# Patient Record
Sex: FEMALE | Race: WHITE | Employment: UNEMPLOYED | URBAN - METROPOLITAN AREA
[De-identification: names, ages, dates, MRNs, and addresses within clinical notes are randomized per-mention and may not be internally consistent; named-entity substitution may affect disease eponyms.]

---

## 2017-02-22 LAB
HBSAG, EXTERNAL: NORMAL
HIV, EXTERNAL: NORMAL
RUBELLA, EXTERNAL: NORMAL

## 2017-03-08 ENCOUNTER — HOSPITAL ENCOUNTER (EMERGENCY)
Age: 32
Discharge: HOME | End: 2017-03-08
Attending: EMERGENCY MEDICINE
Payer: MEDICAID

## 2017-03-08 ENCOUNTER — APPOINTMENT (OUTPATIENT)
Dept: ULTRASOUND IMAGING | Age: 32
End: 2017-03-08
Payer: MEDICAID

## 2017-03-08 VITALS
TEMPERATURE: 98.6 F | HEIGHT: 60 IN | HEART RATE: 89 BPM | OXYGEN SATURATION: 100 % | SYSTOLIC BLOOD PRESSURE: 103 MMHG | BODY MASS INDEX: 19.09 KG/M2 | DIASTOLIC BLOOD PRESSURE: 57 MMHG | RESPIRATION RATE: 18 BRPM | WEIGHT: 97.22 LBS

## 2017-03-08 DIAGNOSIS — F90.9 ATTENTION DEFICIT HYPERACTIVITY DISORDER (ADHD), UNSPECIFIED ADHD TYPE: ICD-10-CM

## 2017-03-08 DIAGNOSIS — O20.0 THREATENED MISCARRIAGE IN EARLY PREGNANCY: Primary | ICD-10-CM

## 2017-03-08 DIAGNOSIS — F41.9 ANXIETY: ICD-10-CM

## 2017-03-08 DIAGNOSIS — Z87.59 HISTORY OF MISCARRIAGE: ICD-10-CM

## 2017-03-08 PROCEDURE — 74011250637 HC RX REV CODE- 250/637: Performed by: PHYSICIAN ASSISTANT

## 2017-03-08 PROCEDURE — 76801 OB US < 14 WKS SINGLE FETUS: CPT

## 2017-03-08 PROCEDURE — 99283 EMERGENCY DEPT VISIT LOW MDM: CPT

## 2017-03-08 RX ORDER — ACETAMINOPHEN 500 MG
1000 TABLET ORAL ONCE
Status: COMPLETED | OUTPATIENT
Start: 2017-03-08 | End: 2017-03-08

## 2017-03-08 RX ORDER — DOXYLAMINE SUCCINATE AND PYRIDOXINE HYDROCHLORIDE, DELAYED RELEASE TABLETS 10 MG/10 MG 10; 10 MG/1; MG/1
TABLET, DELAYED RELEASE ORAL
COMMUNITY
End: 2017-10-09

## 2017-03-08 RX ADMIN — ACETAMINOPHEN 1000 MG: 500 TABLET, FILM COATED ORAL at 11:27

## 2017-03-08 NOTE — ED PROVIDER NOTES
HPI Comments: Elva Quiroz is a A4 28 y.o. female with pertinent PMHx of miscarriage x 2 and endometriosis presenting ambulatory to the ED c/o vaginal bleeding x last night. Pt states that she began having the vaginal bleeding shortly after sexual intercourse last night and states that she became concerned after beginning to pass clots this morning. Pt notes an associated symptom of headache. Pt states that her prior miscarriages were during her first trimester and were likely related to her hx of endometriosis. Pt states that her current symptoms are similar to her past miscarriages. Pt states that she has had a full OB/GYN work up and is being provided medications which control her nausea during her pregnancy. Pt specifically denies any vomiting or diarrhea. Pt also c/o a nonproductive cough x ~1 week. Pt denies any fevers/chills, chest pain or SOB. PCP: JASON Florez  OB/GYN: Sharon Cueto M.D. Social Hx: + tobacco use, - alcohol use, - illicit drug use    There are no other complaints, changes, or physical findings at this time. The history is provided by the patient. No  was used.         Past Medical History:   Diagnosis Date    Calculus of kidney     Dyspepsia and other specified disorders of function of stomach     GERD (gastroesophageal reflux disease)     Kidney disease     hx of kidney stone    Other ill-defined conditions(799.89)     kidney stone in pass,passed    Psychiatric disorder     ADHD       Past Surgical History:   Procedure Laterality Date    ABDOMEN SURGERY PROC UNLISTED  10/30/12    Laparoscopic cholecystectomy    HX APPENDECTOMY      HX CHOLECYSTECTOMY      HX DILATION AND CURETTAGE      HX GYN      miscarriage x2    HX GYN      C section    HX HEENT      wisdom teeth extraction    HX ORTHOPAEDIC      torn catiledge repair left knee    HX OTHER SURGICAL      HX WISDOM TEETH EXTRACTION      ND ANESTH,KNEE AREA SURGERY      UPPER GI ENDOSCOPY,BIOPSY  12/17/2015              Family History:   Problem Relation Age of Onset    Heart Disease Father        Social History     Social History    Marital status:      Spouse name: N/A    Number of children: N/A    Years of education: N/A     Occupational History    Not on file. Social History Main Topics    Smoking status: Current Every Day Smoker     Packs/day: 0.25     Years: 0.60    Smokeless tobacco: Never Used      Comment: about 6 cigarettes per day at present    Alcohol use No      Comment: rarely and not during pregnancy    Drug use: No    Sexual activity: Yes     Partners: Male     Birth control/ protection: None     Other Topics Concern    Not on file     Social History Narrative         ALLERGIES: Ambien [zolpidem]; Morphine; and Vicodin [hydrocodone-acetaminophen]    Review of Systems   Constitutional: Negative. Negative for chills and fever. Eyes: Negative. Respiratory: Positive for cough. Negative for shortness of breath and wheezing. Cardiovascular: Negative. Negative for chest pain. Gastrointestinal: Positive for nausea. Negative for abdominal pain, diarrhea and vomiting. Genitourinary: Positive for vaginal bleeding. Negative for difficulty urinating, dysuria and vaginal pain. Skin: Negative. Neurological: Positive for headaches. Psychiatric/Behavioral: Negative. All other systems reviewed and are negative. Vitals:    03/08/17 1029 03/08/17 1226   BP: 118/73 103/57   Pulse: 100 89   Resp: 18 18   Temp: 98.6 °F (37 °C)    SpO2: 100% 100%   Weight: 44.1 kg (97 lb 3.6 oz)    Height: 5' (1.524 m)             Physical Exam   Constitutional: She is oriented to person, place, and time. She appears well-developed and well-nourished. No distress. Tearful throughout exam   HENT:   Head: Normocephalic and atraumatic. Nose: Nose normal.   Eyes: Conjunctivae and EOM are normal. Right eye exhibits no discharge.  Left eye exhibits no discharge. No scleral icterus. Neck: Normal range of motion. Neck supple. No JVD present. No tracheal deviation present. No thyromegaly present. Cardiovascular: Normal rate, regular rhythm and normal heart sounds. Pulmonary/Chest: Effort normal and breath sounds normal. No respiratory distress. She has no wheezes. Abdominal: Soft. There is no tenderness. Musculoskeletal: Normal range of motion. She exhibits no edema. Lymphadenopathy:     She has no cervical adenopathy. Neurological: She is alert and oriented to person, place, and time. She exhibits normal muscle tone. Coordination normal.   Skin: Skin is warm and dry. She is not diaphoretic. Psychiatric: Her behavior is normal. Judgment normal. Her mood appears anxious. Nursing note and vitals reviewed. MDM  Number of Diagnoses or Management Options  History of miscarriage:   Threatened miscarriage in early pregnancy:   Diagnosis management comments: DDx: threatened , subchorionic hemorrhage       Amount and/or Complexity of Data Reviewed  Tests in the radiology section of CPT®: ordered and reviewed  Review and summarize past medical records: yes  Independent visualization of images, tracings, or specimens: yes    Patient Progress  Patient progress: stable    ED Course       Pelvic Exam  Date/Time: 3/8/2017 11:19 AM  Performed by: PA  Procedure duration:  15 minutes. Documented by:  JASON Garcia. Exam assisted by:  Wilson Elizalde RN. Type of exam performed: speculum. External genitalia appearance: normal.    Vaginal exam:  bleeding. Bleeding: mild  Cervical exam:  os closed. Patient tolerance: Patient tolerated the procedure well with no immediate complications        Procedure Note - Pelvic Exam:    11:07 AM  Performed by: Rodolfo Ahumada  Pelvic exam was performed using bimanual and speculum. Further findings noted under procedures tab. The procedure took 1-15 minutes, and pt tolerated well.   Written by Betti Leyden Cheryl Peralta ED Scribe, as dictated by [employer]. Progress Note:  12:59 PM  Pt and/or family have been updated on their results. Pt and/or pt's family are aware of the plan of care and are in agreement. Written by FAITH Mouraibdalila, as dictated by [employer]. IMAGING RESULTS:  US PREG UTS < 14 WKS SNGL   Final Result     INDICATION: threatened miscarriage first trimester , postcoital bleeding.     COMPARISON: Ultrasound of 7/28/2015     EXAM: Realtime transabdominal ultrasound of the female pelvis. Transvaginal  ultrasound was refused by the patient.     FINDINGS: The uterus measures 10.3 x 6.8 x 7.4 cm. A fundic intrauterine  pregnancy is demonstrated with demonstration of a single fetus showing positive  cardiac activity measuring 181 beats per minute. Crown-rump length measurement  of 3.05 cm yields an estimated gestational age of 10 weeks, 0 days +/- 6 days. There is a normal-appearing anterior placenta and normal appearing gestational  sac and amniotic sac fluid volume for dates. The cervix shows a normal length of  3.9 cm. No uterine or cervical mass is shown. The right ovary measures 3.3 x 2.2  x 2.1 cm and left ovary 2.9 x 1.1 x 2.9 cm without demonstration of adnexal  mass. No free pelvic fluid is demonstrated.     IMPRESSION  IMPRESSION: Viable 10 week single intrauterine pregnancy. MEDICATIONS GIVEN:  Medications   acetaminophen (TYLENOL) tablet 1,000 mg (1,000 mg Oral Given 3/8/17 1127)       IMPRESSION:  1. Threatened miscarriage in early pregnancy    2. History of miscarriage    3. Attention deficit hyperactivity disorder (ADHD), unspecified ADHD type    4. Anxiety        PLAN:  1. Current Discharge Medication List      CONTINUE these medications which have NOT CHANGED    Details   doxylamine-pyridoxine (DICLEGIS) 10-10 mg TbEC Take  by mouth. MME69/PFAO FUM/FOLIC ACID (PRENATAL PO) Take  by mouth.            2.   Follow-up Information     Follow up With Details Comments Contact Info    Ronald Godwin MD   [address] [facility]  [phone]      [facility] EMERGENCY DEPT  If symptoms worsen [address] [facility]  [address]  [facility]  [phone]        Return to ED if worse   DISCHARGE NOTE:  1:04 PM  The patient is ready for discharge. The patient's signs, symptoms, diagnosis, and discharge instructions have been discussed and the patient and/or family has conveyed their understanding. The patient and/or family is to follow up as recommended or return to the ER should their symptoms worsen. Plan has been discussed and the patient and/or family is in agreement. Written by Merari Knight, ED Scribe, as dictated by [employer]. Attestation: This note is prepared by Keyon العراقي. Susie Knight, acting as Scribe for Shaji Buitrago. RADHA Domínguez: The scribe's documentation has been prepared under my direction and personally reviewed by me in its entirety. I confirm that the note above accurately reflects all work, treatment, procedures, and medical decision making performed by me.

## 2017-03-08 NOTE — ED NOTES
Discharge instructions reviewed with pt by GRAHAM Nixon. Pt able to return/verbalize discharge instructions. Copy of discharge instructions given. Patient condition stable, respiratory status within normal limits, neuro status intact.  Ambulatory out of er, accompanied by significant other

## 2017-03-08 NOTE — ED NOTES
Assumed care of patient. Pt resting in position of comfort. Call bell within reach. Pt presents to ED with reports of being 10 weeks pregnant. Reports last night after having sexual intercourse she started with vaginal bleeding. Bleeding subsided and then this morning she coughed and \"I feel like I am passing something and it is still attached. Reports 4 pregnancies including this one with 1 living child. Having pressure in her pelvis area. Denies any fevers, vomiting.

## 2017-03-08 NOTE — ED NOTES
Pt back from [address]. Resting in position of comfort.  at bedside brought patient lunch.  Pt states headache subsided

## 2017-03-08 NOTE — ED NOTES
Assisted PA with pelvic exam. One small grape sized blood clot noted. Cervix is closed. Pt given ginger ale, saltines and medicated with tylenol.  Aware of plan of care

## 2017-03-08 NOTE — DISCHARGE INSTRUCTIONS
Threatened Miscarriage: Care Instructions  Your Care Instructions    Some women have light spotting or bleeding during the first 12 weeks of pregnancy. In some cases this is normal. Light spotting or bleeding can also be a sign of a possible loss of the pregnancy. This is called a threatened miscarriage. At this point, the doctor may not be able to tell if your vaginal bleeding is normal or is a sign of a miscarriage. In early pregnancy, things such as stress, exercise, and sex do not cause miscarriage. You may be worried or upset about the possibility of losing your pregnancy. But do not blame yourself. There is no treatment to stop a threatened miscarriage. If you do have a miscarriage, there was nothing you could have done to prevent it. A miscarriage usually means that the pregnancy is not developing normally. The doctor has checked you carefully, but problems can develop later. If you notice any problems or new symptoms, get medical treatment right away. Follow-up care is a key part of your treatment and safety. Be sure to make and go to all appointments, and call your doctor if you are having problems. It's also a good idea to know your test results and keep a list of the medicines you take. How can you care for yourself at home? · If you do have a miscarriage, you will probably have some vaginal bleeding for 1 to 2 weeks. Use pads instead of tampons. · Take acetaminophen (Tylenol) for cramps. Read and follow all instructions on the label. · Do not take two or more pain medicines at the same time unless the doctor told you to. Many pain medicines have acetaminophen, which is Tylenol. Too much acetaminophen (Tylenol) can be harmful. · Do not have sex until your doctor says it is okay. · Get lots of rest over the next several days. · You may do your normal activities if you feel well enough to do them. But do not do any heavy exercise until your doctor says it is okay.   · Eat a balanced diet that is high in iron and vitamin C. Foods rich in iron include red meat, shellfish, eggs, beans, and leafy green vegetables. Foods high in vitamin C include citrus fruits, tomatoes, and broccoli. Talk to your doctor about whether you need to take iron pills or a multivitamin. · Do not drink alcohol or use tobacco or illegal drugs. · Do not smoke. If you need help quitting, talk to your doctor about stop-smoking programs and medicines. These can increase your chances of quitting for good. When should you call for help? Call 911 anytime you think you may need emergency care. For example, call if:  · You have sudden, severe pain in your belly or pelvis. · You passed out (lost consciousness). · You have severe vaginal bleeding. Call your doctor now or seek immediate medical care if:  · You are dizzy or lightheaded, or you feel like you may faint. · You have new or increased pain in your belly or pelvis. · Your vaginal bleeding is getting worse. · You have increased pain in the vaginal area. · You have a fever. · You think you may have passed tissue. Save any tissue that you pass. Take it to your doctor's office as soon as you can. Watch closely for changes in your health, and be sure to contact your doctor if:  · You have new or worse vaginal discharge. · You do not get better as expected. Where can you learn more? Go to http://john-rekha.info/. Enter S755 in the search box to learn more about \"Threatened Miscarriage: Care Instructions. \"  Current as of: May 30, 2016  Content Version: 11.1  © 4591-8776 [employer]. Care instructions adapted under license by [employer] (which disclaims liability or warranty for this information). If you have questions about a medical condition or this instruction, always ask your healthcare professional. [address] any warranty or liability for your use of this information.

## 2017-07-13 LAB
ANTIBODY SCREEN, EXTERNAL: NORMAL
T. PALLIDUM, EXTERNAL: NORMAL

## 2017-09-05 LAB — GRBS, EXTERNAL: NORMAL

## 2017-10-06 ENCOUNTER — ANESTHESIA EVENT (OUTPATIENT)
Dept: LABOR AND DELIVERY | Age: 32
DRG: 542 | End: 2017-10-06
Payer: MEDICAID

## 2017-10-06 ENCOUNTER — ANESTHESIA (OUTPATIENT)
Dept: LABOR AND DELIVERY | Age: 32
DRG: 542 | End: 2017-10-06
Payer: MEDICAID

## 2017-10-06 ENCOUNTER — HOSPITAL ENCOUNTER (INPATIENT)
Age: 32
LOS: 3 days | Discharge: HOME | DRG: 542 | End: 2017-10-09
Attending: OBSTETRICS & GYNECOLOGY | Admitting: OBSTETRICS & GYNECOLOGY
Payer: MEDICAID

## 2017-10-06 PROBLEM — O42.90 ROM (RUPTURE OF MEMBRANES), PREMATURE: Status: ACTIVE | Noted: 2017-10-06

## 2017-10-06 LAB
A1 MICROGLOB PLACENTAL VAG QL: POSITIVE
BASOPHILS # BLD: 0 K/UL (ref 0–0.1)
BASOPHILS NFR BLD: 0 % (ref 0–1)
CONTROL LINE PRESENT?: YES
DAILY QC (YES/NO)?: YES
EOSINOPHIL # BLD: 0.2 K/UL (ref 0–0.4)
EOSINOPHIL NFR BLD: 2 % (ref 0–7)
ERYTHROCYTE [DISTWIDTH] IN BLOOD BY AUTOMATED COUNT: 12.9 % (ref 11.5–14.5)
EXPIRATION DATE: NORMAL
HCT VFR BLD AUTO: 35.1 % (ref 35–47)
HGB BLD-MCNC: 12 G/DL (ref 11.5–16)
INTERNAL NEGATIVE CONTROL: NEGATIVE
KIT LOT NO.: NORMAL
LYMPHOCYTES # BLD: 2.3 K/UL (ref 0.8–3.5)
LYMPHOCYTES NFR BLD: 25 % (ref 12–49)
MCH RBC QN AUTO: 32.6 PG (ref 26–34)
MCHC RBC AUTO-ENTMCNC: 34.2 G/DL (ref 30–36.5)
MCV RBC AUTO: 95.4 FL (ref 80–99)
MONOCYTES # BLD: 0.8 K/UL (ref 0–1)
MONOCYTES NFR BLD: 9 % (ref 5–13)
NEUTS SEG # BLD: 6 K/UL (ref 1.8–8)
NEUTS SEG NFR BLD: 64 % (ref 32–75)
PH, VAGINAL FLUID: 5 (ref 5–6.1)
PLATELET # BLD AUTO: 218 K/UL (ref 150–400)
RBC # BLD AUTO: 3.68 M/UL (ref 3.8–5.2)
WBC # BLD AUTO: 9.3 K/UL (ref 3.6–11)

## 2017-10-06 PROCEDURE — 99283 EMERGENCY DEPT VISIT LOW MDM: CPT

## 2017-10-06 PROCEDURE — 74011250637 HC RX REV CODE- 250/637: Performed by: OBSTETRICS & GYNECOLOGY

## 2017-10-06 PROCEDURE — 74011250636 HC RX REV CODE- 250/636: Performed by: OBSTETRICS & GYNECOLOGY

## 2017-10-06 PROCEDURE — 85025 COMPLETE CBC W/AUTO DIFF WBC: CPT | Performed by: OBSTETRICS & GYNECOLOGY

## 2017-10-06 PROCEDURE — 0KQM0ZZ REPAIR PERINEUM MUSCLE, OPEN APPROACH: ICD-10-PCS | Performed by: SPECIALIST

## 2017-10-06 PROCEDURE — 00HU33Z INSERTION OF INFUSION DEVICE INTO SPINAL CANAL, PERCUTANEOUS APPROACH: ICD-10-PCS | Performed by: ANESTHESIOLOGY

## 2017-10-06 PROCEDURE — 74011250636 HC RX REV CODE- 250/636

## 2017-10-06 PROCEDURE — 30233N1 TRANSFUSION OF NONAUTOLOGOUS RED BLOOD CELLS INTO PERIPHERAL VEIN, PERCUTANEOUS APPROACH: ICD-10-PCS | Performed by: SPECIALIST

## 2017-10-06 PROCEDURE — 77030011943

## 2017-10-06 PROCEDURE — 83986 ASSAY PH BODY FLUID NOS: CPT | Performed by: OBSTETRICS & GYNECOLOGY

## 2017-10-06 PROCEDURE — 3E0R3BZ INTRODUCTION OF ANESTHETIC AGENT INTO SPINAL CANAL, PERCUTANEOUS APPROACH: ICD-10-PCS | Performed by: ANESTHESIOLOGY

## 2017-10-06 PROCEDURE — 86900 BLOOD TYPING SEROLOGIC ABO: CPT | Performed by: OBSTETRICS & GYNECOLOGY

## 2017-10-06 PROCEDURE — 10907ZC DRAINAGE OF AMNIOTIC FLUID, THERAPEUTIC FROM PRODUCTS OF CONCEPTION, VIA NATURAL OR ARTIFICIAL OPENING: ICD-10-PCS | Performed by: OBSTETRICS & GYNECOLOGY

## 2017-10-06 PROCEDURE — 74011250636 HC RX REV CODE- 250/636: Performed by: ANESTHESIOLOGY

## 2017-10-06 PROCEDURE — 86923 COMPATIBILITY TEST ELECTRIC: CPT | Performed by: OBSTETRICS & GYNECOLOGY

## 2017-10-06 PROCEDURE — 84112 EVAL AMNIOTIC FLUID PROTEIN: CPT | Performed by: OBSTETRICS & GYNECOLOGY

## 2017-10-06 PROCEDURE — 77030007880 HC KT SPN EPDRL BBMI -B

## 2017-10-06 PROCEDURE — 75410000002 HC LABOR FEE PER 1 HR

## 2017-10-06 PROCEDURE — 0UQC7ZZ REPAIR CERVIX, VIA NATURAL OR ARTIFICIAL OPENING: ICD-10-PCS | Performed by: SPECIALIST

## 2017-10-06 PROCEDURE — 36415 COLL VENOUS BLD VENIPUNCTURE: CPT | Performed by: OBSTETRICS & GYNECOLOGY

## 2017-10-06 PROCEDURE — 65270000029 HC RM PRIVATE

## 2017-10-06 PROCEDURE — 0W3R7ZZ CONTROL BLEEDING IN GENITOURINARY TRACT, VIA NATURAL OR ARTIFICIAL OPENING: ICD-10-PCS | Performed by: SPECIALIST

## 2017-10-06 PROCEDURE — A4300 CATH IMPL VASC ACCESS PORTAL: HCPCS

## 2017-10-06 RX ORDER — FENTANYL CITRATE 50 UG/ML
100 INJECTION, SOLUTION INTRAMUSCULAR; INTRAVENOUS ONCE
Status: DISPENSED | OUTPATIENT
Start: 2017-10-06 | End: 2017-10-07

## 2017-10-06 RX ORDER — SODIUM CHLORIDE, SODIUM LACTATE, POTASSIUM CHLORIDE, CALCIUM CHLORIDE 600; 310; 30; 20 MG/100ML; MG/100ML; MG/100ML; MG/100ML
125 INJECTION, SOLUTION INTRAVENOUS CONTINUOUS
Status: DISCONTINUED | OUTPATIENT
Start: 2017-10-06 | End: 2017-10-07

## 2017-10-06 RX ORDER — TERBUTALINE SULFATE 1 MG/ML
0.25 INJECTION SUBCUTANEOUS AS NEEDED
Status: DISCONTINUED | OUTPATIENT
Start: 2017-10-06 | End: 2017-10-07 | Stop reason: HOSPADM

## 2017-10-06 RX ORDER — FENTANYL CITRATE 50 UG/ML
INJECTION, SOLUTION INTRAMUSCULAR; INTRAVENOUS
Status: DISPENSED
Start: 2017-10-06 | End: 2017-10-07

## 2017-10-06 RX ORDER — BUPIVACAINE HYDROCHLORIDE 2.5 MG/ML
10 INJECTION, SOLUTION EPIDURAL; INFILTRATION; INTRACAUDAL ONCE
Status: DISPENSED | OUTPATIENT
Start: 2017-10-06 | End: 2017-10-07

## 2017-10-06 RX ORDER — ACETAMINOPHEN 500 MG
1000 TABLET ORAL
COMMUNITY
End: 2017-10-09

## 2017-10-06 RX ORDER — FENTANYL/BUPIVACAINE/NS/PF 2-1250MCG
1-16 PREFILLED PUMP RESERVOIR EPIDURAL CONTINUOUS
Status: DISCONTINUED | OUTPATIENT
Start: 2017-10-06 | End: 2017-10-07

## 2017-10-06 RX ORDER — FENTANYL CITRATE 50 UG/ML
50 INJECTION, SOLUTION INTRAMUSCULAR; INTRAVENOUS
Status: DISCONTINUED | OUTPATIENT
Start: 2017-10-06 | End: 2017-10-07 | Stop reason: HOSPADM

## 2017-10-06 RX ORDER — BUPIVACAINE HYDROCHLORIDE 5 MG/ML
INJECTION, SOLUTION EPIDURAL; INTRACAUDAL
Status: DISPENSED
Start: 2017-10-06 | End: 2017-10-07

## 2017-10-06 RX ORDER — BUPIVACAINE HYDROCHLORIDE 5 MG/ML
INJECTION, SOLUTION EPIDURAL; INTRACAUDAL AS NEEDED
Status: DISCONTINUED | OUTPATIENT
Start: 2017-10-06 | End: 2017-10-07 | Stop reason: HOSPADM

## 2017-10-06 RX ORDER — FENTANYL CITRATE 50 UG/ML
INJECTION, SOLUTION INTRAMUSCULAR; INTRAVENOUS AS NEEDED
Status: DISCONTINUED | OUTPATIENT
Start: 2017-10-06 | End: 2017-10-07 | Stop reason: HOSPADM

## 2017-10-06 RX ORDER — ACETAMINOPHEN 325 MG/1
650 TABLET ORAL
Status: DISCONTINUED | OUTPATIENT
Start: 2017-10-06 | End: 2017-10-09 | Stop reason: HOSPADM

## 2017-10-06 RX ORDER — NALOXONE HYDROCHLORIDE 0.4 MG/ML
0.4 INJECTION, SOLUTION INTRAMUSCULAR; INTRAVENOUS; SUBCUTANEOUS AS NEEDED
Status: DISCONTINUED | OUTPATIENT
Start: 2017-10-06 | End: 2017-10-07 | Stop reason: HOSPADM

## 2017-10-06 RX ORDER — BUPIVACAINE HYDROCHLORIDE 2.5 MG/ML
INJECTION, SOLUTION EPIDURAL; INFILTRATION; INTRACAUDAL AS NEEDED
Status: DISCONTINUED | OUTPATIENT
Start: 2017-10-06 | End: 2017-10-07 | Stop reason: HOSPADM

## 2017-10-06 RX ORDER — OXYTOCIN IN 5 % DEXTROSE 30/500 ML
2-25 PLASTIC BAG, INJECTION (ML) INTRAVENOUS
Status: DISCONTINUED | OUTPATIENT
Start: 2017-10-06 | End: 2017-10-07 | Stop reason: HOSPADM

## 2017-10-06 RX ORDER — LIDOCAINE HYDROCHLORIDE AND EPINEPHRINE 15; 5 MG/ML; UG/ML
INJECTION, SOLUTION EPIDURAL AS NEEDED
Status: DISCONTINUED | OUTPATIENT
Start: 2017-10-06 | End: 2017-10-07 | Stop reason: HOSPADM

## 2017-10-06 RX ADMIN — BUPIVACAINE HYDROCHLORIDE 8 ML: 5 INJECTION, SOLUTION EPIDURAL; INTRACAUDAL at 21:10

## 2017-10-06 RX ADMIN — BUPIVACAINE HYDROCHLORIDE 5 ML: 2.5 INJECTION, SOLUTION EPIDURAL; INFILTRATION; INTRACAUDAL at 13:55

## 2017-10-06 RX ADMIN — FENTANYL 0.2 MG/100ML-BUPIV 0.125%-NACL 0.9% EPIDURAL INJ 10 ML/HR: 2/0.125 SOLUTION at 14:00

## 2017-10-06 RX ADMIN — SODIUM CHLORIDE, SODIUM LACTATE, POTASSIUM CHLORIDE, AND CALCIUM CHLORIDE 125 ML/HR: 600; 310; 30; 20 INJECTION, SOLUTION INTRAVENOUS at 12:55

## 2017-10-06 RX ADMIN — FENTANYL CITRATE 100 MCG: 50 INJECTION, SOLUTION INTRAMUSCULAR; INTRAVENOUS at 13:52

## 2017-10-06 RX ADMIN — FENTANYL CITRATE 100 MCG: 50 INJECTION, SOLUTION INTRAMUSCULAR; INTRAVENOUS at 21:10

## 2017-10-06 RX ADMIN — SODIUM CHLORIDE, SODIUM LACTATE, POTASSIUM CHLORIDE, AND CALCIUM CHLORIDE 125 ML/HR: 600; 310; 30; 20 INJECTION, SOLUTION INTRAVENOUS at 08:00

## 2017-10-06 RX ADMIN — FENTANYL CITRATE 50 MCG: 50 INJECTION, SOLUTION INTRAMUSCULAR; INTRAVENOUS at 12:40

## 2017-10-06 RX ADMIN — LIDOCAINE HYDROCHLORIDE AND EPINEPHRINE 5 ML: 15; 5 INJECTION, SOLUTION EPIDURAL at 13:50

## 2017-10-06 RX ADMIN — Medication 2 MILLI-UNITS/MIN: at 11:12

## 2017-10-06 RX ADMIN — FENTANYL 0.2 MG/100ML-BUPIV 0.125%-NACL 0.9% EPIDURAL INJ 10 ML/HR: 2/0.125 SOLUTION at 21:16

## 2017-10-06 RX ADMIN — ACETAMINOPHEN 650 MG: 325 TABLET, FILM COATED ORAL at 17:41

## 2017-10-06 NOTE — H&P
EDC:10/04/2017  EGA: 40 weeks, 2 days      Primary Provider:  Jyoti Arreguin MD    CC:  ROM. History of Present Illness:  patient presents with leaking pink fluid since 5am  some irregular contractions      Patient's Prenatal Care with Doctor of Record Vanessa Cabezas MD Notable For -    Post dates  Normal pregnancy multigravida, ant. plac. , xy  Smoker encouraged to quit pregnant  Prev LTCS, breech, 8-6, xy,  desires  consent done  Previous depression postpartum          Impression & Recommendations:    Problem # 1:  ROM at term (GJF-534.15) (MOY45-K91.02)  +ROM by amnisure (0500)  GBS negative  EFW 36.5, 32%ile  patient desires no epidural  will wait until 6hours s/p ROM to start pitocin    *patient has been counseld for [facility] and signed consent in office          Past Medical History:     Reviewed history from 2015 and no changes required:        ADHD        Anxiety        chronic pelvic pain yrs        c-scope  neg        back pain    Past Surgical History:     Reviewed history from 2016 and no changes required:        left knee-othroscopic        [place] Teeth        Appendectomy         Cholecystectomy 10/'12        SAB        MAB-D&C         Laparoscopy 3/13- pelvic pain, Endometriosis, laser lysis (Dr. Pb Esposito)        Low Transverse  Section male Cecille Grimes [id number]        diagnostic l'scopy 10/'14 Johanna, no path    Family History Summary:      Reviewed history Last on 2017 and no changes required:10/06/2017      General Comments - FH:  Family history transferred to [address] [place] [address] And [address] [place] [address]      Social History:     Reviewed history from 2017 and no changes required:          \" [employer] \"        retail        Review of Systems        See HPI    Except as noted in the HPI, the review of systems is negative for General, Breast, , Resp, GI, Endo, MS, Psych and Heme.     Allergies    MORPHINE (Moderate)  * AMBIEN (Moderate)      Medications Removed from Medication List        Flowsheet View for Follow-up Visit     Estimated weeks of        gestation:  36 2/7     Preceptor:  son     Comment:  L&D ROM          Physical Exam     General           General appearance:  no acute distress    Head           Inspection:   normal    Chest           Lungs:  clear to auscultation          Heart:  regular rate and rhythm    Psych           Orientation:  oriented to time, place, and person          Mood:  no appearance of anxiety, depression, or agitation    Abdomen           Abdomen:  gravid            Impression & Recommendations:    Problem # 1:  ROM at term (YYX-808.81) (DEC17-I49.02)  +ROM by amnisure (0500)  GBS negative  EFW 36.5, 32%ile  patient desires no epidural  will wait until 6hours s/p ROM to start pitocin    *patient has been counseld for TOLAC and signed consent in office      Medications (at conclusion of this visit)    02/22/2017 PRENATAL VITAMIN TABS (PRENATAL VIT-FE FUMARATE-FA TABS)           LABORATORY DATA   TEST DATE RESULT   Group B Strep culture 09/05/2017 Negative                                   (Group B Strep Culture Result Field)   Blood Type 02/22/2017 A                                             (Blood Type Result Field)   Rh 02/22/2017 Positive                                   (Rh Result Field)   Rhogam Inj Given 01/17/2014 *   Tdap Vaccine Given 07/13/2017 Vacc.  [address]   Antibody Screen 07/13/2017 Negative   Rubella  [facility] Reference Ranges On or After 3/10/14                  <0.90              Non-immune      0.90 - 0.99     Equivocal      >0.99              Immune    [facility] Reference Ranges  Before 3/10/14           <5                 Non-immune             5 - 9               Equivocal            >9                 Immune  [facility] Reference Ranges       < Or = 0.90       Negative             0.91-1.09          Equivocal            > Or = 1.10       Positive   02/22/2017     3.47     TPA (T Pallidum Antibodies) 07/13/2017 Negative   Serology (RPR) 01/17/2014 *   HBsAg 02/22/2017 Negative   HIV 02/22/2017 Non Reactive   Hemoglobin 07/13/2017 11.5   Hematocrit 07/13/2017 35.0   Platelets 90/22/2475 193 X10E3/UL   TSH 05/24/2016 1.810   Urine Culture 02/22/2017 Negative   GC DNA Probe 02/22/2017 Negative   Chlamydia DNA 02/22/2017 Negative   PAP 07/23/2015 NIL   Flu Vaccine Given 09/25/2017 Vacc. [address]   HGBA1C 01/17/2014 *   HGB Electro     T4, Free 07/23/2015 1.12   BG Fasting 01/17/2014 *   GTT 1H 50G 07/13/2017 94   GTT 1H 100G 01/17/2014 *   GTT 2H 100G 01/17/2014 *   GTT 3H 100G 01/17/2014 *   Glucose Plasma 01/17/2014 *   CF Accept or Decline 02/22/2017 declined   CF Screen Result 06/13/2013 Negative   Nuchal Trans 05/17/2017 3.16^3. 16 mm&millimeters   AFP Only     Tetra     AFP Serum 02/22/2017 declined   CVS 02/22/2017 declined   AFP Amniotic 01/17/2014 *   Amnio Karyo 02/22/2017 declined   FISH 01/17/2014 *   GC Culture 01/17/2014 *   Chlamydia Cult 01/17/2014 *   Ureaplasma     Mycoplasma     WBC 02/22/2017 10.0 X10E3/UL   RBC 02/22/2017 4.17 X10E6/UL   MCV 02/22/2017 94   MCH 02/22/2017 31.2   MCHC RBC 02/22/2017 33.3     ULTRASOUND DATA   TEST DATE RESULT   Estimated Fetal Weight 09/11/2017 0756.69996600^7414 g&grams                                     Weight % 09/11/2017 31^31% %&percent                                                CADENCE 09/11/2017 10.5^10.5 cm&centimeters                    BPP 09/11/2017 8^8 [n/a]&Not applicable   Cervical Length (mm) 05/18/2016 31.000           Electronically signed by Jesse Hugo MD on 10/06/2017 at 8:18 AM    ________________________________________________________________________

## 2017-10-06 NOTE — PROGRESS NOTES
7324 Bedside report received from Aron Gomez, RN. Pt breathing with UC's. Plan for admission due to positive Amnisure. 7627 Dr Ada Baeza at bedside, discussing plan of care with pt, SVE not done. Plan to continue monitoring. 1001 Pt transferred to labor room 4.  1225 SVE per pt request 2/80/-2, posterior to the patient's left.    46 Dr Idalia Rangel at bedside to place epidural.

## 2017-10-06 NOTE — PROGRESS NOTES
1600: Bedside shift change report given to Kathryn Yi RN (oncoming nurse) by Isaac Cordova RN (offgoing nurse). Report included the following information SBAR, MAR and Recent Results. 2048: Dr. Daniel Worrell at the bedside assessing patient and [address]. 2130: Bedside shift change report given to UVALDO acharya RN (oncoming nurse) by Kathyrn Yi RN (offgoing nurse). Report included the following information SBAR, MAR and Recent Results.

## 2017-10-06 NOTE — IP AVS SNAPSHOT
[address] 
[phone] Patient: Emily Nelson MRN: [id number] MTX:[id number] You are allergic to the following Allergen Reactions Ambien (Zolpidem) Other (comments) \"Hallucinations and black outs\" Morphine Rash Recent Documentation Height Weight Breastfeeding? BMI OB Status Smoking Status 1.524 m 56.2 kg Unknown 24.22 kg/m2 Recent pregnancy Current Every Day Smoker Unresulted Labs Order Current Status PH BY NITRAZINE, POC Preliminary result Emergency Contacts Name Discharge Info Relation Home Work Mobile JoseSreekanth DISCHARGE CAREGIVER [3] Boyfriend [17] [phone] About your hospitalization You were admitted on:  October 6, 2017 You last received care in the:  [facility]  You were discharged on:  October 9, 2017 Unit phone number:  [phone] Why you were hospitalized Your primary diagnosis was:  Not on File Your diagnoses also included:  Rom (Rupture Of Membranes), Premature, Atonic Postpartum Hemorrhage, History Of Postpartum Depression Providers Seen During Your Hospitalizations Provider Role Specialty Primary office phone Jose Kelsey MD Attending Provider Obstetrics & Gynecology [phone] Your Primary Care Physician (PCP) Primary Care Physician Office Phone Office Fax NONE ** None ** ** None ** Follow-up Information Follow up With Details Comments Contact Info Jose Kelsey MD   [address] [id number] [phone] Current Discharge Medication List  
  
START taking these medications Dose & Instructions Dispensing Information Comments Morning Noon Evening Bedtime  
 docusate sodium 100 mg capsule Commonly known as:  Annika Schultz Your last dose was: Your next dose is: Dose:  100 mg Take 1 Cap by mouth two (2) times daily as needed for Constipation. Quantity:  60 Cap Refills:  0 HYDROmorphone 2 mg tablet Commonly known as:  DILAUDID Your last dose was: Your next dose is:    
   
   
 Dose:  2 mg Take 1 Tab by mouth every four (4) hours as needed. Max Daily Amount: 12 mg. Quantity:  12 Tab Refills:  0  
     
   
   
   
  
 ibuprofen 600 mg tablet Commonly known as:  MOTRIN Your last dose was: Your next dose is:    
   
   
 Dose:  600 mg Take 1 Tab by mouth every six (6) hours as needed for Pain. Quantity:  40 Tab Refills:  0  
     
   
   
   
  
 ondansetron 4 mg disintegrating tablet Commonly known as:  ZOFRAN ODT Your last dose was: Your next dose is:    
   
   
 Dose:  4 mg Take 1 Tab by mouth every six (6) hours as needed. Quantity:  30 Tab Refills:  0  
     
   
   
   
  
 sertraline 25 mg tablet Commonly known as:  ZOLOFT Your last dose was: Your next dose is:    
   
   
 Dose:  25 mg Take 1 Tab by mouth daily. Quantity:  90 Tab Refills:  12 CONTINUE these medications which have NOT CHANGED Dose & Instructions Dispensing Information Comments Morning Noon Evening Bedtime PRENATAL PO Your last dose was: Your next dose is: Take  by mouth. Refills:  0 STOP taking these medications DICLEGIS 10-10 mg Tbec DR tablet Generic drug:  doxylamine-pyridoxine (vit B6) TYLENOL EXTRA STRENGTH 500 mg tablet Generic drug:  acetaminophen Where to Get Your Medications Information on where to get these meds will be given to you by the nurse or doctor. ! Ask your nurse or doctor about these medications  
  docusate sodium 100 mg capsule HYDROmorphone 2 mg tablet  
 ibuprofen 600 mg tablet ondansetron 4 mg disintegrating tablet  
 sertraline 25 mg tablet Discharge Instructions After Your Delivery (the Postpartum Period): Care Instructions Your Care Instructions Congratulations on the birth of your baby. Like pregnancy, the  period can be a time of excitement, monica, and exhaustion. You may look at your wondrous little baby and feel happy. You may also be overwhelmed by your new sleep hours and new responsibilities. At first, babies often sleep during the days and are awake at night. They do not have a pattern or routine. They may make sudden gasps, jerk themselves awake, or look like they have crossed eyes. These are all normal, and they may even make you smile. In these first weeks after delivery, try to take good care of yourself. It may take 4 to 6 weeks to feel like yourself again, and possibly longer if you had a  birth. You will likely feel very tired for several weeks. Your days will be full of ups and downs, but lots of monica as well. Follow-up care is a key part of your treatment and safety. Be sure to make and go to all appointments, and call your doctor if you are having problems. It's also a good idea to know your test results and keep a list of the medicines you take. How can you care for yourself at home? Take care of your body after delivery · Use pads instead of tampons for the bloody flow that may last as long as 2 weeks. · Ease cramps with ibuprofen (Advil, Motrin). · Ease soreness of hemorrhoids and the area between your vagina and rectum with ice compresses or witch hazel pads. · Ease constipation by drinking lots of fluid and eating high-fiber foods. Ask your doctor about over-the-counter stool softeners. · Cleanse yourself with a gentle squeeze of warm water from a bottle instead of wiping with toilet paper. · Take a sitz bath in warm water several times a day. · Wear a good nursing bra. Ease sore and swollen breasts with warm, wet washcloths. · If you are not breastfeeding, use ice rather than heat for breast soreness. · Your period may not start for several months if you are breastfeeding. You may bleed more, and longer at first, than you did before you got pregnant. · Wait until you are healed (about 4 to 6 weeks) before you have sexual intercourse. Your doctor will tell you when it is okay to have sex. · Try not to travel with your baby for 5 or 6 weeks. If you take a long car trip, make frequent stops to walk around and stretch. Avoid exhaustion · Rest every day. Try to nap when your baby naps. · Ask another adult to be with you for a few days after delivery. · Plan for  if you have other children. · Stay flexible so you can eat at odd hours and sleep when you need to. Both you and your baby are making new schedules. · Plan small trips to get out of the house. Change can make you feel less tired. · Ask for help with housework, cooking, and shopping. Remind yourself that your job is to care for your baby. Know about help for postpartum depression · \"Baby blues\" are common for the first 1 to 2 weeks after birth. You may cry or feel sad or irritable for no reason. · Rest whenever you can. Being tired makes it harder to handle your emotions. · Go for walks with your baby. · Talk to your partner, friends, and family about your feelings. · If your symptoms last for more than a few weeks, or if you feel very depressed, ask your doctor for help. · Postpartum depression can be treated. Support groups and counseling can help. Sometimes medicine can also help. Stay healthy · Eat healthy foods so you have more energy, make good breast milk, and lose extra baby pounds. · If you breastfeed, avoid alcohol and drugs. Stay smoke-free. If you quit during pregnancy, congratulations. · Start daily exercise after 4 to 6 weeks, but rest when you feel tired. · Learn exercises to tone your belly. Do Kegel exercises to regain strength in your pelvic muscles. You can do these exercises while you stand or sit. ¨ Squeeze the same muscles you would use to stop your urine. Your belly and thighs should not move. ¨ Hold the squeeze for 3 seconds, and then relax for 3 seconds. ¨ Start with 3 seconds. Then add 1 second each week until you are able to squeeze for 10 seconds. ¨ Repeat the exercise 10 to 15 times for each session. Do three or more sessions each day. · Find a class for new mothers and new babies that has an exercise time. · If you had a  birth, give yourself a bit more time before you exercise, and be careful. When should you call for help? Call 911 anytime you think you may need emergency care. For example, call if: 
· You passed out (lost consciousness). Call your doctor now or seek immediate medical care if: 
· You have severe vaginal bleeding. This means you are passing blood clots and soaking through a pad each hour for 2 or more hours. · You are dizzy or lightheaded, or you feel like you may faint. · You have a fever. · You have new belly pain, or your pain gets worse. Watch closely for changes in your health, and be sure to contact your doctor if: 
· Your vaginal bleeding seems to be getting heavier. · You have new or worse vaginal discharge. · You feel sad, anxious, or hopeless for more than a few days. · You do not get better as expected. Where can you learn more? Go to http://john-rekha.info/. Enter A461 in the search box to learn more about \"After Your Delivery (the Postpartum Period): Care Instructions. \" Current as of: 2017 Content Version: 11.3 © 6630-1040 [employer]. Care instructions adapted under license by [employer] (which disclaims liability or warranty for this information).  If you have questions about a medical condition or this instruction, always ask your healthcare professional. [place] [address] any warranty or liability for your use of this information. Discharge Orders None Endoart Announcement We are excited to announce that we are making your provider's discharge notes available to you in Endoart. You will see these notes when they are completed and signed by the physician that discharged you from your recent hospital stay. If you have any questions or concerns about any information you see in Endoart, please call the Health Information Department where you were seen or reach out to your Primary Care Provider for more information about your plan of care. Introducing [facility]! Britton Chan introduces Endoart patient portal. Now you can access parts of your medical record, email your doctor's office, and request medication refills online. 1. In your internet browser, go to [email or web address] 2. Click on the First Time User? Click Here link in the Sign In box. You will see the New Member Sign Up page. 3. Enter your Endoart Access Code exactly as it appears below. You will not need to use this code after youve completed the sign-up process. If you do not sign up before the expiration date, you must request a new code. · Endoart Access Code: [email or web address] Expires: 1/7/2018 11:05 AM 
 
4. Enter the last four digits of your Social Security Number (xxxx) and Date of Birth (mm/dd/yyyy) as indicated and click Submit. You will be taken to the next sign-up page. 5. Create a Endoart ID. This will be your Endoart login ID and cannot be changed, so think of one that is secure and easy to remember. 6. Create a Endoart password. You can change your password at any time. 7. Enter your Password Reset Question and Answer. This can be used at a later time if you forget your password. 8. Enter your e-mail address.  You will receive e-mail notification when new information is available in apomiot. 9. Click Sign Up. You can now view and download portions of your medical record. 10. Click the Download Summary menu link to download a portable copy of your medical information. If you have questions, please visit the Frequently Asked Questions section of the [employer] website. Remember, [employer] is NOT to be used for urgent needs. For medical emergencies, dial 911. Now available from your iPhone and Android! General Information Please provide this summary of care documentation to your next provider. Patient Signature:  ____________________________________________________________ Date:  ____________________________________________________________  
  
[facility] Provider Signature:  ____________________________________________________________ Date:  ____________________________________________________________

## 2017-10-06 NOTE — ANESTHESIA PROCEDURE NOTES
Epidural Block    Performed by: Traci Kramer  Authorized by: Jillian GANT     Pre-Procedure  Indication: labor epidural    Preanesthetic Checklist: patient identified, risks and benefits discussed, patient being monitored and timeout performed      Epidural:   Patient position:  Left lateral decubitus  Prep region:  Lumbar  Prep: Betadine    Location:  L2-3    Needle and Epidural Catheter:   Needle Type:  Tuohy  Needle Gauge:  18 G  Injection Technique:  Loss of resistance using air and loss of resistance using saline  Attempts:  1  Catheter Size:  20 G  Catheter at Skin Depth (cm):  10  Depth in Epidural Space (cm):  5  Events: no blood with aspiration, no cerebrospinal fluid with aspiration, no paresthesia and negative aspiration test    Test Dose:  Negative and lidocaine 1.5% w/ epi    Assessment:   Catheter Secured:  Tegaderm and tape  Insertion:  Uncomplicated  Patient tolerance:  Patient tolerated the procedure well with no immediate complications

## 2017-10-06 NOTE — PROGRESS NOTES
6802 Pt presented with complaint of ruptured membranes service of Dr Wally Leung no fluid noted.    0703 nitrazene negative    0727 amnisure done positive    0735 Bedside shift change report given to SHELLY Hall (oncoming nurse) by Javier Arreola Rn (offgoing nurse). Report included the following information SBAR.

## 2017-10-06 NOTE — PROGRESS NOTES
Labor Progress Note  Patient seen, fetal heart rate and contraction pattern evaluated. Comfortable w epidural    VSS, pitocin @ 4  Fetal Heart Rate: moderate variability  Accelerations: yes  Decelerations: none  Uterine Activity: q3min  Cervical Exam: 3/90/0    Assessment/Plan:  Reassuring fetal status.    Increase pitocin per protocol

## 2017-10-06 NOTE — ANESTHESIA PREPROCEDURE EVALUATION
Anesthetic History   No history of anesthetic complications            Review of Systems / Medical History  Patient summary reviewed, nursing notes reviewed and pertinent labs reviewed    Pulmonary  Within defined limits                 Neuro/Psych         Psychiatric history     Cardiovascular  Within defined limits                Exercise tolerance: >4 METS     GI/Hepatic/Renal     GERD           Endo/Other  Within defined limits           Other Findings              Physical Exam    Airway  Mallampati: II  TM Distance: 4 - 6 cm  Neck ROM: normal range of motion   Mouth opening: Normal     Cardiovascular  Regular rate and rhythm,  S1 and S2 normal,  no murmur, click, rub, or gallop             Dental  No notable dental hx       Pulmonary  Breath sounds clear to auscultation               Abdominal  GI exam deferred       Other Findings            Anesthetic Plan    ASA: 2  Anesthesia type: epidural            Anesthetic plan and risks discussed with: Patient

## 2017-10-06 NOTE — IP AVS SNAPSHOT
[address] 
[phone] Patient: Loretta Xie MRN: [id number] KIS:0/98/7581 Current Discharge Medication List  
  
START taking these medications Dose & Instructions Dispensing Information Comments Morning Noon Evening Bedtime  
 docusate sodium 100 mg capsule Commonly known as:  Ltanya Julio Your last dose was: Your next dose is:    
   
   
 Dose:  100 mg Take 1 Cap by mouth two (2) times daily as needed for Constipation. Quantity:  60 Cap Refills:  0 HYDROmorphone 2 mg tablet Commonly known as:  DILAUDID Your last dose was: Your next dose is:    
   
   
 Dose:  2 mg Take 1 Tab by mouth every four (4) hours as needed. Max Daily Amount: 12 mg. Quantity:  12 Tab Refills:  0  
     
   
   
   
  
 ibuprofen 600 mg tablet Commonly known as:  MOTRIN Your last dose was: Your next dose is:    
   
   
 Dose:  600 mg Take 1 Tab by mouth every six (6) hours as needed for Pain. Quantity:  40 Tab Refills:  0  
     
   
   
   
  
 ondansetron 4 mg disintegrating tablet Commonly known as:  ZOFRAN ODT Your last dose was: Your next dose is:    
   
   
 Dose:  4 mg Take 1 Tab by mouth every six (6) hours as needed. Quantity:  30 Tab Refills:  0  
     
   
   
   
  
 sertraline 25 mg tablet Commonly known as:  ZOLOFT Your last dose was: Your next dose is:    
   
   
 Dose:  25 mg Take 1 Tab by mouth daily. Quantity:  90 Tab Refills:  12 CONTINUE these medications which have NOT CHANGED Dose & Instructions Dispensing Information Comments Morning Noon Evening Bedtime PRENATAL PO Your last dose was: Your next dose is: Take  by mouth. Refills:  0 STOP taking these medications DICLEGIS 10-10 mg Tbec DR tablet Generic drug:  doxylamine-pyridoxine (vit B6) TYLENOL EXTRA STRENGTH 500 mg tablet Generic drug:  acetaminophen Where to Get Your Medications Information on where to get these meds will be given to you by the nurse or doctor. ! Ask your nurse or doctor about these medications  
  docusate sodium 100 mg capsule HYDROmorphone 2 mg tablet  
 ibuprofen 600 mg tablet  
 ondansetron 4 mg disintegrating tablet  
 sertraline 25 mg tablet

## 2017-10-07 ENCOUNTER — ANESTHESIA (OUTPATIENT)
Dept: LABOR AND DELIVERY | Age: 32
DRG: 542 | End: 2017-10-07
Payer: MEDICAID

## 2017-10-07 ENCOUNTER — ANESTHESIA EVENT (OUTPATIENT)
Dept: LABOR AND DELIVERY | Age: 32
DRG: 542 | End: 2017-10-07
Payer: MEDICAID

## 2017-10-07 PROBLEM — Z86.59 HISTORY OF POSTPARTUM DEPRESSION: Status: ACTIVE | Noted: 2017-10-07

## 2017-10-07 PROBLEM — Z87.59 HISTORY OF POSTPARTUM DEPRESSION: Status: ACTIVE | Noted: 2017-10-07

## 2017-10-07 LAB
ERYTHROCYTE [DISTWIDTH] IN BLOOD BY AUTOMATED COUNT: 13.1 % (ref 11.5–14.5)
HCT VFR BLD AUTO: 30.9 % (ref 35–47)
HCT VFR BLD AUTO: 35 % (ref 35–47)
HGB BLD-MCNC: 10.6 G/DL (ref 11.5–16)
HGB BLD-MCNC: 11.9 G/DL (ref 11.5–16)
MCH RBC QN AUTO: 32.3 PG (ref 26–34)
MCHC RBC AUTO-ENTMCNC: 34 G/DL (ref 30–36.5)
MCV RBC AUTO: 95.1 FL (ref 80–99)
PLATELET # BLD AUTO: 253 K/UL (ref 150–400)
RBC # BLD AUTO: 3.68 M/UL (ref 3.8–5.2)
WBC # BLD AUTO: 22 K/UL (ref 3.6–11)

## 2017-10-07 PROCEDURE — 76010000388 HC LDRP PROCEDURE 1 TO 1.5 HR: Performed by: OBSTETRICS & GYNECOLOGY

## 2017-10-07 PROCEDURE — 76060000078 HC EPIDURAL ANESTHESIA: Performed by: OBSTETRICS & GYNECOLOGY

## 2017-10-07 PROCEDURE — 74011250636 HC RX REV CODE- 250/636

## 2017-10-07 PROCEDURE — 75410000003 HC RECOV DEL/VAG/CSECN EA 0.5 HR

## 2017-10-07 PROCEDURE — P9016 RBC LEUKOCYTES REDUCED: HCPCS | Performed by: OBSTETRICS & GYNECOLOGY

## 2017-10-07 PROCEDURE — 74011250636 HC RX REV CODE- 250/636: Performed by: SPECIALIST

## 2017-10-07 PROCEDURE — 74011250636 HC RX REV CODE- 250/636: Performed by: OBSTETRICS & GYNECOLOGY

## 2017-10-07 PROCEDURE — 85018 HEMOGLOBIN: CPT

## 2017-10-07 PROCEDURE — 65410000002 HC RM PRIVATE OB

## 2017-10-07 PROCEDURE — 75410000000 HC DELIVERY VAGINAL/SINGLE

## 2017-10-07 PROCEDURE — 74011000250 HC RX REV CODE- 250

## 2017-10-07 PROCEDURE — 85027 COMPLETE CBC AUTOMATED: CPT | Performed by: OBSTETRICS & GYNECOLOGY

## 2017-10-07 PROCEDURE — 85018 HEMOGLOBIN: CPT | Performed by: SPECIALIST

## 2017-10-07 PROCEDURE — 36415 COLL VENOUS BLD VENIPUNCTURE: CPT | Performed by: OBSTETRICS & GYNECOLOGY

## 2017-10-07 PROCEDURE — 74011250637 HC RX REV CODE- 250/637: Performed by: OBSTETRICS & GYNECOLOGY

## 2017-10-07 PROCEDURE — 74011250636 HC RX REV CODE- 250/636: Performed by: ANESTHESIOLOGY

## 2017-10-07 PROCEDURE — 74011250637 HC RX REV CODE- 250/637: Performed by: SPECIALIST

## 2017-10-07 PROCEDURE — 74011000250 HC RX REV CODE- 250: Performed by: OBSTETRICS & GYNECOLOGY

## 2017-10-07 PROCEDURE — 75410000003 HC RECOV DEL/VAG/CSECN EA 0.5 HR: Performed by: OBSTETRICS & GYNECOLOGY

## 2017-10-07 PROCEDURE — 36430 TRANSFUSION BLD/BLD COMPNT: CPT

## 2017-10-07 PROCEDURE — 76060000078 HC EPIDURAL ANESTHESIA

## 2017-10-07 PROCEDURE — 77030020061 HC IV BLD WRMR ADMIN SET 3M -B: Performed by: ANESTHESIOLOGY

## 2017-10-07 PROCEDURE — 75410000002 HC LABOR FEE PER 1 HR

## 2017-10-07 PROCEDURE — 74011000258 HC RX REV CODE- 258: Performed by: OBSTETRICS & GYNECOLOGY

## 2017-10-07 PROCEDURE — 77030011943

## 2017-10-07 RX ORDER — ACETAMINOPHEN 10 MG/ML
INJECTION, SOLUTION INTRAVENOUS AS NEEDED
Status: DISCONTINUED | OUTPATIENT
Start: 2017-10-07 | End: 2017-10-07 | Stop reason: HOSPADM

## 2017-10-07 RX ORDER — METHYLERGONOVINE MALEATE 0.2 MG/ML
0.2 INJECTION INTRAVENOUS ONCE
Status: COMPLETED | OUTPATIENT
Start: 2017-10-07 | End: 2017-10-07

## 2017-10-07 RX ORDER — ACETAMINOPHEN 10 MG/ML
1000 INJECTION, SOLUTION INTRAVENOUS EVERY 6 HOURS
Status: DISCONTINUED | OUTPATIENT
Start: 2017-10-07 | End: 2017-10-07 | Stop reason: DRUGHIGH

## 2017-10-07 RX ORDER — KETOROLAC TROMETHAMINE 30 MG/ML
INJECTION, SOLUTION INTRAMUSCULAR; INTRAVENOUS
Status: COMPLETED
Start: 2017-10-07 | End: 2017-10-07

## 2017-10-07 RX ORDER — HYDROMORPHONE HYDROCHLORIDE 1 MG/ML
2 INJECTION, SOLUTION INTRAMUSCULAR; INTRAVENOUS; SUBCUTANEOUS ONCE
Status: DISCONTINUED | OUTPATIENT
Start: 2017-10-07 | End: 2017-10-07

## 2017-10-07 RX ORDER — SODIUM CHLORIDE 0.9 % (FLUSH) 0.9 %
5-10 SYRINGE (ML) INJECTION EVERY 8 HOURS
Status: DISCONTINUED | OUTPATIENT
Start: 2017-10-07 | End: 2017-10-09 | Stop reason: HOSPADM

## 2017-10-07 RX ORDER — CEFAZOLIN SODIUM IN 0.9 % NACL 2 G/50 ML
INTRAVENOUS SOLUTION, PIGGYBACK (ML) INTRAVENOUS
Status: DISCONTINUED
Start: 2017-10-07 | End: 2017-10-07

## 2017-10-07 RX ORDER — MISOPROSTOL 200 UG/1
800 TABLET ORAL ONCE
Status: COMPLETED | OUTPATIENT
Start: 2017-10-07 | End: 2017-10-07

## 2017-10-07 RX ORDER — ONDANSETRON 2 MG/ML
4 INJECTION INTRAMUSCULAR; INTRAVENOUS ONCE
Status: COMPLETED | OUTPATIENT
Start: 2017-10-07 | End: 2017-10-07

## 2017-10-07 RX ORDER — MINERAL OIL
OIL (ML) ORAL
Status: DISCONTINUED
Start: 2017-10-07 | End: 2017-10-07

## 2017-10-07 RX ORDER — PROPOFOL 10 MG/ML
INJECTION, EMULSION INTRAVENOUS AS NEEDED
Status: DISCONTINUED | OUTPATIENT
Start: 2017-10-07 | End: 2017-10-07 | Stop reason: HOSPADM

## 2017-10-07 RX ORDER — HYDROMORPHONE HYDROCHLORIDE 2 MG/ML
INJECTION, SOLUTION INTRAMUSCULAR; INTRAVENOUS; SUBCUTANEOUS
Status: COMPLETED
Start: 2017-10-07 | End: 2017-10-07

## 2017-10-07 RX ORDER — ONDANSETRON 2 MG/ML
4 INJECTION INTRAMUSCULAR; INTRAVENOUS
Status: DISCONTINUED | OUTPATIENT
Start: 2017-10-07 | End: 2017-10-09 | Stop reason: HOSPADM

## 2017-10-07 RX ORDER — OXYTOCIN 10 [USP'U]/ML
10 INJECTION, SOLUTION INTRAMUSCULAR; INTRAVENOUS AS NEEDED
Status: DISCONTINUED | OUTPATIENT
Start: 2017-10-07 | End: 2017-10-09 | Stop reason: HOSPADM

## 2017-10-07 RX ORDER — HYDROMORPHONE HYDROCHLORIDE 10 MG/ML
3 INJECTION INTRAMUSCULAR; INTRAVENOUS; SUBCUTANEOUS ONCE
Status: DISCONTINUED | OUTPATIENT
Start: 2017-10-07 | End: 2017-10-07 | Stop reason: DRUGHIGH

## 2017-10-07 RX ORDER — SODIUM CHLORIDE, SODIUM LACTATE, POTASSIUM CHLORIDE, CALCIUM CHLORIDE 600; 310; 30; 20 MG/100ML; MG/100ML; MG/100ML; MG/100ML
INJECTION, SOLUTION INTRAVENOUS
Status: DISCONTINUED | OUTPATIENT
Start: 2017-10-07 | End: 2017-10-07 | Stop reason: HOSPADM

## 2017-10-07 RX ORDER — HYDROMORPHONE HYDROCHLORIDE 2 MG/1
2 TABLET ORAL
Status: DISCONTINUED | OUTPATIENT
Start: 2017-10-07 | End: 2017-10-09 | Stop reason: HOSPADM

## 2017-10-07 RX ORDER — CEFAZOLIN SODIUM 1 G/3ML
INJECTION, POWDER, FOR SOLUTION INTRAMUSCULAR; INTRAVENOUS AS NEEDED
Status: DISCONTINUED | OUTPATIENT
Start: 2017-10-07 | End: 2017-10-07 | Stop reason: HOSPADM

## 2017-10-07 RX ORDER — OXYTOCIN/RINGER'S LACTATE 20/1000 ML
125 PLASTIC BAG, INJECTION (ML) INTRAVENOUS ONCE
Status: COMPLETED | OUTPATIENT
Start: 2017-10-07 | End: 2017-10-07

## 2017-10-07 RX ORDER — SODIUM CHLORIDE 0.9 % (FLUSH) 0.9 %
5-10 SYRINGE (ML) INJECTION AS NEEDED
Status: DISCONTINUED | OUTPATIENT
Start: 2017-10-07 | End: 2017-10-09 | Stop reason: HOSPADM

## 2017-10-07 RX ORDER — MIDAZOLAM HYDROCHLORIDE 1 MG/ML
INJECTION, SOLUTION INTRAMUSCULAR; INTRAVENOUS AS NEEDED
Status: DISCONTINUED | OUTPATIENT
Start: 2017-10-07 | End: 2017-10-07 | Stop reason: HOSPADM

## 2017-10-07 RX ORDER — LIDOCAINE HYDROCHLORIDE 10 MG/ML
INJECTION INFILTRATION; PERINEURAL
Status: DISCONTINUED
Start: 2017-10-07 | End: 2017-10-07

## 2017-10-07 RX ORDER — SODIUM CHLORIDE 9 MG/ML
250 INJECTION, SOLUTION INTRAVENOUS AS NEEDED
Status: DISCONTINUED | OUTPATIENT
Start: 2017-10-07 | End: 2017-10-09 | Stop reason: HOSPADM

## 2017-10-07 RX ORDER — SODIUM CHLORIDE 9 MG/ML
INJECTION, SOLUTION INTRAVENOUS
Status: DISCONTINUED | OUTPATIENT
Start: 2017-10-07 | End: 2017-10-07 | Stop reason: HOSPADM

## 2017-10-07 RX ORDER — SERTRALINE HYDROCHLORIDE 50 MG/1
25 TABLET, FILM COATED ORAL DAILY
Status: DISCONTINUED | OUTPATIENT
Start: 2017-10-07 | End: 2017-10-09 | Stop reason: HOSPADM

## 2017-10-07 RX ORDER — ACETAMINOPHEN 500 MG
1000 TABLET ORAL 4 TIMES DAILY
Status: DISCONTINUED | OUTPATIENT
Start: 2017-10-07 | End: 2017-10-07 | Stop reason: ALTCHOICE

## 2017-10-07 RX ORDER — KETOROLAC TROMETHAMINE 30 MG/ML
30 INJECTION, SOLUTION INTRAMUSCULAR; INTRAVENOUS
Status: ACTIVE | OUTPATIENT
Start: 2017-10-07 | End: 2017-10-08

## 2017-10-07 RX ORDER — IBUPROFEN 400 MG/1
800 TABLET ORAL EVERY 8 HOURS
Status: DISCONTINUED | OUTPATIENT
Start: 2017-10-07 | End: 2017-10-09 | Stop reason: HOSPADM

## 2017-10-07 RX ORDER — ONDANSETRON 2 MG/ML
INJECTION INTRAMUSCULAR; INTRAVENOUS
Status: COMPLETED
Start: 2017-10-07 | End: 2017-10-07

## 2017-10-07 RX ORDER — ACETAMINOPHEN 10 MG/ML
1000 INJECTION, SOLUTION INTRAVENOUS EVERY 6 HOURS
Status: DISCONTINUED | OUTPATIENT
Start: 2017-10-07 | End: 2017-10-07

## 2017-10-07 RX ADMIN — Medication 10 ML: at 20:50

## 2017-10-07 RX ADMIN — METHYLERGONOVINE MALEATE 0.2 MG: 0.2 INJECTION, SOLUTION INTRAMUSCULAR; INTRAVENOUS at 08:06

## 2017-10-07 RX ADMIN — OXYTOCIN 10 UNITS: 10 INJECTION INTRAVENOUS at 08:10

## 2017-10-07 RX ADMIN — FENTANYL CITRATE 100 MCG: 50 INJECTION, SOLUTION INTRAMUSCULAR; INTRAVENOUS at 05:02

## 2017-10-07 RX ADMIN — IBUPROFEN 800 MG: 400 TABLET, FILM COATED ORAL at 20:43

## 2017-10-07 RX ADMIN — SODIUM CHLORIDE, SODIUM LACTATE, POTASSIUM CHLORIDE, AND CALCIUM CHLORIDE 125 ML/HR: 600; 310; 30; 20 INJECTION, SOLUTION INTRAVENOUS at 01:10

## 2017-10-07 RX ADMIN — HYDROMORPHONE HYDROCHLORIDE 2 MG: 2 TABLET ORAL at 23:22

## 2017-10-07 RX ADMIN — PROPOFOL 50 MG: 10 INJECTION, EMULSION INTRAVENOUS at 08:53

## 2017-10-07 RX ADMIN — SERTRALINE HYDROCHLORIDE 25 MG: 50 TABLET ORAL at 20:48

## 2017-10-07 RX ADMIN — TRANEXAMIC ACID: 100 INJECTION, SOLUTION INTRAVENOUS at 08:41

## 2017-10-07 RX ADMIN — ACETAMINOPHEN 1000 MG: 10 INJECTION, SOLUTION INTRAVENOUS at 16:02

## 2017-10-07 RX ADMIN — ONDANSETRON 4 MG: 2 INJECTION INTRAMUSCULAR; INTRAVENOUS at 13:29

## 2017-10-07 RX ADMIN — OXYTOCIN 10 UNITS: 10 INJECTION INTRAVENOUS at 08:06

## 2017-10-07 RX ADMIN — Medication 125 ML/HR: at 09:50

## 2017-10-07 RX ADMIN — MIDAZOLAM HYDROCHLORIDE 2 MG: 1 INJECTION, SOLUTION INTRAMUSCULAR; INTRAVENOUS at 08:20

## 2017-10-07 RX ADMIN — TRANEXAMIC ACID 1 G: 100 INJECTION, SOLUTION INTRAVENOUS at 08:27

## 2017-10-07 RX ADMIN — ACETAMINOPHEN 1000 MG: 10 INJECTION, SOLUTION INTRAVENOUS at 09:06

## 2017-10-07 RX ADMIN — BUPIVACAINE HYDROCHLORIDE 8 ML: 5 INJECTION, SOLUTION EPIDURAL; INTRACAUDAL at 05:02

## 2017-10-07 RX ADMIN — SODIUM CHLORIDE: 9 INJECTION, SOLUTION INTRAVENOUS at 08:20

## 2017-10-07 RX ADMIN — CEFAZOLIN SODIUM 2 G: 1 INJECTION, POWDER, FOR SOLUTION INTRAMUSCULAR; INTRAVENOUS at 08:18

## 2017-10-07 RX ADMIN — KETOROLAC TROMETHAMINE 30 MG: 30 INJECTION, SOLUTION INTRAMUSCULAR; INTRAVENOUS at 13:02

## 2017-10-07 RX ADMIN — HYDROMORPHONE HYDROCHLORIDE 2 MG: 2 INJECTION, SOLUTION INTRAMUSCULAR; INTRAVENOUS; SUBCUTANEOUS at 13:32

## 2017-10-07 RX ADMIN — HYDROMORPHONE HYDROCHLORIDE 2 MG: 2 TABLET ORAL at 18:19

## 2017-10-07 RX ADMIN — PROPOFOL 50 MG: 10 INJECTION, EMULSION INTRAVENOUS at 08:20

## 2017-10-07 RX ADMIN — MISOPROSTOL 800 MCG: 200 TABLET ORAL at 08:06

## 2017-10-07 RX ADMIN — KETOROLAC TROMETHAMINE 30 MG: 30 INJECTION, SOLUTION INTRAMUSCULAR at 13:02

## 2017-10-07 RX ADMIN — ONDANSETRON 4 MG: 2 INJECTION INTRAMUSCULAR; INTRAVENOUS at 23:24

## 2017-10-07 RX ADMIN — FENTANYL 0.2 MG/100ML-BUPIV 0.125%-NACL 0.9% EPIDURAL INJ 10 ML/HR: 2/0.125 SOLUTION at 03:35

## 2017-10-07 RX ADMIN — ONDANSETRON 4 MG: 2 INJECTION INTRAMUSCULAR; INTRAVENOUS at 18:21

## 2017-10-07 RX ADMIN — SODIUM CHLORIDE, SODIUM LACTATE, POTASSIUM CHLORIDE, CALCIUM CHLORIDE: 600; 310; 30; 20 INJECTION, SOLUTION INTRAVENOUS at 08:16

## 2017-10-07 NOTE — PROGRESS NOTES
This patient was 30 or more weeks gestation at the time of [facility] go-live. For complete information pertaining to this patient's pregnancy, please refer to the paper chart and ACOG form. Delivery Note    Obstetrician:  Susan Mora MD    Assistant: none    Pre-Delivery Diagnosis: Term pregnancy, Induced labor : SROM: Desires TOLAC    Post-Delivery Diagnosis: Living  infant(s) or Male    Intrapartum Event: None    Procedure: Spontaneous vaginal delivery    Epidural: YES    Monitor:  Fetal Heart Tones - External and Uterine Contractions - External    Indications for instrumental delivery: none    Estimated Blood Loss: 900cc    Episiotomy: none    Laceration(s):  Cervical- left angle and left  labial    Laceration(s) repair: YES    Presentation: Cephalic    Fetal Description: culp    Fetal Position: Right Occiput Anterior    Birth Weight:     Birth Length:     Apgar - One Minute: 9    Apgar - Five Minutes: 9    Umbilical Cord: 3 vessels present    Specimens:            Complications:  uterine inertia           Cord Blood Results:   Information for the patient's :  Diamante Nuris [[id number]]   No results found for: PCTABR, PCTDIG, BILI, ABORH    Prenatal Labs:     Lab Results   Component Value Date/Time    ABO/Rh(D) A POSITIVE 10/06/2017 08:17 AM    ABO,Rh A positive  2013    HBsAg, External NEG 2017    HIV, External Non-reactive 2017    Rubella, External Immune 2017    RPR, External Non-Reactive 2013    Gonorrhea, External Negative 2013    Chlamydia, External Negative 2013    GrBStrep, External NEG 2017        Attending Attestation: I performed the procedure    Signed By:  Susan Mora MD     2017        After spontaneous expulsion of placenta, brisk bleeding was noted with lower segment uterine atony encountered. Bladder was drained with rubber catheter.  Clots were removed from the lower uterine segment and bimanual uterine massage was done. At that point the Nursing staff, anesthesia and Dr. Anyi Ayala was notified and immediate  help was available. Left labial laceration noted which was hemostatic. 10 units of IM pitocin was given along with 30 units of pitocin in the 500ml RL. 0.2 mg IM methergine was given. 800mcg cytotec was placed in the rectum. No perineal /vaginal  laceration noted at that time. Due to incomplete visualization of the cervix and continued bleeding the decision was made to go the OR. Ms. Gregg Elizalde was consented for [facility]  and possible laparotomy and any indicated procedure. Risks bleeding, infection, DVT, PE was discussed in detail. Informed consent obtained. To OR for EUA and D&C.   Ancef 2 gram IV single dose given  EBL at this time 800cc  Type and crossmatch 2 units PRBC  H&H 11.9/35    Aaron Burroughs MD

## 2017-10-07 NOTE — PROGRESS NOTES
2130- SBAR by VOLODYMYR Liang RN     2150- SVE 5-6 cm/60/-1 by Dr. Holly Perez. Some brighter red small amount of blood noted, then forebag noted and AROM's forebag. Once broke then moderate amount of brighter blood. Cleaned pt up and then no added bleeding at this time. 2300- Pt called out and felt some rudy- area drainage. Once in room, found light pink drainage on pad that was a silver dollar in size and small amount of absorption into pad.     2325-Small amount of bleeding noted, a little brighter, about a quarter in size. Will have Tye Underwood RN monitor in size as giving report now. 2325- SBAR to STAN Owens RN

## 2017-10-07 NOTE — ANESTHESIA PROCEDURE NOTES
Emergent Intubation  Performed by: Kelli Escalante by: Laura BROOKS       Airway Documentation:   Emergent intubation was not done.

## 2017-10-07 NOTE — ANESTHESIA POSTPROCEDURE EVALUATION
Post-Anesthesia Evaluation and Assessment    Patient: Mery Bolanos MRN: [id number]  SSN: [id number]    YOB: 1985  Age: 33 y.o. Sex: female       Cardiovascular Function/Vital Signs  Visit Vitals    /66    Pulse 80    Temp 36 °C (96.8 °F)    Resp 12    Ht 5' (1.524 m)    Wt 56.2 kg (124 lb)    SpO2 97%    Breastfeeding Unknown    BMI 24.22 kg/m2       Patient is status post epidural, MAC anesthesia for Procedure(s):  VAGINAL EXAMINATION UNDER ANESTHESIA. Nausea/Vomiting: None    Postoperative hydration reviewed and adequate. Pain:  Pain Scale 1: Numeric (0 - 10) (10/07/17 0927)  Pain Intensity 1: 4 (10/07/17 0927)   Managed    Neurological Status:   Neuro (WDL): Within Defined Limits (10/07/17 0930)   At baseline    Mental Status and Level of Consciousness: Arousable    Pulmonary Status:   O2 Device: Room air (10/07/17 0930)   Adequate oxygenation and airway patent    Complications related to anesthesia: None    Post-anesthesia assessment completed.  No concerns    Signed By: Aldair Najera MD     October 7, 2017

## 2017-10-07 NOTE — PROGRESS NOTES
Labor Progress Note  Patient seen, fetal heart rate and contraction pattern evaluated. Comfortable with Epidural.    Physical Exam:  Cervical Exam: 5-6 cm/70/-1  Membranes: Forebag felt- AROM - Bloody fluid noted  Uterine Activity: Frequency: regular  Fetal Heart Rate: Reactive, 130,moderate variability  Accelerations: yes  Decelerations: none  Uterine contractions:every 2-3 mins    Assessment/Plan:  Reassuring fetal status.    Will continue to titrate pitocin  Will continue to monitor closely    Viri Alvarenga MD

## 2017-10-07 NOTE — PROGRESS NOTES
Cortes4 CHINO Giraldo from the lab called and notified us that patient blue top tube did not have enough blood in it to run the PT/PTT and Fibrinogen. Requesting for new order and new blue top tube to be sent.  Asuncion Campbell RN notified

## 2017-10-07 NOTE — PROGRESS NOTES
0740 Bedside shift change report given to Nam Galan RN (oncoming nurse) by Bridget Castillo RN (offgoing nurse). Report included the following information SBAR.     0803 placenta delivered; increased vaginal bleeding noted; MD performing uterine massage and exploration of vagina/ cervix; lower uterine segment atony noted; second IV and labs requested by MD; Code KURTIS called; methergine, cytotec, oxytocin ordered  0806 methergine, pitocin, and cytotec admin;  Dr Francine Owens in room to assist Dr Fox Police  6546 pitocin administered IM; vaginal packing in; continuing to attempt 2nd IV placement    0817 Pt in OR  0920 Pt out OR; PRBCs #2 unit currently infusing. Pt still groggy;  1300 consulting with Dr Francine Owens re: pain relief options; will give toradol  1315 demerol difficult to get from pharmacy; pt has no negative SE with historical use of dilaudid; MD will order dilaudid  1910 TRANSFER - OUT REPORT:    Verbal report given to Murali Bailey RN(name) on Dk Yu  being transferred to MIU(unit) for routine progression of care       Report consisted of patients Situation, Background, Assessment and   Recommendations(SBAR). Information from the following report(s) SBAR, OR Summary, Procedure Summary, MAR and Recent Results was reviewed with the receiving nurse. Lines:   Peripheral IV 10/06/17 Left Forearm (Active)   Site Assessment Clean, dry, & intact 10/6/2017  3:32 PM   Phlebitis Assessment 0 10/6/2017  3:32 PM   Infiltration Assessment 0 10/6/2017  3:32 PM   Dressing Status Clean, dry, & intact 10/6/2017  3:32 PM   Dressing Type Tape;Transparent 10/6/2017  3:32 PM   Hub Color/Line Status Pink; Infusing 10/6/2017  8:19 AM       Peripheral IV 10/07/17 Right Forearm (Active)   Site Assessment Clean, dry, & intact 10/7/2017 10:00 AM   Phlebitis Assessment 0 10/7/2017 10:00 AM   Infiltration Assessment 0 10/7/2017 10:00 AM   Dressing Status Clean, dry, & intact 10/7/2017 10:00 AM   Dressing Type Tape;Transparent 10/7/2017 10:00 AM   Hub Color/Line Status Pink; Infusing 10/7/2017 10:00 AM   Action Taken Blood drawn 10/7/2017 10:00 AM        Opportunity for questions and clarification was provided.       Patient transported with:   Registered Nurse

## 2017-10-07 NOTE — ANESTHESIA PREPROCEDURE EVALUATION
Anesthetic History   No history of anesthetic complications            Review of Systems / Medical History  Patient summary reviewed, nursing notes reviewed and pertinent labs reviewed    Pulmonary  Within defined limits                 Neuro/Psych   Within defined limits           Cardiovascular  Within defined limits                Exercise tolerance: >4 METS     GI/Hepatic/Renal  Within defined limits              Endo/Other  Within defined limits           Other Findings              Physical Exam    Airway  Mallampati: II  TM Distance: 4 - 6 cm  Neck ROM: normal range of motion   Mouth opening: Normal     Cardiovascular  Regular rate and rhythm,  S1 and S2 normal,  no murmur, click, rub, or gallop             Dental  No notable dental hx       Pulmonary  Breath sounds clear to auscultation               Abdominal  GI exam deferred       Other Findings            Anesthetic Plan    ASA: 2, emergent  Anesthesia type: MAC          Induction: Intravenous  Anesthetic plan and risks discussed with: Patient

## 2017-10-07 NOTE — PROGRESS NOTES
Vaginal packing pulled  Mild-moderately stained with lochia - appropriate for 5 hours   Fundus firm with minimal lochia expressed during uterine massage  Repeat H/H pending  Excellent UOP

## 2017-10-07 NOTE — PROGRESS NOTES
OB Hospitalist      Assumed care from Dr. Ruddy Miller. 34 y/o [id number]@ 40 weeks 2 days with SROM @ 5 am today Previous  C -Section due to Breech in  desires TOLAC. Patient comfortable with Epidural. Has been started on Low dose Pitocin at 12 pm. Currently on 6 milliunits. VSS AF  Alert and Oriented x 3  CTA B/L BS  RRR  ,moderate variability, +accels  [place] ctx every 2-3 mins  Cervix -deferred  Plan  [id number]@ 40 weeks 2 days with SROM in early labor: Desires TOLAC  Will continue to Titrate Pitocin. Fetal tracing Reactive. Risks of  reiterated and indications for  section discussed. Patient verbalizes understanding and all questions answered.

## 2017-10-07 NOTE — LACTATION NOTE
Discussed with mother her plan for feeding. Questions and concerns addressed at this time. Patient confirms breast milk feed exclusively as intended feeding method at this time. Instructed on how to recognize hunger cues and offered assistance as needed. Will support her choice and offer additional information as needed.

## 2017-10-07 NOTE — PROGRESS NOTES
Labor Progress Note  Patient seen, fetal heart rate and contraction pattern evaluated. Physical Exam:  Cervical Exam: C/C/+2 ,OP  Membranes:  SROM  Uterine Activity: Frequency: regular  Fetal Heart Rate: Reactive  Accelerations: yes  Decelerations: none  Uterine contractions: every 2-3 mins    Assessment/Plan:  Reassuring fetal status.    Feels the urge to push     Martínez Lockett MD

## 2017-10-07 NOTE — PROGRESS NOTES
TRANSFER - IN REPORT:    Verbal report received from Reymundo Garcia RN(name) on Hector Russo  being received from L&D(unit) for routine progression of care      Report consisted of patients Situation, Background, Assessment and   Recommendations(SBAR). Information from the following report(s) SBAR was reviewed with the receiving nurse. Opportunity for questions and clarification was provided. Assessment completed upon patients arrival to unit and care assumed.

## 2017-10-07 NOTE — OP NOTES
OB/GYN Operative Report    Patient ID:     Name: Karen Singh   Medical Record Number: [id number]   YOB: 1985    Indications: This is a 33 yrs female  Immediate s/p vaginal birth after prior  delivery with immediate postpartum hemorrage     Date of Surgery: 10/7/2017    Preoperative Diagnosis:    with PPH unresponsive to cytotec, methergine, IM and IV pitocin, to OR for vaginal exam under anesthesia    Postoperative Diagnosis: same + repair of left cervical laceration, failed Bakri balloon, placement of vaginal packing; administration of transexamic acid, 2U PRBCs    Surgeon: James Ramirez MD     Assistant: Surgeon(s):  MD Santiago Morley MD Cosimo Moos, MD    Anesthesia: MAC    Procedure: 1. Exam under anesthesia 2. Repair of left cervical laceration 3. Attempted placement of bakri balloon 4. Vaginal packing    Findings: lower uterine segment atony; in tact lower uterine segment; 4-5cm left cervical laceration    Estimated Blood Loss: pre-op 2145g (weighed), intra-op 500cc    Procedure:  After reviewing the indications for delivery and obtaining informed consen,t the patient was taken to the operating theatre where she was anesthetized then prepped and draped in the usual sterile fashion for vaginal surgery. A miguel catheter was then placed using sterile technique. Once the patient was comfortable a bimanual exam was undertaken which confirmed the lower uterine segment to be in tact, but without normal tone. The uterine corpus remained firm and contracted at approximately 1cm below the umbilicus. A weighted speculum was inserted into the vagina and the cervix fully exposed with retractors. The entirety of the cervix was walked using atraumatic instruments and a cervical laceration noted at approximately 4 o'clock extending 4-5 cm posteriorly. This was repaired with 2-0 vicryl in anatomic fashion, until hemostasis was noted.  At this point  Maximo scrubbed out and Dr Eddie Gaines scrubbed in. The remaining second degree right vaginal laceration was repaired; the left sided labia minora laceration was hemostatic and no longer required suturing. Improvement in hemorrhage was noted after the repair of the cervical laceration; however, the lower uterine segment remained atonic. Bakri balloon placement was attempted by Dr Eddie Gaines; however, it could not be easily supported, even with insufflation, by the poor lower segment uterine tone. It was removed and the bleeding reassessed. As the bleeding was now consistent with normal lochia, the decision was made to proceed with placement of vaginal packing for support. Once the procedures were completed the patient had her anesthesia reversed. Counts were correct x 2; the patient was taken to the recovery room in stable condition.       Specimens: * No specimens in log * NONE    Signed By: James Ramirez MD

## 2017-10-07 NOTE — PROGRESS NOTES
OB Hospitalist    At the bedside to reassess. Patient for the last 30 mins was trying positional changes- hands on knees . Cervical exam - OP with mild caput noted. Fetal monitoring 120 with accelerations and moderate variability. Patient expresses the desire  to push and continue with the trial of labor. Ms. Delano Pickering was given the option of  section at this time but deferred at this time. Risks of  section were elaborated at this time -risk of injury to the bladder. Bowel, extension of incision, bleeding, infection, blood transfusion, PE. Jonathan Lagos Patient verbalizes understanding of all the risks at this time. Patient has decided  to resume pushing at this time. The effective pushing time has been approx. 45 mins and patient wants to continue with the pushing efforts for the next hour.       Dr. James Peres

## 2017-10-07 NOTE — PROGRESS NOTES
Labor Progress Note  Patient seen, fetal heart rate and contraction pattern evaluated. Physical Exam:  Cervical Exam: 7-8/80/0 mildly swollen cervix mostly on the right side, feels OP  Membranes: SROM  Uterine Activity: Frequency: regular  Fetal Heart Rate: Reactive  Accelerations: yes  Decelerations: none  Uterine contractions:every 2-3 mins    Assessment/Plan:  Reassuring fetal status. Updated on the findings of the fetal position and the options to continue with the trial of labor versus repeat  section revisited. Patient wants to continue with the trial of labor.   Will continue to monitor progress of labor    Marielena Magallon MD

## 2017-10-07 NOTE — PROGRESS NOTES
2330 - Bedside and Verbal shift change report given to STAN Ortiz RNC (oncoming nurse) by BASSEM Macias RNC (offgoing nurse). Report included the following information SBAR, Kardex, Intake/Output, MAR, Accordion, Recent Results and Med Rec Status. 80 - Dr Shayla Brandt at bedside to assess pt and perform SVE. 7-8, swollen anterior cervix/0 station. Plan is to recheck between [address] Dr Shayla Brandt assured pt that she may request a c/section if she does not want to continue trying to labor. 0500 - Dr Alize Pedraza gave epidural bolus per pt's request (she can feel her vagina/labia, but not necessarily pain with UCs. She is anxious about possibility of pushing and \"feeling everything down there\"). 0740 - Bedside and Verbal shift change report given to MERLE Mitchell RN (oncoming nurse) by Cristian Henderson RNC (offgoing nurse). Report included the following information SBAR and Recent Results. 65 -  of vigorous male infant, attended by Dr Vasile Motley - Placenta delivered, intact.

## 2017-10-07 NOTE — ANESTHESIA POSTPROCEDURE EVALUATION
Post-Anesthesia Evaluation and Assessment    Patient: Mery Bolanos MRN: [id number]  SSN: [id number]    YOB: 1985  Age: 33 y.o. Sex: female       Cardiovascular Function/Vital Signs  Visit Vitals    /66    Pulse 80    Temp 36 °C (96.8 °F)    Resp 12    Ht 5' (1.524 m)    Wt 56.2 kg (124 lb)    SpO2 97%    Breastfeeding Unknown    BMI 24.22 kg/m2       Patient is status post epidural, MAC anesthesia for Procedure(s):  VAGINAL EXAMINATION UNDER ANESTHESIA. Nausea/Vomiting: None    Postoperative hydration reviewed and adequate. Pain:  Pain Scale 1: Numeric (0 - 10) (10/07/17 0927)  Pain Intensity 1: 4 (10/07/17 0927)   Managed    Neurological Status:   Neuro (WDL): Within Defined Limits (10/07/17 0930)   At baseline    Mental Status and Level of Consciousness: Arousable    Pulmonary Status:   O2 Device: Room air (10/07/17 0930)   Adequate oxygenation and airway patent    Complications related to anesthesia: None    Post-anesthesia assessment completed.  No concerns    Signed By: Aldair Najera MD     October 7, 2017

## 2017-10-07 NOTE — PROGRESS NOTES
Pharmacist Note  Intravenous acetaminophen (Ofirmev) has been automatically converted to oral acetaminophen because this patient does not meet [facility] P&T Committee's criteria for use. #1 - lactation diet ordered (reference below). Criteria for Use:   1) Restricted to the treatment of pain or fever in patients who are NPO and SC administration is not feasible. 2) Order duration may not exceed 24 hours. Orders exceeding 24 hours will be automatically discontinued. 3) Patients tolerating other oral medications will automatically be converted to oral acetaminophen. 4) Pediatric palliative care/hospice patients may receive IV APAP, even if the patient is not NPO.

## 2017-10-08 LAB
ABO + RH BLD: NORMAL
BLD PROD TYP BPU: NORMAL
BLOOD GROUP ANTIBODIES SERPL: NORMAL
BPU ID: NORMAL
CROSSMATCH RESULT,%XM: NORMAL
SPECIMEN EXP DATE BLD: NORMAL
STATUS OF UNIT,%ST: NORMAL
UNIT DIVISION, %UDIV: 0

## 2017-10-08 PROCEDURE — 74011250637 HC RX REV CODE- 250/637: Performed by: OBSTETRICS & GYNECOLOGY

## 2017-10-08 PROCEDURE — 77030018846 HC SOL IRR STRL H20 ICUM -A

## 2017-10-08 PROCEDURE — 77030031376 HC CATH BLN PSTPRTM BAKRI COOK -C

## 2017-10-08 PROCEDURE — 65410000002 HC RM PRIVATE OB

## 2017-10-08 PROCEDURE — 74011250637 HC RX REV CODE- 250/637: Performed by: SPECIALIST

## 2017-10-08 PROCEDURE — 74011250636 HC RX REV CODE- 250/636: Performed by: SPECIALIST

## 2017-10-08 PROCEDURE — 77030031139 HC SUT VCRL2 J&J -A

## 2017-10-08 RX ORDER — ONDANSETRON 4 MG/1
4 TABLET, ORALLY DISINTEGRATING ORAL
Status: DISCONTINUED | OUTPATIENT
Start: 2017-10-08 | End: 2017-10-09 | Stop reason: HOSPADM

## 2017-10-08 RX ADMIN — ONDANSETRON 4 MG: 4 TABLET, ORALLY DISINTEGRATING ORAL at 14:18

## 2017-10-08 RX ADMIN — ONDANSETRON 4 MG: 4 TABLET, ORALLY DISINTEGRATING ORAL at 18:54

## 2017-10-08 RX ADMIN — SERTRALINE HYDROCHLORIDE 25 MG: 50 TABLET ORAL at 21:38

## 2017-10-08 RX ADMIN — IBUPROFEN 800 MG: 400 TABLET, FILM COATED ORAL at 21:37

## 2017-10-08 RX ADMIN — HYDROMORPHONE HYDROCHLORIDE 2 MG: 2 TABLET ORAL at 22:56

## 2017-10-08 RX ADMIN — HYDROMORPHONE HYDROCHLORIDE 2 MG: 2 TABLET ORAL at 18:54

## 2017-10-08 RX ADMIN — ONDANSETRON 4 MG: 2 INJECTION INTRAMUSCULAR; INTRAVENOUS at 08:05

## 2017-10-08 RX ADMIN — IBUPROFEN 800 MG: 400 TABLET, FILM COATED ORAL at 14:10

## 2017-10-08 RX ADMIN — HYDROMORPHONE HYDROCHLORIDE 2 MG: 2 TABLET ORAL at 14:18

## 2017-10-08 RX ADMIN — IBUPROFEN 800 MG: 400 TABLET, FILM COATED ORAL at 06:04

## 2017-10-08 RX ADMIN — HYDROMORPHONE HYDROCHLORIDE 2 MG: 2 TABLET ORAL at 08:04

## 2017-10-08 NOTE — ROUTINE PROCESS
Bedside shift change report given to Sonny Pisano RNC (oncoming nurse) by Pratima Osorio RNC (offgoing nurse). Report included the following information SBAR.

## 2017-10-08 NOTE — ROUTINE PROCESS
Received OB SBAR Report from XENIA Britt RN    0000 pt up to bathroom, washed up, tolerated ambulation

## 2017-10-08 NOTE — LACTATION NOTE
This note was copied from a baby's chart. Initial Lactation Consultation - Baby born vaginally yesterday to a  mom at 40 3/7 weeks gestation. Mom nursed her first child for 6 months. She has a post partum hemorrhage after this delivery. Mom states baby is latching and is nursing well. Mom has her own breast pump that she has been using. Mom has been giving the baby a pacifier occasionally. I encouraged her to limit the pacifier use and to breast feed the baby when he is showing feeding cues. Feeding Plan: Mother will keep baby skin to skin as often as possible, feed on demand,  respond to feeding cues, obtain latch, listen for audible swallowing, be aware of signs of oxytocin release/ cramping,thrist,sleepyness while breastfeeding, offer both breasts,and will not limit feedings.

## 2017-10-08 NOTE — PROGRESS NOTES
Post-Partum Day Number 1 Progress Note    Montrose Lute     Assessment: Doing well, post partum day 1. S/p  c/b 2.5L PPH requiring OR for cervical laceration repair, s/p 2u PRBCs    Plan:  1. Continue routine postpartum and perineal care as well as maternal education. 2. The risks and benefits of the circumcision  procedure and anesthesia including: bleeding, infection, variability of cosmetic results were discussed at length with the mother. She gives informed consent to proceed as noted and her questions are answered. 3. PPH- VSS     - Hgb 10.6 s/p 2 units PRBCs    Information for the patient's :  Arlin Arnett [[id number]]   Vaginal, Spontaneous Delivery   Patient doing well without significant complaint. Voiding without difficulty, normal lochia. Patient feels better since being up and moving around. Vitals:  Visit Vitals    /73 (BP 1 Location: Left arm, BP Patient Position: At rest)    Pulse 76    Temp 97.8 °F (36.6 °C)    Resp 14    Ht 5' (1.524 m)    Wt 56.2 kg (124 lb)    SpO2 97%    Breastfeeding Unknown    BMI 24.22 kg/m2     Temp (24hrs), Av.7 °F (36.5 °C), Min:96.8 °F (36 °C), Max:98 °F (36.7 °C)        Exam:   Patient without distress. Abdomen soft, fundus firm, nontender                Lower extremities are symmetric without tenderness, cords or erythema.     Labs:     Lab Results   Component Value Date/Time    WBC 22.0 10/07/2017 08:44 AM    WBC 9.3 10/06/2017 08:17 AM    WBC 10.4 2015 07:52 AM    WBC 10.9 2015 03:08 PM    WBC 8.0 2015 08:02 PM    WBC 11.9 04/15/2015 09:39 PM    WBC 9.0 2014 09:30 PM    WBC 5.9 11/15/2014 02:38 PM    WBC 8.5 2014 11:40 AM    WBC 8.7 10/30/2014 09:25 PM    WBC 8.5 10/15/2014 11:40 AM    WBC 6.1 2014 03:15 PM    WBC 9.2 2014 07:05 AM    WBC 16.3 2014 05:43 AM    WBC 10.7 2014 12:16 PM    WBC 8.9 2013 06:35 AM    WBC 7.9 2013 06:55 PM    WBC 7.1 02/18/2013 06:15 PM    WBC 5.1 12/04/2012 11:11 AM    WBC 17.9 11/27/2012 11:40 AM    WBC 8.6 11/20/2012 11:40 PM    WBC 9.8 11/01/2012 05:39 AM    WBC 6.3 02/23/2011 05:50 AM    HGB 10.6 10/07/2017 02:32 PM    HGB 11.9 10/07/2017 08:44 AM    HGB 12.0 10/06/2017 08:17 AM    HGB 14.1 12/19/2015 07:52 AM    HGB 12.5 08/25/2015 03:08 PM    HGB 14.2 07/28/2015 08:02 PM    HGB 15.5 04/15/2015 09:39 PM    HGB 13.4 11/27/2014 09:30 PM    HGB 14.8 11/15/2014 02:38 PM    HGB 13.9 11/05/2014 11:40 AM    HGB 15.1 10/30/2014 09:25 PM    HGB 13.9 10/15/2014 11:40 AM    HGB 12.7 04/26/2014 03:15 PM    HGB 13.7 01/31/2014 07:05 AM    HGB 10.3 01/18/2014 05:43 AM    HGB 12.8 01/17/2014 12:16 PM    HGB 14.5 06/20/2013 06:35 AM    HGB 13.7 05/31/2013 06:55 PM    HGB 13.5 02/18/2013 06:15 PM    HGB 13.9 12/04/2012 11:11 AM    HGB 14.9 11/27/2012 11:40 AM    HGB 13.0 11/20/2012 11:40 PM    HGB 15.1 11/01/2012 05:39 AM    HGB 14.7 02/23/2011 05:50 AM    HCT 30.9 10/07/2017 02:32 PM    HCT 35.0 10/07/2017 08:44 AM    HCT 35.1 10/06/2017 08:17 AM    HCT 41.3 12/19/2015 07:52 AM    HCT 38.2 08/25/2015 03:08 PM    HCT 41.7 07/28/2015 08:02 PM    HCT 45.2 04/15/2015 09:39 PM    HCT 39.3 11/27/2014 09:30 PM    HCT 42.5 11/15/2014 02:38 PM    HCT 40.7 11/05/2014 11:40 AM    HCT 43.1 10/30/2014 09:25 PM    HCT 41.6 10/15/2014 11:40 AM    HCT 37.1 04/26/2014 03:15 PM    HCT 40.6 01/31/2014 07:05 AM    HCT 29.8 01/18/2014 05:43 AM    HCT 37.5 01/17/2014 12:16 PM    HCT 40.0 06/20/2013 06:35 AM    HCT 38.8 05/31/2013 06:55 PM    HCT 38.3 02/18/2013 06:15 PM    HCT 40.3 12/04/2012 11:11 AM    HCT 43.7 11/27/2012 11:40 AM    HCT 37.8 11/20/2012 11:40 PM    HCT 42.8 11/01/2012 05:39 AM    HCT 41.3 02/23/2011 05:50 AM    PLATELET 040 79/52/2944 08:44 AM    PLATELET 866 27/49/2781 08:17 AM    PLATELET 666 77/19/4937 07:52 AM    PLATELET 579 05/94/2209 03:08 PM    PLATELET 257 25/53/8925 08:02 PM    PLATELET 636 56/60/6454 09:39 PM    PLATELET 802 11/27/2014 09:30 PM    PLATELET 155 35/03/7121 02:38 PM    PLATELET 134 66/97/3137 11:40 AM    PLATELET 139 96/97/2311 09:25 PM    PLATELET 670 86/88/2834 11:40 AM    PLATELET 427 30/37/5384 03:15 PM    PLATELET 168 13/38/3088 07:05 AM    PLATELET 736 84/95/0207 05:43 AM    PLATELET 711 68/61/0458 12:16 PM    PLATELET 334 40/21/4493 06:35 AM    PLATELET 138 01/86/4882 06:55 PM    PLATELET 294 27/32/1234 06:15 PM    PLATELET 350 46/61/2204 11:11 AM    PLATELET 668 01/75/3728 11:40 AM    PLATELET 737 34/65/7043 11:40 PM    PLATELET 882 16/52/3814 05:39 AM    PLATELET 238 42/38/5233 05:50 AM       Recent Results (from the past 24 hour(s))   CBC W/O DIFF    Collection Time: 10/07/17  8:44 AM   Result Value Ref Range    WBC 22.0 (H) 3.6 - 11.0 K/uL    RBC 3.68 (L) 3.80 - 5.20 M/uL    HGB 11.9 11.5 - 16.0 g/dL    HCT 35.0 35.0 - 47.0 %    MCV 95.1 80.0 - 99.0 FL    MCH 32.3 26.0 - 34.0 PG    MCHC 34.0 30.0 - 36.5 g/dL    RDW 13.1 11.5 - 14.5 %    PLATELET 075 912 - 179 K/uL   HGB & HCT    Collection Time: 10/07/17  2:32 PM   Result Value Ref Range    HGB 10.6 (L) 11.5 - 16.0 g/dL    HCT 30.9 (L) 35.0 - 47.0 %

## 2017-10-09 VITALS
HEART RATE: 60 BPM | TEMPERATURE: 98.5 F | BODY MASS INDEX: 24.35 KG/M2 | DIASTOLIC BLOOD PRESSURE: 60 MMHG | HEIGHT: 60 IN | SYSTOLIC BLOOD PRESSURE: 108 MMHG | WEIGHT: 124 LBS | RESPIRATION RATE: 16 BRPM | OXYGEN SATURATION: 97 %

## 2017-10-09 PROCEDURE — 74011250637 HC RX REV CODE- 250/637: Performed by: SPECIALIST

## 2017-10-09 PROCEDURE — 74011250637 HC RX REV CODE- 250/637: Performed by: OBSTETRICS & GYNECOLOGY

## 2017-10-09 RX ORDER — ONDANSETRON 4 MG/1
4 TABLET, ORALLY DISINTEGRATING ORAL
Qty: 30 TAB | Refills: 0 | Status: SHIPPED | OUTPATIENT
Start: 2017-10-09 | End: 2018-10-19

## 2017-10-09 RX ORDER — IBUPROFEN 600 MG/1
600 TABLET ORAL
Qty: 40 TAB | Refills: 0 | Status: SHIPPED | OUTPATIENT
Start: 2017-10-09 | End: 2018-10-19

## 2017-10-09 RX ORDER — HYDROMORPHONE HYDROCHLORIDE 2 MG/1
2 TABLET ORAL
Qty: 12 TAB | Refills: 0 | Status: SHIPPED | OUTPATIENT
Start: 2017-10-09 | End: 2018-10-19

## 2017-10-09 RX ORDER — DOCUSATE SODIUM 100 MG/1
100 CAPSULE, LIQUID FILLED ORAL
Qty: 60 CAP | Refills: 0 | Status: SHIPPED | OUTPATIENT
Start: 2017-10-09 | End: 2018-10-19

## 2017-10-09 RX ORDER — SERTRALINE HYDROCHLORIDE 25 MG/1
25 TABLET, FILM COATED ORAL DAILY
Qty: 90 TAB | Refills: 12 | Status: SHIPPED | OUTPATIENT
Start: 2017-10-09 | End: 2018-10-19

## 2017-10-09 RX ADMIN — IBUPROFEN 800 MG: 400 TABLET, FILM COATED ORAL at 05:43

## 2017-10-09 RX ADMIN — HYDROMORPHONE HYDROCHLORIDE 2 MG: 2 TABLET ORAL at 12:17

## 2017-10-09 RX ADMIN — HYDROMORPHONE HYDROCHLORIDE 2 MG: 2 TABLET ORAL at 07:41

## 2017-10-09 RX ADMIN — ONDANSETRON 4 MG: 4 TABLET, ORALLY DISINTEGRATING ORAL at 07:40

## 2017-10-09 NOTE — DISCHARGE SUMMARY
Obstetrical Discharge Summary     Name: Fercho Woods MRN: [id number]  SSN: [id number]    YOB: 1985  Age: 33 y.o. Sex: female      Admit Date: 10/6/2017    Discharge Date: 10/9/2017     Admitting Physician: Shlomo Pearce MD     Attending Physician:  Terrell Woody MD     Admission Diagnoses: ROM (rupture of membranes), premature   with PPH, to OR for vaginal exam under anesthesia    Discharge Diagnoses:   Information for the patient's :  Patricia Quintana [[id number]]   Delivery of a 3.445 kg male infant via Vaginal, Spontaneous Delivery on 10/7/2017 at 7:52 AM  by . Apgars were 9 and 9. 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 Afterwards, she was hemodynamically stable and did well. Patient was discharged home on postpartum day 2. Disposition at Discharge: Home or self care    Discharged Condition: Stable    Patient Instructions:   Current Discharge Medication List      START taking these medications    Details   HYDROmorphone (DILAUDID) 2 mg tablet Take 1 Tab by mouth every four (4) hours as needed. Max Daily Amount: 12 mg.  Qty: 12 Tab, Refills: 0      sertraline (ZOLOFT) 25 mg tablet Take 1 Tab by mouth daily.   Qty: 90 Tab, Refills: 12      ondansetron (ZOFRAN ODT) 4 mg disintegrating tablet Take 1 Tab by mouth every six (6) hours as needed. Qty: 30 Tab, Refills: 0      ibuprofen (MOTRIN) 600 mg tablet Take 1 Tab by mouth every six (6) hours as needed for Pain. Qty: 40 Tab, Refills: 0      docusate sodium (COLACE) 100 mg capsule Take 1 Cap by mouth two (2) times daily as needed for Constipation. Qty: 60 Cap, Refills: 0         CONTINUE these medications which have NOT CHANGED    Details   GZU61/UGTZ FUM/FOLIC ACID (PRENATAL PO) Take  by mouth. STOP taking these medications       acetaminophen (TYLENOL EXTRA STRENGTH) 500 mg tablet Comments:   Reason for Stopping:         doxylamine-pyridoxine (DICLEGIS) 10-10 mg TbEC Comments:   Reason for Stopping:               Reference my discharge instructions.     Follow-up Appointments   Procedures    FOLLOW UP VISIT Appointment in: 6 Weeks     Standing Status:   Standing     Number of Occurrences:   1     Order Specific Question:   Appointment in     Answer:   6 Weeks        Signed By:  Ludivina Castanon MD     October 9, 2017

## 2017-10-09 NOTE — ROUTINE PROCESS
0800 Received OB SBAR report at bedside from [address] Reviewed discharge instructions with patient. All questions answered. Discharge instructions and prescriptions given to patient. Patient discharged with infant.

## 2017-10-09 NOTE — LACTATION NOTE
This note was copied from a baby's chart. Mother reports Baby nursing well and has improved throughout post partum stay, deep latch maintained, mother is comfortable, baby feeding vigorously with rhythmic suck, swallow, breathe pattern, with audible swallowing, and evident milk transfer, both breasts offerd, baby is asleep following feeding. Baby is feeding on demand, voiding and stools present as appropriate over the last 24 hours. Infant has returned to breast following evaluation for green emesis and his circumcision this morning per mother. Mother states that she has no further questions for Lactation Consultant before discharge. Mother has [facility] phone number and agrees to call with questions or seek help from her provider if needed.

## 2017-10-09 NOTE — DISCHARGE INSTRUCTIONS
After Your Delivery (the Postpartum Period): Care Instructions  Your Care Instructions  Congratulations on the birth of your baby. Like pregnancy, the  period can be a time of excitement, monica, and exhaustion. You may look at your wondrous little baby and feel happy. You may also be overwhelmed by your new sleep hours and new responsibilities. At first, babies often sleep during the days and are awake at night. They do not have a pattern or routine. They may make sudden gasps, jerk themselves awake, or look like they have crossed eyes. These are all normal, and they may even make you smile. In these first weeks after delivery, try to take good care of yourself. It may take 4 to 6 weeks to feel like yourself again, and possibly longer if you had a  birth. You will likely feel very tired for several weeks. Your days will be full of ups and downs, but lots of monica as well. Follow-up care is a key part of your treatment and safety. Be sure to make and go to all appointments, and call your doctor if you are having problems. It's also a good idea to know your test results and keep a list of the medicines you take. How can you care for yourself at home? Take care of your body after delivery  · Use pads instead of tampons for the bloody flow that may last as long as 2 weeks. · Ease cramps with ibuprofen (Advil, Motrin). · Ease soreness of hemorrhoids and the area between your vagina and rectum with ice compresses or witch hazel pads. · Ease constipation by drinking lots of fluid and eating high-fiber foods. Ask your doctor about over-the-counter stool softeners. · Cleanse yourself with a gentle squeeze of warm water from a bottle instead of wiping with toilet paper. · Take a sitz bath in warm water several times a day. · Wear a good nursing bra. Ease sore and swollen breasts with warm, wet washcloths. · If you are not breastfeeding, use ice rather than heat for breast soreness.   · Your period may not start for several months if you are breastfeeding. You may bleed more, and longer at first, than you did before you got pregnant. · Wait until you are healed (about 4 to 6 weeks) before you have sexual intercourse. Your doctor will tell you when it is okay to have sex. · Try not to travel with your baby for 5 or 6 weeks. If you take a long car trip, make frequent stops to walk around and stretch. Avoid exhaustion  · Rest every day. Try to nap when your baby naps. · Ask another adult to be with you for a few days after delivery. · Plan for  if you have other children. · Stay flexible so you can eat at odd hours and sleep when you need to. Both you and your baby are making new schedules. · Plan small trips to get out of the house. Change can make you feel less tired. · Ask for help with housework, cooking, and shopping. Remind yourself that your job is to care for your baby. Know about help for postpartum depression  · \"Baby blues\" are common for the first 1 to 2 weeks after birth. You may cry or feel sad or irritable for no reason. · Rest whenever you can. Being tired makes it harder to handle your emotions. · Go for walks with your baby. · Talk to your partner, friends, and family about your feelings. · If your symptoms last for more than a few weeks, or if you feel very depressed, ask your doctor for help. · Postpartum depression can be treated. Support groups and counseling can help. Sometimes medicine can also help. Stay healthy  · Eat healthy foods so you have more energy, make good breast milk, and lose extra baby pounds. · If you breastfeed, avoid alcohol and drugs. Stay smoke-free. If you quit during pregnancy, congratulations. · Start daily exercise after 4 to 6 weeks, but rest when you feel tired. · Learn exercises to tone your belly. Do Kegel exercises to regain strength in your pelvic muscles. You can do these exercises while you stand or sit.   ¨ Squeeze the same muscles you would use to stop your urine. Your belly and thighs should not move. ¨ Hold the squeeze for 3 seconds, and then relax for 3 seconds. ¨ Start with 3 seconds. Then add 1 second each week until you are able to squeeze for 10 seconds. ¨ Repeat the exercise 10 to 15 times for each session. Do three or more sessions each day. · Find a class for new mothers and new babies that has an exercise time. · If you had a  birth, give yourself a bit more time before you exercise, and be careful. When should you call for help? Call 911 anytime you think you may need emergency care. For example, call if:  · You passed out (lost consciousness). Call your doctor now or seek immediate medical care if:  · You have severe vaginal bleeding. This means you are passing blood clots and soaking through a pad each hour for 2 or more hours. · You are dizzy or lightheaded, or you feel like you may faint. · You have a fever. · You have new belly pain, or your pain gets worse. Watch closely for changes in your health, and be sure to contact your doctor if:  · Your vaginal bleeding seems to be getting heavier. · You have new or worse vaginal discharge. · You feel sad, anxious, or hopeless for more than a few days. · You do not get better as expected. Where can you learn more? Go to http://john-rekha.info/. Enter A461 in the search box to learn more about \"After Your Delivery (the Postpartum Period): Care Instructions. \"  Current as of: 2017  Content Version: 11.3  © 0514-5116 [employer]. Care instructions adapted under license by [facility] (which disclaims liability or warranty for this information). If you have questions about a medical condition or this instruction, always ask your healthcare professional. [address] any warranty or liability for your use of this information.

## 2017-10-09 NOTE — ROUTINE PROCESS
0000- Bedside shift change report given to OMAR Green RN (oncoming nurse) by VOLODYMYR Samaniego RN (offgoing nurse). Report included the following information SBAR.

## 2017-10-09 NOTE — PROGRESS NOTES
Post-Partum Day Number 2 Progress Note    Aryan Zoilas     Assessment: Doing well, post partum day 2 s/p  complicated by postpartum hemorrhage requiring 2U pRBC and cervical laceration repair. Now hemodynamically stable and doing well. Plan:   1. Discharge home today  2. Follow up in office in 6 weeks with Perla Kaur MD  3. Post partum activity advised, diet as tolerated  4. Discharge Medications: ibuprofen, dilaudid, colace, zoloft and medications prior to admission  5. H/o depression: currently taking zoloft. Denies symptoms of postpartum blues or depression currently. Patient counseled. Information for the patient's :  Casper Espinal [[id number]]   Vaginal, Spontaneous Delivery    Subjective:  Patient doing well without significant complaint. Voiding without difficulty, normal lochia. Pain controlled. Trying to avoid dilaudid but needs it occasionally. Feeling emotional today- mostly just feeling grateful because she finally realized today the severity of the complications she had after birth. She's so appreciative that she's doing well physically. She denies feeling any blues or depression and feels \"over the moon\" with the birth of her baby. Vitals:  Visit Vitals    /60 (BP 1 Location: Right arm, BP Patient Position: Sitting)    Pulse 60    Temp 98.5 °F (36.9 °C)    Resp 16    Ht 5' (1.524 m)    Wt 56.2 kg (124 lb)    SpO2 97%    Breastfeeding Unknown    BMI 24.22 kg/m2     Temp (24hrs), Av °F (36.7 °C), Min:97.3 °F (36.3 °C), Max:98.5 °F (36.9 °C)      Exam:         Patient without distress. Abdomen soft, fundus firm, nontender                 Lower extremities are negative for swelling, cords or tenderness.     Labs:     Lab Results   Component Value Date/Time    WBC 22.0 10/07/2017 08:44 AM    WBC 9.3 10/06/2017 08:17 AM    WBC 10.4 2015 07:52 AM    WBC 10.9 2015 03:08 PM    WBC 8.0 2015 08:02 PM    WBC 11.9 04/15/2015 09:39 PM    WBC 9.0 11/27/2014 09:30 PM    WBC 5.9 11/15/2014 02:38 PM    WBC 8.5 11/05/2014 11:40 AM    WBC 8.7 10/30/2014 09:25 PM    WBC 8.5 10/15/2014 11:40 AM    WBC 6.1 04/26/2014 03:15 PM    WBC 9.2 01/31/2014 07:05 AM    WBC 16.3 01/18/2014 05:43 AM    WBC 10.7 01/17/2014 12:16 PM    WBC 8.9 06/20/2013 06:35 AM    WBC 7.9 05/31/2013 06:55 PM    WBC 7.1 02/18/2013 06:15 PM    WBC 5.1 12/04/2012 11:11 AM    WBC 17.9 11/27/2012 11:40 AM    WBC 8.6 11/20/2012 11:40 PM    WBC 9.8 11/01/2012 05:39 AM    WBC 6.3 02/23/2011 05:50 AM    HGB 10.6 10/07/2017 02:32 PM    HGB 11.9 10/07/2017 08:44 AM    HGB 12.0 10/06/2017 08:17 AM    HGB 14.1 12/19/2015 07:52 AM    HGB 12.5 08/25/2015 03:08 PM    HGB 14.2 07/28/2015 08:02 PM    HGB 15.5 04/15/2015 09:39 PM    HGB 13.4 11/27/2014 09:30 PM    HGB 14.8 11/15/2014 02:38 PM    HGB 13.9 11/05/2014 11:40 AM    HGB 15.1 10/30/2014 09:25 PM    HGB 13.9 10/15/2014 11:40 AM    HGB 12.7 04/26/2014 03:15 PM    HGB 13.7 01/31/2014 07:05 AM    HGB 10.3 01/18/2014 05:43 AM    HGB 12.8 01/17/2014 12:16 PM    HGB 14.5 06/20/2013 06:35 AM    HGB 13.7 05/31/2013 06:55 PM    HGB 13.5 02/18/2013 06:15 PM    HGB 13.9 12/04/2012 11:11 AM    HGB 14.9 11/27/2012 11:40 AM    HGB 13.0 11/20/2012 11:40 PM    HGB 15.1 11/01/2012 05:39 AM    HGB 14.7 02/23/2011 05:50 AM    HCT 30.9 10/07/2017 02:32 PM    HCT 35.0 10/07/2017 08:44 AM    HCT 35.1 10/06/2017 08:17 AM    HCT 41.3 12/19/2015 07:52 AM    HCT 38.2 08/25/2015 03:08 PM    HCT 41.7 07/28/2015 08:02 PM    HCT 45.2 04/15/2015 09:39 PM    HCT 39.3 11/27/2014 09:30 PM    HCT 42.5 11/15/2014 02:38 PM    HCT 40.7 11/05/2014 11:40 AM    HCT 43.1 10/30/2014 09:25 PM    HCT 41.6 10/15/2014 11:40 AM    HCT 37.1 04/26/2014 03:15 PM    HCT 40.6 01/31/2014 07:05 AM    HCT 29.8 01/18/2014 05:43 AM    HCT 37.5 01/17/2014 12:16 PM    HCT 40.0 06/20/2013 06:35 AM    HCT 38.8 05/31/2013 06:55 PM    HCT 38.3 02/18/2013 06:15 PM    HCT 40.3 12/04/2012 11:11 AM    HCT 43.7 11/27/2012 11:40 AM    HCT 37.8 11/20/2012 11:40 PM    HCT 42.8 11/01/2012 05:39 AM    HCT 41.3 02/23/2011 05:50 AM    PLATELET 582 48/41/6526 08:44 AM    PLATELET 373 80/92/0466 08:17 AM    PLATELET 136 04/97/8650 07:52 AM    PLATELET 694 11/76/6260 03:08 PM    PLATELET 388 06/40/0101 08:02 PM    PLATELET 060 09/07/5760 09:39 PM    PLATELET 623 55/42/9802 09:30 PM    PLATELET 446 79/37/6647 02:38 PM    PLATELET 848 26/51/8299 11:40 AM    PLATELET 398 36/51/4122 09:25 PM    PLATELET 198 31/98/1134 11:40 AM    PLATELET 491 68/03/6599 03:15 PM    PLATELET 044 74/95/0617 07:05 AM    PLATELET 947 04/56/2513 05:43 AM    PLATELET 989 23/48/3997 12:16 PM    PLATELET 173 56/32/3706 06:35 AM    PLATELET 591 67/40/8284 06:55 PM    PLATELET 912 59/63/5556 06:15 PM    PLATELET 551 93/90/0815 11:11 AM    PLATELET 118 53/21/7057 11:40 AM    PLATELET 556 56/95/5559 11:40 PM    PLATELET 855 30/37/0399 05:39 AM    PLATELET 311 13/62/8809 05:50 AM       No results found for this or any previous visit (from the past 24 hour(s)).

## 2017-10-10 LAB
HGB BLD-MCNC: 12.2 G/DL (ref 11.5–16)
HGB BLD-MCNC: 12.3 G/DL (ref 11.5–16)

## 2018-10-19 ENCOUNTER — ANESTHESIA EVENT (OUTPATIENT)
Dept: SURGERY | Age: 33
End: 2018-10-19
Payer: MEDICAID

## 2018-10-24 ENCOUNTER — HOSPITAL ENCOUNTER (OUTPATIENT)
Age: 33
Setting detail: OUTPATIENT SURGERY
Discharge: HOME | End: 2018-10-24
Attending: OBSTETRICS & GYNECOLOGY | Admitting: OBSTETRICS & GYNECOLOGY
Payer: MEDICAID

## 2018-10-24 ENCOUNTER — ANESTHESIA (OUTPATIENT)
Dept: SURGERY | Age: 33
End: 2018-10-24
Payer: MEDICAID

## 2018-10-24 VITALS
SYSTOLIC BLOOD PRESSURE: 94 MMHG | HEIGHT: 60 IN | WEIGHT: 120 LBS | TEMPERATURE: 98 F | DIASTOLIC BLOOD PRESSURE: 56 MMHG | RESPIRATION RATE: 13 BRPM | BODY MASS INDEX: 23.56 KG/M2 | HEART RATE: 59 BPM | OXYGEN SATURATION: 99 %

## 2018-10-24 PROBLEM — N93.8 DUB (DYSFUNCTIONAL UTERINE BLEEDING): Status: ACTIVE | Noted: 2018-10-24

## 2018-10-24 LAB
HCG UR QL: NEGATIVE
HGB BLD-MCNC: 13.9 G/DL (ref 11.5–16)

## 2018-10-24 PROCEDURE — 74011250636 HC RX REV CODE- 250/636: Performed by: OBSTETRICS & GYNECOLOGY

## 2018-10-24 PROCEDURE — 88305 TISSUE EXAM BY PATHOLOGIST: CPT | Performed by: OBSTETRICS & GYNECOLOGY

## 2018-10-24 PROCEDURE — 85018 HEMOGLOBIN: CPT

## 2018-10-24 PROCEDURE — 77030020782 HC GWN BAIR PAWS FLX 3M -B

## 2018-10-24 PROCEDURE — 76210000017 HC OR PH I REC 1.5 TO 2 HR: Performed by: OBSTETRICS & GYNECOLOGY

## 2018-10-24 PROCEDURE — 74011250636 HC RX REV CODE- 250/636

## 2018-10-24 PROCEDURE — 76210000020 HC REC RM PH II FIRST 0.5 HR: Performed by: OBSTETRICS & GYNECOLOGY

## 2018-10-24 PROCEDURE — 76010000138 HC OR TIME 0.5 TO 1 HR: Performed by: OBSTETRICS & GYNECOLOGY

## 2018-10-24 PROCEDURE — 77030003679 HC NDL SPN TERU -A: Performed by: OBSTETRICS & GYNECOLOGY

## 2018-10-24 PROCEDURE — 77030032490 HC SLV COMPR SCD KNE COVD -B: Performed by: OBSTETRICS & GYNECOLOGY

## 2018-10-24 PROCEDURE — 74011250636 HC RX REV CODE- 250/636: Performed by: ANESTHESIOLOGY

## 2018-10-24 PROCEDURE — 77030005537 HC CATH URETH BARD -A: Performed by: OBSTETRICS & GYNECOLOGY

## 2018-10-24 PROCEDURE — 77030018836 HC SOL IRR NACL ICUM -A: Performed by: OBSTETRICS & GYNECOLOGY

## 2018-10-24 PROCEDURE — 81025 URINE PREGNANCY TEST: CPT

## 2018-10-24 PROCEDURE — 76060000032 HC ANESTHESIA 0.5 TO 1 HR: Performed by: OBSTETRICS & GYNECOLOGY

## 2018-10-24 RX ORDER — GLYCOPYRROLATE 0.2 MG/ML
0.2 INJECTION INTRAMUSCULAR; INTRAVENOUS
Status: DISCONTINUED | OUTPATIENT
Start: 2018-10-24 | End: 2018-10-24 | Stop reason: HOSPADM

## 2018-10-24 RX ORDER — MIDAZOLAM HYDROCHLORIDE 1 MG/ML
0.5 INJECTION, SOLUTION INTRAMUSCULAR; INTRAVENOUS
Status: DISCONTINUED | OUTPATIENT
Start: 2018-10-24 | End: 2018-10-24 | Stop reason: HOSPADM

## 2018-10-24 RX ORDER — ONDANSETRON 2 MG/ML
4 INJECTION INTRAMUSCULAR; INTRAVENOUS AS NEEDED
Status: DISCONTINUED | OUTPATIENT
Start: 2018-10-24 | End: 2018-10-24 | Stop reason: HOSPADM

## 2018-10-24 RX ORDER — SODIUM CHLORIDE 0.9 % (FLUSH) 0.9 %
5-10 SYRINGE (ML) INJECTION EVERY 8 HOURS
Status: DISCONTINUED | OUTPATIENT
Start: 2018-10-24 | End: 2018-10-24 | Stop reason: HOSPADM

## 2018-10-24 RX ORDER — EPHEDRINE SULFATE 50 MG/ML
5 INJECTION, SOLUTION INTRAVENOUS AS NEEDED
Status: DISCONTINUED | OUTPATIENT
Start: 2018-10-24 | End: 2018-10-24 | Stop reason: HOSPADM

## 2018-10-24 RX ORDER — SODIUM CHLORIDE, SODIUM LACTATE, POTASSIUM CHLORIDE, CALCIUM CHLORIDE 600; 310; 30; 20 MG/100ML; MG/100ML; MG/100ML; MG/100ML
1000 INJECTION, SOLUTION INTRAVENOUS CONTINUOUS
Status: DISCONTINUED | OUTPATIENT
Start: 2018-10-24 | End: 2018-10-24 | Stop reason: HOSPADM

## 2018-10-24 RX ORDER — FENTANYL CITRATE 50 UG/ML
25 INJECTION, SOLUTION INTRAMUSCULAR; INTRAVENOUS
Status: COMPLETED | OUTPATIENT
Start: 2018-10-24 | End: 2018-10-24

## 2018-10-24 RX ORDER — PROPOFOL 10 MG/ML
INJECTION, EMULSION INTRAVENOUS AS NEEDED
Status: DISCONTINUED | OUTPATIENT
Start: 2018-10-24 | End: 2018-10-24 | Stop reason: HOSPADM

## 2018-10-24 RX ORDER — SODIUM CHLORIDE 0.9 % (FLUSH) 0.9 %
5-10 SYRINGE (ML) INJECTION AS NEEDED
Status: DISCONTINUED | OUTPATIENT
Start: 2018-10-24 | End: 2018-10-24 | Stop reason: HOSPADM

## 2018-10-24 RX ORDER — HYDROCODONE BITARTRATE AND ACETAMINOPHEN 5; 325 MG/1; MG/1
1 TABLET ORAL AS NEEDED
Status: DISCONTINUED | OUTPATIENT
Start: 2018-10-24 | End: 2018-10-24 | Stop reason: HOSPADM

## 2018-10-24 RX ORDER — DEXAMETHASONE SODIUM PHOSPHATE 4 MG/ML
INJECTION, SOLUTION INTRA-ARTICULAR; INTRALESIONAL; INTRAMUSCULAR; INTRAVENOUS; SOFT TISSUE AS NEEDED
Status: DISCONTINUED | OUTPATIENT
Start: 2018-10-24 | End: 2018-10-24 | Stop reason: HOSPADM

## 2018-10-24 RX ORDER — SODIUM CHLORIDE, SODIUM LACTATE, POTASSIUM CHLORIDE, CALCIUM CHLORIDE 600; 310; 30; 20 MG/100ML; MG/100ML; MG/100ML; MG/100ML
125 INJECTION, SOLUTION INTRAVENOUS CONTINUOUS
Status: DISCONTINUED | OUTPATIENT
Start: 2018-10-24 | End: 2018-10-24 | Stop reason: HOSPADM

## 2018-10-24 RX ORDER — KETOROLAC TROMETHAMINE 30 MG/ML
INJECTION, SOLUTION INTRAMUSCULAR; INTRAVENOUS AS NEEDED
Status: DISCONTINUED | OUTPATIENT
Start: 2018-10-24 | End: 2018-10-24 | Stop reason: HOSPADM

## 2018-10-24 RX ORDER — LIDOCAINE HYDROCHLORIDE 20 MG/ML
INJECTION, SOLUTION EPIDURAL; INFILTRATION; INTRACAUDAL; PERINEURAL AS NEEDED
Status: DISCONTINUED | OUTPATIENT
Start: 2018-10-24 | End: 2018-10-24 | Stop reason: HOSPADM

## 2018-10-24 RX ORDER — LIDOCAINE HYDROCHLORIDE 10 MG/ML
0.1 INJECTION, SOLUTION EPIDURAL; INFILTRATION; INTRACAUDAL; PERINEURAL AS NEEDED
Status: DISCONTINUED | OUTPATIENT
Start: 2018-10-24 | End: 2018-10-24 | Stop reason: HOSPADM

## 2018-10-24 RX ORDER — MIDAZOLAM HYDROCHLORIDE 1 MG/ML
1 INJECTION, SOLUTION INTRAMUSCULAR; INTRAVENOUS AS NEEDED
Status: DISCONTINUED | OUTPATIENT
Start: 2018-10-24 | End: 2018-10-24 | Stop reason: HOSPADM

## 2018-10-24 RX ORDER — ONDANSETRON 2 MG/ML
INJECTION INTRAMUSCULAR; INTRAVENOUS AS NEEDED
Status: DISCONTINUED | OUTPATIENT
Start: 2018-10-24 | End: 2018-10-24 | Stop reason: HOSPADM

## 2018-10-24 RX ORDER — LIDOCAINE HYDROCHLORIDE 10 MG/ML
10 INJECTION INFILTRATION; PERINEURAL ONCE
Status: DISCONTINUED | OUTPATIENT
Start: 2018-10-24 | End: 2018-10-24 | Stop reason: HOSPADM

## 2018-10-24 RX ORDER — SODIUM CHLORIDE, SODIUM LACTATE, POTASSIUM CHLORIDE, CALCIUM CHLORIDE 600; 310; 30; 20 MG/100ML; MG/100ML; MG/100ML; MG/100ML
INJECTION, SOLUTION INTRAVENOUS
Status: DISCONTINUED | OUTPATIENT
Start: 2018-10-24 | End: 2018-10-24 | Stop reason: HOSPADM

## 2018-10-24 RX ORDER — SODIUM CHLORIDE 9 MG/ML
25 INJECTION, SOLUTION INTRAVENOUS CONTINUOUS
Status: DISCONTINUED | OUTPATIENT
Start: 2018-10-24 | End: 2018-10-24 | Stop reason: HOSPADM

## 2018-10-24 RX ORDER — DIPHENHYDRAMINE HYDROCHLORIDE 50 MG/ML
12.5 INJECTION, SOLUTION INTRAMUSCULAR; INTRAVENOUS AS NEEDED
Status: DISCONTINUED | OUTPATIENT
Start: 2018-10-24 | End: 2018-10-24 | Stop reason: HOSPADM

## 2018-10-24 RX ORDER — FENTANYL CITRATE 50 UG/ML
50 INJECTION, SOLUTION INTRAMUSCULAR; INTRAVENOUS AS NEEDED
Status: COMPLETED | OUTPATIENT
Start: 2018-10-24 | End: 2018-10-24

## 2018-10-24 RX ORDER — ROPIVACAINE HYDROCHLORIDE 5 MG/ML
30 INJECTION, SOLUTION EPIDURAL; INFILTRATION; PERINEURAL AS NEEDED
Status: DISCONTINUED | OUTPATIENT
Start: 2018-10-24 | End: 2018-10-24 | Stop reason: HOSPADM

## 2018-10-24 RX ORDER — ALBUTEROL SULFATE 0.83 MG/ML
2.5 SOLUTION RESPIRATORY (INHALATION) AS NEEDED
Status: DISCONTINUED | OUTPATIENT
Start: 2018-10-24 | End: 2018-10-24 | Stop reason: HOSPADM

## 2018-10-24 RX ORDER — ACETAMINOPHEN 10 MG/ML
INJECTION, SOLUTION INTRAVENOUS AS NEEDED
Status: DISCONTINUED | OUTPATIENT
Start: 2018-10-24 | End: 2018-10-24 | Stop reason: HOSPADM

## 2018-10-24 RX ADMIN — FENTANYL CITRATE 25 MCG: 50 INJECTION INTRAMUSCULAR; INTRAVENOUS at 12:50

## 2018-10-24 RX ADMIN — FENTANYL CITRATE 50 MCG: 50 INJECTION, SOLUTION INTRAMUSCULAR; INTRAVENOUS at 11:46

## 2018-10-24 RX ADMIN — FENTANYL CITRATE 50 MCG: 50 INJECTION, SOLUTION INTRAMUSCULAR; INTRAVENOUS at 11:52

## 2018-10-24 RX ADMIN — ACETAMINOPHEN 1000 MG: 10 INJECTION, SOLUTION INTRAVENOUS at 12:04

## 2018-10-24 RX ADMIN — PROPOFOL 50 MG: 10 INJECTION, EMULSION INTRAVENOUS at 12:22

## 2018-10-24 RX ADMIN — FENTANYL CITRATE 25 MCG: 50 INJECTION INTRAMUSCULAR; INTRAVENOUS at 12:55

## 2018-10-24 RX ADMIN — FENTANYL CITRATE 25 MCG: 50 INJECTION INTRAMUSCULAR; INTRAVENOUS at 13:05

## 2018-10-24 RX ADMIN — ONDANSETRON 4 MG: 2 INJECTION INTRAMUSCULAR; INTRAVENOUS at 11:46

## 2018-10-24 RX ADMIN — DEXAMETHASONE SODIUM PHOSPHATE 8 MG: 4 INJECTION, SOLUTION INTRA-ARTICULAR; INTRALESIONAL; INTRAMUSCULAR; INTRAVENOUS; SOFT TISSUE at 11:55

## 2018-10-24 RX ADMIN — PROPOFOL 50 MG: 10 INJECTION, EMULSION INTRAVENOUS at 12:10

## 2018-10-24 RX ADMIN — KETOROLAC TROMETHAMINE 30 MG: 30 INJECTION, SOLUTION INTRAMUSCULAR; INTRAVENOUS at 12:28

## 2018-10-24 RX ADMIN — SODIUM CHLORIDE, SODIUM LACTATE, POTASSIUM CHLORIDE, AND CALCIUM CHLORIDE 1000 ML: 600; 310; 30; 20 INJECTION, SOLUTION INTRAVENOUS at 13:05

## 2018-10-24 RX ADMIN — SODIUM CHLORIDE, SODIUM LACTATE, POTASSIUM CHLORIDE, CALCIUM CHLORIDE: 600; 310; 30; 20 INJECTION, SOLUTION INTRAVENOUS at 11:46

## 2018-10-24 RX ADMIN — PROPOFOL 50 MG: 10 INJECTION, EMULSION INTRAVENOUS at 12:11

## 2018-10-24 RX ADMIN — ONDANSETRON 4 MG: 2 INJECTION INTRAMUSCULAR; INTRAVENOUS at 12:50

## 2018-10-24 RX ADMIN — MIDAZOLAM HYDROCHLORIDE 2 MG: 1 INJECTION, SOLUTION INTRAMUSCULAR; INTRAVENOUS at 11:46

## 2018-10-24 RX ADMIN — PROPOFOL 50 MG: 10 INJECTION, EMULSION INTRAVENOUS at 11:54

## 2018-10-24 RX ADMIN — PROPOFOL 30 MG: 10 INJECTION, EMULSION INTRAVENOUS at 12:03

## 2018-10-24 RX ADMIN — PROPOFOL 20 MG: 10 INJECTION, EMULSION INTRAVENOUS at 12:08

## 2018-10-24 RX ADMIN — LIDOCAINE HYDROCHLORIDE 40 MG: 20 INJECTION, SOLUTION EPIDURAL; INFILTRATION; INTRACAUDAL; PERINEURAL at 11:54

## 2018-10-24 RX ADMIN — FENTANYL CITRATE 25 MCG: 50 INJECTION INTRAMUSCULAR; INTRAVENOUS at 13:00

## 2018-10-24 RX ADMIN — SODIUM CHLORIDE, SODIUM LACTATE, POTASSIUM CHLORIDE, AND CALCIUM CHLORIDE 125 ML/HR: 600; 310; 30; 20 INJECTION, SOLUTION INTRAVENOUS at 11:42

## 2018-10-24 NOTE — ANESTHESIA POSTPROCEDURE EVALUATION
Post-Anesthesia Evaluation and Assessment Patient: Governor Bello MRN: [id number]  SSN: [id number] YOB: 1985  Age: 29 y.o. Sex: female I have evaluated the patient and they are stable and ready for discharge from the PACU. Cardiovascular Function/Vital Signs Visit Vitals /61 Pulse (!) 57 Temp 36.5 °C (97.7 °F) Resp 12 Ht 5' (1.524 m) Wt 54.4 kg (120 lb) SpO2 100% Breastfeeding? No  
BMI 23.44 kg/m² Patient is status post Other anesthesia for Procedure(s): 
Hystereoscopy, Dilation and Curettage and Insertion of Liletta IUD. Nausea/Vomiting: None Postoperative hydration reviewed and adequate. Pain: 
Pain Scale 1: Numeric (0 - 10) (10/24/18 1305) Pain Intensity 1: 6 (10/24/18 1305) Managed Neurological Status:  
Neuro (WDL): Within Defined Limits (10/24/18 1124) At baseline Mental Status, Level of Consciousness: Alert and  oriented to person, place, and time Pulmonary Status:  
O2 Device: Nasal cannula (10/24/18 1240) Adequate oxygenation and airway patent Complications related to anesthesia: None Post-anesthesia assessment completed. No concerns Signed By: Cinthya Mcwilliams MD   
 October 24, 2018 Procedure(s): 
Hystereoscopy, Dilation and Curettage and Insertion of Liletta IUD. <BSHSIANPOST> Visit Vitals /61 Pulse (!) 57 Temp 36.5 °C (97.7 °F) Resp 12 Ht 5' (1.524 m) Wt 54.4 kg (120 lb) SpO2 100% Breastfeeding? No  
BMI 23.44 kg/m²

## 2018-10-24 NOTE — H&P
Gynecology History and Physical 
 
Name: Komal Cartwright MRN: [id number] SSN: [id number] YOB: 1985  Age: 29 y.o. Sex: female Subjective: Chief complaint:  ALLIE Newton is a 29 y.o.  female with a history of abnormal uterine bleeding. Previous workup included a sonohysterogram which was unremarkable . Previous treatment measures include nonsteroidal anti-inflammatory drugs (NSAID's) and oral contraceptives. She is admitted for Procedure(s) (LRB): 
HYSTEROSCOPY, DILATION AND CURETTAGE, Liletta IUD INSERTION, The current method of family planning is abstinence. Past Medical History:  
Diagnosis Date  Anxiety  Arthritis  Calculus of kidney  Dyspepsia and other specified disorders of function of stomach  GERD (gastroesophageal reflux disease)  Kidney disease   
 hx of kidney stone  Other ill-defined conditions(799.89)   
 kidney stone in pass,passed  Postpartum hemorrhage  ALSO HAD HEMORRHAGE DURING MISCARRIAGE   Psychiatric disorder ADHD Past Surgical History:  
Procedure Laterality Date  ABDOMEN SURGERY PROC UNLISTED  10/30/12 Laparoscopic cholecystectomy [address] ONLY    
 2014  HX APPENDECTOMY  HX CHOLECYSTECTOMY  HX DILATION AND CURETTAGE    
 HX GYN    
 miscarriage x2  HX GYN    
 C section  HX GYN    
 EXP LAPS  
 HX HEENT    
 wisdom teeth extraction  HX ORTHOPAEDIC    
 torn catiledge repair left knee  HX WISDOM TEETH EXTRACTION    
 DC ANESTH,KNEE AREA SURGERY    
 UPPER GI ENDOSCOPY,BIOPSY  2015 OB History  Para Term  AB Living 4 2 2   2 2 SAB TAB Ectopic Molar Multiple Live Births  
         0 2 Allergies Allergen Reactions  Ambien [Zolpidem] Other (comments) \"Hallucinations and black outs\"  Morphine Rash Prior to Admission medications Not on File Family History Problem Relation Age of Onset  Emphysema Mother  No Known Problems Father  No Known Problems Son  No Known Problems Son  Anesth Problems Neg Hx Social History Socioeconomic History  Marital status:  Spouse name: Not on file  Number of children: Not on file  Years of education: Not on file  Highest education level: Not on file Social Needs  Financial resource strain: Not on file  Food insecurity - worry: Not on file  Food insecurity - inability: Not on file  Transportation needs - medical: Not on file  Transportation needs - non-medical: Not on file Occupational History  Not on file Tobacco Use  Smoking status: Current Every Day Smoker Packs/day: 0.50 Years: 15.00 Pack years: 7.50  Smokeless tobacco: Never Used  Tobacco comment: about 6 cigarettes per day at present Substance and Sexual Activity  Alcohol use: Yes Comment: RARELY  Drug use: No  
 Sexual activity: Yes  
  Partners: Male Birth control/protection: None Other Topics Concern  Not on file Social History Narrative  Not on file Review of Systems Objective:  
 
Vitals:  
 10/19/18 1424 10/24/18 1114 BP:  94/58 Pulse:  (!) 58 Resp:  14 Temp:  98.3 °F (36.8 °C) SpO2:  100% Weight: 54.4 kg (120 lb) 54.4 kg (120 lb) Height: 5' (1.524 m) Physical Exam  Lungs, clear, Cor  Regular, Abdomen soft , nt Pelvic : LMP 10 days ago, deferred to OR Physical Exam 
 
 
Assessment:  
 
Abnormal uterine bleeding , failed medical mgm Plan: 1. Procedure(s) (LRB): 
HYSTEROSCOPY, DILATION AND CURETTAGE, Liletta  IUD INSERTION, Discussed the risks of surgery including the risks of bleeding, infection, deep vein thrombosis, and surgical injuries to internal organs including but not limited to the bowels, bladder, rectum, and female reproductive organs.  The patient understands the risks; any and all questions were answered to the patient's satisfaction. Signed By:  Ainsley Mckay MD   
 October 24, 2018

## 2018-10-24 NOTE — PERIOP NOTES
Patient: Elaine Suresh MRN: [id number]  SSN: [id number] YOB: 1985  Age: 29 y.o. Sex: female Patient is status post Procedure(s): 
Hystereoscopy, Dilation and Curettage and Insertion of Liletta IUD. Surgeon(s) and Role: * Pilar Gonzalez MD - Primary Local/Dose/Irrigation:  See [facility] Peripheral IV 10/24/18 Left Forearm (Active) Site Assessment Clean, dry, & intact 10/24/2018 11:41 AM  
Phlebitis Assessment 0 10/24/2018 11:41 AM  
Infiltration Assessment 0 10/24/2018 11:41 AM  
Dressing Status Clean, dry, & intact 10/24/2018 11:41 AM  
Dressing Type Tape 10/24/2018 11:41 AM  
Hub Color/Line Status Pink; Infusing 10/24/2018 11:41 AM  
Alcohol Cap Used No 10/24/2018 11:41 AM  
                 
 
 
 
Dressing/Packing:    
Splint/Cast:  ] Other:

## 2018-10-24 NOTE — ANESTHESIA PREPROCEDURE EVALUATION
Anesthetic History No history of anesthetic complications Review of Systems / Medical History Patient summary reviewed, nursing notes reviewed and pertinent labs reviewed Pulmonary Within defined limits Neuro/Psych Within defined limits Cardiovascular Within defined limits Exercise tolerance: >4 METS 
  
GI/Hepatic/Renal 
Within defined limits Endo/Other Within defined limits Other Findings Physical Exam 
 
Airway Mallampati: II 
TM Distance: > 6 cm Neck ROM: normal range of motion Mouth opening: Normal 
 
 Cardiovascular Regular rate and rhythm,  S1 and S2 normal,  no murmur, click, rub, or gallop Dental 
No notable dental hx Pulmonary Breath sounds clear to auscultation Abdominal 
GI exam deferred Other Findings Anesthetic Plan ASA: 1 Anesthesia type: general 
 
 
 
 
Induction: Intravenous Anesthetic plan and risks discussed with: Patient

## 2018-10-24 NOTE — DISCHARGE INSTRUCTIONS
*You had IV Tylenol at 1204    After Care Instructions For Your D&C      1. You may resume your usual diet once the nausea resolves. Initially, try sips of warm fluids and a bland diet. 2. Avoid heavy lifting and straining. Gradually increase your activity. First, try walking and doing light activity around the house. Resume your normal habits if no significant discomfort or bleeding develops. Most women can return to work within one to four days after this procedure. 3. You may take showers. Avoid using a tub bath, swimming pool or hot tub until after your check-up. 4. Do not place anything in your vagina until after your postoperative visit. Do not   douche, use tampons, or have intercourse because this may cause bleeding and   infection. 5. You may initially experience a heavy bloody discharge. This should not be more than your menstrual flow. Over the next several days, the flow should steadily decrease. 6. Typically following the procedure, there is little or no pain. You may feel cramps in your lower abdomen. Tylenol may relieve mild cramping. If pain medication does not improve your symptoms, you should contact your physician. 7. Contact the office if you have excessive bleeding (saturating a pad an hour for two hours or passing large clots). It is also necessary to speak with your physician if you develop chills, a temperature greater than 100.4, difficulty voiding or burning on urination. 8. Your physician may want to see you in the office after your D&C. Please call for an appointment if this has not already been arranged. Our office phone number is [phone].  If appropriate, the microscopic results from your procedure will be discussed at this follow-up visit.        ______________________________________________________________________    Anesthesia Discharge Instructions    After general anesthesia or intervenous sedation, for 24 hours or while taking prescription Narcotics:  · Limit your activities  · Do not drive or operate hazardous machinery  · If you have not urinated within 8 hours after discharge, please contact your surgeon on call. · Do not make important personal or business decisions  · Do not drink alcoholic beverages    Report the following to your surgeon:  · Excessive pain, swelling, redness or odor of or around the surgical area  · Temperature over 100.5 degrees  · Nausea and vomiting lasting longer than 4 hours or if unable to take medication  · Any signs of decreased circulation or nerve impairment to extremity:  Change in color, persistent numbness, tingling, coldness or increased pain.   · Any questions

## 2018-10-24 NOTE — OP NOTES
HYSTEROSCOPY D & Bennett Quiet IUD insertion      DATE OF PROCEDURE: 10/24/2018    PREOPERATIVE DIAGNOSIS:  Menorrhagia, DUB  POSTOPERATIVE DIAGNOSIS:  same  PROCEDURE: NaCl hysteroscopy, D&C  SURGEON:  Leonardo Sprague MD    ASSISTANT:  none    ANESTHESIA: MAC, local paracervical with 1%lidocaine   EBL:  minimal    FINDINGS: grade 0 descensus, uterus sounded to 8cm,   Specimen:  Uterine contents to path  Cervix smooth on right, scarred on left corner  No intracavitary polyps or leiomyomas    PROCEDURE: Patient was placed on the operating table in the supine position. Time out was done to confirm the operating procedure, surgeon, patient and site. Once confirmed by the team, procedure was started. Anesthesia was administered. She was prepped and draped in the usual fashion for vaginal surgery. Cervix was visualized with the aid of a vaginal speculum and grasped with a tenaculum . The cervix was then dilated to A#6 Hegar dialator  and uterine length was determined and hysteroscopy had been performed with distension medium of normal saline. Fluid balance was negligible  Sharp curettage was then performed until a gritty feeling was obtained in all 4 quadrants. Following this the [address] IUD was inserted in the usual fashion. There was no excess bleeding. Instruments were removed. The patient went to the recovery room in satisfactory condition.

## 2019-02-08 ENCOUNTER — OFFICE VISIT (OUTPATIENT)
Dept: BEHAVIORAL/MENTAL HEALTH CLINIC | Age: 34
End: 2019-02-08

## 2019-02-08 VITALS
BODY MASS INDEX: 22.78 KG/M2 | SYSTOLIC BLOOD PRESSURE: 106 MMHG | HEART RATE: 102 BPM | WEIGHT: 116 LBS | DIASTOLIC BLOOD PRESSURE: 79 MMHG | HEIGHT: 60 IN

## 2019-02-08 DIAGNOSIS — F41.1 GENERALIZED ANXIETY DISORDER: Primary | ICD-10-CM

## 2019-02-08 DIAGNOSIS — F33.0 MILD EPISODE OF RECURRENT MAJOR DEPRESSIVE DISORDER (HCC): ICD-10-CM

## 2019-02-08 RX ORDER — FLUOXETINE HYDROCHLORIDE 40 MG/1
CAPSULE ORAL DAILY
COMMUNITY
End: 2019-06-10 | Stop reason: SDUPTHER

## 2019-02-08 RX ORDER — BUSPIRONE HYDROCHLORIDE 5 MG/1
5 TABLET ORAL
Qty: 90 TAB | Refills: 2 | Status: SHIPPED | OUTPATIENT
Start: 2019-02-08 | End: 2019-05-07 | Stop reason: SDUPTHER

## 2019-02-08 RX ORDER — ALPRAZOLAM 0.5 MG/1
TABLET ORAL
COMMUNITY
End: 2019-02-08 | Stop reason: SDUPTHER

## 2019-02-08 RX ORDER — ALPRAZOLAM 0.5 MG/1
0.25 TABLET ORAL
Qty: 30 TAB | Refills: 0 | Status: SHIPPED | OUTPATIENT
Start: 2019-02-08 | End: 2019-03-25 | Stop reason: SDUPTHER

## 2019-02-08 NOTE — PROGRESS NOTES
Leandra Cowan 1985 
29 y.o. 
female [place]  Chief Complaint Patient presents with  Depression PHQ 9 is score 9 indicating mild depression  Anxiety Garcia anxiety rating scale score 37 indicating severe anxiety  Bipolar Mood disorder questionnaire negative and patient denied ever having any manic or hypomanic episode  Addiction problem Patient is smoke marijuana daily [place]:  
This is a 29years old   female with past psychiatric history and treatment of ADHD, depression, anxiety who is referred by her OB/GYN at [facility] who provided her with Prozac 40 mg daily and Xanax 0.5 mg #20 with no refill until she come and see me. She presented on time, was alert awake oriented to time person and place and was calm and cooperative, polite, and pleasant during the interview. She was able to provide pertinent history and was able to discuss treatment alternatives and decide about the plan. Patient was in her usual state of health up until high school when she was diagnosed with ADHD and she took Adderall XR which improved her grade but made her irritable and she was tried on Concerta which gave her side effect of jitteriness. She was able to function well until few years ago when she was going through marital problems and divorce and was tried on several medication. Patient is score 9 on PHQ 9 indicating mild depression reporting somewhat difficult and more than half of the day symptoms of restless and fidgety and several days of depressed mood, anhedonia, trouble with sleep, feeling tired, appetite issue, feeling bad about herself, and trouble concentrating. She is scored 37 on Garcia anxiety rating scale indicating severe anxiety and her symptoms are suggestive of generalized anxiety with panic attacks.   Her mood disorder questionnaire is negative and she denies any manic or hypomanic episode, denies any hallucination, denies any PTSD symptoms or OCD symptoms and denies any eating disorder symptoms. Patient was provided with support and psychoeducation, and after discussing risk/benefits/expectations with treatment alternatives available, patient decided to continue Prozac 40 mg daily and try BuSpar 5 mg 3 times a day to target symptoms of generalized anxiety which look likes her chief complaint today. She will call me after 2 weeks if she would like to increase dose if see at least 20% benefit. She was provided with 30 tablets of Xanax 0.5 mg with understanding that she will gradually taper and discontinue as it is not in our treatment plan. PAST PSYCHIATRIC HISTORY: 
Patient denies any acute inpatient psychiatric hospitalization, any substance abuse detox or rehab, and denies any suicide attempt ever. Patient report trials of Adderall which improve her grade but made her irritable. Wellbutrin which works very well. Concerta did not help and side effect of jitteriness. Viibryd side effect of brain shocks. Zoloft worked very well but completely cut down her libido. Prozac she is currently taking. She did not like Paxil or Celexa Lexapro and Cymbalta. FAMILY HISTORY: 
Any of first degree relatives including biological parents, siblings, first cousins on both sides, uncle/aunt on both sides, and grand parents on both sides have been diagnosed or treated for any mental illness, substance abuse or attempted/commited suicide. Patient does not know about her father or his side of family as she only met him once. Her sister is addicted to heroine and drugs and has a history of ADHD. Her mother suffers from depression and drinking 1 of the maternal uncle and aunt suffers from drinking and have questionable bipolar. SUBSTANCE USE HISTORY:  
TOBACCO: Smoke half a pack per day. ALCOHOL: Denies any abuse ever. CANNABIS: Smokes since high school currently 1 hit daily. COCAINE, OPIOIDS and  oTHERS: Patient denies. MEDICAL HISTORY:  
 has a past medical history of Anxiety, Arthritis, Calculus of kidney, Dyspepsia and other specified disorders of function of stomach, GERD (gastroesophageal reflux disease), Kidney disease, Other ill-defined conditions(799.89), Postpartum hemorrhage (2017), and Psychiatric disorder. She also has no past medical history of Abnormal Pap smear, Abnormal Papanicolaou smear of cervix, Acquired hypothyroidism, Anemia, Anemia NEC, Asthma, Chlamydia, Complication of anesthesia, Diabetes mellitus, Essential hypertension, Essential hypertension, Genital herpes, unspecified, Gestational diabetes, Gestational hypertension, Gonorrhea, Heart abnormalities, Heart abnormality, Herpes gestationis, Herpes simplex without mention of complication, Human immunodeficiency virus (HIV) disease ([facility] [address] OTHER MEDICAL, Infertility, Infertility, female, Phlebitis and thrombophlebitis of unspecified site, Pituitary disorder ([facility] [address] Polycystic disease, ovaries, Postpartum depression, Rhesus isoimmunization unspecified as to episode of care in pregnancy, Sickle cell trait syndrome ([facility] [address] Sickle-cell disease, unspecified, Syphilis, Systemic lupus erythematosus ([facility] [address] Thyroid activity decreased, Trauma, Unspecified breast disorder, Unspecified diseases of blood and blood-forming organs, or Unspecified epilepsy without mention of intractable epilepsy. ALLERGIES:  
Allergies Allergen Reactions  Ambien [Zolpidem] Other (comments) \"Hallucinations and black outs\"  Morphine Rash VITAL SIGNS: 
/79   Pulse (!) 102   Ht 5' (1.524 m)   Wt 52.6 kg (116 lb)   BMI 22.65 kg/m² Current Outpatient Medications Medication Sig Dispense Refill  FLUoxetine (PROZAC) 40 mg capsule Take  by mouth daily.  cyclobenzaprine HCl (FLEXERIL PO) Take  by mouth.     
 ALPRAZolam (XANAX) 0.5 mg tablet Take 0.5 Tabs by mouth two (2) times daily as needed for Anxiety. Max Daily Amount: 0.5 mg. 30 Tab 0  
 busPIRone (BUSPAR) 5 mg tablet Take 1 Tab by mouth three (3) times daily (with meals). 90 Tab 2  
 
 
ROS: 
Constitutional: Positive for tattoos Eyes: Negative for contacts/classes ENT: Negative for hearing loss and tinnitus Respiratory: Negative for cough or wheezing Cardiovascular: Negative for chest pain, palpitations Gastrointestinal: Negative for reflux symptoms and constipation Genitourinary: Negative for frequency and urinary incontinence Musculoskeletal: Negative for myalgias and arthralgias Neurological: Positive for memory problems Behavioral/psych: Positive for insomnia, anxiety, depression, negative for SI/HI Endocrine: Negative for diabetic symptoms including polyuria and polydipsia LEGAL HISTORY: 
Denies. SOCIAL HISTORY: 
Patient was born and raised in [place] by maternal grandparents as her father was never in life and mother was very young when she had her. History of childhood abuse: Denies. Education: Graduated from high school no college.  history: Denies. Marriage and children:  2000 6:16 years of marriage and has 11year-old son from marriage and 3year-old son from her current relationship. Muslim/Sprituality: Judaism. She used to work in retail and cleaning but has not work in past couple of years as she is taking care of young children. She lives with her mother and sister and help them out and take care of the house. MENTAL STATUS EXAMINATION:  
Patient is a 29 y.o. female [facility] who looks her stated age. Patient appearance is appropriately dressed with fair hygiene. Speech is regular rate and rhythm, fluent language, and thought process is linear, logical, and goal directed. Reported mood is anxious with mood congruent anxious affect. Patient denies any suicidal ideation, homicidal ideation, and auditory visual hallucination.  Not observed to be delusional or paranoid. Insight, judgement, reliability and impulse control is limited to intact. Cognition was within normal limit and patient was observed to be reliable. DIAGNOSIS: 
Encounter Diagnoses Name Primary?  Generalized anxiety disorder Yes  Mild episode of recurrent major depressive disorder ([facility] [address] PLAN: 
1. MEDICATION:  
1. Patient report compliance with Prozac 40 mg daily, is able to tolerate increasing dose, and reported marked improvement with her depression which is currently mild but requested some help with anxiety so we decided to try BuSpar. 2.  BuSpar 5 mg 3 times a day to target symptoms of generalized anxiety. 3.  Patient is provided with Xanax 0.5 mg #20 on January 21 by her OB/GYN until she comes to see me. She was provided with Xanax 0.5 mg #30 with 0 refill today so she can gradually taper and discontinue. She is smoking marijuana on regular basis and understand that I will not be able to provide Xanax on a continued basis and she only need to take for panic attacks. She was provided with psycho education, discussed risk/benefits, and expectations from medication changes. Patient agrees with plan. 2. PSYCHOTHERAPY: Patient was provided with supportive therapy, strongly encourage to seek psychotherapy. Patient will be referred for psychotherapy once stabilized on medication. 3. MEDICAL CARE: Patient was strongly encourage to take their medical medications and follow up with their PCP on regular basis. Her weight today is 116 pounds and she has lost 4 pounds since October 24, 2018. Her blood pressure is 106/79 but pulse is 102 high. She has history of appendicitis, chronic pelvic pain, and dysfunctional uterine bleeding. 4. SUBSTANCE ABUSE CARE: Patient smokes half a pack per day, denies drinking or abusing any illicit drugs except marijuana daily \"one-hitter\". 5. FOLLOW UP: Follow-up Disposition: Return in about 6 weeks (around 3/22/2019) for Medication management. Patient was seen face-to-face for 45 minutes with more than half time spent in supportive counseling.

## 2019-03-25 DIAGNOSIS — F41.1 GENERALIZED ANXIETY DISORDER: ICD-10-CM

## 2019-03-25 RX ORDER — ALPRAZOLAM 0.5 MG/1
0.25 TABLET ORAL
Qty: 30 TAB | Refills: 0 | Status: SHIPPED | OUTPATIENT
Start: 2019-03-25 | End: 2019-04-18 | Stop reason: SDUPTHER

## 2019-05-07 ENCOUNTER — OFFICE VISIT (OUTPATIENT)
Dept: BEHAVIORAL/MENTAL HEALTH CLINIC | Age: 34
End: 2019-05-07

## 2019-05-07 VITALS
WEIGHT: 112 LBS | SYSTOLIC BLOOD PRESSURE: 106 MMHG | HEART RATE: 93 BPM | BODY MASS INDEX: 21.99 KG/M2 | HEIGHT: 60 IN | DIASTOLIC BLOOD PRESSURE: 82 MMHG

## 2019-05-07 DIAGNOSIS — F33.0 MILD EPISODE OF RECURRENT MAJOR DEPRESSIVE DISORDER (HCC): ICD-10-CM

## 2019-05-07 DIAGNOSIS — F41.1 GENERALIZED ANXIETY DISORDER: Primary | ICD-10-CM

## 2019-05-07 RX ORDER — HYDROXYZINE HYDROCHLORIDE 10 MG/1
10 TABLET, FILM COATED ORAL
Qty: 90 TAB | Refills: 2 | Status: SHIPPED | OUTPATIENT
Start: 2019-05-07 | End: 2019-05-17

## 2019-05-07 RX ORDER — BUSPIRONE HYDROCHLORIDE 5 MG/1
5 TABLET ORAL
Qty: 90 TAB | Refills: 2 | Status: SHIPPED | OUTPATIENT
Start: 2019-05-07 | End: 2019-10-29 | Stop reason: SINTOL

## 2019-05-07 NOTE — PROGRESS NOTES
Moe Parks  1985  36 y.o.  female  [facility]    Chief Complaint   Patient presents with    Depression     Prozac 40 mg helps    Anxiety     Mostly symptoms of generalized anxiety so agreed to try BuSpar    Stress     Psychosocial at home take Xanax which was supposed to be discontinued so prescribed hydroxyzine    Addiction problem     Smoke marijuana 2 weeks ago and trying to quit by using CBD    Nicotine Dependence     Smoke half a pack per day       [place]:   This is a 36years old   female with past psychiatric history and treatment of ADHD, depression, anxiety who is referred by her OB/GYN at [facility] who provided her with Prozac 40 mg daily and Xanax 0.5 mg #20 with no refill until she come and see me. She was seen for the only time on February 8, 2019 when she decided to continue Prozac 40 mg daily, try BuSpar 5 mg 3 times a day, and was provided with Xanax 0.5 mg #30 with no refill with understanding that she will gradually taper and discontinue. Patient was supposed to see me in 4 weeks but presented after 3 months and was able to get another prescription for Xanax 9.5 mg #30 on April 25 by the covering provider. She presented on time, was alert awake oriented to time person and place and was calm and cooperative, polite, and pleasant during the interview. She informed that she is not able to get BuSpar and does not want to take Xanax as it makes her sleepy just like Ativan did. She asked help with her anxiety as Prozac is helping her with depression. Patient was in her usual state of health up until high school when she was diagnosed with ADHD and she took Adderall XR which improved her grade but made her irritable and she was tried on Concerta which gave her side effect of jitteriness.   She was able to function well until few years ago when she was going through marital problems and divorce and was tried on several medication.      Patient was provided with support and psychoeducation, and after discussing risk/benefits/expectations with treatment alternatives available, patient decided to continue Prozac 40 mg daily and try BuSpar 5 mg 3 times a day to target symptoms of generalized anxiety which look likes her chief complaint today. She will call me after 2 weeks if she would like to increase dose if see at least 20% benefit. She also agreed to try hydroxyzine 10 mg up to 3 times a day as needed for anxiety. She is trying to quit smoking marijuana.     SUBSTANCE USE HISTORY:   TOBACCO: Smoke half a pack per day. ALCOHOL: Denies any abuse ever. CANNABIS: Smokes since high school, smoke marijuana last time 2 weeks ago and currently trying to quit by using CBD. COCAINE, OPIOIDS and OTHERS: Patient denies. MEDICAL HISTORY:    has a past medical history of Anxiety, Arthritis, Calculus of kidney, Dyspepsia and other specified disorders of function of stomach, GERD (gastroesophageal reflux disease), Kidney disease, Other ill-defined conditions(799.89), Postpartum hemorrhage (2017), and Psychiatric disorder.  She also has no past medical history of Abnormal Pap smear, Abnormal Papanicolaou smear of cervix, Acquired hypothyroidism, Anemia, Anemia NEC, Asthma, Chlamydia, Complication of anesthesia, Diabetes mellitus, Essential hypertension, Essential hypertension, Genital herpes, unspecified, Gestational diabetes, Gestational hypertension, Gonorrhea, Heart abnormalities, Heart abnormality, Herpes gestationis, Herpes simplex without mention of complication, Human immunodeficiency virus (HIV) disease ([facility] [address] OTHER MEDICAL, Infertility, Infertility, female, Phlebitis and thrombophlebitis of unspecified site, Pituitary disorder ([facility] [address] Polycystic disease, ovaries, Postpartum depression, Rhesus isoimmunization unspecified as to episode of care in pregnancy, Sickle cell trait syndrome ([facility] [address] Sickle-cell disease, unspecified, Syphilis, Systemic lupus erythematosus ([address] Thyroid activity decreased, Trauma, Unspecified breast disorder, Unspecified diseases of blood and blood-forming organs, or Unspecified epilepsy without mention of intractable epilepsy. ALLERGIES:   Allergies   Allergen Reactions    Ambien [Zolpidem] Other (comments)     \"Hallucinations and black outs\"    Morphine Rash       VITAL SIGNS:  /82   Pulse 93   Ht 5' (1.524 m)   Wt 50.8 kg (112 lb)   BMI 21.87 kg/m²     Current Outpatient Medications   Medication Sig Dispense Refill    busPIRone (BUSPAR) 5 mg tablet Take 1 Tab by mouth three (3) times daily (with meals). 90 Tab 2    hydrOXYzine HCl (ATARAX) 10 mg tablet Take 1 Tab by mouth three (3) times daily as needed for Itching for up to 10 days. Indications: anxious 90 Tab 2    ALPRAZolam (XANAX) 0.5 mg tablet TAKE 1 TABLET BY MOUTH TWICE A DAY AS NEEDED 30 Tab 0    FLUoxetine (PROZAC) 40 mg capsule Take  by mouth daily. ROS:  Constitutional: Positive for tattoos  Eyes: Negative for contacts/classes  ENT: Negative for hearing loss and tinnitus  Musculoskeletal: Negative for myalgias and arthralgias  Neurological: Positive for memory problems  Behavioral/psych: Positive for insomnia, anxiety, depression, negative for SI/HI  Endocrine: Negative for diabetic symptoms including polyuria and polydipsia    MENTAL STATUS EXAMINATION:   Patient is a 36 y.o. female [facility] who looks her stated age. Patient appearance is appropriately dressed with fair hygiene. Speech is regular rate and rhythm, fluent language, and thought process is linear, logical, and goal directed. Reported mood is anxious with mood congruent anxious affect. Patient denies any suicidal ideation, homicidal ideation, and auditory visual hallucination. Not observed to be delusional or paranoid. Insight, judgement, reliability and impulse control is limited to intact.   Cognition was within normal limit and patient was observed to be reliable. DIAGNOSIS:  Encounter Diagnoses   Name Primary?  Generalized anxiety disorder Yes    Mild episode of recurrent major depressive disorder ([facility] [address]        PLAN:  1. MEDICATION:   1. Depression and anxiety: Prozac 40 mg daily.      2.    Generalized anxiety: BuSpar 5 mg 3 times a day to target symptoms of generalized anxiety.      3. Anxiety attack: Discontinue Xanax (she was supposed to gradually taper and discontinue but was provided by covering provider on April 25). She report side effect of sedation and requested something else also informed that she was not able to tolerate Ativan in past.  She decided to try hydroxyzine. 4.  Anxiety: Hydroxyzine 10 mg up to 3 times a day as needed for anxiety. She was provided with psycho education, discussed risk/benefits, and expectations from medication changes. Patient agrees with plan.      2. PSYCHOTHERAPY: Patient was provided with supportive therapy, strongly encourage to seek psychotherapy. Patient will be referred for psychotherapy once stabilized on medication. She talking detail about her 20-year-old younger sister who is in and out of penitentiary and rehab programs for significant drug abuse and lives with her and created stress in her life.     3. MEDICAL CARE: Patient was strongly encourage to take their medical medications and follow up with their PCP on regular basis. Her weight today is 112 pounds, it was 116 pounds on February 8 and 120 pounds on October 24, 2018. Her blood pressure is 106/82 and pulse is 93. She has history of appendicitis, chronic pelvic pain, and dysfunctional uterine bleeding.     4. SUBSTANCE ABUSE CARE: Patient smokes half a pack per day, denies drinking or abusing any illicit drugs except marijuana, last time 2 weeks ago and she is trying to quit smoking with the help of CBD. 5. FOLLOW UP:   Follow-up and Dispositions    · Return in about 6 weeks (around 6/18/2019) for Medication management.

## 2019-05-20 ENCOUNTER — TELEPHONE (OUTPATIENT)
Dept: BEHAVIORAL/MENTAL HEALTH CLINIC | Age: 34
End: 2019-05-20

## 2019-05-20 NOTE — TELEPHONE ENCOUNTER
This is a [facility] pt  He put her on Hydroxazine She says the Hydroxazine makes her chest tight, feel \"foggy\" and increases her anxiety. She would like to go back on the Xanax. It does make her sleepy though. Please advise in Zulfiqars absence.      Thanks

## 2019-06-10 RX ORDER — FLUOXETINE HYDROCHLORIDE 40 MG/1
40 CAPSULE ORAL DAILY
Qty: 30 CAP | Refills: 0 | Status: SHIPPED | OUTPATIENT
Start: 2019-06-10 | End: 2019-10-29 | Stop reason: SINTOL

## 2019-06-27 ENCOUNTER — TELEPHONE (OUTPATIENT)
Dept: BEHAVIORAL/MENTAL HEALTH CLINIC | Age: 34
End: 2019-06-27

## 2019-06-27 NOTE — TELEPHONE ENCOUNTER
Patient calls to cancel appointment today due to not having a vehicle or ride to get here. She states she has stopped the Buspar completely because it was causing headaches, and she now thinks the Prozac is causing her reflux to be worse to the point that she's actually vomiting.

## 2019-07-19 ENCOUNTER — TELEPHONE (OUTPATIENT)
Dept: BEHAVIORAL/MENTAL HEALTH CLINIC | Age: 34
End: 2019-07-19

## 2019-07-19 NOTE — TELEPHONE ENCOUNTER
Pt calls to say she has stopped the Prozac. It gave her reflux so bad, she would vomit. She is only on the Hydroxyzine because its better than nothing.    Please call and advise since she stopped the prozac

## 2019-07-22 RX ORDER — HYDROXYZINE 50 MG/1
50 TABLET, FILM COATED ORAL
Qty: 90 TAB | Refills: 2 | Status: SHIPPED | OUTPATIENT
Start: 2019-07-22 | End: 2019-08-21

## 2019-07-22 NOTE — TELEPHONE ENCOUNTER
Patient report that she is stopped taking BuSpar about a month ago due to headache and insomnia. She is stopped taking Prozac couple of days ago due to side effect of reflux but she is taking same dose for past many months without any issues. She is taking hydroxyzine 30 mg once a day with some help so we decided to do hydroxyzine 50 mg 3 times a day and sent prescription to the pharmacy. Please put her on cancellation list and we will try to get her sooner than her appointment on August 20 to discuss her medication.

## 2019-07-24 RX ORDER — HYDROXYZINE PAMOATE 50 MG/1
50 CAPSULE ORAL
Qty: 90 CAP | Refills: 0 | Status: SHIPPED | OUTPATIENT
Start: 2019-07-24 | End: 2019-09-04 | Stop reason: SDUPTHER

## 2019-07-29 ENCOUNTER — TELEPHONE (OUTPATIENT)
Dept: BEHAVIORAL/MENTAL HEALTH CLINIC | Age: 34
End: 2019-07-29

## 2019-07-29 NOTE — TELEPHONE ENCOUNTER
Fax received from medication insurance coverage, hydroxyzine pamoate is \"non participating \" through patient's benefits. Hydroxyzine HcL is on back order.

## 2019-09-04 RX ORDER — HYDROXYZINE PAMOATE 50 MG/1
50 CAPSULE ORAL
Qty: 90 CAP | Refills: 0 | Status: SHIPPED | OUTPATIENT
Start: 2019-09-04 | End: 2019-10-05 | Stop reason: SDUPTHER

## 2019-10-08 RX ORDER — HYDROXYZINE PAMOATE 50 MG/1
50 CAPSULE ORAL
Qty: 90 CAP | Refills: 0 | Status: SHIPPED | OUTPATIENT
Start: 2019-10-08 | End: 2019-10-29 | Stop reason: SINTOL

## 2019-10-08 RX ORDER — HYDROXYZINE 50 MG/1
50 TABLET, FILM COATED ORAL
Qty: 90 TAB | Refills: 0 | Status: SHIPPED | OUTPATIENT
Start: 2019-10-08 | End: 2019-10-18

## 2019-10-17 ENCOUNTER — TELEPHONE (OUTPATIENT)
Dept: BEHAVIORAL/MENTAL HEALTH CLINIC | Age: 34
End: 2019-10-17

## 2019-10-17 NOTE — TELEPHONE ENCOUNTER
Patient calls to report she is not currently taking any medication. She stopped the hydroxyzine due to it making her more cranky. She is now irritated, can't compose herself, getting mad quickly. A follow up is scheduled Oct 29 but she wants \"an anti-depressant or something\" before then.

## 2019-10-29 ENCOUNTER — OFFICE VISIT (OUTPATIENT)
Dept: BEHAVIORAL/MENTAL HEALTH CLINIC | Age: 34
End: 2019-10-29

## 2019-10-29 VITALS
BODY MASS INDEX: 21.99 KG/M2 | HEIGHT: 60 IN | DIASTOLIC BLOOD PRESSURE: 81 MMHG | SYSTOLIC BLOOD PRESSURE: 117 MMHG | HEART RATE: 85 BPM | WEIGHT: 112 LBS

## 2019-10-29 DIAGNOSIS — F41.9 ANXIETY: Primary | ICD-10-CM

## 2019-10-29 DIAGNOSIS — F33.0 MILD EPISODE OF RECURRENT MAJOR DEPRESSIVE DISORDER (HCC): ICD-10-CM

## 2019-10-29 DIAGNOSIS — F41.1 GENERALIZED ANXIETY DISORDER: ICD-10-CM

## 2019-10-29 RX ORDER — VENLAFAXINE HYDROCHLORIDE 37.5 MG/1
37.5 CAPSULE, EXTENDED RELEASE ORAL DAILY
Qty: 30 CAP | Refills: 2 | Status: SHIPPED | OUTPATIENT
Start: 2019-10-29 | End: 2019-11-25

## 2019-10-29 RX ORDER — CLONAZEPAM 0.5 MG/1
0.5 TABLET ORAL
Qty: 30 TAB | Refills: 2 | Status: CANCELLED | OUTPATIENT
Start: 2019-10-29

## 2019-10-29 RX ORDER — CLONAZEPAM 0.5 MG/1
0.5 TABLET ORAL
Qty: 30 TAB | Refills: 2 | Status: SHIPPED | OUTPATIENT
Start: 2019-10-29 | End: 2020-09-23

## 2019-10-29 NOTE — PROGRESS NOTES
Kenisha Huffman  1985  36 y.o.  female  [facility] [place] [facility]    Chief Complaint   Patient presents with    Anxiety     8 out of 10 on the scale of 1-10 where 10 is worst, mostly symptoms of generalized anxiety    Depression     4 out of 10 mostly due to psychosocial stress    Stress     Recent breakup with 4 years of relationship and father of her 3year-old son, she says she may have to get restraining order against him as he is calling 31 times     Insomnia     Mostly due to taking care of 2 young children    Medication Problem     Reports side effect from Prozac BuSpar and hydroxyzine and is stopped taking, noted that she was able to take same dose Prozac for 6 months without any issues    Addiction problem       [place]:   This is a 35 years old   female with past psychiatric history and treatment of ADHD, depression, anxiety who is referred by her OB/GYN at [facility] who provided her with Prozac 40 mg daily and Xanax 0.5 mg #20 with no refill until she come and see me. She was seen for the only time on February 8, 2019 when she decided to continue Prozac 40 mg daily, try BuSpar 5 mg 3 times a day, and was provided with Xanax 0.5 mg #30 with no refill with understanding that she will gradually taper and discontinue.      Patient was supposed to see me in 4 weeks but presented after 3 months and was able to get another prescription for Xanax 9.5 mg #30 on April 25 by the covering provider. She decided to continue Prozac 40 mg daily and try BuSpar 5 mg 3 times a day to target symptoms of generalized anxiety which look likes her chief complaint today. She was supposed to see me in 6 weeks but again presented after 6 months.      Patient presented on time, was alert awake oriented to time person and place and was calm and cooperative, polite, and pleasant during the interview. She  report noncompliance with all 3 of her medications, but she said Prozac made her to throw up a lot but she was taking Prozac for past 1 year without any problems and her dose was unchanged last time. She also stopped taking BuSpar because it was ineffective and reports side effect of tiredness from hydroxyzine and has not been taking psychiatric medication for past 5 months. Patient reports significant anxiety especially in the context of breakup with boyfriend of 4 years with whom she has 3year-old son which was present with her in the interview. She said that her boyfriend is harassing her by calling 31 times in a day and she is thinking about getting a protective order against him. She report 8 out of 10 on the scale of 1-10 where 10 is worst for anxiety and report symptoms suggestive of generalized anxiety. Patient also report 4 out of 10 for depression and denies any sleep issues except for taking care of 2 young children. She was provided with support and psychoeducation, we discussed treatment alternative available and after discussing several medications like Cymbalta Zoloft both of them worked but she had sexual side effect from Zoloft and brain shocks sensations with Cymbalta. She decided to try Effexor XR 37.5 mg daily and was provided with Klonopin 0.25 mg up to twice a day as needed for anxiety #30 with 2 refills with understanding that it will not be continued next time and we will try to increase Effexor to target her symptoms of generalized anxiety. She did not talk about ADHD at this time.       SUBSTANCE USE HISTORY:   TOBACCO: Smoke half a pack per day.     ALCOHOL: Denies any abuse ever.     CANNABIS: Smokes since high school, smoke marijuana last time 2 weeks ago and  denies smoking marijuana or using CBD for past several months.     Duck Paget denies.     MEDICAL HISTORY:    has a past medical history of Anxiety, Arthritis, Calculus of kidney, Dyspepsia and other specified disorders of function of stomach, GERD (gastroesophageal reflux disease), Kidney disease, Other ill-defined conditions(799.89), Postpartum hemorrhage (2017), and Psychiatric disorder. She also has no past medical history of Abnormal Pap smear, Abnormal Papanicolaou smear of cervix, Acquired hypothyroidism, Anemia, Anemia NEC, Asthma, Chlamydia, Complication of anesthesia, Diabetes mellitus, Essential hypertension, Essential hypertension, Genital herpes, unspecified, Gestational diabetes, Gestational hypertension, Gonorrhea, Heart abnormalities, Heart abnormality, Herpes gestationis, Herpes simplex without mention of complication, Human immunodeficiency virus (HIV) disease ([facility] [address] OTHER MEDICAL, Infertility, Infertility, female, Phlebitis and thrombophlebitis of unspecified site, Pituitary disorder ([facility] [address] Polycystic disease, ovaries, Postpartum depression, Rhesus isoimmunization unspecified as to episode of care in pregnancy, Sickle cell trait syndrome ([facility] [address] Sickle-cell disease, unspecified, Syphilis, Systemic lupus erythematosus ([facility] [address] Thyroid activity decreased, Trauma, Unspecified breast disorder, Unspecified diseases of blood and blood-forming organs, or Unspecified epilepsy without mention of intractable epilepsy. ALLERGIES:   Allergies   Allergen Reactions    Ambien [Zolpidem] Other (comments)     \"Hallucinations and black outs\"    Morphine Rash       VITAL SIGNS:  /81 (BP 1 Location: Left arm, BP Patient Position: Sitting)   Pulse 85   Ht 5' (1.524 m)   Wt 50.8 kg (112 lb)   BMI 21.87 kg/m²     Current Outpatient Medications   Medication Sig Dispense Refill    IBUPROFEN PO Take  by mouth.  venlafaxine-SR (EFFEXOR-XR) 37.5 mg capsule Take 1 Cap by mouth daily.  Indications: Repeated Episodes of Anxiety 30 Cap 2       ROS:  Constitutional: Positive for tattoos and marks on her face which she said from constant scratching herself with her nails  Eyes: Negative for contacts/classes  ENT: Negative for hearing loss and tinnitus  Musculoskeletal: Negative for myalgias and arthralgias  Neurological: Positive for memory problems  Behavioral/psych: Positive for insomnia, anxiety, depression, negative for SI/HI  Endocrine: Negative for diabetic symptoms including polyuria and polydipsia     MENTAL STATUS EXAMINATION:   Patient is a 35 y. o. female WHITE OR  who looks her stated age. She is appropriately dressed with fair hygiene. Speech is regular rate and rhythm, fluent language, and thought process is linear, logical, and goal directed. Reported mood is anxious with mood congruent anxious affect. Patient denies any suicidal ideation, homicidal ideation, and auditory visual hallucination. Not observed to be delusional or paranoid. Insight, judgement, reliability and impulse control is limited to intact. Cognition was within normal limit and patient was observed to be reliable. DIAGNOSIS:  Encounter Diagnoses   Name Primary?  Anxiety Yes    Generalized anxiety disorder     Mild episode of recurrent major depressive disorder ([facility] [address]        PLAN:  1. MEDICATION:   1. Depression and anxiety:  Effexor XR 37.5 mg daily, she is going to call me after 2 weeks to request increasing dose if appreciate at least 20% benefit. She decided to stop Prozac 40 mg daily due to stomach upset and also in past was not able to tolerate Cymbalta by reporting brain shocks and Zoloft by reporting decreased sexual desire.      2.  Generalized anxiety:  Discontinue BuSpar, she is stopped taking because it was ineffective but her reliability is poor.       3.  Anxiety attack: Discontinue hydroxyzine due to reported side effect of making her tired. Klonopin 0.5 mg #30 with 2 refills, she will take up to half tablet up to twice a day as needed for anxiety and understand that it is a temporary medication to help her to get to effective dose of Effexor and she will not be provided anymore Klonopin.  She was provided with psycho education, discussed risk/benefits, and expectations from medication changes. Patient agrees with plan.      2. PSYCHOTHERAPY: Patient was provided with supportive therapy, strongly encourage to seek psychotherapy.  Patient was provided with names and contact information for therapist in area as she agreed today to get psychotherapy.     3. MEDICAL CARE: Patient was strongly encourage to take their medical medications and follow up with their PCP on regular basis. Her weight today is 112 pounds, it was 116 pounds on February 8 and 120 pounds on October 24, 2018. Her blood pressure is 117/81 and pulse is  85. She has history of appendicitis, chronic pelvic pain, and dysfunctional uterine bleeding.     4. SUBSTANCE ABUSE CARE: Patient smokes half a pack per day, denies drinking or abusing any illicit drugs and reportedly quit smoking marijuana about 3-4 months ago and denies using CBD. 5. FOLLOW UP:   Follow-up and Dispositions    · Return in about 3 months (around 1/29/2020) for Medication management.

## 2019-11-22 ENCOUNTER — TELEPHONE (OUTPATIENT)
Dept: BEHAVIORAL/MENTAL HEALTH CLINIC | Age: 34
End: 2019-11-22

## 2019-11-22 NOTE — TELEPHONE ENCOUNTER
Pt called to say the effexor is making her more \"down\" and angry. She is asking if she can switch back to the prozac. Said she only went off of it last time because the capsules were giving her bad reflux so she wants to know if the prozac comes in a tab form?       [phone]

## 2019-11-25 RX ORDER — FLUOXETINE HYDROCHLORIDE 40 MG/1
40 CAPSULE ORAL DAILY
Qty: 90 CAP | Refills: 2 | Status: SHIPPED | OUTPATIENT
Start: 2019-11-25 | End: 2019-11-26 | Stop reason: ALTCHOICE

## 2019-11-25 NOTE — TELEPHONE ENCOUNTER
Discontinue Effexor and sent prescription for Prozac 40 mg #90 with 2 refills to pharmacy on record.

## 2019-11-26 RX ORDER — FLUOXETINE 20 MG/1
40 TABLET ORAL DAILY
Qty: 60 TAB | Refills: 5 | Status: SHIPPED | OUTPATIENT
Start: 2019-11-26 | End: 2020-09-23

## 2019-11-26 NOTE — TELEPHONE ENCOUNTER
Discontinue capsules sent fluoxetine 20 mg tablets (40 mg dose) #60 with 5 refills to pharmacy on record.

## 2020-01-29 ENCOUNTER — HOSPITAL ENCOUNTER (EMERGENCY)
Age: 35
Discharge: HOME | End: 2020-01-29
Attending: EMERGENCY MEDICINE
Payer: MEDICAID

## 2020-01-29 ENCOUNTER — APPOINTMENT (OUTPATIENT)
Dept: GENERAL RADIOLOGY | Age: 35
End: 2020-01-29
Attending: PHYSICIAN ASSISTANT
Payer: MEDICAID

## 2020-01-29 VITALS
TEMPERATURE: 98.4 F | BODY MASS INDEX: 21.08 KG/M2 | RESPIRATION RATE: 16 BRPM | HEART RATE: 83 BPM | WEIGHT: 107.36 LBS | OXYGEN SATURATION: 98 % | HEIGHT: 60 IN | SYSTOLIC BLOOD PRESSURE: 122 MMHG | DIASTOLIC BLOOD PRESSURE: 79 MMHG

## 2020-01-29 DIAGNOSIS — Z86.69 HISTORY OF SCIATICA: ICD-10-CM

## 2020-01-29 DIAGNOSIS — M54.41 ACUTE RIGHT-SIDED BACK PAIN WITH SCIATICA: Primary | ICD-10-CM

## 2020-01-29 LAB
APPEARANCE UR: CLEAR
BACTERIA URNS QL MICRO: NEGATIVE /HPF
BILIRUB UR QL: NEGATIVE
COLOR UR: ABNORMAL
EPITH CASTS URNS QL MICRO: ABNORMAL /LPF
GLUCOSE UR STRIP.AUTO-MCNC: NEGATIVE MG/DL
HCG UR QL: NEGATIVE
HGB UR QL STRIP: NEGATIVE
HYALINE CASTS URNS QL MICRO: ABNORMAL /LPF (ref 0–5)
KETONES UR QL STRIP.AUTO: NEGATIVE MG/DL
LEUKOCYTE ESTERASE UR QL STRIP.AUTO: NEGATIVE
NITRITE UR QL STRIP.AUTO: NEGATIVE
PH UR STRIP: 8.5 [PH] (ref 5–8)
PROT UR STRIP-MCNC: NEGATIVE MG/DL
RBC #/AREA URNS HPF: ABNORMAL /HPF (ref 0–5)
SP GR UR REFRACTOMETRY: 1.01 (ref 1–1.03)
UA: UC IF INDICATED,UAUC: ABNORMAL
UROBILINOGEN UR QL STRIP.AUTO: 0.2 EU/DL (ref 0.2–1)
WBC URNS QL MICRO: ABNORMAL /HPF (ref 0–4)

## 2020-01-29 PROCEDURE — 81025 URINE PREGNANCY TEST: CPT

## 2020-01-29 PROCEDURE — 73502 X-RAY EXAM HIP UNI 2-3 VIEWS: CPT

## 2020-01-29 PROCEDURE — 81001 URINALYSIS AUTO W/SCOPE: CPT

## 2020-01-29 PROCEDURE — 99284 EMERGENCY DEPT VISIT MOD MDM: CPT

## 2020-01-29 PROCEDURE — 74011250637 HC RX REV CODE- 250/637: Performed by: PHYSICIAN ASSISTANT

## 2020-01-29 PROCEDURE — 74011250636 HC RX REV CODE- 250/636: Performed by: PHYSICIAN ASSISTANT

## 2020-01-29 PROCEDURE — 96372 THER/PROPH/DIAG INJ SC/IM: CPT

## 2020-01-29 RX ORDER — KETOROLAC TROMETHAMINE 30 MG/ML
30 INJECTION, SOLUTION INTRAMUSCULAR; INTRAVENOUS
Status: COMPLETED | OUTPATIENT
Start: 2020-01-29 | End: 2020-01-29

## 2020-01-29 RX ORDER — CYCLOBENZAPRINE HCL 10 MG
10 TABLET ORAL
Qty: 15 TAB | Refills: 0 | Status: SHIPPED | OUTPATIENT
Start: 2020-01-29 | End: 2020-09-23

## 2020-01-29 RX ORDER — CYCLOBENZAPRINE HCL 10 MG
10 TABLET ORAL
Status: COMPLETED | OUTPATIENT
Start: 2020-01-29 | End: 2020-01-29

## 2020-01-29 RX ORDER — HYDROCODONE BITARTRATE AND ACETAMINOPHEN 5; 325 MG/1; MG/1
1 TABLET ORAL
Status: COMPLETED | OUTPATIENT
Start: 2020-01-29 | End: 2020-01-29

## 2020-01-29 RX ORDER — LIDOCAINE 50 MG/G
PATCH TOPICAL
Qty: 5 EACH | Refills: 0 | Status: SHIPPED | OUTPATIENT
Start: 2020-01-29 | End: 2020-09-23

## 2020-01-29 RX ORDER — NAPROXEN 500 MG/1
500 TABLET ORAL 2 TIMES DAILY WITH MEALS
Qty: 10 TAB | Refills: 0 | Status: SHIPPED | OUTPATIENT
Start: 2020-01-29 | End: 2020-09-23

## 2020-01-29 RX ORDER — PREDNISONE 50 MG/1
50 TABLET ORAL DAILY
Qty: 3 TAB | Refills: 0 | Status: SHIPPED | OUTPATIENT
Start: 2020-01-29 | End: 2020-02-01

## 2020-01-29 RX ADMIN — CYCLOBENZAPRINE 10 MG: 10 TABLET, FILM COATED ORAL at 11:21

## 2020-01-29 RX ADMIN — HYDROCODONE BITARTRATE AND ACETAMINOPHEN 1 TABLET: 5; 325 TABLET ORAL at 11:21

## 2020-01-29 RX ADMIN — KETOROLAC TROMETHAMINE 30 MG: 30 INJECTION, SOLUTION INTRAMUSCULAR at 11:21

## 2020-01-29 NOTE — ED PROVIDER NOTES
EMERGENCY DEPARTMENT HISTORY AND PHYSICAL EXAM      Date: 1/29/2020  Patient Name: Elaine Navarrete    History of Presenting Illness     Chief Complaint   Patient presents with    Back Pain     Pain at low back to left side radiating down left leg       History Provided By: Patient    HPI: Elaine Navarrete, 36 y.o. female with PMHx significant for anxiety, kidney stones, chronic back pain status post accident patient presents to the emergency department with 7 out of 10 right-sided back pain that radiates from the right buttock down the right leg. He states it feels like a similar sciatic episode and she denies any nausea, vomiting or dysuria. She does state that she feels like her right hip is clicking and she is able to weight-bear favoring her right side. She is tried over-the-counter medications for pain without any relief. Denies any saddle anesthesia, incontinence, bilateral leg paresthesias, urinary or stool incontinence, history of IV drug use or any fevers. Patient states that she is currently on her menstrual cycle. Please note for examination and HPI were completed I did introduce myself as the physician assistant. Please note that this dictation was completed with DotBlu, the C-Note voice recognition software. Quite often unanticipated grammatical, syntax, homophones, and other interpretive errors are inadvertently transcribed by the computer software. Please disregard these errors. Please excuse any errors that have escaped final proofreading. For further clarification on any chart please contact myself. Thank you. There are no other complaints, changes, or physical findings at this time. PCP: None    No current facility-administered medications on file prior to encounter. Current Outpatient Medications on File Prior to Encounter   Medication Sig Dispense Refill    FLUoxetine (PROZAC) 20 mg tablet Take 2 Tabs by mouth daily. 60 Tab 5    IBUPROFEN PO Take  by mouth.       clonazePAM (KLONOPIN) 0.5 mg tablet Take 1 Tab by mouth nightly as needed for Other (anxiety). Max Daily Amount: 0.5 mg. 30 Tab 2       Past History     Past Medical History:  Past Medical History:   Diagnosis Date    Anxiety     Arthritis     Calculus of kidney     Dyspepsia and other specified disorders of function of stomach     GERD (gastroesophageal reflux disease)     Kidney disease     hx of kidney stone    Other ill-defined conditions(799.89)     kidney stone in pass,passed    Postpartum hemorrhage 2017    ALSO HAD HEMORRHAGE DURING MISCARRIAGE 2012    Psychiatric disorder     ADHD       Past Surgical History:  Past Surgical History:   Procedure Laterality Date    ABDOMEN SURGERY PROC UNLISTED  10/30/12    Laparoscopic cholecystectomy     DELIVERY ONLY          HX APPENDECTOMY      HX CHOLECYSTECTOMY      HX DILATION AND CURETTAGE      HX GYN      miscarriage x2    HX GYN      C section    HX GYN      EXP LAPS    HX HEENT      wisdom teeth extraction    HX ORTHOPAEDIC      torn catiledge repair left knee    HX WISDOM TEETH EXTRACTION      SD ANESTH,KNEE AREA SURGERY      UPPER GI ENDOSCOPY,BIOPSY  2015            Family History:  Family History   Problem Relation Age of Onset    Emphysema Mother     No Known Problems Father     No Known Problems Son     No Known Problems Son     Anesth Problems Neg Hx        Social History:  Social History     Tobacco Use    Smoking status: Current Every Day Smoker     Packs/day: 0.50     Years: 15.00     Pack years: 7.50    Smokeless tobacco: Never Used    Tobacco comment: about 6 cigarettes per day at present   Substance Use Topics    Alcohol use: Yes     Comment: RARELY    Drug use: No       Allergies: Allergies   Allergen Reactions    Ambien [Zolpidem] Other (comments)     \"Hallucinations and black outs\"    Morphine Rash         Review of Systems   Review of Systems   Musculoskeletal: Positive for back pain.    All other systems reviewed and are negative. Physical Exam   Physical Exam  Vitals signs and nursing note reviewed. Constitutional:       Appearance: She is well-developed. HENT:      Head: Normocephalic and atraumatic. Eyes:      Conjunctiva/sclera: Conjunctivae normal.      Pupils: Pupils are equal, round, and reactive to light. Neck:      Musculoskeletal: Normal range of motion and neck supple. Thyroid: No thyromegaly. Cardiovascular:      Rate and Rhythm: Normal rate and regular rhythm. Heart sounds: Normal heart sounds. No murmur. Pulmonary:      Effort: Pulmonary effort is normal. No respiratory distress. Breath sounds: Normal breath sounds. No stridor. No wheezing. Abdominal:      General: Bowel sounds are normal.      Palpations: Abdomen is soft. Tenderness: There is no abdominal tenderness. There is no right CVA tenderness or left CVA tenderness. Musculoskeletal: Normal range of motion. General: No tenderness. Comments: No midline cervical, thoracic, lumbosacral tenderness to palpation. She did have right-sided paraspinal tenderness to palpation with 45 degrees straight leg raise test positive on the right side. Left leg is negative. Flexion of right knee towards contralateral side does elicit ipsilateral tenderness. +2 patellar reflex. Patient is able to cross and extend feet. She has has positive dorsalis pedis pulse equal bilaterally. Lymphadenopathy:      Cervical: No cervical adenopathy. Skin:     General: Skin is warm. Neurological:      Mental Status: She is alert and oriented to person, place, and time. Deep Tendon Reflexes: Reflexes are normal and symmetric.    Psychiatric:         Judgment: Judgment normal.         Diagnostic Study Results     Labs -     Recent Results (from the past 12 hour(s))   URINALYSIS W/ REFLEX CULTURE    Collection Time: 01/29/20 11:23 AM   Result Value Ref Range    Color YELLOW/STRAW      Appearance CLEAR CLEAR      Specific gravity 1.010 1.003 - 1.030      pH (UA) 8.5 (H) 5.0 - 8.0      Protein NEGATIVE  NEG mg/dL    Glucose NEGATIVE  NEG mg/dL    Ketone NEGATIVE  NEG mg/dL    Bilirubin NEGATIVE  NEG      Blood NEGATIVE  NEG      Urobilinogen 0.2 0.2 - 1.0 EU/dL    Nitrites NEGATIVE  NEG      Leukocyte Esterase NEGATIVE  NEG      WBC 0-4 0 - 4 /hpf    RBC 0-5 0 - 5 /hpf    Epithelial cells FEW FEW /lpf    Bacteria NEGATIVE  NEG /hpf    UA:UC IF INDICATED CULTURE NOT INDICATED BY UA RESULT CNI      Hyaline cast 2-5 0 - 5 /lpf   HCG URINE, QL. - POC    Collection Time: 01/29/20 11:49 AM   Result Value Ref Range    Pregnancy test,urine (POC) NEGATIVE  NEG         Radiologic Studies -   XR HIP RT W OR WO PELV 2-3 VWS   Final Result   IMPRESSION: Negative radiographic examination of the right hip. CT Results  (Last 48 hours)    None        CXR Results  (Last 48 hours)    None            Medical Decision Making   I am the first provider for this patient. I reviewed the vital signs, available nursing notes, past medical history, past surgical history, family history and social history. Vital Signs-Reviewed the patient's vital signs. Patient Vitals for the past 12 hrs:   Temp Pulse Resp BP SpO2   01/29/20 1047 98.4 °F (36.9 °C) 83 16 122/79 98 %       Records Reviewed: Nursing Notes    Provider Notes (Medical Decision Making): Low suspicion for any pyelonephritis, nephrolithiasis, acute cystitis as patient does not have any CVA tenderness and urinalysis does not support this at this time and patient does not have any nausea or vomiting or any fevers and she is able to tolerate p.o. Given this patient's history and exam findings, their back pain is most likely musculoskeletal in nature. There are no signs of spinal cord involvement, caudia equina, epidural abscess, or discitis.  They will be discharged home on pain medication and a muscle relaxer, and will follow-up with the primary care physician within 4 days for re-evaluation and assessment of their symptoms with this treatment. I discussed the reasons to return to ED immediately for worsening pain, numbness or weakness of legs, or bowel/bladder dysfunction. Low clinical suspicion for any pyelonephritis, she does not have any urine blood noted on her evaluation nausea vomiting low clinical suspicion for any further lithiasis. ED Course:   Initial assessment performed. The patients presenting problems have been discussed, and they are in agreement with the care plan formulated and outlined with them. I have encouraged them to ask questions as they arise throughout their visit. Critical Care Time: None    Disposition:  Dispo    PLAN:  1. Discharge Medication List as of 1/29/2020 12:35 PM      START taking these medications    Details   naproxen (NAPROSYN) 500 mg tablet Take 1 Tab by mouth two (2) times daily (with meals). , Normal, Disp-10 Tab, R-0      predniSONE (DELTASONE) 50 mg tablet Take 1 Tab by mouth daily for 3 days. , Normal, Disp-3 Tab, R-0      lidocaine (LIDODERM) 5 % Apply patch to the affected area for 12 hours a day and remove for 12 hours a day., Normal, Disp-5 Each, R-0      cyclobenzaprine (FLEXERIL) 10 mg tablet Take 1 Tab by mouth three (3) times daily as needed for Muscle Spasm(s). , Normal, Disp-15 Tab, R-0         CONTINUE these medications which have NOT CHANGED    Details   FLUoxetine (PROZAC) 20 mg tablet Take 2 Tabs by mouth daily. , Normal, Disp-60 Tab, R-5      IBUPROFEN PO Take  by mouth., Historical Med      clonazePAM (KLONOPIN) 0.5 mg tablet Take 1 Tab by mouth nightly as needed for Other (anxiety). Max Daily Amount: 0.5 mg., Print, Disp-30 Tab, R-2           2.    Follow-up Information     Follow up With Specialties Details Why Contact Info    [facility] [address]    [address] [facility] [address] [phone]    [facility]    [address] [place] [id number] Return to ED if worse     Diagnosis     Clinical Impression:   1. Acute right-sided back pain with sciatica    2. History of sciatica          Please note that this dictation was completed with TeamStreamz, the computer voice recognition software. Quite often unanticipated grammatical, syntax, homophones, and other interpretive errors are inadvertently transcribed by the computer software. Please disregards these errors. Please excuse any errors that have escaped final proofreading. This note will not be viewable in [address]

## 2020-01-29 NOTE — ED NOTES
Momo GOMEZ and Royal Perez RN reviewed discharge instructions with the patient. The patient verbalized understanding. All questions and concerns were addressed. The patient declined a wheelchair and is discharged ambulatory in the care of family members with instructions and prescriptions in hand. Pt is alert and oriented x 4. Respirations are clear and unlabored.

## 2020-03-01 DIAGNOSIS — F41.9 ANXIETY: ICD-10-CM

## 2020-03-03 RX ORDER — CLONAZEPAM 0.5 MG/1
TABLET ORAL
Qty: 30 TAB | Refills: 2 | OUTPATIENT
Start: 2020-03-03

## 2020-09-23 ENCOUNTER — OFFICE VISIT (OUTPATIENT)
Dept: SURGERY | Age: 35
End: 2020-09-23
Payer: MEDICAID

## 2020-09-23 VITALS
BODY MASS INDEX: 20.95 KG/M2 | HEART RATE: 68 BPM | WEIGHT: 106.7 LBS | SYSTOLIC BLOOD PRESSURE: 100 MMHG | HEIGHT: 60 IN | TEMPERATURE: 96.6 F | OXYGEN SATURATION: 99 % | DIASTOLIC BLOOD PRESSURE: 58 MMHG

## 2020-09-23 DIAGNOSIS — K42.9 UMBILICAL HERNIA WITHOUT OBSTRUCTION AND WITHOUT GANGRENE: Primary | ICD-10-CM

## 2020-09-23 PROCEDURE — 99204 OFFICE O/P NEW MOD 45 MIN: CPT | Performed by: SURGERY

## 2020-09-23 NOTE — PROGRESS NOTES
Chief Complaint   Patient presents with    Umbilical Hernia     seen at the request of Chris kerns umbilical hernia      1. Have you been to the ER, urgent care clinic since your last visit? Hospitalized since your last visit? No    2. Have you seen or consulted any other health care providers outside of the [address] [place] [address] since your last visit? Include any pap smears or colon screening. No   Discussed advanced directive. Patient states that she does not have an advanced directive.

## 2020-09-23 NOTE — PROGRESS NOTES
HISTORY OF PRESENT ILLNESS  Barrera Estrada is a 30 y.o. female. UH present since she was 4  Getting bigger  Worse after pregnancy (son is 3)  Pops out  Hurts  Has chronic nausea  Rare vomiting  BMs constant diarrhea    Pt of Dr Jason Gillespie  EGD and colonoscopy normal      ____________________________________________________________________________  Patient presents with:  Umbilical Hernia: seen at the request of Shelby kerns umbilical hernia     BP (!) 100/58   Pulse 68   Temp (!) 96.6 °F (35.9 °C)   Ht 5' (1.524 m)   Wt 48.4 kg (106 lb 11.2 oz)   SpO2 99%   BMI 20.84 kg/m²   Past Medical History:  No date: Anxiety  No date: Arthritis  No date: Calculus of kidney  No date: Dyspepsia and other specified disorders of function of   stomach  No date: GERD (gastroesophageal reflux disease)  No date: Kidney disease      Comment:  hx of kidney stone  No date:  Other ill-defined conditions(799.89)      Comment:  kidney stone in pass,passed  2017: Postpartum hemorrhage      Comment:  ALSO HAD HEMORRHAGE DURING MISCARRIAGE   No date: Psychiatric disorder      Comment:  ADHD  Past Surgical History:  10/30/12: ABDOMEN SURGERY PROC UNLISTED      Comment:  Laparoscopic cholecystectomy  No date:  DELIVERY ONLY      Comment:    No date: HX APPENDECTOMY  No date: HX CHOLECYSTECTOMY  No date: HX DILATION AND CURETTAGE  No date: HX GYN      Comment:  miscarriage x2  No date: HX GYN      Comment:  C section  No date: HX GYN      Comment:  EXP LAPS  No date: HX HEENT      Comment:  wisdom teeth extraction  No date: HX ORTHOPAEDIC      Comment:  torn catiledge repair left knee  No date: HX WISDOM TEETH EXTRACTION  No date: LA ANESTH,KNEE AREA SURGERY  2015: UPPER GI ENDOSCOPY,BIOPSY      Comment:     Social History    Socioeconomic History      Marital status:       Spouse name: Not on file      Number of children: Not on file      Years of education: Not on file      Highest education level: Not on file    Tobacco Use      Smoking status: Current Every Day Smoker        Packs/day: 0.50        Years: 15.00        Pack years: 7.5      Smokeless tobacco: Never Used      Tobacco comment: about 6 cigarettes per day at present    Substance and Sexual Activity      Alcohol use: Yes        Comment: RARELY      Drug use: No      Sexual activity: Yes        Partners: Male        Birth control/protection: None    Review of patient's family history indicates:  Problem: Emphysema      Relation: Mother          Age of Onset: (Not Specified)  Problem: No Known Problems      Relation: Father          Age of Onset: (Not Specified)  Problem: No Known Problems      Relation: Son          Age of Onset: (Not Specified)  Problem: No Known Problems      Relation: Son          Age of Onset: (Not Specified)  Problem: Anesth Problems      Relation: Neg Hx          Age of Onset: (Not Specified)    Current Outpatient Medications:  IBUPROFEN PO, Take  by mouth. No current facility-administered medications for this visit. Allergies:  -- Ambien (Zolpidem) -- Other (comments)    --  \"Hallucinations and black outs\"   -- Morphine -- Rash  _____________________________________________________________________________        Possible Hernia   The history is provided by the patient. This is a chronic problem. The current episode started more than 1 week ago. The problem occurs daily. The problem has been gradually worsening. Associated symptoms include abdominal pain. Pertinent negatives include no chest pain, no headaches and no shortness of breath. The symptoms are aggravated by exertion. The symptoms are relieved by rest. The treatment provided no relief. Review of Systems   Constitutional: Negative for chills, fever and weight loss. HENT: Negative for ear pain. Eyes: Negative for pain. Respiratory: Negative for shortness of breath. Cardiovascular: Negative for chest pain.    Gastrointestinal: Positive for abdominal pain. Negative for blood in stool. Genitourinary: Negative for hematuria. Musculoskeletal: Negative for joint pain. Skin: Negative for rash. Neurological: Negative for dizziness, focal weakness, seizures and headaches. Endo/Heme/Allergies: Does not bruise/bleed easily. Psychiatric/Behavioral: The patient does not have insomnia. Physical Exam  Constitutional:       General: She is not in acute distress. Appearance: She is well-developed. She is not diaphoretic. HENT:      Head: Normocephalic and atraumatic. Mouth/Throat:      Pharynx: No oropharyngeal exudate. Eyes:      Pupils: Pupils are equal, round, and reactive to light. Neck:      Musculoskeletal: Normal range of motion. Trachea: No tracheal deviation. Cardiovascular:      Rate and Rhythm: Normal rate and regular rhythm. Heart sounds: Normal heart sounds. No murmur. Pulmonary:      Effort: Pulmonary effort is normal. No respiratory distress. Breath sounds: Normal breath sounds. No wheezing. Abdominal:      General: Bowel sounds are normal. There is no distension. Palpations: Abdomen is soft. There is no mass. Tenderness: There is no abdominal tenderness. There is no guarding or rebound. Hernia: A hernia is present. Hernia is present in the umbilical area. Musculoskeletal: Normal range of motion. General: No tenderness. Lymphadenopathy:      Cervical: No cervical adenopathy. Skin:     General: Skin is warm. Findings: No erythema or rash. Neurological:      Mental Status: She is alert and oriented to person, place, and time. Psychiatric:         Behavior: Behavior normal.         ASSESSMENT and PLAN    ICD-10-CM ICD-9-CM    1. Umbilical hernia without obstruction and without gangrene  K42.9 553.1      Hernia currently contains fat. Short term is low risk for strangulated bowel. Not sure she is done having kids.     I had an extensive discussion with her regarding the risks, benefits, and alternatives of proceeding with a(n) Open umbilical hernia Repair with possible mesh. Risks,benefits, and alternatives were discussed including the risk of anesthesia, bleeding, infection, injury to bowel, chronic pain, and recurrence were discussed. We discussed her risk factors including: tobacco use and possible future pregnancy. These related to perioperative morbidity including the increased risk of wound infection, and wound breakdown, and hernia reoccurrence  requiring intervention. As her symptoms are not severe. I have suggested we delay surgery with the hopes we can improve her complication/recurence risk. She was counceled on smoking cessation. Quitting 6 weeks prior to surgery has potential for significantly reducing her risks. I have also given her a referral to obtain a PCP    She will go home and think about our discussion. Surgery 6 weeks after quitting. Surgery sooner if symptoms worsen or does not think she can quit    In the interim, we discussed continuing to stay active including core strengthening exercises, technique to reduce the hernia if needed. Call or return to the office sooner if these symptoms worsen. Discussed typical symptoms of strangulation. Expressed understanding of her discussion and agreed with the plan. Thank you for this consult.         Quit smoking

## 2022-03-17 ENCOUNTER — HOSPITAL ENCOUNTER (EMERGENCY)
Age: 37
Discharge: OTHER INSTITUTION | End: 2022-03-17
Attending: EMERGENCY MEDICINE
Payer: MEDICAID

## 2022-03-17 ENCOUNTER — APPOINTMENT (OUTPATIENT)
Dept: CT IMAGING | Age: 37
DRG: 044 | End: 2022-03-17
Attending: NURSE PRACTITIONER
Payer: MEDICAID

## 2022-03-17 ENCOUNTER — APPOINTMENT (OUTPATIENT)
Dept: CT IMAGING | Age: 37
End: 2022-03-17
Attending: EMERGENCY MEDICINE
Payer: MEDICAID

## 2022-03-17 ENCOUNTER — HOSPITAL ENCOUNTER (INPATIENT)
Age: 37
LOS: 8 days | Discharge: NURSING, REHAB OR LONG-TERM CARE | DRG: 044 | End: 2022-03-25
Attending: ANESTHESIOLOGY | Admitting: ANESTHESIOLOGY
Payer: MEDICAID

## 2022-03-17 VITALS
RESPIRATION RATE: 22 BRPM | HEIGHT: 60 IN | HEART RATE: 73 BPM | SYSTOLIC BLOOD PRESSURE: 104 MMHG | BODY MASS INDEX: 16.45 KG/M2 | DIASTOLIC BLOOD PRESSURE: 54 MMHG | WEIGHT: 83.78 LBS | OXYGEN SATURATION: 97 % | TEMPERATURE: 96.9 F

## 2022-03-17 DIAGNOSIS — I61.4 CEREBELLAR HEMORRHAGE (HCC): ICD-10-CM

## 2022-03-17 DIAGNOSIS — I63.9 CEREBROVASCULAR ACCIDENT (CVA), UNSPECIFIED MECHANISM (HCC): ICD-10-CM

## 2022-03-17 DIAGNOSIS — I61.9: ICD-10-CM

## 2022-03-17 DIAGNOSIS — I61.4 CEREBELLAR HEMORRHAGE (HCC): Primary | ICD-10-CM

## 2022-03-17 DIAGNOSIS — I61.4 CEREBELLAR HEMORRHAGE, ACUTE (HCC): Primary | ICD-10-CM

## 2022-03-17 LAB
ALBUMIN SERPL-MCNC: 4 G/DL (ref 3.5–5)
ALBUMIN/GLOB SERPL: 1.2 {RATIO} (ref 1.1–2.2)
ALP SERPL-CCNC: 29 U/L (ref 45–117)
ALT SERPL-CCNC: 19 U/L (ref 12–78)
ANION GAP SERPL CALC-SCNC: 1 MMOL/L (ref 5–15)
ANION GAP SERPL CALC-SCNC: 6 MMOL/L (ref 5–15)
APTT PPP: 24.5 SEC (ref 22.1–31)
AST SERPL-CCNC: 16 U/L (ref 15–37)
BASOPHILS # BLD: 0.1 K/UL (ref 0–0.1)
BASOPHILS NFR BLD: 1 % (ref 0–1)
BILIRUB SERPL-MCNC: 0.6 MG/DL (ref 0.2–1)
BUN SERPL-MCNC: 15 MG/DL (ref 6–20)
BUN SERPL-MCNC: 18 MG/DL (ref 6–20)
BUN/CREAT SERPL: 26 (ref 12–20)
BUN/CREAT SERPL: 27 (ref 12–20)
CALCIUM SERPL-MCNC: 8.2 MG/DL (ref 8.5–10.1)
CALCIUM SERPL-MCNC: 8.9 MG/DL (ref 8.5–10.1)
CHLORIDE SERPL-SCNC: 107 MMOL/L (ref 97–108)
CHLORIDE SERPL-SCNC: 108 MMOL/L (ref 97–108)
CO2 SERPL-SCNC: 27 MMOL/L (ref 21–32)
CO2 SERPL-SCNC: 27 MMOL/L (ref 21–32)
CREAT SERPL-MCNC: 0.56 MG/DL (ref 0.55–1.02)
CREAT SERPL-MCNC: 0.7 MG/DL (ref 0.55–1.02)
DIFFERENTIAL METHOD BLD: NORMAL
EOSINOPHIL # BLD: 0.2 K/UL (ref 0–0.4)
EOSINOPHIL NFR BLD: 3 % (ref 0–7)
ERYTHROCYTE [DISTWIDTH] IN BLOOD BY AUTOMATED COUNT: 11.9 % (ref 11.5–14.5)
GLOBULIN SER CALC-MCNC: 3.4 G/DL (ref 2–4)
GLUCOSE SERPL-MCNC: 131 MG/DL (ref 65–100)
GLUCOSE SERPL-MCNC: 138 MG/DL (ref 65–100)
HCT VFR BLD AUTO: 41.1 % (ref 35–47)
HGB BLD-MCNC: 13.6 G/DL (ref 11.5–16)
IMM GRANULOCYTES # BLD AUTO: 0 K/UL (ref 0–0.04)
IMM GRANULOCYTES NFR BLD AUTO: 0 % (ref 0–0.5)
INR PPP: 1.1 (ref 0.9–1.1)
LYMPHOCYTES # BLD: 3 K/UL (ref 0.8–3.5)
LYMPHOCYTES NFR BLD: 34 % (ref 12–49)
MCH RBC QN AUTO: 31.4 PG (ref 26–34)
MCHC RBC AUTO-ENTMCNC: 33.1 G/DL (ref 30–36.5)
MCV RBC AUTO: 94.9 FL (ref 80–99)
MONOCYTES # BLD: 0.5 K/UL (ref 0–1)
MONOCYTES NFR BLD: 6 % (ref 5–13)
NEUTS SEG # BLD: 5.1 K/UL (ref 1.8–8)
NEUTS SEG NFR BLD: 56 % (ref 32–75)
NRBC # BLD: 0 K/UL (ref 0–0.01)
NRBC BLD-RTO: 0 PER 100 WBC
OSMOLALITY UR: 631 MOSM/KG H2O
PHOSPHATE SERPL-MCNC: 2.9 MG/DL (ref 2.6–4.7)
PLATELET # BLD AUTO: 278 K/UL (ref 150–400)
PMV BLD AUTO: 10.5 FL (ref 8.9–12.9)
POTASSIUM SERPL-SCNC: 3.2 MMOL/L (ref 3.5–5.1)
POTASSIUM SERPL-SCNC: 4.2 MMOL/L (ref 3.5–5.1)
PROT SERPL-MCNC: 7.4 G/DL (ref 6.4–8.2)
PROTHROMBIN TIME: 11 SEC (ref 9–11.1)
RBC # BLD AUTO: 4.33 M/UL (ref 3.8–5.2)
SODIUM SERPL-SCNC: 136 MMOL/L (ref 136–145)
SODIUM SERPL-SCNC: 140 MMOL/L (ref 136–145)
THERAPEUTIC RANGE,PTTT: NORMAL SECS (ref 58–77)
WBC # BLD AUTO: 9 K/UL (ref 3.6–11)

## 2022-03-17 PROCEDURE — 85025 COMPLETE CBC W/AUTO DIFF WBC: CPT

## 2022-03-17 PROCEDURE — 93005 ELECTROCARDIOGRAM TRACING: CPT

## 2022-03-17 PROCEDURE — 74011250636 HC RX REV CODE- 250/636: Performed by: NURSE PRACTITIONER

## 2022-03-17 PROCEDURE — 74011000636 HC RX REV CODE- 636: Performed by: EMERGENCY MEDICINE

## 2022-03-17 PROCEDURE — 96374 THER/PROPH/DIAG INJ IV PUSH: CPT

## 2022-03-17 PROCEDURE — 99285 EMERGENCY DEPT VISIT HI MDM: CPT

## 2022-03-17 PROCEDURE — 70450 CT HEAD/BRAIN W/O DYE: CPT

## 2022-03-17 PROCEDURE — 74011000250 HC RX REV CODE- 250: Performed by: NURSE PRACTITIONER

## 2022-03-17 PROCEDURE — 85610 PROTHROMBIN TIME: CPT

## 2022-03-17 PROCEDURE — 83935 ASSAY OF URINE OSMOLALITY: CPT

## 2022-03-17 PROCEDURE — 83930 ASSAY OF BLOOD OSMOLALITY: CPT

## 2022-03-17 PROCEDURE — 85730 THROMBOPLASTIN TIME PARTIAL: CPT

## 2022-03-17 PROCEDURE — 65610000006 HC RM INTENSIVE CARE

## 2022-03-17 PROCEDURE — 70496 CT ANGIOGRAPHY HEAD: CPT

## 2022-03-17 PROCEDURE — 96376 TX/PRO/DX INJ SAME DRUG ADON: CPT

## 2022-03-17 PROCEDURE — 74011250636 HC RX REV CODE- 250/636: Performed by: NEUROLOGICAL SURGERY

## 2022-03-17 PROCEDURE — 80048 BASIC METABOLIC PNL TOTAL CA: CPT

## 2022-03-17 PROCEDURE — 36415 COLL VENOUS BLD VENIPUNCTURE: CPT

## 2022-03-17 PROCEDURE — 84100 ASSAY OF PHOSPHORUS: CPT

## 2022-03-17 PROCEDURE — 74011250636 HC RX REV CODE- 250/636: Performed by: EMERGENCY MEDICINE

## 2022-03-17 PROCEDURE — 80053 COMPREHEN METABOLIC PANEL: CPT

## 2022-03-17 PROCEDURE — 96375 TX/PRO/DX INJ NEW DRUG ADDON: CPT

## 2022-03-17 RX ORDER — FENTANYL CITRATE 50 UG/ML
100 INJECTION, SOLUTION INTRAMUSCULAR; INTRAVENOUS ONCE
Status: COMPLETED | OUTPATIENT
Start: 2022-03-17 | End: 2022-03-17

## 2022-03-17 RX ORDER — ONDANSETRON 2 MG/ML
4 INJECTION INTRAMUSCULAR; INTRAVENOUS
Status: COMPLETED | OUTPATIENT
Start: 2022-03-17 | End: 2022-03-17

## 2022-03-17 RX ORDER — SODIUM CHLORIDE 9 MG/ML
75 INJECTION, SOLUTION INTRAVENOUS CONTINUOUS
Status: DISCONTINUED | OUTPATIENT
Start: 2022-03-17 | End: 2022-03-17

## 2022-03-17 RX ORDER — ACETAMINOPHEN 325 MG/1
650 TABLET ORAL
Status: DISCONTINUED | OUTPATIENT
Start: 2022-03-17 | End: 2022-03-25 | Stop reason: HOSPADM

## 2022-03-17 RX ORDER — MANNITOL 20 G/100ML
40 INJECTION, SOLUTION INTRAVENOUS ONCE
Status: DISPENSED | OUTPATIENT
Start: 2022-03-17 | End: 2022-03-18

## 2022-03-17 RX ORDER — METOCLOPRAMIDE HYDROCHLORIDE 5 MG/ML
10 INJECTION INTRAMUSCULAR; INTRAVENOUS
Status: DISCONTINUED | OUTPATIENT
Start: 2022-03-17 | End: 2022-03-25 | Stop reason: HOSPADM

## 2022-03-17 RX ORDER — 3% SODIUM CHLORIDE 3 G/100ML
30 INJECTION, SOLUTION INTRAVENOUS CONTINUOUS
Status: DISPENSED | OUTPATIENT
Start: 2022-03-17 | End: 2022-03-18

## 2022-03-17 RX ORDER — ONDANSETRON 2 MG/ML
4 INJECTION INTRAMUSCULAR; INTRAVENOUS
Status: DISCONTINUED | OUTPATIENT
Start: 2022-03-17 | End: 2022-03-25 | Stop reason: HOSPADM

## 2022-03-17 RX ORDER — POLYETHYLENE GLYCOL 3350 17 G/17G
17 POWDER, FOR SOLUTION ORAL DAILY PRN
Status: DISCONTINUED | OUTPATIENT
Start: 2022-03-17 | End: 2022-03-18

## 2022-03-17 RX ORDER — ONDANSETRON 4 MG/1
4 TABLET, ORALLY DISINTEGRATING ORAL
Status: DISCONTINUED | OUTPATIENT
Start: 2022-03-17 | End: 2022-03-17

## 2022-03-17 RX ORDER — SODIUM CHLORIDE 0.9 % (FLUSH) 0.9 %
5-40 SYRINGE (ML) INJECTION AS NEEDED
Status: DISCONTINUED | OUTPATIENT
Start: 2022-03-17 | End: 2022-03-25 | Stop reason: HOSPADM

## 2022-03-17 RX ORDER — HYDROMORPHONE HYDROCHLORIDE 1 MG/ML
0.5 INJECTION, SOLUTION INTRAMUSCULAR; INTRAVENOUS; SUBCUTANEOUS
Status: DISCONTINUED | OUTPATIENT
Start: 2022-03-17 | End: 2022-03-17 | Stop reason: HOSPADM

## 2022-03-17 RX ORDER — PROCHLORPERAZINE EDISYLATE 5 MG/ML
10 INJECTION INTRAMUSCULAR; INTRAVENOUS
Status: DISCONTINUED | OUTPATIENT
Start: 2022-03-17 | End: 2022-03-17 | Stop reason: SDUPTHER

## 2022-03-17 RX ORDER — DEXAMETHASONE SODIUM PHOSPHATE 10 MG/ML
10 INJECTION INTRAMUSCULAR; INTRAVENOUS ONCE
Status: COMPLETED | OUTPATIENT
Start: 2022-03-17 | End: 2022-03-17

## 2022-03-17 RX ORDER — ACETAMINOPHEN 650 MG/1
650 SUPPOSITORY RECTAL
Status: DISCONTINUED | OUTPATIENT
Start: 2022-03-17 | End: 2022-03-25 | Stop reason: HOSPADM

## 2022-03-17 RX ORDER — DEXAMETHASONE SODIUM PHOSPHATE 4 MG/ML
4 INJECTION, SOLUTION INTRA-ARTICULAR; INTRALESIONAL; INTRAMUSCULAR; INTRAVENOUS; SOFT TISSUE EVERY 6 HOURS
Status: DISCONTINUED | OUTPATIENT
Start: 2022-03-18 | End: 2022-03-18

## 2022-03-17 RX ORDER — SODIUM CHLORIDE 0.9 % (FLUSH) 0.9 %
5-40 SYRINGE (ML) INJECTION EVERY 8 HOURS
Status: DISCONTINUED | OUTPATIENT
Start: 2022-03-17 | End: 2022-03-25 | Stop reason: HOSPADM

## 2022-03-17 RX ADMIN — FENTANYL CITRATE 100 MCG: 0.05 INJECTION, SOLUTION INTRAMUSCULAR; INTRAVENOUS at 16:43

## 2022-03-17 RX ADMIN — ONDANSETRON 4 MG: 2 INJECTION INTRAMUSCULAR; INTRAVENOUS at 23:06

## 2022-03-17 RX ADMIN — HYDROMORPHONE HYDROCHLORIDE 0.5 MG: 1 INJECTION, SOLUTION INTRAMUSCULAR; INTRAVENOUS; SUBCUTANEOUS at 17:48

## 2022-03-17 RX ADMIN — SODIUM CHLORIDE 75 ML/HR: 9 INJECTION, SOLUTION INTRAVENOUS at 21:43

## 2022-03-17 RX ADMIN — DEXAMETHASONE SODIUM PHOSPHATE 10 MG: 10 INJECTION INTRAMUSCULAR; INTRAVENOUS at 20:06

## 2022-03-17 RX ADMIN — METOCLOPRAMIDE HYDROCHLORIDE 10 MG: 5 INJECTION INTRAMUSCULAR; INTRAVENOUS at 20:21

## 2022-03-17 RX ADMIN — IOPAMIDOL 100 ML: 755 INJECTION, SOLUTION INTRAVENOUS at 18:03

## 2022-03-17 RX ADMIN — HYDROMORPHONE HYDROCHLORIDE 0.5 MG: 1 INJECTION, SOLUTION INTRAMUSCULAR; INTRAVENOUS; SUBCUTANEOUS at 17:10

## 2022-03-17 RX ADMIN — SODIUM CHLORIDE 30 ML/HR: 3 INJECTION, SOLUTION INTRAVENOUS at 23:23

## 2022-03-17 RX ADMIN — ONDANSETRON 4 MG: 2 INJECTION INTRAMUSCULAR; INTRAVENOUS at 17:10

## 2022-03-17 RX ADMIN — FENTANYL CITRATE 100 MCG: 50 INJECTION, SOLUTION INTRAMUSCULAR; INTRAVENOUS at 20:15

## 2022-03-17 RX ADMIN — SODIUM CHLORIDE, PRESERVATIVE FREE 10 ML: 5 INJECTION INTRAVENOUS at 21:42

## 2022-03-17 NOTE — ED PROVIDER NOTES
EMERGENCY DEPARTMENT HISTORY AND PHYSICAL EXAM      Date: 3/17/2022  Patient Name: Raphael Gonzalez    History of Presenting Illness     Chief Complaint   Patient presents with    Nausea    Headache         HPI: Raphael Gonzalez, 40 y.o. female presents to the ED with cc of. Onset was very soon prior to arrival.  Sudden onset. Patient has a history of migraines but states this is worse than typical migraines. Describes a \"splitting\" headache with vertigo, nausea and vomiting. Also has photophobia. Denies anticoagulation or other significant medical problems. There are no other complaints, changes, or physical findings at this time. PCP: None    No current facility-administered medications on file prior to encounter. Current Outpatient Medications on File Prior to Encounter   Medication Sig Dispense Refill    IBUPROFEN PO Take  by mouth.          Past History     Past Medical History:  Past Medical History:   Diagnosis Date    Anxiety     Arthritis     Calculus of kidney     Dyspepsia and other specified disorders of function of stomach     GERD (gastroesophageal reflux disease)     Kidney disease     hx of kidney stone    Other ill-defined conditions(799.89)     kidney stone in pass,passed    Postpartum hemorrhage 2017    ALSO HAD HEMORRHAGE DURING MISCARRIAGE 2012    Psychiatric disorder     ADHD       Past Surgical History:  Past Surgical History:   Procedure Laterality Date    ABDOMEN SURGERY PROC UNLISTED  10/30/12    Laparoscopic cholecystectomy     DELIVERY ONLY          HX APPENDECTOMY      HX CHOLECYSTECTOMY      HX DILATION AND CURETTAGE      HX GYN      miscarriage x2    HX GYN      C section    HX GYN      EXP LAPS    HX HEENT      wisdom teeth extraction    HX ORTHOPAEDIC      torn catiledge repair left knee    HX WISDOM TEETH EXTRACTION      LA ANESTH,KNEE AREA SURGERY      UPPER GI ENDOSCOPY,BIOPSY  2015            Family History:  Family History   Problem Relation Age of Onset    Emphysema Mother     No Known Problems Father     No Known Problems Son     No Known Problems Son     Anesth Problems Neg Hx        Social History:  Social History     Tobacco Use    Smoking status: Current Every Day Smoker     Packs/day: 0.50     Years: 15.00     Pack years: 7.50    Smokeless tobacco: Never Used    Tobacco comment: about 6 cigarettes per day at present   Substance Use Topics    Alcohol use: Yes     Comment: RARELY    Drug use: No       Allergies: Allergies   Allergen Reactions    Ambien [Zolpidem] Other (comments)     \"Hallucinations and black outs\"    Morphine Rash         Review of Systems   Review of Systems   Constitutional: Negative for chills and fever. HENT: Negative for rhinorrhea. Eyes: Positive for photophobia. Negative for redness. Respiratory: Negative for shortness of breath. Cardiovascular: Negative for chest pain. Gastrointestinal: Positive for nausea and vomiting. Negative for abdominal pain. Genitourinary: Negative for dysuria. Musculoskeletal: Negative for back pain. Neurological: Positive for dizziness and headaches. Negative for syncope. Psychiatric/Behavioral: The patient is not nervous/anxious. All other systems reviewed and are negative. Physical Exam   Physical Exam  Vitals and nursing note reviewed. Constitutional:       Comments: Looks like she is in pain, covering her eyes   HENT:      Head: Normocephalic and atraumatic. Mouth/Throat:      Mouth: Mucous membranes are moist.   Eyes:      Extraocular Movements: Extraocular movements intact. Pupils: Pupils are equal, round, and reactive to light. Cardiovascular:      Rate and Rhythm: Normal rate and regular rhythm. Pulses: Normal pulses. Pulmonary:      Effort: Pulmonary effort is normal.      Breath sounds: Normal breath sounds. Abdominal:      Palpations: Abdomen is soft. Tenderness:  There is no abdominal tenderness. Musculoskeletal:         General: No deformity. Cervical back: Neck supple. Skin:     General: Skin is warm and dry. Neurological:      General: No focal deficit present. Mental Status: She is alert and oriented to person, place, and time. Comments: MAEE, no focal deficits w limited participation in exam, nystagmus bilat   Psychiatric:         Mood and Affect: Mood normal.         Behavior: Behavior normal.         Diagnostic Study Results     Labs -     Recent Results (from the past 24 hour(s))   EKG, 12 LEAD, INITIAL    Collection Time: 03/17/22  3:57 PM   Result Value Ref Range    Ventricular Rate 76 BPM    Atrial Rate 76 BPM    P-R Interval 162 ms    QRS Duration 82 ms    Q-T Interval 438 ms    QTC Calculation (Bezet) 492 ms    Calculated P Axis 79 degrees    Calculated R Axis 78 degrees    Calculated T Axis 76 degrees    Diagnosis       Normal sinus rhythm  Prolonged QT  When compared with ECG of 15-NOV-2014 13:33,  QT has lengthened     CBC WITH AUTOMATED DIFF    Collection Time: 03/17/22  4:06 PM   Result Value Ref Range    WBC 9.0 3.6 - 11.0 K/uL    RBC 4.33 3.80 - 5.20 M/uL    HGB 13.6 11.5 - 16.0 g/dL    HCT 41.1 35.0 - 47.0 %    MCV 94.9 80.0 - 99.0 FL    MCH 31.4 26.0 - 34.0 PG    MCHC 33.1 30.0 - 36.5 g/dL    RDW 11.9 11.5 - 14.5 %    PLATELET 047 138 - 577 K/uL    MPV 10.5 8.9 - 12.9 FL    NRBC 0.0 0  WBC    ABSOLUTE NRBC 0.00 0.00 - 0.01 K/uL    NEUTROPHILS 56 32 - 75 %    LYMPHOCYTES 34 12 - 49 %    MONOCYTES 6 5 - 13 %    EOSINOPHILS 3 0 - 7 %    BASOPHILS 1 0 - 1 %    IMMATURE GRANULOCYTES 0 0.0 - 0.5 %    ABS. NEUTROPHILS 5.1 1.8 - 8.0 K/UL    ABS. LYMPHOCYTES 3.0 0.8 - 3.5 K/UL    ABS. MONOCYTES 0.5 0.0 - 1.0 K/UL    ABS. EOSINOPHILS 0.2 0.0 - 0.4 K/UL    ABS. BASOPHILS 0.1 0.0 - 0.1 K/UL    ABS. IMM.  GRANS. 0.0 0.00 - 0.04 K/UL    DF AUTOMATED     METABOLIC PANEL, COMPREHENSIVE    Collection Time: 03/17/22  4:06 PM   Result Value Ref Range    Sodium 140 136 - 145 mmol/L    Potassium 3.2 (L) 3.5 - 5.1 mmol/L    Chloride 107 97 - 108 mmol/L    CO2 27 21 - 32 mmol/L    Anion gap 6 5 - 15 mmol/L    Glucose 138 (H) 65 - 100 mg/dL    BUN 18 6 - 20 MG/DL    Creatinine 0.70 0.55 - 1.02 MG/DL    BUN/Creatinine ratio 26 (H) 12 - 20      GFR est AA >60 >60 ml/min/1.73m2    GFR est non-AA >60 >60 ml/min/1.73m2    Calcium 8.2 (L) 8.5 - 10.1 MG/DL    Bilirubin, total 0.6 0.2 - 1.0 MG/DL    ALT (SGPT) 19 12 - 78 U/L    AST (SGOT) 16 15 - 37 U/L    Alk. phosphatase 29 (L) 45 - 117 U/L    Protein, total 7.4 6.4 - 8.2 g/dL    Albumin 4.0 3.5 - 5.0 g/dL    Globulin 3.4 2.0 - 4.0 g/dL    A-G Ratio 1.2 1.1 - 2.2     PROTHROMBIN TIME + INR    Collection Time: 03/17/22  4:58 PM   Result Value Ref Range    INR 1.1 0.9 - 1.1      Prothrombin time 11.0 9.0 - 11.1 sec   PTT    Collection Time: 03/17/22  4:58 PM   Result Value Ref Range    aPTT 24.5 22.1 - 31.0 sec    aPTT, therapeutic range     58.0 - 77.0 SECS       Radiologic Studies -   CT HEAD WO CONT   Final Result      1. There is a right cerebellar hemorrhage with surrounding edema with extension   into the right cerebellar peduncle. There is compression of the fourth ventricle   which is small and contains a small amount of hemorrhage. The temporal tips are   slightly dilated may represent developing hydrocephalus. Results called to the   ER. CTA HEAD NECK W CONT    (Results Pending)     CT Results  (Last 48 hours)               03/17/22 1637  CT HEAD WO CONT Final result    Impression:      1. There is a right cerebellar hemorrhage with surrounding edema with extension   into the right cerebellar peduncle. There is compression of the fourth ventricle   which is small and contains a small amount of hemorrhage. The temporal tips are   slightly dilated may represent developing hydrocephalus. Results called to the   ER. Narrative:  EXAM: CT HEAD WO CONT       INDICATION: HA       COMPARISON: None. CONTRAST: None. TECHNIQUE: Unenhanced CT of the head was performed using 5 mm images. Brain and   bone windows were generated. Coronal and sagittal reformats. CT dose reduction   was achieved through use of a standardized protocol tailored for this   examination and automatic exposure control for dose modulation. FINDINGS:   There is a 2.3 x 2.6 x 1.8 cm right cerebellar hemorrhage with surrounding edema   and mass effect on the fourth ventricle which is compressed. The lateral   ventricles are normal in size however there is slight dilatation of the temporal   horns. There is is a small amount of hemorrhage in the fourth ventricle. There   is slight extension of the hemorrhage into the cerebellar peduncle. There is   paucity of CSF between the clivus and nicki with question of edema extending into   the nicki/medulla. The bone windows demonstrate no abnormalities. The visualized portions of the   paranasal sinuses and mastoid air cells are clear. CXR Results  (Last 48 hours)    None            Medical Decision Making   I am the first provider for this patient. I reviewed the vital signs, available nursing notes, past medical history, past surgical history, family history and social history. Vital Signs-Reviewed the patient's vital signs. Patient Vitals for the past 24 hrs:   Temp Pulse Resp BP SpO2   03/17/22 1726  79 14 (!) 106/49 98 %   03/17/22 1711  90 21 (!) 104/54 100 %   03/17/22 1657  79 16 (!) 112/48 98 %   03/17/22 1642    (!) 103/59    03/17/22 1615  72 17 123/74 100 %   03/17/22 1551 97.4 °F (36.3 °C) 85 20 107/67 100 %         Provider Notes (Medical Decision Making):   Pleasant 15-year-old with no relevant past medical history presenting to ED with chief complaint of severe headache. CT imaging demonstrates cerebellar hemorrhage. Patient is not anticoagulated. Her blood pressure is not elevated.   Discussed with neurosurgery and ICU at [facility] to establish transfer. 5:08 PM Spoke with Dr Ana Enriquez. He recommends CTA, transfer to [facility] ICU.    5:13 PM Spoke with Dr Amrik Mcmahon who agrees to admission to [facility] ED Course:     Initial assessment performed. The patients presenting problems have been discussed, and they are in agreement with the care plan formulated and outlined with them. I have encouraged them to ask questions as they arise throughout their visit. Critical Care Time:     35    Disposition:  transfer    PLAN:  1. Current Discharge Medication List        2.    Follow-up Information    None       Return to ED if worse     Diagnosis     Clinical Impression: cerebellar hemorrhage

## 2022-03-17 NOTE — ED NOTES
Pt unable to tolerate CTA without additional pain meds, per MD ok to give prn off schedule.  Pt medicated     0042 Spoke w/ access center Bed 7111, report [id number], accepting physician Dr. Milad Morgan     [phone] RN to RN report given to Meg Ellis, BRITTNI at [facility].     1292 Transport at bedside, report given and EMTALA completed

## 2022-03-17 NOTE — ED NOTES
Pt arrives via EMS from home. Upon EMS arrival at pts home, pt was found on floor near the entrance to the home, with a small trashcan with vomit inside. CC nausea, vomitting, dizziness and headache, symptoms began today. Per EMS, blood sugar 118, Fast negative. Pt reports using marijuana today, denies alcohol use. Pt rates headache 8/10, describes it as splitting, and \"everything is spinning\". Hx of vertigo, arthritis in back and migraines. Pt says vertigo has never been this bad. Per pt, pts son has recently had a stomach bug. Pt in stretcher, monitor x3, EKG complete. Pt has nystagmus currently.  Pt had one episode of vomitting at 1550.     1602 MD at bedside

## 2022-03-18 ENCOUNTER — ANESTHESIA (OUTPATIENT)
Dept: INTERVENTIONAL RADIOLOGY/VASCULAR | Age: 37
DRG: 044 | End: 2022-03-18
Payer: MEDICAID

## 2022-03-18 ENCOUNTER — APPOINTMENT (OUTPATIENT)
Dept: CT IMAGING | Age: 37
DRG: 044 | End: 2022-03-18
Attending: NURSE PRACTITIONER
Payer: MEDICAID

## 2022-03-18 ENCOUNTER — ANESTHESIA EVENT (OUTPATIENT)
Dept: INTERVENTIONAL RADIOLOGY/VASCULAR | Age: 37
DRG: 044 | End: 2022-03-18
Payer: MEDICAID

## 2022-03-18 ENCOUNTER — APPOINTMENT (OUTPATIENT)
Dept: MRI IMAGING | Age: 37
DRG: 044 | End: 2022-03-18
Attending: NURSE PRACTITIONER
Payer: MEDICAID

## 2022-03-18 ENCOUNTER — APPOINTMENT (OUTPATIENT)
Dept: INTERVENTIONAL RADIOLOGY/VASCULAR | Age: 37
DRG: 044 | End: 2022-03-18
Attending: RADIOLOGY
Payer: MEDICAID

## 2022-03-18 ENCOUNTER — APPOINTMENT (OUTPATIENT)
Dept: NON INVASIVE DIAGNOSTICS | Age: 37
DRG: 044 | End: 2022-03-18
Attending: NURSE PRACTITIONER
Payer: MEDICAID

## 2022-03-18 PROBLEM — Z86.59 HISTORY OF POSTPARTUM DEPRESSION: Status: ACTIVE | Noted: 2017-10-07

## 2022-03-18 PROBLEM — Z87.59 HISTORY OF POSTPARTUM DEPRESSION: Status: ACTIVE | Noted: 2017-10-07

## 2022-03-18 PROBLEM — I61.4 CEREBELLAR HEMORRHAGE (HCC): Status: ACTIVE | Noted: 2022-03-18

## 2022-03-18 LAB
ANION GAP SERPL CALC-SCNC: 3 MMOL/L (ref 5–15)
ANION GAP SERPL CALC-SCNC: 4 MMOL/L (ref 5–15)
ANION GAP SERPL CALC-SCNC: 5 MMOL/L (ref 5–15)
ANION GAP SERPL CALC-SCNC: 5 MMOL/L (ref 5–15)
ANION GAP SERPL CALC-SCNC: 6 MMOL/L (ref 5–15)
ANION GAP SERPL CALC-SCNC: 7 MMOL/L (ref 5–15)
ATRIAL RATE: 76 BPM
BUN SERPL-MCNC: 14 MG/DL (ref 6–20)
BUN SERPL-MCNC: 15 MG/DL (ref 6–20)
BUN SERPL-MCNC: 16 MG/DL (ref 6–20)
BUN/CREAT SERPL: 26 (ref 12–20)
BUN/CREAT SERPL: 28 (ref 12–20)
BUN/CREAT SERPL: 29 (ref 12–20)
BUN/CREAT SERPL: 29 (ref 12–20)
BUN/CREAT SERPL: 31 (ref 12–20)
BUN/CREAT SERPL: 35 (ref 12–20)
CALCIUM SERPL-MCNC: 8.6 MG/DL (ref 8.5–10.1)
CALCIUM SERPL-MCNC: 8.7 MG/DL (ref 8.5–10.1)
CALCIUM SERPL-MCNC: 8.7 MG/DL (ref 8.5–10.1)
CALCIUM SERPL-MCNC: 8.8 MG/DL (ref 8.5–10.1)
CALCIUM SERPL-MCNC: 9 MG/DL (ref 8.5–10.1)
CALCIUM SERPL-MCNC: 9 MG/DL (ref 8.5–10.1)
CALCULATED P AXIS, ECG09: 79 DEGREES
CALCULATED R AXIS, ECG10: 78 DEGREES
CALCULATED T AXIS, ECG11: 76 DEGREES
CHLORIDE SERPL-SCNC: 110 MMOL/L (ref 97–108)
CHLORIDE SERPL-SCNC: 112 MMOL/L (ref 97–108)
CHLORIDE SERPL-SCNC: 113 MMOL/L (ref 97–108)
CHLORIDE SERPL-SCNC: 116 MMOL/L (ref 97–108)
CHLORIDE SERPL-SCNC: 116 MMOL/L (ref 97–108)
CHLORIDE SERPL-SCNC: 117 MMOL/L (ref 97–108)
CO2 SERPL-SCNC: 22 MMOL/L (ref 21–32)
CO2 SERPL-SCNC: 23 MMOL/L (ref 21–32)
CO2 SERPL-SCNC: 23 MMOL/L (ref 21–32)
CO2 SERPL-SCNC: 24 MMOL/L (ref 21–32)
COVID-19 RAPID TEST, COVR: NOT DETECTED
CREAT SERPL-MCNC: 0.43 MG/DL (ref 0.55–1.02)
CREAT SERPL-MCNC: 0.48 MG/DL (ref 0.55–1.02)
CREAT SERPL-MCNC: 0.51 MG/DL (ref 0.55–1.02)
CREAT SERPL-MCNC: 0.52 MG/DL (ref 0.55–1.02)
CREAT SERPL-MCNC: 0.54 MG/DL (ref 0.55–1.02)
CREAT SERPL-MCNC: 0.57 MG/DL (ref 0.55–1.02)
DIAGNOSIS, 93000: NORMAL
ERYTHROCYTE [DISTWIDTH] IN BLOOD BY AUTOMATED COUNT: 11.9 % (ref 11.5–14.5)
GLUCOSE SERPL-MCNC: 102 MG/DL (ref 65–100)
GLUCOSE SERPL-MCNC: 112 MG/DL (ref 65–100)
GLUCOSE SERPL-MCNC: 121 MG/DL (ref 65–100)
GLUCOSE SERPL-MCNC: 122 MG/DL (ref 65–100)
GLUCOSE SERPL-MCNC: 125 MG/DL (ref 65–100)
GLUCOSE SERPL-MCNC: 132 MG/DL (ref 65–100)
HCT VFR BLD AUTO: 38.3 % (ref 35–47)
HGB BLD-MCNC: 12.7 G/DL (ref 11.5–16)
MAGNESIUM SERPL-MCNC: 2.3 MG/DL (ref 1.6–2.4)
MAGNESIUM SERPL-MCNC: 2.4 MG/DL (ref 1.6–2.4)
MCH RBC QN AUTO: 30.9 PG (ref 26–34)
MCHC RBC AUTO-ENTMCNC: 33.2 G/DL (ref 30–36.5)
MCV RBC AUTO: 93.2 FL (ref 80–99)
NRBC # BLD: 0 K/UL (ref 0–0.01)
NRBC BLD-RTO: 0 PER 100 WBC
OSMOLALITY SERPL: 292 MOSM/KG H2O
P-R INTERVAL, ECG05: 162 MS
PHOSPHATE SERPL-MCNC: 3.3 MG/DL (ref 2.6–4.7)
PLATELET # BLD AUTO: 241 K/UL (ref 150–400)
PMV BLD AUTO: 10.3 FL (ref 8.9–12.9)
POTASSIUM SERPL-SCNC: 3.7 MMOL/L (ref 3.5–5.1)
POTASSIUM SERPL-SCNC: 3.9 MMOL/L (ref 3.5–5.1)
POTASSIUM SERPL-SCNC: 4 MMOL/L (ref 3.5–5.1)
POTASSIUM SERPL-SCNC: 4.1 MMOL/L (ref 3.5–5.1)
POTASSIUM SERPL-SCNC: 4.2 MMOL/L (ref 3.5–5.1)
POTASSIUM SERPL-SCNC: 4.3 MMOL/L (ref 3.5–5.1)
Q-T INTERVAL, ECG07: 438 MS
QRS DURATION, ECG06: 82 MS
QTC CALCULATION (BEZET), ECG08: 492 MS
RBC # BLD AUTO: 4.11 M/UL (ref 3.8–5.2)
SODIUM SERPL-SCNC: 140 MMOL/L (ref 136–145)
SODIUM SERPL-SCNC: 141 MMOL/L (ref 136–145)
SODIUM SERPL-SCNC: 141 MMOL/L (ref 136–145)
SODIUM SERPL-SCNC: 142 MMOL/L (ref 136–145)
SODIUM SERPL-SCNC: 143 MMOL/L (ref 136–145)
SODIUM SERPL-SCNC: 143 MMOL/L (ref 136–145)
SOURCE, COVRS: NORMAL
VENTRICULAR RATE, ECG03: 76 BPM
WBC # BLD AUTO: 11.7 K/UL (ref 3.6–11)

## 2022-03-18 PROCEDURE — C1751 CATH, INF, PER/CENT/MIDLINE: HCPCS

## 2022-03-18 PROCEDURE — APPSS45 APP SPLIT SHARED TIME 31-45 MINUTES: Performed by: NURSE PRACTITIONER

## 2022-03-18 PROCEDURE — B31R1ZZ FLUOROSCOPY OF INTRACRANIAL ARTERIES USING LOW OSMOLAR CONTRAST: ICD-10-PCS | Performed by: RADIOLOGY

## 2022-03-18 PROCEDURE — 74011000250 HC RX REV CODE- 250: Performed by: ANESTHESIOLOGY

## 2022-03-18 PROCEDURE — 36415 COLL VENOUS BLD VENIPUNCTURE: CPT

## 2022-03-18 PROCEDURE — C1760 CLOSURE DEV, VASC: HCPCS

## 2022-03-18 PROCEDURE — 99291 CRITICAL CARE FIRST HOUR: CPT | Performed by: PSYCHIATRY & NEUROLOGY

## 2022-03-18 PROCEDURE — 76060000032 HC ANESTHESIA 0.5 TO 1 HR

## 2022-03-18 PROCEDURE — 77030019940 HC BLNKT HYPOTHRM STRY -B

## 2022-03-18 PROCEDURE — 65610000006 HC RM INTENSIVE CARE

## 2022-03-18 PROCEDURE — 74011250636 HC RX REV CODE- 250/636: Performed by: NURSE ANESTHETIST, CERTIFIED REGISTERED

## 2022-03-18 PROCEDURE — 87635 SARS-COV-2 COVID-19 AMP PRB: CPT

## 2022-03-18 PROCEDURE — 74011250636 HC RX REV CODE- 250/636: Performed by: RADIOLOGY

## 2022-03-18 PROCEDURE — 80048 BASIC METABOLIC PNL TOTAL CA: CPT

## 2022-03-18 PROCEDURE — C1894 INTRO/SHEATH, NON-LASER: HCPCS

## 2022-03-18 PROCEDURE — C1769 GUIDE WIRE: HCPCS

## 2022-03-18 PROCEDURE — 85027 COMPLETE CBC AUTOMATED: CPT

## 2022-03-18 PROCEDURE — 74011000636 HC RX REV CODE- 636

## 2022-03-18 PROCEDURE — 74011250636 HC RX REV CODE- 250/636: Performed by: ANESTHESIOLOGY

## 2022-03-18 PROCEDURE — 74011250636 HC RX REV CODE- 250/636: Performed by: NURSE PRACTITIONER

## 2022-03-18 PROCEDURE — 76937 US GUIDE VASCULAR ACCESS: CPT

## 2022-03-18 PROCEDURE — 84100 ASSAY OF PHOSPHORUS: CPT

## 2022-03-18 PROCEDURE — A9576 INJ PROHANCE MULTIPACK: HCPCS | Performed by: ANESTHESIOLOGY

## 2022-03-18 PROCEDURE — 70544 MR ANGIOGRAPHY HEAD W/O DYE: CPT

## 2022-03-18 PROCEDURE — 83735 ASSAY OF MAGNESIUM: CPT

## 2022-03-18 PROCEDURE — 74011000250 HC RX REV CODE- 250: Performed by: RADIOLOGY

## 2022-03-18 PROCEDURE — 74011250636 HC RX REV CODE- 250/636: Performed by: NEUROLOGICAL SURGERY

## 2022-03-18 PROCEDURE — 74011250637 HC RX REV CODE- 250/637: Performed by: NURSE PRACTITIONER

## 2022-03-18 PROCEDURE — 2709999900 HC NON-CHARGEABLE SUPPLY

## 2022-03-18 PROCEDURE — 70547 MR ANGIOGRAPHY NECK W/O DYE: CPT

## 2022-03-18 PROCEDURE — 77030020365 HC SOL INJ SOD CL 0.9% 50ML

## 2022-03-18 PROCEDURE — 36573 INSJ PICC RS&I 5 YR+: CPT | Performed by: ANESTHESIOLOGY

## 2022-03-18 PROCEDURE — 70553 MRI BRAIN STEM W/O & W/DYE: CPT

## 2022-03-18 PROCEDURE — 77030012468 HC VLV BLEEDBK CNTRL ABBT -B

## 2022-03-18 PROCEDURE — 74011000250 HC RX REV CODE- 250: Performed by: NURSE PRACTITIONER

## 2022-03-18 PROCEDURE — 36224 PLACE CATH CAROTD ART: CPT

## 2022-03-18 PROCEDURE — 70450 CT HEAD/BRAIN W/O DYE: CPT

## 2022-03-18 PROCEDURE — 77030016715 HC CATH ANGI DX TMPO2 CARD -B

## 2022-03-18 PROCEDURE — 99255 IP/OBS CONSLTJ NEW/EST HI 80: CPT | Performed by: RADIOLOGY

## 2022-03-18 RX ORDER — FENTANYL CITRATE 50 UG/ML
INJECTION, SOLUTION INTRAMUSCULAR; INTRAVENOUS AS NEEDED
Status: DISCONTINUED | OUTPATIENT
Start: 2022-03-18 | End: 2022-03-18 | Stop reason: HOSPADM

## 2022-03-18 RX ORDER — HYDROMORPHONE HYDROCHLORIDE 1 MG/ML
0.5 INJECTION, SOLUTION INTRAMUSCULAR; INTRAVENOUS; SUBCUTANEOUS
Status: DISCONTINUED | OUTPATIENT
Start: 2022-03-18 | End: 2022-03-20

## 2022-03-18 RX ORDER — MIDAZOLAM HYDROCHLORIDE 1 MG/ML
INJECTION, SOLUTION INTRAMUSCULAR; INTRAVENOUS
Status: COMPLETED
Start: 2022-03-18 | End: 2022-03-18

## 2022-03-18 RX ORDER — LIDOCAINE HYDROCHLORIDE 20 MG/ML
20 INJECTION, SOLUTION INFILTRATION; PERINEURAL
Status: COMPLETED | OUTPATIENT
Start: 2022-03-18 | End: 2022-03-18

## 2022-03-18 RX ORDER — BUTALBITAL, ACETAMINOPHEN AND CAFFEINE 50; 325; 40 MG/1; MG/1; MG/1
1 TABLET ORAL
Status: DISCONTINUED | OUTPATIENT
Start: 2022-03-18 | End: 2022-03-19

## 2022-03-18 RX ORDER — SODIUM CHLORIDE 9 MG/ML
INJECTION, SOLUTION INTRAVENOUS
Status: DISCONTINUED | OUTPATIENT
Start: 2022-03-18 | End: 2022-03-18 | Stop reason: HOSPADM

## 2022-03-18 RX ORDER — FENTANYL CITRATE 50 UG/ML
INJECTION, SOLUTION INTRAMUSCULAR; INTRAVENOUS
Status: COMPLETED
Start: 2022-03-18 | End: 2022-03-18

## 2022-03-18 RX ORDER — DEXAMETHASONE SODIUM PHOSPHATE 4 MG/ML
4 INJECTION, SOLUTION INTRA-ARTICULAR; INTRALESIONAL; INTRAMUSCULAR; INTRAVENOUS; SOFT TISSUE EVERY 12 HOURS
Status: DISCONTINUED | OUTPATIENT
Start: 2022-03-18 | End: 2022-03-25 | Stop reason: HOSPADM

## 2022-03-18 RX ORDER — MIDAZOLAM HYDROCHLORIDE 1 MG/ML
INJECTION, SOLUTION INTRAMUSCULAR; INTRAVENOUS AS NEEDED
Status: DISCONTINUED | OUTPATIENT
Start: 2022-03-18 | End: 2022-03-18 | Stop reason: HOSPADM

## 2022-03-18 RX ORDER — POLYETHYLENE GLYCOL 3350 17 G/17G
17 POWDER, FOR SOLUTION ORAL DAILY PRN
Status: DISCONTINUED | OUTPATIENT
Start: 2022-03-18 | End: 2022-03-25 | Stop reason: HOSPADM

## 2022-03-18 RX ORDER — SODIUM CHLORIDE 0.9 % (FLUSH) 0.9 %
10 SYRINGE (ML) INJECTION
Status: COMPLETED | OUTPATIENT
Start: 2022-03-18 | End: 2022-03-18

## 2022-03-18 RX ORDER — HYDROMORPHONE HYDROCHLORIDE 1 MG/ML
1 INJECTION, SOLUTION INTRAMUSCULAR; INTRAVENOUS; SUBCUTANEOUS ONCE
Status: DISPENSED | OUTPATIENT
Start: 2022-03-18 | End: 2022-03-18

## 2022-03-18 RX ORDER — SODIUM CHLORIDE 9 MG/ML
25 INJECTION, SOLUTION INTRAVENOUS AS NEEDED
Status: DISCONTINUED | OUTPATIENT
Start: 2022-03-18 | End: 2022-03-25 | Stop reason: HOSPADM

## 2022-03-18 RX ADMIN — SODIUM CHLORIDE, PRESERVATIVE FREE 10 ML: 5 INJECTION INTRAVENOUS at 06:01

## 2022-03-18 RX ADMIN — DEXAMETHASONE SODIUM PHOSPHATE 4 MG: 4 INJECTION, SOLUTION INTRAMUSCULAR; INTRAVENOUS at 21:37

## 2022-03-18 RX ADMIN — FAMOTIDINE 20 MG: 10 INJECTION INTRAVENOUS at 21:36

## 2022-03-18 RX ADMIN — METOCLOPRAMIDE HYDROCHLORIDE 10 MG: 5 INJECTION INTRAMUSCULAR; INTRAVENOUS at 08:47

## 2022-03-18 RX ADMIN — Medication 4000 UNITS: at 12:10

## 2022-03-18 RX ADMIN — DEXAMETHASONE SODIUM PHOSPHATE 4 MG: 4 INJECTION, SOLUTION INTRA-ARTICULAR; INTRALESIONAL; INTRAMUSCULAR; INTRAVENOUS; SOFT TISSUE at 02:28

## 2022-03-18 RX ADMIN — MIDAZOLAM HYDROCHLORIDE 0.5 MG: 1 INJECTION, SOLUTION INTRAMUSCULAR; INTRAVENOUS at 12:02

## 2022-03-18 RX ADMIN — MIDAZOLAM HYDROCHLORIDE 0.5 MG: 1 INJECTION, SOLUTION INTRAMUSCULAR; INTRAVENOUS at 12:42

## 2022-03-18 RX ADMIN — METOCLOPRAMIDE HYDROCHLORIDE 10 MG: 5 INJECTION INTRAMUSCULAR; INTRAVENOUS at 22:00

## 2022-03-18 RX ADMIN — SODIUM CHLORIDE 30 ML/HR: 3 INJECTION, SOLUTION INTRAVENOUS at 17:56

## 2022-03-18 RX ADMIN — HYDROMORPHONE HYDROCHLORIDE 0.5 MG: 1 INJECTION, SOLUTION INTRAMUSCULAR; INTRAVENOUS; SUBCUTANEOUS at 17:58

## 2022-03-18 RX ADMIN — HYDROMORPHONE HYDROCHLORIDE 0.5 MG: 1 INJECTION, SOLUTION INTRAMUSCULAR; INTRAVENOUS; SUBCUTANEOUS at 09:43

## 2022-03-18 RX ADMIN — METOCLOPRAMIDE HYDROCHLORIDE 10 MG: 5 INJECTION INTRAMUSCULAR; INTRAVENOUS at 16:19

## 2022-03-18 RX ADMIN — ONDANSETRON 4 MG: 2 INJECTION INTRAMUSCULAR; INTRAVENOUS at 06:05

## 2022-03-18 RX ADMIN — SODIUM CHLORIDE: 900 INJECTION, SOLUTION INTRAVENOUS at 11:48

## 2022-03-18 RX ADMIN — METOCLOPRAMIDE HYDROCHLORIDE 10 MG: 5 INJECTION INTRAMUSCULAR; INTRAVENOUS at 01:30

## 2022-03-18 RX ADMIN — MIDAZOLAM HYDROCHLORIDE 1.5 MG: 1 INJECTION, SOLUTION INTRAMUSCULAR; INTRAVENOUS at 11:57

## 2022-03-18 RX ADMIN — Medication 4000 UNITS: at 12:09

## 2022-03-18 RX ADMIN — DEXAMETHASONE SODIUM PHOSPHATE 4 MG: 4 INJECTION, SOLUTION INTRAMUSCULAR; INTRAVENOUS at 08:48

## 2022-03-18 RX ADMIN — GADOTERIDOL 5 ML: 279.3 INJECTION, SOLUTION INTRAVENOUS at 02:18

## 2022-03-18 RX ADMIN — FENTANYL CITRATE 25 MCG: 50 INJECTION, SOLUTION INTRAMUSCULAR; INTRAVENOUS at 12:50

## 2022-03-18 RX ADMIN — Medication 4000 UNITS: at 12:11

## 2022-03-18 RX ADMIN — MIDAZOLAM HYDROCHLORIDE 0.5 MG: 1 INJECTION, SOLUTION INTRAMUSCULAR; INTRAVENOUS at 12:50

## 2022-03-18 RX ADMIN — LIDOCAINE HYDROCHLORIDE 5 ML: 20 INJECTION, SOLUTION INFILTRATION; PERINEURAL at 12:59

## 2022-03-18 RX ADMIN — FENTANYL CITRATE 50 MCG: 50 INJECTION, SOLUTION INTRAMUSCULAR; INTRAVENOUS at 11:57

## 2022-03-18 RX ADMIN — SODIUM CHLORIDE, PRESERVATIVE FREE 10 ML: 5 INJECTION INTRAVENOUS at 13:58

## 2022-03-18 RX ADMIN — Medication 4000 UNITS: at 12:08

## 2022-03-18 RX ADMIN — HYDROMORPHONE HYDROCHLORIDE 0.5 MG: 1 INJECTION, SOLUTION INTRAMUSCULAR; INTRAVENOUS; SUBCUTANEOUS at 13:58

## 2022-03-18 RX ADMIN — ACETAMINOPHEN 650 MG: 325 TABLET ORAL at 21:36

## 2022-03-18 RX ADMIN — FAMOTIDINE 20 MG: 10 INJECTION INTRAVENOUS at 13:57

## 2022-03-18 RX ADMIN — SODIUM CHLORIDE, PRESERVATIVE FREE 10 ML: 5 INJECTION INTRAVENOUS at 02:18

## 2022-03-18 RX ADMIN — IOPAMIDOL 88 ML: 612 INJECTION, SOLUTION INTRAVENOUS at 12:53

## 2022-03-18 RX ADMIN — SODIUM CHLORIDE, PRESERVATIVE FREE 10 ML: 5 INJECTION INTRAVENOUS at 21:37

## 2022-03-18 NOTE — CONSULTS
[address]    Name:  Bonita Dong  MR#:  [id number]  :  1985  ACCOUNT #:  [de-identified]  DATE OF SERVICE:  2022    CHIEF COMPLAINT:  A 60-year-old woman, who presented to the emergency room at [facility] with severe headache, with a history of migraines or other headaches in the past; nausea; vomiting, presented with right cerebellar hemorrhage. 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 I had spoken with Dr. Mortimer Arrow, Interventional Radiology, who agrees that at some point the patient needs a formal arteriogram.  The patient's grandmother who acts as her surrogate mother tells me that she has had headache in the past, but no documented CT scan or other imaging. PAST MEDICAL HISTORY:  She has history of anxiety disorder, renal stones, gastroesophageal reflux disease, postpartum hemorrhage, ADHD. PAST SURGICAL HISTORY:  She has had a cholecystectomy, , appendectomy. She has a history of endometriosis, left knee surgery for cartilage repair, and oral surgery. SOCIAL HISTORY: She is a current everyday smoker and also vapes. Rare alcohol use. Admits to some marijuana use occasionally. ALLERGIES:  SHE IS ALLERGIC TO AMBIEN AND MORPHINE. REVIEW OF SYSTEMS:  At this point on exam, she does not complain of any nausea, photophobia, or diplopia. She has been medicated for those in the last several hours, and that seems to have helped. There are no other GI, , respiratory, cardiac, musculoskeletal, neurologic, psychiatric complaints. PHYSICAL EXAMINATION:  GENERAL:  On exam, she is completely awake and alert. Oriented to person, place, and time.   VITAL SIGNS:  Most recent vital signs, blood pressure 102/68, O2 sat 100%, pulse rate 81, respiratory rate 17. NEUROLOGIC:  She follows commands with all four extremities. Pupils are equal and reactive. Extraocular muscles are intact. Corneal responses equal and present bilaterally, both direct and consensual.  Face is symmetric. Palate rises equally. Tongue protrudes in midline. She was able to cooperate somewhat with finger-to-nose testing, but I did not press that, as she appears more comfortable in a lateral decubitus position. Toes are downgoing. No clonus at the ankles. Gait and station are deferred. IMPRESSION:  I reviewed the imaging studies, including a CT and CT angiogram.  I also spoke with the intensivist and spoke with Dr. Phil Vila in Interventional Radiology. Dr. Phil Vila and I agree that she would benefit from a formal arteriogram, but that it need not be done immediately, but it would be best to maintain her vital signs, let her \"cool off\" with regard to the hemorrhage. It does not appear to cause much mass effect. There is no evidence of hydrocephalous despite the Radiology report that the temporal horns were visible. This is a rather complicated malformation and, in my opinion, taking her to the operating room tonight could be more risky than not, without getting a more clear understanding of the anatomy of the arteriovenous malformation. Obviously if she decompensates and the hemorrhage worsens, then we may be forced to take her to the operating room. We will treat her with pain medication. She is responding well to some of the nausea medicine, and started her on some steroids as well, and we will follow closely with you. Thank you for asking me to see this patient.       Osiel Crawford MD      [id number]  D:  03/17/2022 21:53  T:  03/18/2022 1:09  JOB #:  [id number]  CC:  Fred Wynn MD

## 2022-03-18 NOTE — PROGRESS NOTES
I spoke to ER MD at [facility] when she was there  Just arrived to ICU at [facility]  I had ordered the CTA and indeed shows an AVM  Spoke with Dr Allyne Bumpers,  Neuro IR, and we both agree that hemorrhage no causing much mass effect, no hydrocephalus, and she is awake, alert, so may be best to treat with steroids, and not bring to OR without more clear pre-op visualization of the malformation  Will need formal angiogram. I will see pt shortly

## 2022-03-18 NOTE — H&P
[facility]    ICU TEAM Progress Note    Name: Lul Jaramillo   : 1985   MRN: [id number]   Date: 3/17/2022      JOSEPH Beckham is a 40year old female with pmhx of migraines who presents as a transfer from an OSH ED with a right cerebellar hemorrhage. She presented to OSH ED by EMS with sudden headache, dizziness, and nausea. She had just returned home from work and was taking with the sitter. She denies any precipitating events or trauma. Her headache is rated at 8/10 while at rest and 10/10 with activity. Fentanyl and hydromorphone were given at OSH ED with no improvement in symptoms. She was transferred to [facility] ICU for evaluation with neuro surgery and neuro interventional.    Reason for ICU Admission: right cerebellar hemorrhage    Subjective:   Overnight Events:   3/17/2022  N/A     Hospital Course  3/17 - transferred from OSH ED for right cerebellar hemorrhage, started on 3% saline    POD:  * No surgery found *    S/P:            ICU Assessment and Plan:     Right cerebellar hemorrhage  Nausea/vomiting         ICU Comprehensive Plan of Care:     1. Neurological System    Spontaneous Awakening Trial: N/A  Sedation: N/A  Analgesia: Fentanyl and Acetaminophen    2. Cardiovascular System    SBP Goal of: < 160 mmHg  MAP Goal of: > 65 mmHg  None - For above SBP/MAP goals  Transfusion Trigger (Hgb): <7 g/dL and <50K platelets  Keep K > 4; Mg > 2     3. Respiratory System  N/A  Spontaneous Breathing Trial: N/A  Pulmonary toilet: N/A   SpO2 Goal: > 92%  DVT Prophylaxis: SCD's or Sequential Compression Device     4. Renal/GI/Endocrine System  IVFs: 3% saline  Ulcer Prophylaxis: Not at this time   Bowel Regimen: MiraLax  Feeding:  No NPO  Blood Sugar Goal 120-180 - Glycemic Control: Insulin    5. Infectious Disease    Indwelling Catheter:  Tubes: None  Lines: Peripheral IV  Drains: None  D - De-escalation of Antibiotics:   None    6. PT/OT: None at this time     7.  Goals of Care Discussion with family Yes     8. Plan of Care/Code Status: Full Code    9. Discussed Care Plan with Bedside RN    10. Documentation of Current Medications      Active Problem List:     Problem List  Date Reviewed: 2020          Codes Class    DUB (dysfunctional uterine bleeding) ICD-10-CM: N93.8  ICD-9-CM: 626.8         Atonic postpartum hemorrhage ICD-10-CM: O72.1  ICD-9-CM: 666.14         History of postpartum depression ICD-10-CM: Z87.59, Z86.59  ICD-9-CM: V13.29, V11.8         ROM (rupture of membranes), premature ICD-10-CM: O42.90  ICD-9-CM: 658.10         Chronic pelvic pain in female ICD-10-CM: R10.2, G89.29  ICD-9-CM: 625.9, 338.29         Pregnancy ICD-10-CM: Z34.90  ICD-9-CM: V22.2         Breech presentation ICD-10-CM: O32. 1XX0  ICD-9-CM: 652.20         Threatened  labor ICD-10-CM: O47.00  ICD-9-CM: 644.00         Abdominal pain complicating pregnancy ENU-10-SR: O26.899, R10.9  ICD-9-CM: 646.80, 789.00         Appendicitis ICD-10-CM: K37  ICD-9-CM: 56               Past Medical History:      has a past medical history of Anxiety, Arthritis, Calculus of kidney, Dyspepsia and other specified disorders of function of stomach, GERD (gastroesophageal reflux disease), Kidney disease, Other ill-defined conditions(799.89), Postpartum hemorrhage (2017), and Psychiatric disorder. She has no past medical history of Abnormal Pap smear, Abnormal Papanicolaou smear of cervix, Acquired hypothyroidism, Anemia, Anemia NEC, Asthma, Chlamydia, Complication of anesthesia, Diabetes mellitus, Essential hypertension, Essential hypertension, Genital herpes, unspecified, Gestational diabetes, Gestational hypertension, Gonorrhea, Heart abnormalities, Heart abnormality, Herpes gestationis, Herpes simplex without mention of complication, Human immunodeficiency virus (HIV) disease ([facility] [address] OTHER MEDICAL, Infertility, Infertility, female, Phlebitis and thrombophlebitis of unspecified site, Pituitary disorder ([facility] [address] Polycystic disease, ovaries, Postpartum depression, Rhesus isoimmunization unspecified as to episode of care in pregnancy, Sickle cell trait syndrome ([facility] [address] Sickle-cell disease, unspecified, Syphilis, Systemic lupus erythematosus ([facility] [address] Thyroid activity decreased, Trauma, Unspecified breast disorder, Unspecified diseases of blood and blood-forming organs, or Unspecified epilepsy without mention of intractable epilepsy. Past Surgical History:      has a past surgical history that includes hx wisdom teeth extraction; pr anesth,knee area surgery; hx heent; pr abdomen surgery proc unlisted (10/30/12); hx cholecystectomy; hx appendectomy; hx orthopaedic; upper gi endoscopy,biopsy (2015); pr  delivery only; hx dilation and curettage; hx gyn; hx gyn; and hx gyn. Home Medications:     Prior to Admission medications    Medication Sig Start Date End Date Taking? Authorizing Provider   IBUPROFEN PO Take  by mouth. Provider, Historical       Allergies/Social/Family History: Allergies   Allergen Reactions    Ambien [Zolpidem] Other (comments)     \"Hallucinations and black outs\"    Morphine Rash      Social History     Tobacco Use    Smoking status: Current Every Day Smoker     Packs/day: 0.50     Years: 15.00     Pack years: 7.50    Smokeless tobacco: Never Used    Tobacco comment: about 6 cigarettes per day at present   Substance Use Topics    Alcohol use: Yes     Comment: RARELY      Family History   Problem Relation Age of Onset    Emphysema Mother     No Known Problems Father     No Known Problems Son     No Known Problems Son     Anesth Problems Neg Hx        Review of Systems:     Review of Systems   Eyes: Positive for photophobia. Gastrointestinal: Positive for nausea and vomiting. Neurological: Positive for dizziness and headaches. All other systems reviewed and are negative.         Objective:   Vital Signs:  Visit Vitals  /68   Pulse 81   Temp 98.1 °F (36.7 °C)   Resp 17   Wt 39.8 kg (87 lb 11.9 oz)   SpO2 100%   BMI 17.14 kg/m²      O2 Device: None (Room air) Temp (24hrs), Av.7 °F (36.5 °C), Min:96.9 °F (36.1 °C), Max:98.2 °F (36.8 °C)           Intake/Output:   No intake or output data in the 24 hours ending 22 410    Physical Exam:    Physical Exam  Vitals and nursing note reviewed. Constitutional:       General: She is not in acute distress. Appearance: She is underweight. Eyes:      Comments: nystagmus   Cardiovascular:      Rate and Rhythm: Normal rate and regular rhythm. Pulmonary:      Effort: Pulmonary effort is normal.      Breath sounds: Normal breath sounds. Abdominal:      General: Abdomen is flat. Bowel sounds are normal.      Palpations: Abdomen is soft. Tenderness: There is no abdominal tenderness. Musculoskeletal:         General: Normal range of motion. Skin:     General: Skin is warm and dry. Capillary Refill: Capillary refill takes less than 2 seconds. Neurological:      General: No focal deficit present. Mental Status: She is alert and oriented to person, place, and time. Psychiatric:         Mood and Affect: Mood normal.         Judgment: Judgment normal.           LABS AND  DATA: Personally reviewed  Recent Labs     22  1606   WBC 9.0   HGB 13.6   HCT 41.1        Recent Labs     22  1606      K 3.2*      CO2 27   BUN 18   CREA 0.70   *   CA 8.2*     Recent Labs     22  1606   AP 29*   TP 7.4   ALB 4.0   GLOB 3.4     Recent Labs     22  1658   INR 1.1   PTP 11.0   APTT 24.5      No results for input(s): PHI, PCO2I, PO2I, FIO2I in the last 72 hours. No results for input(s): CPK, CKMB, TROIQ, BNPP in the last 72 hours.     Imaging:  3/17/2022       Chest X-Ray:  CXR Results  (Last 48 hours)    None            ECHO:  N/A    Ventilator Settings:  Mode Rate Tidal Volume Pressure FiO2 PEEP                    Peak airway pressure:      Minute ventilation:          MEDS: Reviewed    Multidisciplinary Rounds Completed:  N/A    ABCDEF Bundle/Checklist Completed:  Yes    SPECIAL EQUIPMENT  None    DISPOSITION  Stay in ICU    CRITICAL CARE CONSULTANT NOTE  I had a face to face encounter with the patient, reviewed and interpreted patient data including clinical events, labs, images, vital signs, I/O's, and examined patient. I have discussed the case and the plan and management of the patient's care with the consulting services, the bedside nurses and the respiratory therapist.      NOTE OF PERSONAL INVOLVEMENT IN CARE   This patient has a high probability of imminent, clinically significant deterioration, which requires the highest level of preparedness to intervene urgently. I participated in the decision-making and personally managed or directed the management of the following life and organ supporting interventions that required my frequent assessment to treat or prevent imminent deterioration. I personally spent 60 minutes of critical care time. This is time spent at this critically ill patient's bedside actively involved in patient care as well as the coordination of care. This does not include any procedural time which has been billed separately.     Karley Yu AGACNP-BC  Nurse Practitioner  [facility]  3/17/2022

## 2022-03-18 NOTE — PROGRESS NOTES
Hospital follow-up PCP transitional care appointment has been scheduled with Dr. Gabriele Mccarty for Thursday, 3/24/22 at 8:30 a.m. Pending patient discharge. Matt Hoffman, Care Management Assistant.

## 2022-03-18 NOTE — BRIEF OP NOTE
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 COMPLICATIONS:  None    FOLLOW-UP:  Return to ICU  SBP <160  Q1 hr neurochecks DATE OF SERVICE:  3/18/2022 1:15 PM     ATTENDING SURGEON(S):  Royal Lazcano MD      ANESTHESIA:   MAC    EBL: 5 cc    MEDICATIONS:   See nursing record    PUNCTURE SITE:  Right common femoral artery. Arteriotomy closed with [employer]. Flat with leg straight x 2 hours. Nomi Coulter M.D.    Jeronimo Marie    , Department of Radiology  [facility]
stated

## 2022-03-18 NOTE — PROGRESS NOTES
C/o h/a.  gcs 15. Awakens benson well. Cn exam deferred. Right posterior circulation complex avm with cerebellar hemorrhage. No hydrocephalus at this point. Angio today. Cont npo with meds.

## 2022-03-18 NOTE — PROGRESS NOTES
1918-TRANSFER - IN REPORT:    Verbal report received from Farhana(name) on Donnell Sapp  being received from [id number] [facility] ED(unit) for routine progression of care      Report consisted of patients Situation, Background, Assessment and   Recommendations(SBAR). Information from the following report(s) SBAR, Kardex, ED Summary, Recent Results, Med Rec Status, Cardiac Rhythm SR, Alarm Parameters , Pre Procedure Checklist, Procedure Verification and Quality Measures was reviewed with the receiving nurse. Opportunity for questions and clarification was provided. Assessment completed upon patients arrival to unit and care assumed. 1920- Pt is alert and oriented, c/o H/A. Informed NP Marylene Prudent patient arrived. 1920-Primary Nurse Areli Mckee RN and Laly Infante RN performed a dual skin assessment on this patient No impairment noted  Srinivasa score is 23    1945- Spoke with [address], will come see patient. New orders to consult Neuro IR and give 10mg IV decadron now and then 4mg decadron IV Q4 hrs scheduled. 1945-Bedside and Verbal shift change report given to Vince (oncoming nurse) by Matteo Newton (offgoing nurse). Report included the following information SBAR, Kardex, ED Summary, Procedure Summary, Intake/Output, MAR, Accordion, Recent Results, Cardiac Rhythm SR, Alarm Parameters , Procedure Verification, Quality Measures and Dual Neuro Assessment.

## 2022-03-18 NOTE — PROGRESS NOTES
Reviewed chart for transitions of care, and discussed in rounds. CM met with patient at bedside to explain role and offer support. Patient is alert and oriented x4, and confirmed demographics. Baseline:   ADLs/IDALS:Independent  Previous Home Health:None  Previous SNF/IPR:None  ER Contact: Mother Ron Joshua [phone]    Patient lives in a single  level house with 6-7 steps to enter. Patient is independent with ADLs/IDALs   Patient has no previous HH or equipment needs. Patient's preferred pharmacy is [facility] located on [address]. Patient's family is expected to transport at discharge. Reason for Admission:  right cerebellar hemorrhage                     RUR Score:     10%                Plan for utilizing home health:          PCP: First and Last name:  None     Name of Practice:    Are you a current patient: Yes/No:    Approximate date of last visit:    Can you participate in a virtual visit with your PCP:                     Current Advanced Directive/Advance Care Plan: Full Code      Healthcare Decision Maker:   Click here to complete [employer] including selection of the Healthcare Decision Maker Relationship (ie \"Primary\")                             Transition of Care Plan:            Care manager met with patient to introduce self and explain role. Patient is independent. CM confirmed demographic information. Referred to CM specialist to make a PCP appointment for patient.    Edward Lund RN,Care Management  Care Management Interventions  MyChart Signup: No  Physical Therapy Consult: No  Occupational Therapy Consult: No  Speech Therapy Consult: No  Support Systems: Parent(s) (Mother Keli Alfred [phone])

## 2022-03-18 NOTE — PROGRESS NOTES
Bedside and Verbal shift change report given to [address] (oncoming nurse) by Gamal Robb (offgoing nurse). Report included the following information SBAR, Kardex, Intake/Output, Recent Results, Cardiac Rhythm SR and Quality Measures. Shift summary    Rec'd patient lying in bed alert and oriented, stated that the light make her head hurt. Room darken,  Eye mask applied. Neuro assessment done and documented. Medicated for H/A as ordered. Family at bedside. PICC line placed at bedside by team.       Resting at present    1130 Patient transported to angio via stretcher with transport team. Tolerated ride. Report given to [address]       [phone] patient returned from angio. Right groin site C/D/I . No bleeding or hematoma noted. + pulses noted throughout. SCD applied. Noted 3% NS infusing via PICC line  Site C/D/I      Voided on bedpan and documented    Neuro assessments completed as ordered no changes noted. C/O nausea medicated at ordered    Family at bedside     C/O H/A medicated as ordered. Resting at present. Right groin drsg C/D/I no bleeding/hematoma noted + pulses noted. Bedside and Verbal shift change report given to [facility]  (oncoming nurse) by [address]  (offgoing nurse). Report included the following information SBAR, Kardex, Procedure Summary, Intake/Output, Recent Results, Cardiac Rhythm SB, Alarm Parameters , Quality Measures and Dual Neuro Assessment.

## 2022-03-18 NOTE — ANESTHESIA PREPROCEDURE EVALUATION
Anesthetic History   No history of anesthetic complications            Review of Systems / Medical History  Patient summary reviewed, nursing notes reviewed and pertinent labs reviewed    Pulmonary  Within defined limits                 Neuro/Psych             Comments: Cerebellar AVM with cerebellar hemorrhage     Cerebral edema with brain compression on hypertonic saline Cardiovascular  Within defined limits                Exercise tolerance: >4 METS     GI/Hepatic/Renal     GERD           Endo/Other        Arthritis     Other Findings   Comments: Current workup for nic danlos due to hypermobile joints and increase skin elasticity          Physical Exam    Airway  Mallampati: I  TM Distance: > 6 cm  Neck ROM: normal range of motion   Mouth opening: Normal     Cardiovascular  Regular rate and rhythm,  S1 and S2 normal,  no murmur, click, rub, or gallop             Dental  No notable dental hx       Pulmonary  Breath sounds clear to auscultation               Abdominal  GI exam deferred       Other Findings            Anesthetic Plan    ASA: 4, emergent  Anesthesia type: general          Induction: Intravenous  Anesthetic plan and risks discussed with: Patient

## 2022-03-18 NOTE — CONSULTS
NEUROINTERVENTIONAL SURGERY CONSULT  Lidia Baum NP      Date Time: 03/17/22 9:15 PM  Patient [address] [facility]  Attending Physician: Celeste Plunkett DO    REASON FOR CONSULTATION:   Right Cerebellar Arteriovenous Malformation     History of Present Illness: The patient is 44-year-old female with past medical history of anxiety disorder, renal stones, gastroesophageal reflux disease, postpartum hemorrhage, and ADHD who was transported to ED [facility] emergency department on 03/17/22 with sudden onset vertiginous dizziness, photosensitivity, nausea vomiting, and severe headache on a scale of 10 out of 10. According to the patient, she was in her usual state of health until this afternoon she started endorsing severe vertiginous dizziness after returning home from work. Upon arrival to the [facility] ED, CT head without contrast obtained showed right cerebellar hemorrhage with surrounding edema with extension into the right cerebellar peduncle. There is compression of the fourth ventricle which is small and contains a small amount of hemorrhage. The temporal tips are slightly dilated may represent developing hydrocephalus. ED physician discussed results with Dr. Ayden Eden from [address] [place] [address] who recommended CTA H/N and transfer to [facility] ICU for higher level of care. CTA H/N was obtained and patient was then  transferred to [facility] ICU. CTA H/N showed tangle of dilated abnormal vessels in the right cerebellum, some aneurysmally dilated, likely between the right PICA and possibly the transverse sinus, suggesting a vascular malformation. [facility] was consulted for further evaluation which was discussed with Dr. Susan Fall. Dr. Ayden Eden discussed with Dr. Susan Fall for possible surgical intervention however, the plan was to hold off any procedure in the settings of acute bleed with complicated malformation.  Moreover, the patient is currently neurologically stable with no focal deficits except nystagmus and complaining of severe headache with vomiting thus will manage patient's symptoms and plan to start 3% saline. Past Medical History:     Past Medical History:   Diagnosis Date    Anxiety     Arthritis     Calculus of kidney     Dyspepsia and other specified disorders of function of stomach     GERD (gastroesophageal reflux disease)     Kidney disease     hx of kidney stone    Other ill-defined conditions(799.89)     kidney stone in pass,passed    Postpartum hemorrhage 2017    ALSO HAD HEMORRHAGE DURING MISCARRIAGE 2012    Psychiatric disorder     ADHD       Allergies   Allergen Reactions    Ambien [Zolpidem] Other (comments)     \"Hallucinations and black outs\"    Morphine Rash       Social & Family History:     Social History     Socioeconomic History    Marital status:      Spouse name: Not on file    Number of children: Not on file    Years of education: Not on file    Highest education level: Not on file   Occupational History    Not on file   Tobacco Use    Smoking status: Current Every Day Smoker     Packs/day: 0.50     Years: 15.00     Pack years: 7.50    Smokeless tobacco: Never Used    Tobacco comment: about 6 cigarettes per day at present   Substance and Sexual Activity    Alcohol use: Yes     Comment: RARELY    Drug use: No    Sexual activity: Yes     Partners: Male     Birth control/protection: None   Other Topics Concern    Not on file   Social History Narrative    Not on file     Social Determinants of Health     Financial Resource Strain:     Difficulty of Paying Living Expenses: Not on file   Food Insecurity:     Worried About Running Out of Food in the Last Year: Not on file    Bhupendra of Food in the Last Year: Not on file   Transportation Needs:     Lack of Transportation (Medical): Not on file    Lack of Transportation (Non-Medical):  Not on file   Physical Activity:     Days of Exercise per Week: Not on file    Minutes of Exercise per Session: Not on file   Stress:     Feeling of Stress : Not on file Social Connections:     Frequency of Communication with Friends and Family: Not on file    Frequency of Social Gatherings with Friends and Family: Not on file    Attends [employer] Services: Not on file    Active Member of Clubs or Organizations: Not on file    Attends Club or Organization Meetings: Not on file    Marital Status: Not on file   Intimate Partner Violence:     Fear of Current or Ex-Partner: Not on file    Emotionally Abused: Not on file    Physically Abused: Not on file    Sexually Abused: Not on file   Housing Stability:     Unable to Pay for Housing in the Last Year: Not on file    Number of Places Lived in the Last Year: Not on file    Unstable Housing in the Last Year: Not on file       Family History   Problem Relation Age of Onset    Emphysema Mother     No Known Problems Father     No Known Problems Son     No Known Problems Son     Anesth Problems Neg Hx      Meds:     Current Facility-Administered Medications   Medication Dose Route Frequency    [START ON 3/18/2022] dexamethasone (DECADRON) 4 mg/mL injection 4 mg  4 mg IntraVENous Q6H    sodium chloride (NS) flush 5-40 mL  5-40 mL IntraVENous Q8H    sodium chloride (NS) flush 5-40 mL  5-40 mL IntraVENous PRN    acetaminophen (TYLENOL) tablet 650 mg  650 mg Oral Q6H PRN    Or    acetaminophen (TYLENOL) suppository 650 mg  650 mg Rectal Q6H PRN    polyethylene glycol (MIRALAX) packet 17 g  17 g Oral DAILY PRN    ondansetron (ZOFRAN ODT) tablet 4 mg  4 mg Oral Q8H PRN    Or    ondansetron (ZOFRAN) injection 4 mg  4 mg IntraVENous Q6H PRN    metoclopramide HCl (REGLAN) injection 10 mg  10 mg IntraVENous Q6H PRN    [Held by provider] mannitol (OSMITROL) 20 % infusion 40 g  40 g IntraVENous ONCE    0.9% sodium chloride infusion  75 mL/hr IntraVENous CONTINUOUS     I personally reviewed all of the medications    Review of Systems:   Admits severe headache,  Nausea/vomiting, photosensitivity, and vertiginous dizziness. Patient denied any focal deficits, nose, throat problems; no coughing or wheezing or shortness of breath, No chest pain or orthopnea, no abdominal pain, No pain in the body or extremities, no psychiatric, neurological, endocrine, hematological or cardiac complaints except as noted above. Physical Exam:   Blood pressure 102/68, pulse 81, temperature 98.1 °F (36.7 °C), resp. rate 17, weight 39.8 kg (87 lb 11.9 oz), SpO2 100 %. GEN: NAD, Cooperative, Calm  HEENT: Normocephalic. Non-icter, no congestion  Lungs:  CTA bilaterally Ant  Cardiac: S1,S2, normal rate and rhythm, no carotid bruits, no gallops  Abdomen: Normal bowel sounds, no distention, non-tender  Extremities: no clubbing, cyanosis, or edema  Skin: no rashes or lesions noted      NEURO:  Mental status: A & O x 4. Able to follow commands appropriately  Cranial Nerves:  II-XII intact; EOMI with nystagmus and R dysconjugate, PERRL, No dysarthria or aphasia. Full facial strength, no asymmetry. Hearing intact bilaterally. Tongue protrudes to midline, palate elevates symmetrically. Shrug Shoulders B/L  Motor:  Normal bulk and tone, 5/5 strength x 4 extremities proximally and distally; No involuntary movements.   Coordination:  Intact FTN and HTS testing  Reflexes:  +2 throughout, down going toes bilaterally   Sensation: Intact x 4 extremities to Light Touch  Gait:  Deferred     Labs/images:     Lab Results   Component Value Date/Time    WBC 9.0 03/17/2022 04:06 PM    Hemoglobin (POC) 13.9 10/24/2018 11:40 AM    HGB 13.6 03/17/2022 04:06 PM    HCT 41.1 03/17/2022 04:06 PM    PLATELET 579 32/03/4886 04:06 PM    MCV 94.9 03/17/2022 04:06 PM      Lab Results   Component Value Date/Time    Sodium 140 03/17/2022 04:06 PM    Potassium 3.2 (L) 03/17/2022 04:06 PM    Chloride 107 03/17/2022 04:06 PM    CO2 27 03/17/2022 04:06 PM    Anion gap 6 03/17/2022 04:06 PM    Glucose 138 (H) 03/17/2022 04:06 PM    BUN 18 03/17/2022 04:06 PM    Creatinine 0.70 03/17/2022 04:06 PM    BUN/Creatinine ratio 26 (H) 03/17/2022 04:06 PM    GFR est AA >60 03/17/2022 04:06 PM    GFR est non-AA >60 03/17/2022 04:06 PM    Calcium 8.2 (L) 03/17/2022 04:06 PM    Bilirubin, total 0.6 03/17/2022 04:06 PM    Alk. phosphatase 29 (L) 03/17/2022 04:06 PM    Protein, total 7.4 03/17/2022 04:06 PM    Albumin 4.0 03/17/2022 04:06 PM    Globulin 3.4 03/17/2022 04:06 PM    A-G Ratio 1.2 03/17/2022 04:06 PM    ALT (SGPT) 19 03/17/2022 04:06 PM    AST (SGOT) 16 03/17/2022 04:06 PM       CT Results (most recent):  Results from [place] encounter on 03/17/22    CTA HEAD NECK W CONT    Narrative  *PRELIMINARY REPORT*    Exam: CTA head and neck. INDICATION: Right cerebellar hemorrhage. Preliminary findings: Tangle of dilated abnormal vessels in the right  cerebellum, some aneurysmally dilated, likely between the right PICA and  possibly the transverse sinus, suggesting a vascular malformation to be further  characterized. . Right cerebellar hemorrhage again noted roughly stable in size. Preliminary report was provided by Dr. Julia Valentino, the on-call radiologist, at  [place] 26 hours    Final report to follow. *END PRELIMINARY REPORT*     Chart reviewed    Assessment & Plan:   69-year-old female with past medical history of anxiety disorder, renal stones, gastroesophageal reflux disease, postpartum hemorrhage, and ADHD who was transferred from [address] [place] [address] emergency department on 03/17/22 to [facility] ICU with sudden onset severe headache associated with vertiginous dizziness, nausea vomiting, and photosensitivity found to have right cerebellar hemorrhage with surrounding edema and mass effect on the fourth ventricle which is compressed.     Right Cerebellar Hemorrhage: ICH score of 1  · As seen on [facility]  · Neurosurgery Dr. Leona Merlin evaluated patient with recommendations as outlined in the notes  · Will avoid unnecessary procedures that cause any stress or strain    · Patient started on steroids 4mg Decadron Q 6hr  · Started patient on 3% sodium chloride at 30ml/hr and titrate to keep N+ 145-155. Q2-3hr BMP  · Will keep mannitol 1g/kg on standby in the settings of emergency which has being prepared by pharmacist and readily available  · Pain management per intensivist without oversedating  · PRN antiemetics for n/v  · Q1hr neuro checks  · Pending repeat CTH  · PRN cardene gtt to keep SBP<140  · Neuro consult   · Avoid any AC or antiplatelets at this time       Right Cerebellar Arteriovenous Malformation  · As seen on CTA H/N  · CTA H/N preliminary read showing tangle of dilated abnormal vessels in the right cerebellum, some aneurysmally dilated, likely between the right PICA and possibly the transverse sinus, suggesting a vascular malformation. · Will avoid any surgical intervention in the acute settings  · Dr. Omero Howell aware of patient  · Patient will need diagnostic cerebral angiogram at some point  · Discussed case with patient and grandmother at the bedside  · Pending MRI/MRA brain/neck  · DVT ppx with SCD's  · Pending Echocardiogram    · Low threshold to repeat CT head if any worsening change in neurologic condition      Case discussed with: Dr. Kylee Lazo, Dr. Omero Howell, Dr. Edwin Huynh, intensivist, ICU RN, patient, and patient's grandmother at the bedside     >55% time spent in counseling or coordination of care of the above in the assessment and plan     Signed by: Ramandeep Pillai NP      Please note that this dictation was completed with [employer], the computer voice recognition software. Quite often unanticipated grammatical, syntax, homophones, and other interpretive errors are inadvertently transcribed by the computer software. Please disregard these errors. Please excuse any errors that have escaped final proofreading.

## 2022-03-18 NOTE — CONSULTS
[facility]    Patient: Sharlene Rivas MRN: [id number]  SSN: [id number]    YOB: 1985  Age: 40 y.o. Sex: female      Chief Complaint: Headache    Subjective:      Sharlene Rivas is a 40 y.o. F with a pmh of anxiety, GERD, ADHD and post partum hemorrhage who presented to ED [facility] ED on 3/17/22 via EMS reporting acute onset of severe headache, dizziness, nausea and vomiting. A CT head was obtained which showed a right cerebellar hemorrhage with surrounding edema with extension nto the right cerebellar peduncle, compression of the fourth ventricle, and the temporal tips were  slightly dilated may represent developing hydrocephalus. A CTA head/neck was then obtained which showed a right cerebellar arteriovenous malformation with nidus measuring approximately 3.5 x 2.3 x 2.1 cm as described in detail above, including predominant arterial supply from the right PICA with an associated right PICA aneurysm measuring 1.1 x 0.8 x 0.9 cm. NSGY and NIS were then consulted and she was transferred to [facility] for higher level of care. She reports that she is a former smoker, quit a few months ago but was previously 0.5 PPD smoker x 20 years. She reports she smokes marijuana but does not use any other street drugs. She denies any family hx of AVM or vascular/connective tissue disorders. She states she is in the process of getting worked up for [facility] due to hypermobile joints and elastic skin but has not been officially diagnosed.        Past Medical History:   Diagnosis Date    Anxiety     Arthritis     Calculus of kidney     Dyspepsia and other specified disorders of function of stomach     GERD (gastroesophageal reflux disease)     Kidney disease     hx of kidney stone    Other ill-defined conditions(799.89)     kidney stone in pass,passed    Postpartum hemorrhage 2017    ALSO HAD HEMORRHAGE DURING MISCARRIAGE 2012    Psychiatric disorder     ADHD Family History   Problem Relation Age of Onset    Emphysema Mother     No Known Problems Father     No Known Problems Son     No Known Problems Son     Anesth Problems Neg Hx      Social History     Tobacco Use    Smoking status: Current Every Day Smoker     Packs/day: 0.50     Years: 15.00     Pack years: 7.50    Smokeless tobacco: Never Used    Tobacco comment: about 6 cigarettes per day at present   Substance Use Topics    Alcohol use: Yes     Comment: RARELY      Prior to Admission Medications   Prescriptions Last Dose Informant Patient Reported? Taking? IBUPROFEN PO   Yes No   Sig: Take  by mouth. Facility-Administered Medications: None       Allergies   Allergen Reactions    Ambien [Zolpidem] Other (comments)     \"Hallucinations and black outs\"    Morphine Rash     Review of Systems:  A comprehensive review of systems was negative except for: headache, dizziness, nausea, blurred vision. Denies numbness, tingling, difficulty swallowing speaking or swallowing, or weakness. Objective:     Vitals:    03/18/22 0500 03/18/22 0600 03/18/22 0700 03/18/22 0800   BP: (!) 91/56 113/76 97/77 104/67   Pulse: 77 90 69 (!) 58   Resp: 25 18 16 14   Temp:    99.3 °F (37.4 °C)   SpO2: 97% 98% 96% 95%   Weight:          Physical Exam:  GENERAL: Calm, cooperative, NAD  SKIN: Warm, dry, color appropriate for ethnicity. Neurologic Exam:  Mental Status:  Alert and oriented x 4. Appropriate affect, mood and behavior. Language:    Normal fluency, repetition, comprehension and naming. Cranial Nerves:   Pupils 5 mm, equal, round and reactive to light. Visual fields full to confrontation. Extraocular movements with nystagmus when looking forward. During lateral eye movements to the right, right eye strabismus noted. Facial sensation intact. Full facial strength, no asymmetry. Hearing grossly intact bilaterally. No dysarthria.  Tongue protrudes to midline, palate elevates symmetrically. Shoulder shrug 5/5 bilaterally. Motor:    No pronator drift. Bulk and tone normal.      5/5 power in all extremities proximally and distally. No involuntary movements. Sensation:    Sensation intact throughout to light touch. Coordination & Gait: Gait deferred. FTN with mild ataxia ataxia present. Labs:  Lab Results   Component Value Date/Time    WBC 11.7 (H) 03/18/2022 02:41 AM    Hemoglobin (POC) 13.9 10/24/2018 11:40 AM    HGB 12.7 03/18/2022 02:41 AM    HCT 38.3 03/18/2022 02:41 AM    PLATELET 782 04/02/0677 02:41 AM    MCV 93.2 03/18/2022 02:41 AM      Lab Results   Component Value Date/Time    Sodium 141 03/18/2022 06:13 AM    Potassium 4.3 03/18/2022 06:13 AM    Chloride 112 (H) 03/18/2022 06:13 AM    CO2 24 03/18/2022 06:13 AM    Anion gap 5 03/18/2022 06:13 AM    Glucose 122 (H) 03/18/2022 06:13 AM    BUN 14 03/18/2022 06:13 AM    Creatinine 0.48 (L) 03/18/2022 06:13 AM    BUN/Creatinine ratio 29 (H) 03/18/2022 06:13 AM    GFR est AA >60 03/18/2022 06:13 AM    GFR est non-AA >60 03/18/2022 06:13 AM    Calcium 9.0 03/18/2022 06:13 AM       Imaging:  CT Results (maximum last 3): Results from [place] encounter on 03/17/22    CT HEAD WO CONT    Narrative  EXAM: CT HEAD WO CONT  History: Cerebellar hemorrhage  INDICATION: f/u cerebellar bleed    COMPARISON: 3/17/2022. CONTRAST: None. TECHNIQUE: Unenhanced CT of the head was performed using 5 mm images. Brain and  bone windows were generated. Coronal and sagittal reformats. CT dose reduction  was achieved through use of a standardized protocol tailored for this  examination and automatic exposure control for dose modulation. FINDINGS:  Right cerebellar hemorrhage is stable 2.6 x 1.7 cm, cerebellar hyperdensity  related to vascular malformation redemonstrated; stable. . Mild temporal horn  enlargement is stable. There is no significant white matter disease. . The  basilar cisterns are open.  No CT evidence of acute infarct. The bone windows demonstrate no abnormalities. The visualized portions of the  paranasal sinuses and mastoid air cells are clear. Impression  Right cerebellar hemorrhage is unchanged. Hyperdensity at the right cerebellopontine angle/inferior cerebellum is  associated with vascular anomaly as demonstrated on CTA. Results from [place] encounter on 03/17/22    CTA HEAD NECK W CONT    Narrative  *PRELIMINARY REPORT*    Exam: CTA head and neck. INDICATION: Right cerebellar hemorrhage. Preliminary findings: Tangle of dilated abnormal vessels in the right  cerebellum, some aneurysmally dilated, likely between the right PICA and  possibly the transverse sinus, suggesting a vascular malformation to be further  characterized. . Right cerebellar hemorrhage again noted roughly stable in size. Preliminary report was provided by Dr. Tata Mendoza, the on-call radiologist, at  [address] hours    Final report to follow. *END PRELIMINARY REPORT*    EXAM:  CTA HEAD NECK W CONT    INDICATION:   ICH    COMPARISON:  CT head 3/17/2022. CONTRAST:  100 mL of Isovue-370. TECHNIQUE:  Unenhanced  images were obtained to localize the volume for  acquisition. Multislice helical axial CT angiography was performed from the  aortic arch to the top of the head during uneventful rapid bolus intravenous  contrast administration. Coronal and sagittal reformations and 3D post  processing was performed. CT dose reduction was achieved through use of a  standardized protocol tailored for this examination and automatic exposure  control for dose modulation. FINDINGS:    CTA Head:  Right cerebellar arteriovenous malformation, with nidus measuring approximately  3.5 x 2.3 x 2.1 cm (series 3 image 361 and series 604 image 158).  There is  primary arterial supply via an enlarged right PICA, which contains a aneurysm  measuring 1.1 x 0.8 x 0.9 cm (series 3 image 361 and series 604 image 147).  There are additional small arterial feeding vessels that appear to arise from  the right vertebral artery V4 segment and from the right AICA. There are  multiple adjacent draining veins, one of the largest courses along the right  petrous ridge and drains into the right distal transverse sinus (series 3 image  380), a second largest which courses along the right posterior cerebellum and  drains into the torcular (series 3 image 382), and a third that drains into the  right superior petrosal sinus (series 3 image 271). Multiple other small  serpiginous veins throughout the posterior fossa, including superiorly within  the bilateral ambient cisterns and quadrigeminal cistern, right greater than  left. Stable size of acute right cerebellar intraparenchymal hemorrhage measuring 2.6  x 2.0 cm, with stable mild surrounding edema and mass effect with partial  effacement of the fourth ventricle. No hydrocephalus. There is no evidence of large vessel occlusion or flow-limiting stenosis of the  intracranial internal carotid, anterior cerebral, and middle cerebral arteries. The anterior communicating artery is patent. There is no evidence of large vessel occlusion or flow-limiting stenosis of the  intracranial vertebral arteries, basilar artery, or posterior cerebral arteries. The posterior communicating arteries are patent, right larger than left. The dural venous sinuses and deep cerebral venous system are patent. CTA NECK:  NASCET method was utilized for calculating stenosis. The aortic arch is unremarkable. The common carotid arteries demonstrate no  significant stenosis. There is no evidence of significant stenosis in the  cervical right internal carotid artery. There is no evidence of significant  stenosis in the cervical left internal carotid artery. There is a codominant vertebrobasilar arterial system.  The cervical vertebral  arteries are normal in course, size and contour without significant stenosis. Visualized soft tissues of the neck are unremarkable. Visualized lung apices are  clear. No acute fracture or aggressive osseous lesion. Impression  CTA Head:  1. Right cerebellar arteriovenous malformation with nidus measuring  approximately 3.5 x 2.3 x 2.1 cm as described in detail above, including  predominant arterial supply from the right PICA with an associated right PICA  aneurysm measuring 1.1 x 0.8 x 0.9 cm.  2. Stable acute right cerebellar intraparenchymal hemorrhage, with stable mild  surrounding edema and mass effect. No hydrocephalus. CTA Neck:  1. No evidence of significant stenosis. CT HEAD WO CONT    Narrative  EXAM: CT HEAD WO CONT    INDICATION: HA    COMPARISON: None. CONTRAST: None. TECHNIQUE: Unenhanced CT of the head was performed using 5 mm images. Brain and  bone windows were generated. Coronal and sagittal reformats. CT dose reduction  was achieved through use of a standardized protocol tailored for this  examination and automatic exposure control for dose modulation. FINDINGS:  There is a 2.3 x 2.6 x 1.8 cm right cerebellar hemorrhage with surrounding edema  and mass effect on the fourth ventricle which is compressed. The lateral  ventricles are normal in size however there is slight dilatation of the temporal  horns. There is is a small amount of hemorrhage in the fourth ventricle. There  is slight extension of the hemorrhage into the cerebellar peduncle. There is  paucity of CSF between the clivus and nicki with question of edema extending into  the nicki/medulla. The bone windows demonstrate no abnormalities. The visualized portions of the  paranasal sinuses and mastoid air cells are clear. Impression  1. There is a right cerebellar hemorrhage with surrounding edema with extension  into the right cerebellar peduncle. There is compression of the fourth ventricle  which is small and contains a small amount of hemorrhage.  The temporal tips are  slightly dilated may represent developing hydrocephalus. Results called to the  ER. 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 Shana Wood today   - Rapid COVID-19 swab ordered for pre procedure planning   - Neurology and Neurosurgery following    2.) Cerebral edema with brain compression   - Cont decadron   - Hypertonic saline gtt   - Management per Neurology and Neurosurgery     I have discussed the diagnosis and the intended plan as seen in the above orders with Dr. Shana Wood and Dr. Hima Caputo. Risk, benefits and alternatives of procedure were reviewed prior to this procedure by myself and Dr. Shana Wood with patient. The patient verbalized understanding and would like to proceed with procedure as planned. All questions were answered, and written informed consent has been obtained. Thank you for this consult and participating in the care of this patient.   Signed By: Jomar Atkins NP     March 18, 2022

## 2022-03-18 NOTE — PROGRESS NOTES
Transitions of Care  Patient presented to [facility] with N/V, dizziness and a Headache. CTA: AVM. Patient transferred to [facility] for Neuro Intervention. Patient is currently in [facility]. Care management will follow up with patient for initial assessment.    Seldon Boxer RN,Care Management

## 2022-03-18 NOTE — PROGRESS NOTES
Brief NIS Note    Angio findings reviewed and discussed with Dr. Radha Guillen. Embolization of flow related aneurysm(s) not performed since hemorrhage appears to be from AVM nidus. Acute management of AVM/hemorrhage per Neurosurgery. Will continue to follow.

## 2022-03-18 NOTE — PROGRESS NOTES
1930: Bedside shift change report given to [address] (oncoming nurse) by Alicia Davis RN (offgoing nurse). Report included the following information SBAR, ED Summary, Intake/Output, MAR, Recent Results, Cardiac Rhythm NSR, Alarm Parameters  and Dual Neuro Assessment    2000: NP Jb Frank and Jian Kim at bedside assessing patient. Patient has significant nausea and vomiting r/t light and head pain. Received orders for one time dose of IV fentanyl 100mcg and PRN IV reglan 10mg. 0100: patient traveled down to CT and MRI, tolerated fairly well until in MRI patient got up to use bathroom. Patient then became nauseas and had one small vomiting episode. PRN reglan given while down in MRI    0730: Bedside shift change report given to [address] (oncoming nurse) by Pedro Morrow RN (offgoing nurse). Report included the following information SBAR, Intake/Output, MAR, Recent Results, Cardiac Rhythm NSR, Alarm Parameters  and Dual Neuro Assessment. Q1 neuro checks. Patient alert and oriented x4, FARRIS equally with good strength. Pupils are equal and reactive, nystagmus is noted when patient looks towards right. Patient had 2 doses of PRN reglan which helped relieved headache and nausea. Patient remained NPO.  3% NS gtt started, currently infusing at 30ml/hr

## 2022-03-18 NOTE — PROGRESS NOTES
Neurocritical Care Brief Progress Note:    27-year-old female with past medical history of anxiety disorder, renal stones, gastroesophageal reflux disease, postpartum hemorrhage, and ADHD who was [facility] to [id number] [address] Tony sudden onset severe headache associated with vertiginous dizziness, nausea vomiting, and photosensitivity found to have right cerebellar hemorrhage with surrounding edema and mass effect on the fourth ventricle which is compressed. 03/18/22-Patient is s/p diagnostic cerebral angiogram by Dr. Renee Swartz. Preliminary report & disposition; SM grade 3 arteriovenous malformation supplied primarily by the right PICA-- but also with supply from the right SCA and right AICA-- with superficial and deep venous drainage and a 28 x 21 x 16 mm nidus. 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 Patient is doing well post procedure. R groin site mildly tender to palpation otherwise no bleeding/hematoma/bruises. C/D/I. Complained of 8/10 L posterior headache upon wakening up from nap. Reported improvement with n/v. No new neuro complaints. Added Fioricet 1 tab Q6hr prn. Physical Exam:  Gen: NAD, calm, cooperative  Neuro: Alert, oriented to self, place, situation, time. Follows commands. II-XII intact; EOMI with nystagmus and diplopia looking R, PERRL 3mm, No dysarthria or aphasia. Full facial strength, no asymmetry. Hearing intact bilaterally. Tongue protrudes to midline, palate elevates symmetrically. Shrug Shoulders B/L. Valentine spontaneously and purposefully 5/5 strength. Bulk and tone normal. No involuntary movements. Gait deferred. Skin: Warm, dry, color appropriate for ethnicity.  R groin site C/D/I      Plan  -Pending CTH in the AM  -Continue 3% saline and Q3hr bmp  -current N+ is 143  -NSGY and Neurology onboard  -SBP <160  -Q1 hr neurochecks       Continue current plan per NIS.         KRISHNA Higginbotham-NP  Neurocritical Care Nurse Practitioner  [phone]

## 2022-03-18 NOTE — CONSULTS
NEUROLOGY CONSULT  Harvey Dill NP      Date Time: 03/18/22 3:20 AM  Patient [facility] [facility]  Attending Physician: Naye Rosario DO    REASON FOR CONSULTATION:   Right Cerebellar Hemorrhage    History of Present Illness: The patient is 60-year-old female with past medical history of anxiety disorder, renal stones, gastroesophageal reflux disease, postpartum hemorrhage, and ADHD who was transported to [facility] emergency department on 03/17/22 with sudden onset of severe 10/10 headache, vertiginous dizziness, photosensitivity, and nausea/vomiting. According to the patient, she was in her usual state of health until yesturday afternoon she started endorsing severe vertiginous dizziness after returning home from work which prompted her to call EMS. Upon arrival to [facility] ED, [place] obtained showed right cerebellar hemorrhage with surrounding edema with extension into the right cerebellar peduncle. There is compression of the fourth ventricle which is small and contains a small amount of hemorrhage. The temporal tips are slightly dilated may represent developing hydrocephalus. ED physician discussed [place] results with Dr. Aurora Hilario from [address] [place] [address] who recommended obtaining CTA H/N and transfer to [facility] ICU for higher level of care. CTA H/N showed tangle of dilated abnormal vessels in the right cerebellum, some aneurysmally dilated, likely between the right PICA and possibly the transverse sinus, suggesting a vascular malformation. Upon arrival to [facility] ICU, [facility] was consulted for abnormal CTA H/N results with AVM which was discussed with Dr. Suhas Alarcon who reviewed the images. Dr. Aurora Hilario discussed with Dr. Suhas Alarcon for possible surgical intervention however, the plan was to hold off any procedure in the settings of acute bleed with complicated malformation.  Moreover, the patient is currently neurologically stable with no focal deficits except nystagmus and complaining of severe headache with vomiting thus will manage patient's symptoms and plan to start 3% saline. Neurology was also consulted for hemorrhagic stroke w/u and management. Past Medical History:     Past Medical History:   Diagnosis Date    Anxiety     Arthritis     Calculus of kidney     Dyspepsia and other specified disorders of function of stomach     GERD (gastroesophageal reflux disease)     Kidney disease     hx of kidney stone    Other ill-defined conditions(799.89)     kidney stone in pass,passed    Postpartum hemorrhage 2017    ALSO HAD HEMORRHAGE DURING MISCARRIAGE 2012    Psychiatric disorder     ADHD       Allergies   Allergen Reactions    Ambien [Zolpidem] Other (comments)     \"Hallucinations and black outs\"    Morphine Rash       Social & Family History:     Social History     Socioeconomic History    Marital status:      Spouse name: Not on file    Number of children: Not on file    Years of education: Not on file    Highest education level: Not on file   Occupational History    Not on file   Tobacco Use    Smoking status: Current Every Day Smoker     Packs/day: 0.50     Years: 15.00     Pack years: 7.50    Smokeless tobacco: Never Used    Tobacco comment: about 6 cigarettes per day at present   Substance and Sexual Activity    Alcohol use: Yes     Comment: RARELY    Drug use: No    Sexual activity: Yes     Partners: Male     Birth control/protection: None   Other Topics Concern    Not on file   Social History Narrative    Not on file     Social Determinants of Health     Financial Resource Strain:     Difficulty of Paying Living Expenses: Not on file   Food Insecurity:     Worried About Running Out of Food in the Last Year: Not on file    Bhupendra of Food in the Last Year: Not on file   Transportation Needs:     Lack of Transportation (Medical): Not on file    Lack of Transportation (Non-Medical):  Not on file   Physical Activity:     Days of Exercise per Week: Not on file    Minutes of Exercise per Session: Not on file Stress:     Feeling of Stress : Not on file   Social Connections:     Frequency of Communication with Friends and Family: Not on file    Frequency of Social Gatherings with Friends and Family: Not on file    Attends Restoration Services: Not on file    Active Member of Clubs or Organizations: Not on file    Attends Club or Organization Meetings: Not on file    Marital Status: Not on file   Intimate Partner Violence:     Fear of Current or Ex-Partner: Not on file    Emotionally Abused: Not on file    Physically Abused: Not on file    Sexually Abused: Not on file   Housing Stability:     Unable to Pay for Housing in the Last Year: Not on file    Number of Places Lived in the Last Year: Not on file    Unstable Housing in the Last Year: Not on file       Family History   Problem Relation Age of Onset    Emphysema Mother     No Known Problems Father     No Known Problems Son     No Known Problems Son     Anesth Problems Neg Hx        Meds:     Current Facility-Administered Medications   Medication Dose Route Frequency    HYDROmorphone (DILAUDID) injection 1 mg  1 mg IntraVENous ONCE    0.9% sodium chloride infusion 25 mL  25 mL IntraVENous PRN    polyethylene glycol (MIRALAX) packet 17 g  17 g Oral DAILY PRN    dexamethasone (DECADRON) 4 mg/mL injection 4 mg  4 mg IntraVENous Q6H    sodium chloride (NS) flush 5-40 mL  5-40 mL IntraVENous Q8H    sodium chloride (NS) flush 5-40 mL  5-40 mL IntraVENous PRN    acetaminophen (TYLENOL) tablet 650 mg  650 mg Oral Q6H PRN    Or    acetaminophen (TYLENOL) suppository 650 mg  650 mg Rectal Q6H PRN    ondansetron (ZOFRAN) injection 4 mg  4 mg IntraVENous Q6H PRN    metoclopramide HCl (REGLAN) injection 10 mg  10 mg IntraVENous Q6H PRN    [Held by provider] mannitol (OSMITROL) 20 % infusion 40 g  40 g IntraVENous ONCE    3% sodium chloride (*HIGH ALERT*CONCENTRATED IV*) infusion  30 mL/hr IntraVENous CONTINUOUS     I personally reviewed all of the medications    Review of Systems:   Admits severe headache,  Nausea/vomiting, photosensitivity, and vertiginous dizziness. Patient denied any focal deficits, nose, throat problems; no coughing or wheezing or shortness of breath, No chest pain or orthopnea, no abdominal pain, No pain in the body or extremities, no psychiatric, neurological, endocrine, hematological or cardiac complaints except as noted above. Physical Exam:   Blood pressure (!) 90/53, pulse 78, temperature 98.3 °F (36.8 °C), resp. rate 18, weight 39.8 kg (87 lb 11.9 oz), SpO2 98 %. GEN: in acute distress, Cooperative, Calm  HEENT: Normocephalic. Non-icter, no congestion  Lungs:  CTA bilaterally Ant  Cardiac: S1,S2, normal rate and rhythm, no carotid bruits, no gallops  Abdomen: Normal bowel sounds, no distention, non-tender  Extremities: no clubbing, cyanosis, or edema  Skin: no rashes or lesions noted        NEURO:  Mental status: A & O x 4. Able to follow commands appropriately  Cranial Nerves:  II-XII intact; EOMI with nystagmus and R dysconjugate, PERRL 3mm, No dysarthria or aphasia. Full facial strength, no asymmetry. Hearing intact bilaterally. Tongue protrudes to midline, palate elevates symmetrically. Shrug Shoulders B/L  Motor:  Normal bulk and tone, 5/5 strength x 4 extremities proximally and distally; No involuntary movements.   Coordination:  Intact FTN and HTS testing  Reflexes:  +2 throughout, down going toes bilaterally   Sensation: Intact x 4 extremities to Light Touch  Gait:  Deferred     Labs/images:     Lab Results   Component Value Date/Time    WBC 11.7 (H) 03/18/2022 02:41 AM    Hemoglobin (POC) 13.9 10/24/2018 11:40 AM    HGB 12.7 03/18/2022 02:41 AM    HCT 38.3 03/18/2022 02:41 AM    PLATELET 762 72/36/3476 02:41 AM    MCV 93.2 03/18/2022 02:41 AM      Lab Results   Component Value Date/Time    Sodium 140 03/18/2022 02:41 AM    Potassium 4.2 03/18/2022 02:41 AM    Chloride 110 (H) 03/18/2022 02:41 AM    CO2 23 03/18/2022 02:41 AM    Anion gap 7 03/18/2022 02:41 AM    Glucose 132 (H) 03/18/2022 02:41 AM    BUN 15 03/18/2022 02:41 AM    Creatinine 0.52 (L) 03/18/2022 02:41 AM    BUN/Creatinine ratio 29 (H) 03/18/2022 02:41 AM    GFR est AA >60 03/18/2022 02:41 AM    GFR est non-AA >60 03/18/2022 02:41 AM    Calcium 9.0 03/18/2022 02:41 AM    Bilirubin, total 0.6 03/17/2022 04:06 PM    Alk. phosphatase 29 (L) 03/17/2022 04:06 PM    Protein, total 7.4 03/17/2022 04:06 PM    Albumin 4.0 03/17/2022 04:06 PM    Globulin 3.4 03/17/2022 04:06 PM    A-G Ratio 1.2 03/17/2022 04:06 PM    ALT (SGPT) 19 03/17/2022 04:06 PM    AST (SGOT) 16 03/17/2022 04:06 PM       CT Results (most recent):  Results from [place] encounter on 03/17/22    CT HEAD WO CONT    Narrative  EXAM: CT HEAD WO CONT  History: Cerebellar hemorrhage  INDICATION: f/u cerebellar bleed    COMPARISON: 3/17/2022. CONTRAST: None. TECHNIQUE: Unenhanced CT of the head was performed using 5 mm images. Brain and  bone windows were generated. Coronal and sagittal reformats. CT dose reduction  was achieved through use of a standardized protocol tailored for this  examination and automatic exposure control for dose modulation. FINDINGS:  Right cerebellar hemorrhage is stable 2.6 x 1.7 cm, cerebellar hyperdensity  related to vascular malformation redemonstrated; stable. . Mild temporal horn  enlargement is stable. There is no significant white matter disease. . The  basilar cisterns are open. No CT evidence of acute infarct. The bone windows demonstrate no abnormalities. The visualized portions of the  paranasal sinuses and mastoid air cells are clear. Impression  Right cerebellar hemorrhage is unchanged. Hyperdensity at the right cerebellopontine angle/inferior cerebellum is  associated with vascular anomaly as demonstrated on CTA.      Chart reviewed    Assessment & Plan:   81-year-old female with past medical history of anxiety disorder, renal stones, gastroesophageal reflux disease, postpartum hemorrhage, and ADHD who was transferred from [facility] emergency department on 03/17/22 to [facility] ICU with sudden onset severe headache associated with vertiginous dizziness, nausea vomiting, and photosensitivity found to have right cerebellar hemorrhage with surrounding edema and mass effect on the fourth ventricle which is compressed.     Right Cerebellar Hemorrhage: ICH score of 1  · As seen on CTH  · Repeat CTH showed stable R cerebellar hemorrhage. Hyperdensity at the right cerebellopontine angle/inferior cerebellum is associated with vascular anomaly   · Pending MRI/MRA Brain/Neck  · Neurosurgery and Neuro-interventional surgery onboard    · Patient started on steroids 4mg Decadron Q 6hr per NSGY  · On 3% sodium chloride at 30ml/hr and titrate to keep N+ 145-155. Q2-3hr BMP  · Pain management per intensivist without oversedating  · PRN antiemetics for n/v  · Q1hr neuro checks  · PRN cardene gtt to keep SBP<140-150  · Hold off DVT prophylaxsis and any anticoagulant or antiplatlet medications at this time  · DVT ppx with SCD's  · Pending Echocardiogram    · Low threshold to repeat CT head if any worsening change in neurologic condition     Further neurology recommendation to follow by Dr. Alison Leonard     Case discussed with: Case discussed with: Dr. Betty Corbin, Dr. Salo Limon, Dr. Sergey Garcia, intensivist, ICU RN, patient, and patient's grandmother at the bedside     >55% time spent in counseling or coordination of care of the above in the assessment and plan     Signed by: Toña Gunter, CHRIS     Neurology staff:  I examined the patient the bedside. I discussed the case and agree with the plans and documentation above from CHRIS Haddad. Vita Rea is a new patient for me. She is 40years old who presented with thunderclap headache and vertigo. She was found to have a right cerebellar intracranial hemorrhage with intraventricular extension. AVM identified on imaging. Plans for angio.   At the moment she feels okay with mild headache worse when her eyes are open. On exam she is awake. Eyes closed. Moving in bed spontaneously to get comfortable. No acute distress. No abnormal movements. 72-year-old woman who has a right cerebellar intracranial hemorrhage possibly from the AVM identified. Currently stable on 3% hypertonic saline and Decadron. Plans for angiogram today. High risk to decline. Stat head CT for any acute change in exam.  I will follow along. 30 minutes of time was spent providing critical care services reviewing testing, time with the patient. 2 [facility],   Neurologist  Diplomate, American Board of Psychiatry and Neurology  Board Certified, Adult Neurology and Brain Injury Medicine        Please note that this dictation was completed with [employer], the computer voice recognition software. Quite often unanticipated grammatical, syntax, homophones, and other interpretive errors are inadvertently transcribed by the computer software. Please disregard these errors. Please excuse any errors that have escaped final proofreading.

## 2022-03-18 NOTE — PROGRESS NOTES
[facility]    ICU TEAM Progress Note    Name: Raphael Gonzalez   : 1985   MRN: [id number]   Date: 3/18/2022      [facility]  Harrison Boswell is a 40year old female with pmhx of migraines who presents as a transfer from an OSH ED with a right cerebellar hemorrhage. She presented to OSH ED by EMS with sudden headache, dizziness, and nausea. She had just returned home from work and was taking with the sitter. She denies any precipitating events or trauma. Her headache is rated at 8/10 while at rest and 10/10 with activity. Fentanyl and hydromorphone were given at OSH ED with no improvement in symptoms. She was transferred to [facility] ICU for evaluation with neuro surgery and neuro interventional.    Reason for ICU Admission: right cerebellar hemorrhage    Subjective:   Overnight Events:   3/18/2022  N/A     Hospital Course  3/17 - transferred from OSH ED for right cerebellar hemorrhage, started on 3% saline    Overnight Events  3/18 -- Started on 3%           ICU Assessment and Plan:     Right cerebellar hemorrhage  AVM - R PICA (with aneurysm)  Nausea/vomiting/photophobia         ICU Comprehensive Plan of Care:     1. Neurological System  Neuro/NIS/NSx following  Angio  3%  Spontaneous Awakening Trial: N/A  Sedation: N/A  Analgesia: Fentanyl and Acetaminophen    2. Cardiovascular System  SBP Goal of: < 160 mmHg  MAP Goal of: > 65 mmHg  None - For above SBP/MAP goals  Transfusion Trigger (Hgb): <7 g/dL and <50K platelets  Keep K > 4; Mg > 2     3. Respiratory System  N/A  Spontaneous Breathing Trial: N/A  Pulmonary toilet: N/A   SpO2 Goal: > 92%  DVT Prophylaxis: SCD's or Sequential Compression Device     4. Renal/GI/Endocrine System  IVFs: 3% saline  Ulcer Prophylaxis: Not at this time   Bowel Regimen: MiraLax  Feeding:  No NPO  Blood Sugar Goal 120-180 - Glycemic Control: Insulin    5.  Infectious Disease    Indwelling Catheter:  Tubes: None  Lines: Peripheral IV  Drains: None  D - De-escalation of Antibiotics:   None    6. PT/OT: None at this time     7. Goals of Care Discussion with family Yes     8. Plan of Care/Code Status: Full Code    9. Discussed Care Plan with Bedside RN    10. Documentation of Current Medications      Active Problem List:     Problem List  Date Reviewed: 2020          Codes Class    Cerebellar hemorrhage ([facility] ICD-10-CM: I61.4  ICD-9-CM: 048         DUB (dysfunctional uterine bleeding) ICD-10-CM: N93.8  ICD-9-CM: 626.8         Atonic postpartum hemorrhage ICD-10-CM: O72.1  ICD-9-CM: 666.14         History of postpartum depression ICD-10-CM: Z87.59, Z86.59  ICD-9-CM: V13.29, V11.8         ROM (rupture of membranes), premature ICD-10-CM: O42.90  ICD-9-CM: 658.10         Chronic pelvic pain in female ICD-10-CM: R10.2, G89.29  ICD-9-CM: 625.9, 338.29         Pregnancy ICD-10-CM: Z34.90  ICD-9-CM: V22.2         Breech presentation ICD-10-CM: O32. 1XX0  ICD-9-CM: 652.20         Threatened  labor ICD-10-CM: O47.00  ICD-9-CM: 644.00         Abdominal pain complicating pregnancy YIV-56-SM: O26.899, R10.9  ICD-9-CM: 646.80, 789.00         Appendicitis ICD-10-CM: K37  ICD-9-CM: 56               Past Medical History:      has a past medical history of Anxiety, Arthritis, Calculus of kidney, Dyspepsia and other specified disorders of function of stomach, GERD (gastroesophageal reflux disease), Kidney disease, Other ill-defined conditions(799.89), Postpartum hemorrhage (2017), and Psychiatric disorder.     She has no past medical history of Abnormal Pap smear, Abnormal Papanicolaou smear of cervix, Acquired hypothyroidism, Anemia, Anemia NEC, Asthma, Chlamydia, Complication of anesthesia, Diabetes mellitus, Essential hypertension, Essential hypertension, Genital herpes, unspecified, Gestational diabetes, Gestational hypertension, Gonorrhea, Heart abnormalities, Heart abnormality, Herpes gestationis, Herpes simplex without mention of complication, Human immunodeficiency virus (HIV) disease ([facility] OTHER MEDICAL, Infertility, Infertility, female, Phlebitis and thrombophlebitis of unspecified site, Pituitary disorder ([facility] Polycystic disease, ovaries, Postpartum depression, Rhesus isoimmunization unspecified as to episode of care in pregnancy, Sickle cell trait syndrome ([facility] Sickle-cell disease, unspecified, Syphilis, Systemic lupus erythematosus ([facility] Thyroid activity decreased, Trauma, Unspecified breast disorder, Unspecified diseases of blood and blood-forming organs, or Unspecified epilepsy without mention of intractable epilepsy. Past Surgical History:      has a past surgical history that includes hx wisdom teeth extraction; pr anesth,knee area surgery; hx heent; pr abdomen surgery proc unlisted (10/30/12); hx cholecystectomy; hx appendectomy; hx orthopaedic; upper gi endoscopy,biopsy (2015); pr  delivery only; hx dilation and curettage; hx gyn; hx gyn; and hx gyn. Home Medications:     Prior to Admission medications    Medication Sig Start Date End Date Taking? Authorizing Provider   IBUPROFEN PO Take  by mouth. Provider, Historical       Allergies/Social/Family History: Allergies   Allergen Reactions    Ambien [Zolpidem] Other (comments)     \"Hallucinations and black outs\"    Morphine Rash      Social History     Tobacco Use    Smoking status: Current Every Day Smoker     Packs/day: 0.50     Years: 15.00     Pack years: 7.50    Smokeless tobacco: Never Used    Tobacco comment: about 6 cigarettes per day at present   Substance Use Topics    Alcohol use: Yes     Comment: RARELY      Family History   Problem Relation Age of Onset    Emphysema Mother     No Known Problems Father     No Known Problems Son     No Known Problems Son     Anesth Problems Neg Hx        Review of Systems:     Review of Systems   Eyes: Positive for photophobia. Gastrointestinal: Positive for nausea and vomiting. Neurological: Positive for dizziness and headaches.    All other systems reviewed and are negative. Objective:   Vital Signs:  Visit Vitals  BP (!) 91/56   Pulse 77   Temp 98.6 °F (37 °C)   Resp 25   Wt 39.8 kg (87 lb 11.9 oz)   SpO2 97%   BMI 17.14 kg/m²      O2 Device: None (Room air) Temp (24hrs), Av.9 °F (36.6 °C), Min:96.9 °F (36.1 °C), Max:98.6 °F (37 °C)           Intake/Output:     Intake/Output Summary (Last 24 hours) at 3/18/2022 9923  Last data filed at 3/18/2022 0500  Gross per 24 hour   Intake 293.5 ml   Output --   Net 293.5 ml       Physical Exam:    Physical Exam  Vitals and nursing note reviewed. Constitutional:       General: She is not in acute distress. Appearance: She is underweight. Eyes:      Comments: nystagmus   Cardiovascular:      Rate and Rhythm: Normal rate and regular rhythm. Pulmonary:      Effort: Pulmonary effort is normal.      Breath sounds: Normal breath sounds. Abdominal:      General: Abdomen is flat. Bowel sounds are normal.      Palpations: Abdomen is soft. Tenderness: There is no abdominal tenderness. Musculoskeletal:         General: Normal range of motion. Skin:     General: Skin is warm and dry. Capillary Refill: Capillary refill takes less than 2 seconds. Neurological:      General: No focal deficit present. Mental Status: She is alert and oriented to person, place, and time.    Psychiatric:         Mood and Affect: Mood normal.         Judgment: Judgment normal.           LABS AND  DATA: Personally reviewed  Recent Labs     22  0241 22  1606   WBC 11.7* 9.0   HGB 12.7 13.6   HCT 38.3 41.1    278     Recent Labs     22  0241 22  2203    136   K 4.2 4.2   * 108   CO2 23 27   BUN 15 15   CREA 0.52* 0.56   * 131*   CA 9.0 8.9   MG 2.4  --    PHOS 3.3 2.9     Recent Labs     22  1606   AP 29*   TP 7.4   ALB 4.0   GLOB 3.4     Recent Labs     22  1658   INR 1.1   PTP 11.0   APTT 24.5      No results for input(s): PHI, PCO2I, PO2I, FIO2I in the last 72 hours. No results for input(s): CPK, CKMB, TROIQ, BNPP in the last 72 hours. Imaging:  3/18/2022       Chest X-Ray:  CXR Results  (Last 48 hours)    None            ECHO:  N/A    Ventilator Settings:  Mode Rate Tidal Volume Pressure FiO2 PEEP                    Peak airway pressure:      Minute ventilation:          MEDS: Reviewed    Multidisciplinary Rounds Completed:  N/A    ABCDEF Bundle/Checklist Completed:  Yes    SPECIAL EQUIPMENT  None    DISPOSITION  Stay in ICU    CRITICAL CARE CONSULTANT NOTE  I had a face to face encounter with the patient, reviewed and interpreted patient data including clinical events, labs, images, vital signs, I/O's, and examined patient. I have discussed the case and the plan and management of the patient's care with the consulting services, the bedside nurses and the respiratory therapist.      NOTE OF PERSONAL INVOLVEMENT IN CARE   This patient has a high probability of imminent, clinically significant deterioration, which requires the highest level of preparedness to intervene urgently. I participated in the decision-making and personally managed or directed the management of the following life and organ supporting interventions that required my frequent assessment to treat or prevent imminent deterioration. I personally spent 55 minutes of critical care time. This is time spent at this critically ill patient's bedside actively involved in patient care as well as the coordination of care. This does not include any procedural time which has been billed separately.     Elie Gage DO   Staff Intensivist/Anesthesiologist  Critical Care Medicine

## 2022-03-18 NOTE — ANESTHESIA POSTPROCEDURE EVALUATION
Post-Anesthesia Evaluation and Assessment    Patient: Verner Forest MRN: [id number]  SSN: [id number]    YOB: 1985  Age: 40 y.o. Sex: female      I have evaluated the patient and they are stable and ready for discharge from the PACU. Cardiovascular Function/Vital Signs  Visit Vitals  /81   Pulse 75   Temp 37.4 °C (99.3 °F)   Resp 20   Wt 39.8 kg (87 lb 11.9 oz)   SpO2 98%   BMI 17.14 kg/m²       Patient is status post * No anesthesia type entered * anesthesia for * No procedures listed *. Nausea/Vomiting: None    Postoperative hydration reviewed and adequate. Pain:  Pain Scale 1: Numeric (0 - 10) (03/18/22 1324)  Pain Intensity 1: 0 (03/18/22 1324)   Managed    Neurological Status:   Neuro  Neurologic State: Alert (03/18/22 1207)  Orientation Level: Oriented X4 (03/18/22 1207)  Cognition: Appropriate decision making; Appropriate for age attention/concentration; Appropriate safety awareness; Follows commands (03/18/22 1207)  Speech: Clear (03/18/22 1207)  Assessment L Pupil: Brisk;Round (03/18/22 1207)  Size L Pupil (mm): 3 (03/18/22 1207)  Assessment R Pupil: Brisk;Round (03/18/22 1207)  Size R Pupil (mm): 3 (03/18/22 1207)  LUE Motor Response: Spontaneous ; Purposeful (03/18/22 1207)  LLE Motor Response: Spontaneous ; Purposeful (03/18/22 1207)  RUE Motor Response: Purposeful;Spontaneous  (03/18/22 1207)  RLE Motor Response: Spontaneous ; Purposeful (03/18/22 1207)   At baseline    Mental Status, Level of Consciousness: Alert and  oriented to person, place, and time    Pulmonary Status:   O2 Device: None (Room air) (03/18/22 1324)   Adequate oxygenation and airway patent    Complications related to anesthesia: None    Post-anesthesia assessment completed. No concerns.      Signed By: Alondra Ernandez DO     March 18, 2022

## 2022-03-18 NOTE — PROGRESS NOTES
Patient moved to angio and moved to angio table without incidence. Anesthesia at bedside currently and will remain to manage patient airway, patient medications, monitor patient vital signs and patient status throughout case and until hand off to another qualified provider.

## 2022-03-18 NOTE — PROGRESS NOTES
PICC Placement Note    PRE-PROCEDURE VERIFICATION  Correct Procedure: yes  Correct Site:  yes  Temperature: Temp: 99.3 °F (37.4 °C), Temperature Source: Temp Source: Oral  Recent Labs     03/18/22  0613 03/18/22  0241 03/18/22  0241 03/17/22  2203 03/17/22  1658   BUN 14   < > 15   < >  --    CREA 0.48*   < > 0.52*   < >  --    PLT  --   --  241  --   --    INR  --   --   --   --  1.1   WBC  --   --  11.7*  --   --     < > = values in this interval not displayed. Allergies: Ambien [zolpidem] and Morphine  Education materials for PICC Care given: yes. See Patient Education activity for further details. PICC Booklet placed at bedside: yes    PROCEDURE DETAIL  Consent was obtained and all questions were answered related to risks and benefits. A triple lumen PICC line was inserted, as a sterile procedure using ultrasound and modified Seldinger technique for central access, 3% Saline. The following documentation is in addition to the PICC properties in the lines/airways flowsheet :  Lot #: [id number]  Lidocaine 1% administered intradermally :yes  Internal Catheter Total Length: 32 (cm)  Vein Selection for PICC:right basilic  Central Line Bundle followed yes  Complication Related to Insertion:no    The placement was verified by EKG, MAX P WAVE @ 32 (cm). PER EKG PICC TIP @ C/A junction.        Line is okay to use: yes    Gely Arthur RN

## 2022-03-18 NOTE — PROGRESS NOTES
Stroke Coordinator ICH Consult     1.) HPI: 40 y.o. female on OCP who presented to [facility] with severe sudden onset headache associated with photosensitivity, vertiginous dizziness, and nausea/vomiting. Patient denied any fall or focal deficits. Patient admit smoking marijuana and vaping. CTH showed R cerebellar bleed. nsgy was consulted and patient was transferred to [facility] for higher level of care. 2.) Medical hx  Active Problems:    * No active hospital problems. *      3.) Traumatic ICH? NO (see ICH score)                   3a. ) ICH Score:     GCS: 15      ICH Volume:       IVH: Yes      Location: right cerebellar hemorrhage       Age: 40      ICH SCORE: 1    Imaging: CT head: right cerebellar hemorrhage with surrounding edema and mass effect on the fourth ventricle which is compressed  CTA head and neck: Tangle of dilated abnormal vessels in the right cerebellum, some aneurysmally dilated, likely between the right PICA and possibly the transverse sinus, suggesting a vascular malformation     Plan: Neurology, NSGY, and NIS consult placed. Discussed with:  Dr. Jag Herron, Dr. Suhas Alarcon, intensivist, ICU RN, patient, and patient's grandmother at the bedside      Time spent: 35 minutes.      Harvey Dill NP  Neurocritical Care Nurse Practitioner  [phone]

## 2022-03-19 ENCOUNTER — APPOINTMENT (OUTPATIENT)
Dept: CT IMAGING | Age: 37
DRG: 044 | End: 2022-03-19
Attending: NURSE PRACTITIONER
Payer: MEDICAID

## 2022-03-19 PROBLEM — N93.8 DUB (DYSFUNCTIONAL UTERINE BLEEDING): Status: ACTIVE | Noted: 2018-10-24

## 2022-03-19 PROBLEM — O42.90 ROM (RUPTURE OF MEMBRANES), PREMATURE: Status: ACTIVE | Noted: 2017-10-06

## 2022-03-19 LAB
ANION GAP SERPL CALC-SCNC: 3 MMOL/L (ref 5–15)
ANION GAP SERPL CALC-SCNC: 3 MMOL/L (ref 5–15)
ANION GAP SERPL CALC-SCNC: 4 MMOL/L (ref 5–15)
BUN SERPL-MCNC: 18 MG/DL (ref 6–20)
BUN SERPL-MCNC: 20 MG/DL (ref 6–20)
BUN SERPL-MCNC: 21 MG/DL (ref 6–20)
BUN/CREAT SERPL: 38 (ref 12–20)
BUN/CREAT SERPL: 38 (ref 12–20)
BUN/CREAT SERPL: 44 (ref 12–20)
CALCIUM SERPL-MCNC: 8.5 MG/DL (ref 8.5–10.1)
CALCIUM SERPL-MCNC: 8.8 MG/DL (ref 8.5–10.1)
CALCIUM SERPL-MCNC: 8.8 MG/DL (ref 8.5–10.1)
CHLORIDE SERPL-SCNC: 114 MMOL/L (ref 97–108)
CHLORIDE SERPL-SCNC: 114 MMOL/L (ref 97–108)
CHLORIDE SERPL-SCNC: 116 MMOL/L (ref 97–108)
CHOLEST SERPL-MCNC: 119 MG/DL
CO2 SERPL-SCNC: 24 MMOL/L (ref 21–32)
CO2 SERPL-SCNC: 24 MMOL/L (ref 21–32)
CO2 SERPL-SCNC: 25 MMOL/L (ref 21–32)
CREAT SERPL-MCNC: 0.47 MG/DL (ref 0.55–1.02)
CREAT SERPL-MCNC: 0.48 MG/DL (ref 0.55–1.02)
CREAT SERPL-MCNC: 0.52 MG/DL (ref 0.55–1.02)
ERYTHROCYTE [DISTWIDTH] IN BLOOD BY AUTOMATED COUNT: 12.3 % (ref 11.5–14.5)
EST. AVERAGE GLUCOSE BLD GHB EST-MCNC: 100 MG/DL
GLUCOSE SERPL-MCNC: 110 MG/DL (ref 65–100)
GLUCOSE SERPL-MCNC: 111 MG/DL (ref 65–100)
GLUCOSE SERPL-MCNC: 118 MG/DL (ref 65–100)
HBA1C MFR BLD: 5.1 % (ref 4–5.6)
HCT VFR BLD AUTO: 33.8 % (ref 35–47)
HDLC SERPL-MCNC: 43 MG/DL
HDLC SERPL: 2.8 {RATIO} (ref 0–5)
HGB BLD-MCNC: 11.3 G/DL (ref 11.5–16)
LDLC SERPL CALC-MCNC: 63 MG/DL (ref 0–100)
MAGNESIUM SERPL-MCNC: 2.3 MG/DL (ref 1.6–2.4)
MCH RBC QN AUTO: 31.6 PG (ref 26–34)
MCHC RBC AUTO-ENTMCNC: 33.4 G/DL (ref 30–36.5)
MCV RBC AUTO: 94.4 FL (ref 80–99)
NRBC # BLD: 0 K/UL (ref 0–0.01)
NRBC BLD-RTO: 0 PER 100 WBC
PHOSPHATE SERPL-MCNC: 2.8 MG/DL (ref 2.6–4.7)
PLATELET # BLD AUTO: 198 K/UL (ref 150–400)
PMV BLD AUTO: 10.7 FL (ref 8.9–12.9)
POTASSIUM SERPL-SCNC: 4.1 MMOL/L (ref 3.5–5.1)
POTASSIUM SERPL-SCNC: 4.1 MMOL/L (ref 3.5–5.1)
POTASSIUM SERPL-SCNC: 4.3 MMOL/L (ref 3.5–5.1)
RBC # BLD AUTO: 3.58 M/UL (ref 3.8–5.2)
SODIUM SERPL-SCNC: 141 MMOL/L (ref 136–145)
SODIUM SERPL-SCNC: 142 MMOL/L (ref 136–145)
SODIUM SERPL-SCNC: 144 MMOL/L (ref 136–145)
TRIGL SERPL-MCNC: 65 MG/DL (ref ?–150)
VLDLC SERPL CALC-MCNC: 13 MG/DL
WBC # BLD AUTO: 13.6 K/UL (ref 3.6–11)

## 2022-03-19 PROCEDURE — 83036 HEMOGLOBIN GLYCOSYLATED A1C: CPT

## 2022-03-19 PROCEDURE — 99233 SBSQ HOSP IP/OBS HIGH 50: CPT | Performed by: RADIOLOGY

## 2022-03-19 PROCEDURE — 36415 COLL VENOUS BLD VENIPUNCTURE: CPT

## 2022-03-19 PROCEDURE — 74011250637 HC RX REV CODE- 250/637: Performed by: NURSE PRACTITIONER

## 2022-03-19 PROCEDURE — APPNB45 APP NON BILLABLE 31-45 MINUTES: Performed by: NURSE PRACTITIONER

## 2022-03-19 PROCEDURE — 80061 LIPID PANEL: CPT

## 2022-03-19 PROCEDURE — 77030040361 HC SLV COMPR DVT MDII -B

## 2022-03-19 PROCEDURE — 74011250636 HC RX REV CODE- 250/636: Performed by: NURSE PRACTITIONER

## 2022-03-19 PROCEDURE — 84100 ASSAY OF PHOSPHORUS: CPT

## 2022-03-19 PROCEDURE — 65610000006 HC RM INTENSIVE CARE

## 2022-03-19 PROCEDURE — 99233 SBSQ HOSP IP/OBS HIGH 50: CPT | Performed by: PSYCHIATRY & NEUROLOGY

## 2022-03-19 PROCEDURE — 80048 BASIC METABOLIC PNL TOTAL CA: CPT

## 2022-03-19 PROCEDURE — 74011000250 HC RX REV CODE- 250: Performed by: NURSE PRACTITIONER

## 2022-03-19 PROCEDURE — 85027 COMPLETE CBC AUTOMATED: CPT

## 2022-03-19 PROCEDURE — 74011000250 HC RX REV CODE- 250: Performed by: ANESTHESIOLOGY

## 2022-03-19 PROCEDURE — 74011250636 HC RX REV CODE- 250/636: Performed by: NEUROLOGICAL SURGERY

## 2022-03-19 PROCEDURE — 83735 ASSAY OF MAGNESIUM: CPT

## 2022-03-19 PROCEDURE — 70450 CT HEAD/BRAIN W/O DYE: CPT

## 2022-03-19 PROCEDURE — 74011250636 HC RX REV CODE- 250/636: Performed by: ANESTHESIOLOGY

## 2022-03-19 RX ORDER — 3% SODIUM CHLORIDE 3 G/100ML
40 INJECTION, SOLUTION INTRAVENOUS CONTINUOUS
Status: DISCONTINUED | OUTPATIENT
Start: 2022-03-19 | End: 2022-03-19

## 2022-03-19 RX ORDER — BUTALBITAL, ACETAMINOPHEN AND CAFFEINE 50; 325; 40 MG/1; MG/1; MG/1
1 TABLET ORAL
Status: DISCONTINUED | OUTPATIENT
Start: 2022-03-19 | End: 2022-03-25 | Stop reason: HOSPADM

## 2022-03-19 RX ORDER — SODIUM CHLORIDE AND POTASSIUM CHLORIDE .9; .15 G/100ML; G/100ML
SOLUTION INTRAVENOUS CONTINUOUS
Status: DISCONTINUED | OUTPATIENT
Start: 2022-03-19 | End: 2022-03-22

## 2022-03-19 RX ADMIN — METOCLOPRAMIDE HYDROCHLORIDE 10 MG: 5 INJECTION INTRAMUSCULAR; INTRAVENOUS at 23:08

## 2022-03-19 RX ADMIN — SODIUM CHLORIDE, PRESERVATIVE FREE 10 ML: 5 INJECTION INTRAVENOUS at 14:04

## 2022-03-19 RX ADMIN — DEXAMETHASONE SODIUM PHOSPHATE 4 MG: 4 INJECTION, SOLUTION INTRAMUSCULAR; INTRAVENOUS at 08:41

## 2022-03-19 RX ADMIN — FAMOTIDINE 20 MG: 10 INJECTION INTRAVENOUS at 21:12

## 2022-03-19 RX ADMIN — SODIUM CHLORIDE, PRESERVATIVE FREE 10 ML: 5 INJECTION INTRAVENOUS at 06:00

## 2022-03-19 RX ADMIN — SODIUM CHLORIDE, PRESERVATIVE FREE 10 ML: 5 INJECTION INTRAVENOUS at 21:12

## 2022-03-19 RX ADMIN — BUTALBITAL, ACETAMINOPHEN, AND CAFFEINE 1 TABLET: 50; 325; 40 TABLET ORAL at 00:58

## 2022-03-19 RX ADMIN — HYDROMORPHONE HYDROCHLORIDE 0.5 MG: 1 INJECTION, SOLUTION INTRAMUSCULAR; INTRAVENOUS; SUBCUTANEOUS at 19:11

## 2022-03-19 RX ADMIN — POTASSIUM CHLORIDE AND SODIUM CHLORIDE: 900; 150 INJECTION, SOLUTION INTRAVENOUS at 09:40

## 2022-03-19 RX ADMIN — FAMOTIDINE 20 MG: 10 INJECTION INTRAVENOUS at 08:41

## 2022-03-19 RX ADMIN — METOCLOPRAMIDE HYDROCHLORIDE 10 MG: 5 INJECTION INTRAMUSCULAR; INTRAVENOUS at 17:08

## 2022-03-19 RX ADMIN — DEXAMETHASONE SODIUM PHOSPHATE 4 MG: 4 INJECTION, SOLUTION INTRAMUSCULAR; INTRAVENOUS at 21:12

## 2022-03-19 RX ADMIN — POTASSIUM CHLORIDE AND SODIUM CHLORIDE: 900; 150 INJECTION, SOLUTION INTRAVENOUS at 18:31

## 2022-03-19 RX ADMIN — METOCLOPRAMIDE HYDROCHLORIDE 10 MG: 5 INJECTION INTRAMUSCULAR; INTRAVENOUS at 09:59

## 2022-03-19 RX ADMIN — BUTALBITAL, ACETAMINOPHEN, AND CAFFEINE 1 TABLET: 50; 325; 40 TABLET ORAL at 08:41

## 2022-03-19 RX ADMIN — METOCLOPRAMIDE HYDROCHLORIDE 10 MG: 5 INJECTION INTRAMUSCULAR; INTRAVENOUS at 03:57

## 2022-03-19 RX ADMIN — HYDROMORPHONE HYDROCHLORIDE 0.5 MG: 1 INJECTION, SOLUTION INTRAMUSCULAR; INTRAVENOUS; SUBCUTANEOUS at 23:00

## 2022-03-19 RX ADMIN — BUTALBITAL, ACETAMINOPHEN, AND CAFFEINE 1 TABLET: 50; 325; 40 TABLET ORAL at 18:30

## 2022-03-19 NOTE — PROGRESS NOTES
No overnight events. Stable neuro exam. Right groin puncture site C/D/I. Continue close monitoring and ICU care. No emergent NIS intervention. Plan for possible coil embolization of large flow related aneurysm and possible embolization of AVM as part of combined treatment approach in a non-urgent setting, once hemorrhage resolves. NIS signing off but immediately available as needed.

## 2022-03-19 NOTE — PROGRESS NOTES
Neurointerventional Surgery Progress Note  Amy Torres, AGACNP-BC  Neurocritical Care NP      Admit Date: 3/17/2022   LOS: 2 days        Daily Progress Note: 3/19/2022    POD: 1 s/p diagnostic cerebral angiogram on 3/18/2021    HPI: Jane Cohen is a 40 y.o. female with a PMH of anxiety disorder, renal stones, gastroesophageal reflux disease, postpartum hemorrhage, and ADHD who was transported to [facility] emergency department on 03/17/22 with sudden onset of severe 10/10 headache, vertiginous dizziness, photosensitivity, and nausea/vomiting. Upon arrival to [facility], CT of Head obtained showed a right cerebellar hemorrhage with surrounding edema with extension into the right cerebellar peduncle. There is compression of the fourth ventricle which is small and contains a small amount of hemorrhage. The temporal tips are slightly dilated may represent developing hydrocephalus. ED physician discussed CTH results with Dr. Nohemi Harrell from [address] [place] [address] ICU for higher level of care. CTA H/N showed tangle of dilated abnormal vessels in the right cerebellum, some aneurysmally dilated, likely between the right PICA and possibly the transverse sinus, suggesting a vascular malformation. Upon arrival to [facility] ICU, NIS was consulted for abnormal CTA H/N results with AVM which was discussed with Dr. Khadijah Wilson who reviewed the images. Dr. Nohemi Harrell discussed with Dr. Khadijah Wilson for possible surgical intervention however, the plan was to hold off any procedure in the settings of acute bleed with complicated malformation. Moreover, the patient is currently neurologically stable with no focal deficits except nystagmus and complaining of severe headache with vomiting thus will manage patient's symptoms. Neurology was also consulted for hemorrhagic stroke w/u and management. Subjective: The patient is ok this morning. She reports a 3/10 right-sided headache described as throbbing.  Fioricet is helping to manage her headache. She reports light makes her symptoms worse. She does complain of dizziness that is worse with movement. She has intermittent nausea/vomting. She denies any chest pain, SOB, weakness, numbness, tingling, speech difficulty, or difficulty swallowing. She endorses diplopia in her right eye. 3% saline was discontinued this morning by NSGY.     Current Facility-Administered Medications   Medication Dose Route Frequency Provider Last Rate Last Admin    0.9% sodium chloride with KCl 20 mEq/L infusion   IntraVENous CONTINUOUS Kasie Galloway  mL/hr at 03/19/22 0940 New Bag at 03/19/22 0940    0.9% sodium chloride infusion 25 mL  25 mL IntraVENous PRN Willard Flank, NP        polyethylene glycol (MIRALAX) packet 17 g  17 g Oral DAILY PRN Blanca Flank, NP        dexamethasone (DECADRON) 4 mg/mL injection 4 mg  4 mg IntraVENous Q12H Kasie Galloway MD   4 mg at 03/19/22 0841    HYDROmorphone (DILAUDID) injection 0.5 mg  0.5 mg IntraVENous Q3H PRN Kasie Galloway MD   0.5 mg at 03/18/22 1758    famotidine (PF) (PEPCID) 20 mg in 0.9% sodium chloride 10 mL injection  20 mg IntraVENous Q12H Leonardo Parker DO   20 mg at 03/19/22 0841    butalbital-acetaminophen-caffeine (FIORICET, ESGIC) -40 mg per tablet 1 Tablet  1 Tablet Oral Q6H PRN Matilde Zapata NP   1 Tablet at 03/19/22 0841    sodium chloride (NS) flush 5-40 mL  5-40 mL IntraVENous Q8H Newham, Marylene Prudent, NP   10 mL at 03/19/22 0600    sodium chloride (NS) flush 5-40 mL  5-40 mL IntraVENous PRN Willard Flank, NP        acetaminophen (TYLENOL) tablet 650 mg  650 mg Oral Q6H PRN Blanca Flank, NP   650 mg at 03/18/22 2136    Or    acetaminophen (TYLENOL) suppository 650 mg  650 mg Rectal Q6H PRN Willard Flank, NP        ondansetron [facility]) injection 4 mg  4 mg IntraVENous Q6H PRN Willard Flank, NP   4 mg at 03/18/22 1727    metoclopramide HCl (REGLAN) injection 10 mg  10 mg IntraVENous Q6H PRN Matilde Zapata, NP   10 mg at 22 5300        Allergies   Allergen Reactions    Ambien [Zolpidem] Other (comments)     \"Hallucinations and black outs\"    Morphine Rash       Review of Systems:  Pertinent items are noted in the History of Present Illness. Objective:     Vital signs  Temp (24hrs), Av °F (36.7 °C), Min:97.7 °F (36.5 °C), Max:98.8 °F (37.1 °C)   701 - 1900  In: 407.3 [P.O.:200; I.V.:207.3]  Out: -   1901 -  07  In: 1496.5 [P.O.:210; I.V.:1286.5]  Out: 725 [Urine:725]    Visit Vitals  /81   Pulse (!) 52   Temp 98 °F (36.7 °C)   Resp 14   Wt 87 lb 11.9 oz (39.8 kg)   SpO2 97%   BMI 17.14 kg/m²      O2 Device: None (Room air)     Pain control  Pain Assessment  Pain Scale 1: Numeric (0 - 10)  Pain Intensity 1: 6  Pain Onset 1: acute  Pain Location 1: Head  Pain Orientation 1: Anterior  Pain Description 1: Aching  Pain Intervention(s) 1: Medication (see MAR)    PT/OT  Gait                   Vitals:    22 0900 22 1000 22 1100 22 1200   BP: 116/85 126/81 129/78 126/81   Pulse: (!) 54 (!) 56 (!) 56 (!) 52   Resp: 13 15 15 14   Temp:       SpO2:       Weight:            Physical Exam:  GENERAL: alert, cooperative, no distress  EYE: conjunctivae/corneas clear. PERRL, EOM's intact. LUNG: clear to auscultation bilaterally  HEART: regular rate and rhythm, sinus federico on the monitor, S1, S2 normal, no murmur, click, rub or gallop  EXTREMITIES:  extremities normal, atraumatic, no cyanosis or edema, distal pulses 2+ bilaterally. SKIN: Skin appropriate for ethnicity. Warm to touch. Right groin site clean, dry, and intact. No hematoma, bleeding, or bruising noted. Neurologic Exam:  Mental Status:  Alert and oriented x 4. Appropriate affect, mood and behavior. Language:    Normal fluency, repetition, comprehension and naming. Cranial Nerves:        Pupils equal, round and reactive to light. Visual fields full to confrontation.      Extraocular movements intact. Bilateral horizontal nystagmus present (R >L). Facial sensation intact. Full facial strength, no asymmetry. No dysarthria. Tongue protrudes to midline, palate elevates symmetrically. Motor:    No pronator drift. Bulk and tone normal.      5/5 power in all extremities proximally and distally. No involuntary movements. Sensation:    Sensation intact throughout to light touch, temperature. No neglect. Coordination & Gait: No ataxia with FTN and HTS bilaterally. Gait deferred.        24 hour results:    Recent Results (from the past 24 hour(s))   METABOLIC PANEL, BASIC    Collection Time: 03/18/22  2:10 PM   Result Value Ref Range    Sodium 143 136 - 145 mmol/L    Potassium 4.1 3.5 - 5.1 mmol/L    Chloride 116 (H) 97 - 108 mmol/L    CO2 22 21 - 32 mmol/L    Anion gap 5 5 - 15 mmol/L    Glucose 125 (H) 65 - 100 mg/dL    BUN 15 6 - 20 MG/DL    Creatinine 0.54 (L) 0.55 - 1.02 MG/DL    BUN/Creatinine ratio 28 (H) 12 - 20      GFR est AA >60 >60 ml/min/1.73m2    GFR est non-AA >60 >60 ml/min/1.73m2    Calcium 8.7 8.5 - 25.2 MG/DL   METABOLIC PANEL, BASIC    Collection Time: 03/18/22  6:06 PM   Result Value Ref Range    Sodium 143 136 - 145 mmol/L    Potassium 3.7 3.5 - 5.1 mmol/L    Chloride 116 (H) 97 - 108 mmol/L    CO2 23 21 - 32 mmol/L    Anion gap 4 (L) 5 - 15 mmol/L    Glucose 121 (H) 65 - 100 mg/dL    BUN 16 6 - 20 MG/DL    Creatinine 0.51 (L) 0.55 - 1.02 MG/DL    BUN/Creatinine ratio 31 (H) 12 - 20      GFR est AA >60 >60 ml/min/1.73m2    GFR est non-AA >60 >60 ml/min/1.73m2    Calcium 8.7 8.5 - 10.1 MG/DL   MAGNESIUM    Collection Time: 03/18/22  6:06 PM   Result Value Ref Range    Magnesium 2.3 1.6 - 2.4 mg/dL   METABOLIC PANEL, BASIC    Collection Time: 03/18/22  9:50 PM   Result Value Ref Range    Sodium 142 136 - 145 mmol/L    Potassium 3.9 3.5 - 5.1 mmol/L    Chloride 117 (H) 97 - 108 mmol/L    CO2 22 21 - 32 mmol/L    Anion gap 3 (L) 5 - 15 mmol/L Glucose 102 (H) 65 - 100 mg/dL    BUN 15 6 - 20 MG/DL    Creatinine 0.43 (L) 0.55 - 1.02 MG/DL    BUN/Creatinine ratio 35 (H) 12 - 20      GFR est AA >60 >60 ml/min/1.73m2    GFR est non-AA >60 >60 ml/min/1.73m2    Calcium 8.6 8.5 - 63.6 MG/DL   METABOLIC PANEL, BASIC    Collection Time: 03/19/22  1:00 AM   Result Value Ref Range    Sodium 142 136 - 145 mmol/L    Potassium 4.1 3.5 - 5.1 mmol/L    Chloride 114 (H) 97 - 108 mmol/L    CO2 25 21 - 32 mmol/L    Anion gap 3 (L) 5 - 15 mmol/L    Glucose 111 (H) 65 - 100 mg/dL    BUN 18 6 - 20 MG/DL    Creatinine 0.47 (L) 0.55 - 1.02 MG/DL    BUN/Creatinine ratio 38 (H) 12 - 20      GFR est AA >60 >60 ml/min/1.73m2    GFR est non-AA >60 >60 ml/min/1.73m2    Calcium 8.8 8.5 - 10.1 MG/DL   CBC W/O DIFF    Collection Time: 03/19/22  4:18 AM   Result Value Ref Range    WBC 13.6 (H) 3.6 - 11.0 K/uL    RBC 3.58 (L) 3.80 - 5.20 M/uL    HGB 11.3 (L) 11.5 - 16.0 g/dL    HCT 33.8 (L) 35.0 - 47.0 %    MCV 94.4 80.0 - 99.0 FL    MCH 31.6 26.0 - 34.0 PG    MCHC 33.4 30.0 - 36.5 g/dL    RDW 12.3 11.5 - 14.5 %    PLATELET 862 101 - 589 K/uL    MPV 10.7 8.9 - 12.9 FL    NRBC 0.0 0  WBC    ABSOLUTE NRBC 0.00 0.00 - 0.01 K/uL   MAGNESIUM    Collection Time: 03/19/22  4:18 AM   Result Value Ref Range    Magnesium 2.3 1.6 - 2.4 mg/dL   PHOSPHORUS    Collection Time: 03/19/22  4:18 AM   Result Value Ref Range    Phosphorus 2.8 2.6 - 4.7 MG/DL   METABOLIC PANEL, BASIC    Collection Time: 03/19/22  4:18 AM   Result Value Ref Range    Sodium 141 136 - 145 mmol/L    Potassium 4.1 3.5 - 5.1 mmol/L    Chloride 114 (H) 97 - 108 mmol/L    CO2 24 21 - 32 mmol/L    Anion gap 3 (L) 5 - 15 mmol/L    Glucose 118 (H) 65 - 100 mg/dL    BUN 20 6 - 20 MG/DL    Creatinine 0.52 (L) 0.55 - 1.02 MG/DL    BUN/Creatinine ratio 38 (H) 12 - 20      GFR est AA >60 >60 ml/min/1.73m2    GFR est non-AA >60 >60 ml/min/1.73m2    Calcium 8.8 8.5 - 10.1 MG/DL   HEMOGLOBIN A1C WITH EAG    Collection Time: 03/19/22  4:18 AM   Result Value Ref Range    Hemoglobin A1c 5.1 4.0 - 5.6 %    Est. average glucose 100 mg/dL   LIPID PANEL    Collection Time: 03/19/22  4:18 AM   Result Value Ref Range    Cholesterol, total 119 <200 MG/DL    Triglyceride 65 <150 MG/DL    HDL Cholesterol 43 MG/DL    LDL, calculated 63 0 - 100 MG/DL    VLDL, calculated 13 MG/DL    CHOL/HDL Ratio 2.8 0.0 - 5.0     METABOLIC PANEL, BASIC    Collection Time: 03/19/22  7:03 AM   Result Value Ref Range    Sodium 144 136 - 145 mmol/L    Potassium 4.3 3.5 - 5.1 mmol/L    Chloride 116 (H) 97 - 108 mmol/L    CO2 24 21 - 32 mmol/L    Anion gap 4 (L) 5 - 15 mmol/L    Glucose 110 (H) 65 - 100 mg/dL    BUN 21 (H) 6 - 20 MG/DL    Creatinine 0.48 (L) 0.55 - 1.02 MG/DL    BUN/Creatinine ratio 44 (H) 12 - 20      GFR est AA >60 >60 ml/min/1.73m2    GFR est non-AA >60 >60 ml/min/1.73m2    Calcium 8.5 8.5 - 10.1 MG/DL          Imaging:  CT Results  (Last 48 hours)               03/19/22 0417  CT HEAD WO CONT Final result    Impression:  Right cerebellar hemorrhage is unchanged. Hyperdensity at the right cerebellopontine angle/inferior cerebellum is   associated with vascular anomaly as demonstrated on MRI and CTA. Narrative:  EXAM: CT HEAD WO CONT   Somatostatin hemorrhage   INDICATION: reassess hemorrhage       COMPARISON: 3/18/2022. CONTRAST: None. TECHNIQUE: Unenhanced CT of the head was performed using 5 mm images. Brain and   bone windows were generated. Coronal and sagittal reformats. CT dose reduction   was achieved through use of a standardized protocol tailored for this   examination and automatic exposure control for dose modulation. FINDINGS:   Right cerebellar hemorrhage is stable 2.6 x 1.7 cm, cerebellar hyperdensity   related to vascular malformation redemonstrated; stable. . Mild temporal horn   enlargement is stable. There is no significant white matter disease. . The   basilar cisterns are open.  No CT evidence of acute infarct. The bone windows demonstrate no abnormalities. The visualized portions of the   paranasal sinuses and mastoid air cells are clear.           03/18/22 0054  CT HEAD WO CONT Final result    Impression:  Right cerebellar hemorrhage is unchanged. Hyperdensity at the right cerebellopontine angle/inferior cerebellum is   associated with vascular anomaly as demonstrated on CTA. Narrative:  EXAM: CT HEAD WO CONT   History: Cerebellar hemorrhage   INDICATION: f/u cerebellar bleed       COMPARISON: 3/17/2022. CONTRAST: None. TECHNIQUE: Unenhanced CT of the head was performed using 5 mm images. Brain and   bone windows were generated. Coronal and sagittal reformats. CT dose reduction   was achieved through use of a standardized protocol tailored for this   examination and automatic exposure control for dose modulation. FINDINGS:   Right cerebellar hemorrhage is stable 2.6 x 1.7 cm, cerebellar hyperdensity   related to vascular malformation redemonstrated; stable. . Mild temporal horn   enlargement is stable. There is no significant white matter disease. . The   basilar cisterns are open. No CT evidence of acute infarct. The bone windows demonstrate no abnormalities. The visualized portions of the   paranasal sinuses and mastoid air cells are clear.           03/17/22 1637  CT HEAD WO CONT Final result    Impression:      1. There is a right cerebellar hemorrhage with surrounding edema with extension   into the right cerebellar peduncle. There is compression of the fourth ventricle   which is small and contains a small amount of hemorrhage. The temporal tips are   slightly dilated may represent developing hydrocephalus. Results called to the   ER. Narrative:  EXAM: CT HEAD WO CONT       INDICATION: HA       COMPARISON: None. CONTRAST: None. TECHNIQUE: Unenhanced CT of the head was performed using 5 mm images.  Brain and   bone windows were generated. Coronal and sagittal reformats. CT dose reduction   was achieved through use of a standardized protocol tailored for this   examination and automatic exposure control for dose modulation. FINDINGS:   There is a 2.3 x 2.6 x 1.8 cm right cerebellar hemorrhage with surrounding edema   and mass effect on the fourth ventricle which is compressed. The lateral   ventricles are normal in size however there is slight dilatation of the temporal   horns. There is is a small amount of hemorrhage in the fourth ventricle. There   is slight extension of the hemorrhage into the cerebellar peduncle. There is   paucity of CSF between the clivus and nicki with question of edema extending into   the nicki/medulla. The bone windows demonstrate no abnormalities. The visualized portions of the   paranasal sinuses and mastoid air cells are clear.           03/17/22 1802  CTA HEAD NECK W CONT Final result    Impression:  CTA Head:   1. Right cerebellar arteriovenous malformation with nidus measuring   approximately 3.5 x 2.3 x 2.1 cm as described in detail above, including   predominant arterial supply from the right PICA with an associated right PICA   aneurysm measuring 1.1 x 0.8 x 0.9 cm.    2. Stable acute right cerebellar intraparenchymal hemorrhage, with stable mild   surrounding edema and mass effect. No hydrocephalus. CTA Neck:   1. No evidence of significant stenosis. Narrative:  PRELIMINARY REPORT       Exam: CTA head and neck. INDICATION: Right cerebellar hemorrhage. Preliminary findings: Tangle of dilated abnormal vessels in the right   cerebellum, some aneurysmally dilated, likely between the right PICA and   possibly the transverse sinus, suggesting a vascular malformation to be further   characterized. . Right cerebellar hemorrhage again noted roughly stable in size.        Preliminary report was provided by Dr. Alise Paez, the on-call radiologist, at   [address] 26 hours Final report to follow. END PRELIMINARY REPORT       EXAM:  CTA HEAD NECK W CONT       INDICATION:   ICH       COMPARISON:  CT head 3/17/2022. CONTRAST:  100 mL of Isovue-370. TECHNIQUE:  Unenhanced  images were obtained to localize the volume for   acquisition. Multislice helical axial CT angiography was performed from the   aortic arch to the top of the head during uneventful rapid bolus intravenous   contrast administration. Coronal and sagittal reformations and 3D post   processing was performed. CT dose reduction was achieved through use of a   standardized protocol tailored for this examination and automatic exposure   control for dose modulation. FINDINGS:       CTA Head:   Right cerebellar arteriovenous malformation, with nidus measuring approximately   3.5 x 2.3 x 2.1 cm (series 3 image 361 and series 604 image 158). There is   primary arterial supply via an enlarged right PICA, which contains a aneurysm   measuring 1.1 x 0.8 x 0.9 cm (series 3 image 361 and series 604 image 147). There are additional small arterial feeding vessels that appear to arise from   the right vertebral artery V4 segment and from the right AICA. There are   multiple adjacent draining veins, one of the largest courses along the right   petrous ridge and drains into the right distal transverse sinus (series 3 image   380), a second largest which courses along the right posterior cerebellum and   drains into the torcular (series 3 image 382), and a third that drains into the   right superior petrosal sinus (series 3 image 271). Multiple other small   serpiginous veins throughout the posterior fossa, including superiorly within   the bilateral ambient cisterns and quadrigeminal cistern, right greater than   left.        Stable size of acute right cerebellar intraparenchymal hemorrhage measuring 2.6   x 2.0 cm, with stable mild surrounding edema and mass effect with partial   effacement of the fourth ventricle. No hydrocephalus. There is no evidence of large vessel occlusion or flow-limiting stenosis of the   intracranial internal carotid, anterior cerebral, and middle cerebral arteries. The anterior communicating artery is patent. There is no evidence of large vessel occlusion or flow-limiting stenosis of the   intracranial vertebral arteries, basilar artery, or posterior cerebral arteries. The posterior communicating arteries are patent, right larger than left. The dural venous sinuses and deep cerebral venous system are patent. CTA NECK:   NASCET method was utilized for calculating stenosis. The aortic arch is unremarkable. The common carotid arteries demonstrate no   significant stenosis. There is no evidence of significant stenosis in the   cervical right internal carotid artery. There is no evidence of significant   stenosis in the cervical left internal carotid artery. There is a codominant vertebrobasilar arterial system. The cervical vertebral   arteries are normal in course, size and contour without significant stenosis. Visualized soft tissues of the neck are unremarkable. Visualized lung apices are   clear. No acute fracture or aggressive osseous lesion. MRA of Brain and Neck and MRI of Brain on 3/18/2022 at 0218 shows  IMPRESSION  MRI brain:  1. Stable right cerebellar intraparenchymal hemorrhage with stable surrounding  mild edema and mass effect with partial effacement of the fourth ventricle. No  cerebellar tonsillar herniation. No hydrocephalus. Adjacent right cerebellar  arteriovenous malformation; see below.     MRA head:  1. Right cerebellar arteriovenous malformation as described in detail above with  nidus measuring approximately 3.3 x 2.0 cm, with predominant arterial supply  from the right PICA, and dominant draining veins draining into the distal right  transverse sinus and torcular.  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     MRA NECK:  1. No evidence of significant stenosis. Assessment:     Active Problems:    Cerebellar hemorrhage ([address] (3/18/2022)        Plan:   Right Cerebellar Hemorrhage with associated right cerebellar AVM and right PICA aneurysm, ICH score:1  - as seen on brain imaging  - repeat Head CT stable this AM  - patient is s/p diagnostic cerebral angiogram on 3/18/2022 which showed a SM grade 3 arteriovenous malformation supplied primarily by the right PICA-- but also with supply from the right SCA and right AICA-- with superficial and deep venous drainage and a 28 x 21 x 16 mm nidus. 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 - 3% discontinued by NSGY  - continue hourly neuro checks  - continue Decadron 4 mg every 12 hours  - SBP goal <140  - no emergent endovascular intervention at this time  - Plan for possible coil embolization of large flow related aneurysm and possible embolization of AVM as part of combined treatment approach in a non-urgent setting, once hemorrhage resolves. NIS signing off but immediately available as needed. - NSGY and Neurology following       Plan d/w Dr. Basilio Hollingsworth, RN, patient, and patient's grandmother at bedside.       Yoshi Ortiz NP

## 2022-03-19 NOTE — PROGRESS NOTES
Bedside and Verbal shift change report given to [address] (oncoming nurse) by Nolberto Masters (offgoing nurse). Report included the following information SBAR, Kardex, Intake/Output, Recent Results, Cardiac Rhythm SR and Quality Measures. Shift summary  Resting in bed throughout this shift. Neuro assessment done and documented. Medicated for H/A as ordered. Some relief at times. Family at bedside side throughout the day. Voiding on Bedpan. Light spotting noted form menes. Reposition self in bed. Neuro assessment unchanged throughout shift. Continue with H/A on and off. Poor appetite noted. No vomiting this shift, continue to have photophobia/dizzieness and nausea. Medicated as ordered. Normal saline with 20 meq KCL infusing via R PICC @ 100 cc/hr. Site C/D/I       Bedside and Verbal shift change report given to Nolberto Masters (oncoming nurse) by [address] (offgoing nurse). Report included the following information SBAR, Kardex, Intake/Output, Recent Results, Cardiac Rhythm SB/SR, Alarm Parameters  and Quality Measures.

## 2022-03-19 NOTE — PROGRESS NOTES
Pt clinically stable. \photophobia and h/a. Nystagmus. Ct without increasing hcp or new bleed. I reviewed angio with Dr. Mark Connors. 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 Multiple flow related aneurysms but hemorrhage appears to be from nidus. No urgent surgery at this point. Let ich cool off. Surgical resection has extremely high risk off neurologic deficit. Will need to consider multii-disciplinary approach with possible radiosurgery.

## 2022-03-19 NOTE — PROGRESS NOTES
Neurocritical Care Progress Note  Deanna Perry NP    Admit Date: 3/17/2022   LOS: 2 days      Daily Progress Note: 3/19/2022    S/P: Diagnostic Cerebral Angiogram with R Arteriotomy closure device by Dr. Arthuro Duane on 03/18/22    HPI: Rita Mariee is a 40 y.o. F with a pmh of anxiety disorder, renal stones, gastroesophageal reflux disease, postpartum hemorrhage, and ADHD who was transported to [facility] emergency department on 03/17/22 with sudden onset of severe 10/10 headache, vertiginous dizziness, photosensitivity, and nausea/vomiting. Upon arrival to [facility], [facility] obtained showed right cerebellar hemorrhage with surrounding edema with extension into the right cerebellar peduncle. There is compression of the fourth ventricle which is small and contains a small amount of hemorrhage. The temporal tips are slightly dilated may represent developing hydrocephalus. ED physician discussed [facility] results with Dr. Satya Lao from [facility] who recommended obtaining CTA H/N and transfer to [facility] ICU for higher level of care. CTA H/N showed tangle of dilated abnormal vessels in the right cerebellum, some aneurysmally dilated, likely between the right PICA and possibly the transverse sinus, suggesting a vascular malformation. Upon arrival to [facility] ICU, NIS was consulted for abnormal CTA H/N results with AVM which was discussed with Dr. Layne Cano who reviewed the images. Dr. Satya Lao discussed with Dr. Layne Cano for possible surgical intervention however, the plan was to hold off any procedure in the settings of acute bleed with complicated malformation. Moreover, the patient is currently neurologically stable with no focal deficits except nystagmus and complaining of severe headache with vomiting thus will manage patient's symptoms and plan to start 3% saline. Neurology was also consulted for hemorrhagic stroke w/u and management. Subjective:   No acute events noted overnight.  Patient denied any new neurological symptoms except for presenting sx of diplopia looking R, 8/10 L posterior headache. Patient stated her N/V seems to improved. Pending CTH in the AM. Continue with 3% saline 30ml/hr. R Groin site intact. mild tender to palpation otherwise no bleeding/hematoma/bruises. C/D/I    Allergies   Allergen Reactions    Ambien [Zolpidem] Other (comments)     \"Hallucinations and black outs\"    Morphine Rash       Past Medical History:   Diagnosis Date    Anxiety     Arthritis     Calculus of kidney     Dyspepsia and other specified disorders of function of stomach     GERD (gastroesophageal reflux disease)     Kidney disease     hx of kidney stone    Other ill-defined conditions(799.89)     kidney stone in pass,passed    Postpartum hemorrhage 2017    ALSO HAD HEMORRHAGE DURING MISCARRIAGE 2012    Psychiatric disorder     ADHD     Family History   Problem Relation Age of Onset    Emphysema Mother     No Known Problems Father     No Known Problems Son     No Known Problems Son     Anesth Problems Neg Hx      Social History     Tobacco Use    Smoking status: Current Every Day Smoker     Packs/day: 0.50     Years: 15.00     Pack years: 7.50    Smokeless tobacco: Never Used    Tobacco comment: about 6 cigarettes per day at present   Substance Use Topics    Alcohol use: Yes     Comment: RARELY      Prior to Admission Medications   Prescriptions Last Dose Informant Patient Reported? Taking? IBUPROFEN PO   Yes No   Sig: Take  by mouth. Facility-Administered Medications: None         Objective:   Vital signs  Temp (24hrs), Av.6 °F (37 °C), Min:97.8 °F (36.6 °C), Max:99.3 °F (37.4 °C)   1901 -  0700  In: -   Out: 425 [Urine:425]   07 - 1900  In: 1093.5 [P.O.:210;  I.V.:883.5]  Out: 300 [Urine:300]  Visit Vitals  /69   Pulse 69   Temp 97.8 °F (36.6 °C)   Resp 21   Wt 39.8 kg (87 lb 11.9 oz)   SpO2 97%   BMI 17.14 kg/m²      O2 Device: None (Room air)   Vitals:    22 1700 22 1730 22 1800 03/18/22 1900   BP: (!) 95/53 (!) 129/58 102/67 109/69   Pulse: 64 85 (!) 55 69   Resp: 15 (!) 39 8 21   Temp:       SpO2: 98% 98% 99% 97%   Weight:            Meds:     Current Facility-Administered Medications   Medication Dose Route Frequency    3% sodium chloride (*HIGH ALERT*CONCENTRATED IV*) infusion  30 mL/hr IntraVENous CONTINUOUS    0.9% sodium chloride infusion 25 mL  25 mL IntraVENous PRN    polyethylene glycol (MIRALAX) packet 17 g  17 g Oral DAILY PRN    dexamethasone (DECADRON) 4 mg/mL injection 4 mg  4 mg IntraVENous Q12H    HYDROmorphone (DILAUDID) injection 0.5 mg  0.5 mg IntraVENous Q3H PRN    famotidine (PF) (PEPCID) 20 mg in 0.9% sodium chloride 10 mL injection  20 mg IntraVENous Q12H    butalbital-acetaminophen-caffeine (FIORICET, ESGIC) -40 mg per tablet 1 Tablet  1 Tablet Oral Q6H PRN    sodium chloride (NS) flush 5-40 mL  5-40 mL IntraVENous Q8H    sodium chloride (NS) flush 5-40 mL  5-40 mL IntraVENous PRN    acetaminophen (TYLENOL) tablet 650 mg  650 mg Oral Q6H PRN    Or    acetaminophen (TYLENOL) suppository 650 mg  650 mg Rectal Q6H PRN    ondansetron (ZOFRAN) injection 4 mg  4 mg IntraVENous Q6H PRN    metoclopramide HCl (REGLAN) injection 10 mg  10 mg IntraVENous Q6H PRN     I personally reviewed all of the medications    Review of Systems:   Admits diplopia looking R, 8/10 L posterior headache upon wakening. N/V improved. Denied visual, ear, nose, throat problems; no coughing or wheezing or shortness of breath, No chest pain or orthopnea, no abdominal pain, nausea or vomiting, No pain in the body or extremities, no psychiatric, neurological, endocrine, hematological or cardiac complaints except as noted above. Physical Exam:   Blood pressure 109/69, pulse 69, temperature 97.8 °F (36.6 °C), resp. rate 21, weight 39.8 kg (87 lb 11.9 oz), SpO2 97 %. GEN: in acute distress, Cooperative, Calm  HEENT: Normocephalic.  Non-icter, no congestion  Lungs:  CTA bilaterally Ant  Cardiac: S1,S2, normal rate and rhythm, no carotid bruits, no gallops  Abdomen: Normal bowel sounds, no distention, non-tender  Extremities: no clubbing, cyanosis, or edema  Skin: no rashes or lesions noted; R Groin site intact. mild tender to palpation otherwise no bleeding/hematoma/bruises. C/D/I         NEURO:  Mental status: A & O x 4.  Able to follow commands appropriately  Cranial Nerves:  II-XII intact; EOMI with nystagmus and diplopia looking R, PERRL 3mm, No dysarthria or aphasia. Full facial strength, no asymmetry. Hearing intact bilaterally. Tongue protrudes to midline, palate elevates symmetrically. Shrug Shoulders B/L  Motor:  Normal bulk and tone, 5/5 strength x 4 extremities proximally and distally; No involuntary movements. Coordination:  Intact FTN and HTS testing  Reflexes:  +2 throughout, down going toes bilaterally   Sensation: Intact x 4 extremities to Light Touch  Gait:  Deferred     Labs/images:     Lab Results   Component Value Date/Time    WBC 11.7 (H) 03/18/2022 02:41 AM    Hemoglobin (POC) 13.9 10/24/2018 11:40 AM    HGB 12.7 03/18/2022 02:41 AM    HCT 38.3 03/18/2022 02:41 AM    PLATELET 789 34/26/7749 02:41 AM    MCV 93.2 03/18/2022 02:41 AM      Lab Results   Component Value Date/Time    Sodium 142 03/18/2022 09:50 PM    Potassium 3.9 03/18/2022 09:50 PM    Chloride 117 (H) 03/18/2022 09:50 PM    CO2 22 03/18/2022 09:50 PM    Anion gap 3 (L) 03/18/2022 09:50 PM    Glucose 102 (H) 03/18/2022 09:50 PM    BUN 15 03/18/2022 09:50 PM    Creatinine 0.43 (L) 03/18/2022 09:50 PM    BUN/Creatinine ratio 35 (H) 03/18/2022 09:50 PM    GFR est AA >60 03/18/2022 09:50 PM    GFR est non-AA >60 03/18/2022 09:50 PM    Calcium 8.6 03/18/2022 09:50 PM    Bilirubin, total 0.6 03/17/2022 04:06 PM    Alk.  phosphatase 29 (L) 03/17/2022 04:06 PM    Protein, total 7.4 03/17/2022 04:06 PM    Albumin 4.0 03/17/2022 04:06 PM    Globulin 3.4 03/17/2022 04:06 PM    A-G Ratio 1.2 03/17/2022 04:06 PM    ALT (SGPT) 19 03/17/2022 04:06 PM    AST (SGOT) 16 03/17/2022 04:06 PM     CT Results (most recent):  Results from [place] encounter on 03/17/22    CT HEAD WO CONT    Narrative  EXAM: CT HEAD WO CONT  History: Cerebellar hemorrhage  INDICATION: f/u cerebellar bleed    COMPARISON: 3/17/2022. CONTRAST: None. TECHNIQUE: Unenhanced CT of the head was performed using 5 mm images. Brain and  bone windows were generated. Coronal and sagittal reformats. CT dose reduction  was achieved through use of a standardized protocol tailored for this  examination and automatic exposure control for dose modulation. FINDINGS:  Right cerebellar hemorrhage is stable 2.6 x 1.7 cm, cerebellar hyperdensity  related to vascular malformation redemonstrated; stable. . Mild temporal horn  enlargement is stable. There is no significant white matter disease. . The  basilar cisterns are open. No CT evidence of acute infarct. The bone windows demonstrate no abnormalities. The visualized portions of the  paranasal sinuses and mastoid air cells are clear. Impression  Right cerebellar hemorrhage is unchanged. Hyperdensity at the right cerebellopontine angle/inferior cerebellum is  associated with vascular anomaly as demonstrated on CTA. MRI Results (most recent):  Results from [place] encounter on 03/17/22    MRA NECK WO CONT    Narrative  *PRELIMINARY REPORT*    Cerebellar hemorrhage with stents of right cerebellar arteriovenous  malformation. Preliminary report was provided by Dr. Rob Giles, the on-call radiologist, at 3:12  AM    Final report to follow. *END PRELIMINARY REPORT*    EXAM:  MRI BRAIN W WO CONT, MRA NECK WO CONT, MRA BRAIN WO CONT    INDICATION:    R cerebellar    COMPARISON:  CT head 3/18/2022, CTA head and neck 3/17/2022. CONTRAST: 5 ml ProHance. TECHNIQUE:  MRI brain:  Multiplanar multisequence acquisition without and with contrast of the brain.     MRA Head/Neck:  Time-of-flight non-contrast MRA of the head and neck were performed. Multiplanar  and MIP reconstructions were obtained. FINDINGS:  MRI brain:  Stable size of right cerebellar intraparenchymal hemorrhage measuring 2.4 x 1.7  x 2.6 cm, with stable surrounding mild edema and mass effect resulting in  partial effacement of the fourth ventricle. There is no cerebellar tonsillar  herniation. There is no hydrocephalus with normal size of the ventricles. There  are multiple serpiginous vessels seen adjacent to the hemorrhage within the  right posterior fossa, consistent with arteriovenous malformation; see below. There is no evidence of an underlying mass. There is no acute infarct. The remaining brain parenchyma has normal signal  characteristics. There is no cerebellar tonsillar herniation. Expected arterial  flow-voids are present. Mild mucosal thickening in the bilateral maxillary  sinuses without air-fluid level. The mastoid air cells and middle ears are  clear. The orbital contents are within normal limits. No significant osseous or  scalp lesions are identified. MRA Head:  Right cerebellar arteriovenous malformation, with nidus measuring approximately  3.3 x 2.0 cm (series 3 image 99). There is supply is via an enlarged right PICA,  which contains an aneurysm measuring 1.2 x 0.9 x 1.2 cm (series 3 image 100). There are additional small arterial feeding vessels from the right vertebral  artery and from the AICA. There are multiple adjacent draining veins, one of the  largest courses along the right petrous ridge and drains into the right distal  transverse sinus, and a second largest which courses along the right posterior  cerebellum and drains into the torcular. There is no evidence of large vessel occlusion or flow-limiting stenosis of the  intracranial internal carotid, anterior cerebral, and middle cerebral arteries.   The anterior communicating artery is patent, with small right A1 segment. There is no evidence of large vessel occlusion or flow-limiting stenosis of the  intracranial vertebral arteries, basilar artery, or posterior cerebral arteries. The posterior communicating arteries are patent, right larger than left. MRA Neck:  The common carotid arteries demonstrate no flow-limiting stenosis. There is no  evidence of flow-limiting stenosis in the cervical right internal carotid  artery. There is no evidence of flow-limiting stenosis in the cervical left  internal carotid artery. There is a codominant vertebrobasilar arterial system. The vertebral arteries  and imaged portion of the basilar artery demonstrate no flow-limiting stenosis. Impression  MRI brain:  1. Stable right cerebellar intraparenchymal hemorrhage with stable surrounding  mild edema and mass effect with partial effacement of the fourth ventricle. No  cerebellar tonsillar herniation. No hydrocephalus. Adjacent right cerebellar  arteriovenous malformation; see below. MRA head:  1. Right cerebellar arteriovenous malformation as described in detail above with  nidus measuring approximately 3.3 x 2.0 cm, with predominant arterial supply  from the right PICA, and dominant draining veins draining into the distal right  transverse sinus and torcular. 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 MRA NECK:  1. No evidence of significant stenosis. Assessment:     Active Problems:    Cerebellar hemorrhage ([address] (3/18/2022)      Plan:      Right Cerebellar Hemorrhage: ICH score of 1  · As seen on CTH and MRI Brain  · NSGY and NIS onboard with their recommendations as outlined in their notes  · Will avoid unnecessary procedures that cause any stress or strain   · Continue with steroids 4mg Decadron Q 6hr per NSGY  · Continue with 3% sodium chloride at 30ml/hr and titrate to keep N+ 145-155.  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 · As seen on CTA H/N and MRA H/N  · Will avoid any surgical intervention in the acute settings  · NIS onboard and patient was taken for diagnostic angiogram on 03/18/22 by Dr. Eliana Gunderson  · DVT ppx with SCD's   · Low threshold to repeat CT head if any worsening change in neurologic condition  · NSGY onboard  · NIS confer with NSGY  · Acute management of AVM/hemorrhage per Neurosurgery    Further neurology recommendations to follow by Dr. Liudmila Hummel     Chart reviewed    Case discussed with: intensivist, patient, and ICU RN    >35% time spent in counseling or coordination of care of the above in the assessment and plan     Signed By: Eliezer Huynh NP                    March 19, 2022    Please note that this dictation was completed with [employer], the computer voice recognition software. Quite often unanticipated grammatical, syntax, homophones, and other interpretive errors are inadvertently transcribed by the computer software. Please disregard these errors. Please excuse any errors that have escaped final proofreading.

## 2022-03-19 NOTE — PROGRESS NOTES
[facility]    ICU TEAM Progress Note    Name: Tiny Pacheco   : 1985   MRN: [id number]   Date: 3/19/2022      [facility]  Madai Maldonado is a 40year old female with pmhx of migraines who presents as a transfer from an OSH ED with a right cerebellar hemorrhage. She presented to OSH ED by EMS with sudden headache, dizziness, and nausea. She had just returned home from work and was taking with the sitter. She denies any precipitating events or trauma. Her headache is rated at 8/10 while at rest and 10/10 with activity. Fentanyl and hydromorphone were given at OSH ED with no improvement in symptoms. She was transferred to [facility] ICU for evaluation with neuro surgery and neuro interventional.    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 Neurological System  Neuro/NIS/NSx following  Decadron  Q1 hour neurochecks  Analgesia: Fentanyl and Acetaminophen    2. Cardiovascular System  SBP Goal of: < 160 mmHg  MAP Goal of: > 65 mmHg  None - For above SBP/MAP goals  Transfusion Trigger (Hgb): <7 g/dL and <50K platelets  Keep K > 4; Mg > 2     3. Respiratory System  Incentive Spirometry  SpO2 Goal: > 92%  DVT Prophylaxis: SCD's or Sequential Compression Device     4. Renal/GI/Endocrine System  IVFs: NS  Ulcer Prophylaxis: Not at this time   Bowel Regimen: MiraLax  Feeding:  No NPO  Blood Sugar Goal 120-180 - Glycemic Control: Insulin    5. Infectious Disease  Indwelling Catheter:  Tubes: None  Lines: Peripheral IV    6. PT/OT: None at this time     7. Goals of Care Discussion with family Yes     8.  Plan of Care/Code Status: Full Code    9. Discussed Care Plan with Bedside RN    10. Documentation of Current Medications      Active Problem List:     Problem List  Date Reviewed: 2020          Codes Class    Cerebellar hemorrhage ([facility] 75.) ICD-10-CM: I61.4  ICD-9-CM: 328         DUB (dysfunctional uterine bleeding) ICD-10-CM: N93.8  ICD-9-CM: 626.8         Atonic postpartum hemorrhage ICD-10-CM: O72.1  ICD-9-CM: 666.14         History of postpartum depression ICD-10-CM: Z87.59, Z86.59  ICD-9-CM: V13.29, V11.8         ROM (rupture of membranes), premature ICD-10-CM: O42.90  ICD-9-CM: 658.10         Chronic pelvic pain in female ICD-10-CM: R10.2, G89.29  ICD-9-CM: 625.9, 338.29         Pregnancy ICD-10-CM: Z34.90  ICD-9-CM: V22.2         Breech presentation ICD-10-CM: O32. 1XX0  ICD-9-CM: 652.20         Threatened  labor ICD-10-CM: O47.00  ICD-9-CM: 644.00         Abdominal pain complicating pregnancy BOOKER-49-ZC: O26.899, R10.9  ICD-9-CM: 646.80, 789.00         Appendicitis ICD-10-CM: K37  ICD-9-CM: 56               Past Medical History:      has a past medical history of Anxiety, Arthritis, Calculus of kidney, Dyspepsia and other specified disorders of function of stomach, GERD (gastroesophageal reflux disease), Kidney disease, Other ill-defined conditions(799.89), Postpartum hemorrhage (2017), and Psychiatric disorder.     She has no past medical history of Abnormal Pap smear, Abnormal Papanicolaou smear of cervix, Acquired hypothyroidism, Anemia, Anemia NEC, Asthma, Chlamydia, Complication of anesthesia, Diabetes mellitus, Essential hypertension, Essential hypertension, Genital herpes, unspecified, Gestational diabetes, Gestational hypertension, Gonorrhea, Heart abnormalities, Heart abnormality, Herpes gestationis, Herpes simplex without mention of complication, Human immunodeficiency virus (HIV) disease ([facility] 75.), OTHER MEDICAL, Infertility, Infertility, female, Phlebitis and thrombophlebitis of unspecified site, Pituitary disorder ([facility] Polycystic disease, ovaries, Postpartum depression, Rhesus isoimmunization unspecified as to episode of care in pregnancy, Sickle cell trait syndrome ([facility] Sickle-cell disease, unspecified, Syphilis, Systemic lupus erythematosus ([facility] Thyroid activity decreased, Trauma, Unspecified breast disorder, Unspecified diseases of blood and blood-forming organs, or Unspecified epilepsy without mention of intractable epilepsy. Past Surgical History:      has a past surgical history that includes hx wisdom teeth extraction; pr anesth,knee area surgery; hx heent; pr abdomen surgery proc unlisted (10/30/12); hx cholecystectomy; hx appendectomy; hx orthopaedic; upper gi endoscopy,biopsy (2015); pr  delivery only; hx dilation and curettage; hx gyn; hx gyn; and hx gyn. Home Medications:     Prior to Admission medications    Medication Sig Start Date End Date Taking? Authorizing Provider   IBUPROFEN PO Take  by mouth. Provider, Historical       Allergies/Social/Family History: Allergies   Allergen Reactions    Ambien [Zolpidem] Other (comments)     \"Hallucinations and black outs\"    Morphine Rash      Social History     Tobacco Use    Smoking status: Current Every Day Smoker     Packs/day: 0.50     Years: 15.00     Pack years: 7.50    Smokeless tobacco: Never Used    Tobacco comment: about 6 cigarettes per day at present   Substance Use Topics    Alcohol use: Yes     Comment: RARELY      Family History   Problem Relation Age of Onset    Emphysema Mother     No Known Problems Father     No Known Problems Son     No Known Problems Son     Anesth Problems Neg Hx        Review of Systems:     Review of Systems   Eyes: Positive for photophobia. Gastrointestinal: Positive for nausea and vomiting. Neurological: Positive for dizziness and headaches. All other systems reviewed and are negative.         Objective:   Vital Signs:  Visit Vitals  BP 98/67   Pulse (!) 54   Temp 97.9 °F (36.6 °C)   Resp 14   Wt 39.8 kg (87 lb 11.9 oz)   SpO2 97%   BMI 17.14 kg/m²      O2 Device: None (Room air) Temp (24hrs), Av.2 °F (36.8 °C), Min:97.7 °F (36.5 °C), Max:99.3 °F (37.4 °C)           Intake/Output:     Intake/Output Summary (Last 24 hours) at 3/19/2022 0488  Last data filed at 3/18/2022 2143  Gross per 24 hour   Intake 770 ml   Output 725 ml   Net 45 ml       Physical Exam:    Physical Exam  Vitals and nursing note reviewed. Constitutional:       General: She is not in acute distress. Appearance: She is underweight. Eyes:      Extraocular Movements: Extraocular movements intact. Left eye: Left eye abnormal extraocular motion: photophobia. Cardiovascular:      Rate and Rhythm: Normal rate and regular rhythm. Pulmonary:      Effort: Pulmonary effort is normal.      Breath sounds: Normal breath sounds. Abdominal:      General: Abdomen is flat. Bowel sounds are normal.      Palpations: Abdomen is soft. Tenderness: There is no abdominal tenderness. Musculoskeletal:         General: Normal range of motion. Skin:     General: Skin is warm and dry. Capillary Refill: Capillary refill takes less than 2 seconds. Neurological:      General: No focal deficit present. Mental Status: She is alert and oriented to person, place, and time.    Psychiatric:         Mood and Affect: Mood normal.         Judgment: Judgment normal.           LABS AND  DATA: Personally reviewed  Recent Labs     22  0418 22  0241   WBC 13.6* 11.7*   HGB 11.3* 12.7   HCT 33.8* 38.3    241     Recent Labs     22  0418 22  0100 22  2150 22  1806 22  0613 22  0241    142   < > 143   < > 140   K 4.1 4.1   < > 3.7   < > 4.2   * 114*   < > 116*   < > 110*   CO2 24 25   < > 23   < > 23   BUN 20 18   < > 16   < > 15   CREA 0.52* 0.47*   < > 0.51*   < > 0.52*   * 111*   < > 121*   < > 132*   CA 8.8 8.8   < > 8.7   < > 9.0   MG 2.3  --   --  2.3   < > 2.4 PHOS 2.8  --   --   --   --  3.3    < > = values in this interval not displayed. Recent Labs     03/17/22  1606   AP 29*   TP 7.4   ALB 4.0   GLOB 3.4     Recent Labs     03/17/22  1658   INR 1.1   PTP 11.0   APTT 24.5      No results for input(s): PHI, PCO2I, PO2I, FIO2I in the last 72 hours. No results for input(s): CPK, CKMB, TROIQ, BNPP in the last 72 hours. Imaging:  3/19/2022       Chest X-Ray:  CXR Results  (Last 48 hours)    None            ECHO:  N/A    Ventilator Settings:  Mode Rate Tidal Volume Pressure FiO2 PEEP                    Peak airway pressure:      Minute ventilation:          MEDS: Reviewed    Multidisciplinary Rounds Completed:  N/A    ABCDEF Bundle/Checklist Completed:  Yes    SPECIAL EQUIPMENT  None    DISPOSITION  Stay in ICU    CRITICAL CARE CONSULTANT NOTE  I had a face to face encounter with the patient, reviewed and interpreted patient data including clinical events, labs, images, vital signs, I/O's, and examined patient. I have discussed the case and the plan and management of the patient's care with the consulting services, the bedside nurses and the respiratory therapist.      NOTE OF PERSONAL INVOLVEMENT IN CARE   This patient has a high probability of imminent, clinically significant deterioration, which requires the highest level of preparedness to intervene urgently. I participated in the decision-making and personally managed or directed the management of the following life and organ supporting interventions that required my frequent assessment to treat or prevent imminent deterioration. I personally spent 55 minutes of critical care time. This is time spent at this critically ill patient's bedside actively involved in patient care as well as the coordination of care. This does not include any procedural time which has been billed separately.     Elie Gage, DO   Staff Intensivist/Anesthesiologist  Critical Care Medicine

## 2022-03-20 ENCOUNTER — APPOINTMENT (OUTPATIENT)
Dept: CT IMAGING | Age: 37
DRG: 044 | End: 2022-03-20
Attending: NEUROLOGICAL SURGERY
Payer: MEDICAID

## 2022-03-20 ENCOUNTER — APPOINTMENT (OUTPATIENT)
Dept: NON INVASIVE DIAGNOSTICS | Age: 37
DRG: 044 | End: 2022-03-20
Attending: NURSE PRACTITIONER
Payer: MEDICAID

## 2022-03-20 LAB
ANION GAP SERPL CALC-SCNC: 5 MMOL/L (ref 5–15)
BUN SERPL-MCNC: 13 MG/DL (ref 6–20)
BUN/CREAT SERPL: 24 (ref 12–20)
CALCIUM SERPL-MCNC: 8.5 MG/DL (ref 8.5–10.1)
CHLORIDE SERPL-SCNC: 108 MMOL/L (ref 97–108)
CO2 SERPL-SCNC: 23 MMOL/L (ref 21–32)
CREAT SERPL-MCNC: 0.55 MG/DL (ref 0.55–1.02)
ECHO AO ROOT DIAM: 2.8 CM
ECHO AO ROOT INDEX: 2.15 CM/M2
ECHO AV PEAK GRADIENT: 4 MMHG
ECHO AV PEAK VELOCITY: 1 M/S
ECHO AV VELOCITY RATIO: 0.8
ECHO EST RA PRESSURE: 15 MMHG
ECHO LA DIAMETER INDEX: 1.92 CM/M2
ECHO LA DIAMETER: 2.5 CM
ECHO LA TO AORTIC ROOT RATIO: 0.89
ECHO LV E' LATERAL VELOCITY: 15 CM/S
ECHO LV E' SEPTAL VELOCITY: 15 CM/S
ECHO LV FRACTIONAL SHORTENING: 32 % (ref 28–44)
ECHO LV INTERNAL DIMENSION DIASTOLE INDEX: 2.92 CM/M2
ECHO LV INTERNAL DIMENSION DIASTOLIC: 3.8 CM (ref 3.9–5.3)
ECHO LV INTERNAL DIMENSION SYSTOLIC INDEX: 2 CM/M2
ECHO LV INTERNAL DIMENSION SYSTOLIC: 2.6 CM
ECHO LV IVSD: 0.9 CM (ref 0.6–0.9)
ECHO LV MASS 2D: 101.1 G (ref 67–162)
ECHO LV MASS INDEX 2D: 77.7 G/M2 (ref 43–95)
ECHO LV POSTERIOR WALL DIASTOLIC: 0.9 CM (ref 0.6–0.9)
ECHO LV RELATIVE WALL THICKNESS RATIO: 0.47
ECHO LVOT PEAK GRADIENT: 2 MMHG
ECHO LVOT PEAK VELOCITY: 0.8 M/S
ECHO MV A VELOCITY: 0.44 M/S
ECHO MV AREA PHT: 4.2 CM2
ECHO MV E DECELERATION TIME (DT): 179.6 MS
ECHO MV E VELOCITY: 0.88 M/S
ECHO MV E/A RATIO: 2
ECHO MV E/E' LATERAL: 5.87
ECHO MV E/E' RATIO (AVERAGED): 5.87
ECHO MV E/E' SEPTAL: 5.87
ECHO MV PRESSURE HALF TIME (PHT): 52.1 MS
ECHO PV MAX VELOCITY: 1 M/S
ECHO PV PEAK GRADIENT: 4 MMHG
ECHO RIGHT VENTRICULAR SYSTOLIC PRESSURE (RVSP): 23 MMHG
ECHO RV TAPSE: 2 CM (ref 1.5–2)
ECHO TV REGURGITANT MAX VELOCITY: 1.42 M/S
ECHO TV REGURGITANT PEAK GRADIENT: 8 MMHG
ERYTHROCYTE [DISTWIDTH] IN BLOOD BY AUTOMATED COUNT: 12.1 % (ref 11.5–14.5)
GLUCOSE SERPL-MCNC: 102 MG/DL (ref 65–100)
HCT VFR BLD AUTO: 35.4 % (ref 35–47)
HGB BLD-MCNC: 11.9 G/DL (ref 11.5–16)
MAGNESIUM SERPL-MCNC: 2.1 MG/DL (ref 1.6–2.4)
MCH RBC QN AUTO: 31.4 PG (ref 26–34)
MCHC RBC AUTO-ENTMCNC: 33.6 G/DL (ref 30–36.5)
MCV RBC AUTO: 93.4 FL (ref 80–99)
NRBC # BLD: 0 K/UL (ref 0–0.01)
NRBC BLD-RTO: 0 PER 100 WBC
PHOSPHATE SERPL-MCNC: 2.3 MG/DL (ref 2.6–4.7)
PLATELET # BLD AUTO: 115 K/UL (ref 150–400)
POTASSIUM SERPL-SCNC: 4.1 MMOL/L (ref 3.5–5.1)
RBC # BLD AUTO: 3.79 M/UL (ref 3.8–5.2)
SODIUM SERPL-SCNC: 136 MMOL/L (ref 136–145)
WBC # BLD AUTO: 11 K/UL (ref 3.6–11)

## 2022-03-20 PROCEDURE — 84100 ASSAY OF PHOSPHORUS: CPT

## 2022-03-20 PROCEDURE — 74011250636 HC RX REV CODE- 250/636: Performed by: NURSE PRACTITIONER

## 2022-03-20 PROCEDURE — 74011000250 HC RX REV CODE- 250: Performed by: NURSE PRACTITIONER

## 2022-03-20 PROCEDURE — 99232 SBSQ HOSP IP/OBS MODERATE 35: CPT | Performed by: PSYCHIATRY & NEUROLOGY

## 2022-03-20 PROCEDURE — 65610000006 HC RM INTENSIVE CARE

## 2022-03-20 PROCEDURE — 85027 COMPLETE CBC AUTOMATED: CPT

## 2022-03-20 PROCEDURE — 74011000250 HC RX REV CODE- 250: Performed by: NEUROLOGICAL SURGERY

## 2022-03-20 PROCEDURE — 74011250637 HC RX REV CODE- 250/637: Performed by: NEUROLOGICAL SURGERY

## 2022-03-20 PROCEDURE — 83735 ASSAY OF MAGNESIUM: CPT

## 2022-03-20 PROCEDURE — 93306 TTE W/DOPPLER COMPLETE: CPT

## 2022-03-20 PROCEDURE — 74011250637 HC RX REV CODE- 250/637: Performed by: NURSE PRACTITIONER

## 2022-03-20 PROCEDURE — 74011250636 HC RX REV CODE- 250/636: Performed by: NEUROLOGICAL SURGERY

## 2022-03-20 PROCEDURE — 36415 COLL VENOUS BLD VENIPUNCTURE: CPT

## 2022-03-20 PROCEDURE — 70450 CT HEAD/BRAIN W/O DYE: CPT

## 2022-03-20 PROCEDURE — 80048 BASIC METABOLIC PNL TOTAL CA: CPT

## 2022-03-20 RX ORDER — HYDROMORPHONE HYDROCHLORIDE 1 MG/ML
1 INJECTION, SOLUTION INTRAMUSCULAR; INTRAVENOUS; SUBCUTANEOUS
Status: DISCONTINUED | OUTPATIENT
Start: 2022-03-20 | End: 2022-03-25 | Stop reason: HOSPADM

## 2022-03-20 RX ORDER — PANTOPRAZOLE SODIUM 40 MG/1
40 TABLET, DELAYED RELEASE ORAL
Status: DISCONTINUED | OUTPATIENT
Start: 2022-03-20 | End: 2022-03-25 | Stop reason: HOSPADM

## 2022-03-20 RX ADMIN — HYDROMORPHONE HYDROCHLORIDE 1 MG: 1 INJECTION, SOLUTION INTRAMUSCULAR; INTRAVENOUS; SUBCUTANEOUS at 15:47

## 2022-03-20 RX ADMIN — DEXAMETHASONE SODIUM PHOSPHATE 4 MG: 4 INJECTION, SOLUTION INTRAMUSCULAR; INTRAVENOUS at 08:37

## 2022-03-20 RX ADMIN — ONDANSETRON 4 MG: 2 INJECTION INTRAMUSCULAR; INTRAVENOUS at 02:21

## 2022-03-20 RX ADMIN — SODIUM CHLORIDE, PRESERVATIVE FREE 10 ML: 5 INJECTION INTRAVENOUS at 22:00

## 2022-03-20 RX ADMIN — HYDROMORPHONE HYDROCHLORIDE 1 MG: 1 INJECTION, SOLUTION INTRAMUSCULAR; INTRAVENOUS; SUBCUTANEOUS at 19:31

## 2022-03-20 RX ADMIN — HYDROMORPHONE HYDROCHLORIDE 1 MG: 1 INJECTION, SOLUTION INTRAMUSCULAR; INTRAVENOUS; SUBCUTANEOUS at 11:39

## 2022-03-20 RX ADMIN — HYDROMORPHONE HYDROCHLORIDE 0.5 MG: 1 INJECTION, SOLUTION INTRAMUSCULAR; INTRAVENOUS; SUBCUTANEOUS at 02:15

## 2022-03-20 RX ADMIN — POTASSIUM CHLORIDE AND SODIUM CHLORIDE: 900; 150 INJECTION, SOLUTION INTRAVENOUS at 16:54

## 2022-03-20 RX ADMIN — SODIUM CHLORIDE, PRESERVATIVE FREE 5 MG: 5 INJECTION INTRAVENOUS at 23:42

## 2022-03-20 RX ADMIN — ACETAMINOPHEN 650 MG: 325 TABLET ORAL at 18:22

## 2022-03-20 RX ADMIN — PANTOPRAZOLE SODIUM 40 MG: 40 TABLET, DELAYED RELEASE ORAL at 07:58

## 2022-03-20 RX ADMIN — ACETAMINOPHEN 650 MG: 325 TABLET ORAL at 07:00

## 2022-03-20 RX ADMIN — HYDROMORPHONE HYDROCHLORIDE 1 MG: 1 INJECTION, SOLUTION INTRAMUSCULAR; INTRAVENOUS; SUBCUTANEOUS at 23:41

## 2022-03-20 RX ADMIN — HYDROMORPHONE HYDROCHLORIDE 1 MG: 1 INJECTION, SOLUTION INTRAMUSCULAR; INTRAVENOUS; SUBCUTANEOUS at 07:58

## 2022-03-20 RX ADMIN — SODIUM CHLORIDE, PRESERVATIVE FREE 5 MG: 5 INJECTION INTRAVENOUS at 18:24

## 2022-03-20 RX ADMIN — METOCLOPRAMIDE HYDROCHLORIDE 10 MG: 5 INJECTION INTRAMUSCULAR; INTRAVENOUS at 04:56

## 2022-03-20 RX ADMIN — SODIUM CHLORIDE, PRESERVATIVE FREE 10 ML: 5 INJECTION INTRAVENOUS at 16:54

## 2022-03-20 RX ADMIN — METOCLOPRAMIDE HYDROCHLORIDE 10 MG: 5 INJECTION INTRAMUSCULAR; INTRAVENOUS at 20:20

## 2022-03-20 RX ADMIN — HYDROMORPHONE HYDROCHLORIDE 0.5 MG: 1 INJECTION, SOLUTION INTRAMUSCULAR; INTRAVENOUS; SUBCUTANEOUS at 04:57

## 2022-03-20 RX ADMIN — ACETAMINOPHEN 650 MG: 325 TABLET ORAL at 23:42

## 2022-03-20 RX ADMIN — SODIUM CHLORIDE, PRESERVATIVE FREE 10 ML: 5 INJECTION INTRAVENOUS at 06:00

## 2022-03-20 RX ADMIN — SODIUM CHLORIDE, PRESERVATIVE FREE 5 MG: 5 INJECTION INTRAVENOUS at 11:41

## 2022-03-20 RX ADMIN — DEXAMETHASONE SODIUM PHOSPHATE 4 MG: 4 INJECTION, SOLUTION INTRAMUSCULAR; INTRAVENOUS at 20:19

## 2022-03-20 RX ADMIN — POTASSIUM CHLORIDE AND SODIUM CHLORIDE: 900; 150 INJECTION, SOLUTION INTRAVENOUS at 05:59

## 2022-03-20 NOTE — H&P
[facility]    ICU TEAM Progress Note    Name: Monisha Vásquez   : 1985   MRN: [id number]   Date: 3/20/2022      [facility]  Duard Favre is a 40year old female with pmhx of migraines who presents as a transfer from an OSH ED with a right cerebellar hemorrhage. She presented to OSH ED by EMS with sudden headache, dizziness, and nausea. She had just returned home from work and was taking with the sitter. She denies any precipitating events or trauma. Her headache is rated at 8/10 while at rest and 10/10 with activity. Fentanyl and hydromorphone were given at OSH ED with no improvement in symptoms. She was transferred to [facility] ICU for evaluation with neuro surgery and neuro interventional.    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 Neurological System  Neuro/NIS/NSx following  Decadron  Q1 hour neurochecks  Analgesia: Dilaudid and Acetaminophen    2. Cardiovascular System  SBP Goal of: < 140 mmHg  MAP Goal of: > 65 mmHg  None - For above SBP/MAP goals  Transfusion Trigger (Hgb): <7 g/dL and <50K platelets  Keep K > 4; Mg > 2     3. Respiratory System  Incentive Spirometry  SpO2 Goal: > 92%  DVT Prophylaxis: SCD's or Sequential Compression Device     4. Renal/GI/Endocrine System  IVFs: NS kcl  Ulcer Prophylaxis: Protonix  Bowel Regimen: MiraLax  Feeding:  ADAT  Blood Sugar Goal 120-180 - Glycemic Control: Insulin    5. Infectious Disease  Indwelling Catheter:  Tubes: None  Lines: Peripheral IV    6. PT/OT: None at this time     7.  Goals of Care Discussion with family Yes 8. Plan of Care/Code Status: Full Code    9. Discussed Care Plan with Bedside RN    10. Documentation of Current Medications      Active Problem List:     Problem List  Date Reviewed: 2020          Codes Class    Cerebellar hemorrhage ([facility] 75.) ICD-10-CM: I61.4  ICD-9-CM: 450         DUB (dysfunctional uterine bleeding) ICD-10-CM: N93.8  ICD-9-CM: 626.8         Atonic postpartum hemorrhage ICD-10-CM: O72.1  ICD-9-CM: 666.14         History of postpartum depression ICD-10-CM: Z87.59, Z86.59  ICD-9-CM: V13.29, V11.8         ROM (rupture of membranes), premature ICD-10-CM: O42.90  ICD-9-CM: 658.10         Chronic pelvic pain in female ICD-10-CM: R10.2, G89.29  ICD-9-CM: 625.9, 338.29         Pregnancy ICD-10-CM: Z34.90  ICD-9-CM: V22.2         Breech presentation ICD-10-CM: O32. 1XX0  ICD-9-CM: 652.20         Threatened  labor ICD-10-CM: O47.00  ICD-9-CM: 644.00         Abdominal pain complicating pregnancy KCZ-93-PH: O26.899, R10.9  ICD-9-CM: 646.80, 789.00         Appendicitis ICD-10-CM: K37  ICD-9-CM: 56               Past Medical History:      has a past medical history of Anxiety, Arthritis, Calculus of kidney, Dyspepsia and other specified disorders of function of stomach, GERD (gastroesophageal reflux disease), Kidney disease, Other ill-defined conditions(799.89), Postpartum hemorrhage (2017), and Psychiatric disorder.     She has no past medical history of Abnormal Pap smear, Abnormal Papanicolaou smear of cervix, Acquired hypothyroidism, Anemia, Anemia NEC, Asthma, Chlamydia, Complication of anesthesia, Diabetes mellitus, Essential hypertension, Essential hypertension, Genital herpes, unspecified, Gestational diabetes, Gestational hypertension, Gonorrhea, Heart abnormalities, Heart abnormality, Herpes gestationis, Herpes simplex without mention of complication, Human immunodeficiency virus (HIV) disease ([facility] OTHER MEDICAL, Infertility, Infertility, female, Phlebitis and thrombophlebitis of unspecified site, Pituitary disorder ([facility] Polycystic disease, ovaries, Postpartum depression, Rhesus isoimmunization unspecified as to episode of care in pregnancy, Sickle cell trait syndrome ([facility] Sickle-cell disease, unspecified, Syphilis, Systemic lupus erythematosus ([facility] Thyroid activity decreased, Trauma, Unspecified breast disorder, Unspecified diseases of blood and blood-forming organs, or Unspecified epilepsy without mention of intractable epilepsy. Past Surgical History:      has a past surgical history that includes hx wisdom teeth extraction; pr anesth,knee area surgery; hx heent; pr abdomen surgery proc unlisted (10/30/12); hx cholecystectomy; hx appendectomy; hx orthopaedic; upper gi endoscopy,biopsy (2015); pr  delivery only; hx dilation and curettage; hx gyn; hx gyn; and hx gyn. Home Medications:     Prior to Admission medications    Medication Sig Start Date End Date Taking? Authorizing Provider   IBUPROFEN PO Take  by mouth. Provider, Historical       Allergies/Social/Family History: Allergies   Allergen Reactions    Ambien [Zolpidem] Other (comments)     \"Hallucinations and black outs\"    Morphine Rash      Social History     Tobacco Use    Smoking status: Current Every Day Smoker     Packs/day: 0.50     Years: 15.00     Pack years: 7.50    Smokeless tobacco: Never Used    Tobacco comment: about 6 cigarettes per day at present   Substance Use Topics    Alcohol use: Yes     Comment: RARELY      Family History   Problem Relation Age of Onset    Emphysema Mother     No Known Problems Father     No Known Problems Son     No Known Problems Son     Anesth Problems Neg Hx        Review of Systems:     Review of Systems   Eyes: Positive for photophobia. Gastrointestinal: Positive for nausea and vomiting. Neurological: Positive for dizziness and headaches. All other systems reviewed and are negative.         Objective:   Vital Signs:  Visit Vitals  /86 Pulse (!) 52   Temp 98.1 °F (36.7 °C)   Resp 22   Wt 39.8 kg (87 lb 11.9 oz)   SpO2 99%   BMI 17.14 kg/m²      O2 Device: None (Room air) Temp (24hrs), Av.5 °F (36.9 °C), Min:98 °F (36.7 °C), Max:99.1 °F (37.3 °C)           Intake/Output:     Intake/Output Summary (Last 24 hours) at 3/20/2022 0373  Last data filed at 3/20/2022 0000  Gross per 24 hour   Intake 1886.83 ml   Output 525 ml   Net 1361.83 ml       Physical Exam:    Physical Exam  Vitals and nursing note reviewed. Constitutional:       General: She is not in acute distress. Appearance: She is underweight. Eyes:      Extraocular Movements: Extraocular movements intact. Left eye: Left eye abnormal extraocular motion: photophobia. Cardiovascular:      Rate and Rhythm: Normal rate and regular rhythm. Pulmonary:      Effort: Pulmonary effort is normal.      Breath sounds: Normal breath sounds. Abdominal:      General: Abdomen is flat. Bowel sounds are normal.      Palpations: Abdomen is soft. Tenderness: There is no abdominal tenderness. Musculoskeletal:         General: Normal range of motion. Skin:     General: Skin is warm and dry. Capillary Refill: Capillary refill takes less than 2 seconds. Neurological:      General: No focal deficit present. Mental Status: She is alert and oriented to person, place, and time.    Psychiatric:         Mood and Affect: Mood normal.         Judgment: Judgment normal.           LABS AND  DATA: Personally reviewed  Recent Labs     22   WBC 11.0 13.6*   HGB 11.9 11.3*   HCT 35.4 33.8*   * 198     Recent Labs     22  04222  0703 22    144   < > 141   K 4.1 4.3   < > 4.1    116*   < > 114*   CO2 23 24   < > 24   BUN 13 21*   < > 20   CREA 0.55 0.48*   < > 0.52*   * 110*   < > 118*   CA 8.5 8.5   < > 8.8   MG 2.1  --   --  2.3   PHOS 2.3*  --   --  2.8    < > = values in this interval not displayed. Recent Labs     03/17/22  1606   AP 29*   TP 7.4   ALB 4.0   GLOB 3.4     Recent Labs     03/17/22  1658   INR 1.1   PTP 11.0   APTT 24.5      No results for input(s): PHI, PCO2I, PO2I, FIO2I in the last 72 hours. No results for input(s): CPK, CKMB, TROIQ, BNPP in the last 72 hours. Imaging:  3/20/2022       Chest X-Ray:  CXR Results  (Last 48 hours)    None            ECHO:  N/A    Ventilator Settings:  Mode Rate Tidal Volume Pressure FiO2 PEEP                    Peak airway pressure:      Minute ventilation:          MEDS: Reviewed    Multidisciplinary Rounds Completed:  N/A    ABCDEF Bundle/Checklist Completed:  Yes    SPECIAL EQUIPMENT  None    DISPOSITION  Stay in ICU    CRITICAL CARE CONSULTANT NOTE  I had a face to face encounter with the patient, reviewed and interpreted patient data including clinical events, labs, images, vital signs, I/O's, and examined patient.   I have discussed the case and the plan and management of the patient's care with the consulting services, the bedside nurses and the respiratory therapist.      Daxa Rodriguez DO   Staff Intensivist/Anesthesiologist  Critical Care Medicine

## 2022-03-20 NOTE — PROGRESS NOTES
Neurologically stable. Still with h/a, nausea and photophobia. plts have dropped to 115. Will stop pepcid and give protonix. Follow plt and na serially.   Repeat ct today

## 2022-03-20 NOTE — PROGRESS NOTES
Verbal shift change report given to [facility] RN and Edwin Hutton RN (oncoming nurse) by [address] (offgoing nurse). Report included the following information SBAR, Kardex, Intake/Output, MAR, Recent Results, Cardiac Rhythm NSR and Dual Neuro Assessment.

## 2022-03-20 NOTE — PROGRESS NOTES
Neurocritical Care Progress Note  Avelino Jefferson NP    Admit Date: 3/17/2022   LOS: 3 days      Daily Progress Note: 3/20/2022    S/P: Diagnostic Cerebral Angiogram with R Arteriotomy closure device by Dr. Chelsea Fuentes on 03/18/22    HPI: Leana Xie is a 40 y.o. F with a pmh of anxiety disorder, renal stones, gastroesophageal reflux disease, postpartum hemorrhage, and ADHD who was transported to [facility] emergency department on 03/17/22 with sudden onset of severe 10/10 headache, vertiginous dizziness, photosensitivity, and nausea/vomiting. Upon arrival to [facility], [facility] obtained showed right cerebellar hemorrhage with surrounding edema with extension into the right cerebellar peduncle. There is compression of the fourth ventricle which is small and contains a small amount of hemorrhage. The temporal tips are slightly dilated may represent developing hydrocephalus. ED physician discussed [facility] results with Dr. Mert Fang from [facility] who recommended obtaining CTA H/N and transfer to [facility] ICU for higher level of care. CTA H/N showed tangle of dilated abnormal vessels in the right cerebellum, some aneurysmally dilated, likely between the right PICA and possibly the transverse sinus, suggesting a vascular malformation. Upon arrival to [facility] ICU, [facility] was consulted for abnormal CTA H/N results with AVM which was discussed with Dr. Shaun Capone who reviewed the images. Dr. Mert Fang discussed with Dr. Shaun Capone for possible surgical intervention however, the plan was to hold off any procedure in the settings of acute bleed with complicated malformation. Moreover, the patient is currently neurologically stable with no focal deficits except nystagmus and complaining of severe headache with vomiting thus will manage patient's symptoms and plan to start 3% saline. Neurology was also consulted for hemorrhagic stroke w/u and management. Subjective:   No acute events noted overnight.  Patient admits improvement with headache now at 3/10 and being able to sit-up for the first time without sleep eye mask. denied any new neurological symptoms except ongoing diplopia looking R with dysconjugate and nystagmus. Noted to have L dysmetria w/ FTN otherwise no new neuro issues. Allergies   Allergen Reactions    Ambien [Zolpidem] Other (comments)     \"Hallucinations and black outs\"    Morphine Rash       Past Medical History:   Diagnosis Date    Anxiety     Arthritis     Calculus of kidney     Dyspepsia and other specified disorders of function of stomach     GERD (gastroesophageal reflux disease)     Kidney disease     hx of kidney stone    Other ill-defined conditions(799.89)     kidney stone in pass,passed    Postpartum hemorrhage 2017    ALSO HAD HEMORRHAGE DURING MISCARRIAGE 2012    Psychiatric disorder     ADHD     Family History   Problem Relation Age of Onset    Emphysema Mother     No Known Problems Father     No Known Problems Son     No Known Problems Son     Anesth Problems Neg Hx      Social History     Tobacco Use    Smoking status: Current Every Day Smoker     Packs/day: 0.50     Years: 15.00     Pack years: 7.50    Smokeless tobacco: Never Used    Tobacco comment: about 6 cigarettes per day at present   Substance Use Topics    Alcohol use: Yes     Comment: RARELY      Prior to Admission Medications   Prescriptions Last Dose Informant Patient Reported? Taking? IBUPROFEN PO   Yes No   Sig: Take  by mouth.       Facility-Administered Medications: None         Objective:   Vital signs  Temp (24hrs), Av.5 °F (36.9 °C), Min:97.9 °F (36.6 °C), Max:99.1 °F (37.3 °C)   1901 -  0700  In: 238.3 [I.V.:238.3]  Out: -    0701 -  1900  In: 2225.3 [P.O.:485; I.V.:1740.3]  Out: 1250 [Urine:1250]  Visit Vitals  /86   Pulse (!) 53   Temp 99.1 °F (37.3 °C)   Resp 21   Wt 39.8 kg (87 lb 11.9 oz)   SpO2 98%   BMI 17.14 kg/m²      O2 Device: None (Room air)   Vitals:    22 1600 22 1700 22 1800 22 1900   BP: 101/65 123/81 101/77 130/86   Pulse: 75 61 (!) 57 (!) 53   Resp: 18 18 19 21   Temp: 99.1 °F (37.3 °C)      SpO2:       Weight:            Meds:     Current Facility-Administered Medications   Medication Dose Route Frequency    0.9% sodium chloride with KCl 20 mEq/L infusion   IntraVENous CONTINUOUS    butalbital-acetaminophen-caffeine (FIORICET, ESGIC) -40 mg per tablet 1 Tablet  1 Tablet Oral Q4H PRN    0.9% sodium chloride infusion 25 mL  25 mL IntraVENous PRN    polyethylene glycol (MIRALAX) packet 17 g  17 g Oral DAILY PRN    dexamethasone (DECADRON) 4 mg/mL injection 4 mg  4 mg IntraVENous Q12H    HYDROmorphone (DILAUDID) injection 0.5 mg  0.5 mg IntraVENous Q3H PRN    famotidine (PF) (PEPCID) 20 mg in 0.9% sodium chloride 10 mL injection  20 mg IntraVENous Q12H    sodium chloride (NS) flush 5-40 mL  5-40 mL IntraVENous Q8H    sodium chloride (NS) flush 5-40 mL  5-40 mL IntraVENous PRN    acetaminophen (TYLENOL) tablet 650 mg  650 mg Oral Q6H PRN    Or    acetaminophen (TYLENOL) suppository 650 mg  650 mg Rectal Q6H PRN    ondansetron (ZOFRAN) injection 4 mg  4 mg IntraVENous Q6H PRN    metoclopramide HCl (REGLAN) injection 10 mg  10 mg IntraVENous Q6H PRN     I personally reviewed all of the medications    Review of Systems:   Admits diplopia looking R, 3/10 headache. N/V improved. Denied visual, ear, nose, throat problems; no coughing or wheezing or shortness of breath, No chest pain or orthopnea, no abdominal pain, nausea or vomiting, No pain in the body or extremities, no psychiatric, neurological, endocrine, hematological or cardiac complaints except as noted above. Physical Exam:   Blood pressure 130/86, pulse (!) 53, temperature 99.1 °F (37.3 °C), resp. rate 21, weight 39.8 kg (87 lb 11.9 oz), SpO2 98 %. GEN: NAD, Cooperative, Calm  HEENT: Normocephalic.  Non-icter, no congestion  Lungs:  diminished bilaterally Ant  Cardiac: S1,S2, normal rate and rhythm, no carotid bruits, no gallops  Abdomen: Normal bowel sounds, no distention, non-tender  Extremities: no clubbing, cyanosis, or edema  Skin: no rashes or lesions noted; R Groin site intact. mild tender to palpation otherwise no bleeding/hematoma/bruises. C/D/I         NEURO:  Mental status: A & O x 4.  Able to follow commands appropriately  Cranial Nerves:  II-XII intact; EOMI with nystagmus and diplopia looking R with dysconjugate, PERRL 3mm, No dysarthria or aphasia. Full facial strength, no asymmetry. Hearing intact bilaterally. Tongue protrudes to midline, palate elevates symmetrically. Shrug Shoulders B/L  Motor:  Normal bulk and tone, 5/5 strength x 4 extremities proximally and distally; No involuntary movements. Coordination: L dysmetria with FTN and HTS testing intact  Reflexes:  +2 throughout, down going toes bilaterally   Sensation: Intact x 4 extremities to Light Touch  Gait:  Deferred     Labs/images:     Lab Results   Component Value Date/Time    WBC 13.6 (H) 03/19/2022 04:18 AM    Hemoglobin (POC) 13.9 10/24/2018 11:40 AM    HGB 11.3 (L) 03/19/2022 04:18 AM    HCT 33.8 (L) 03/19/2022 04:18 AM    PLATELET 606 06/00/1290 04:18 AM    MCV 94.4 03/19/2022 04:18 AM      Lab Results   Component Value Date/Time    Sodium 144 03/19/2022 07:03 AM    Potassium 4.3 03/19/2022 07:03 AM    Chloride 116 (H) 03/19/2022 07:03 AM    CO2 24 03/19/2022 07:03 AM    Anion gap 4 (L) 03/19/2022 07:03 AM    Glucose 110 (H) 03/19/2022 07:03 AM    BUN 21 (H) 03/19/2022 07:03 AM    Creatinine 0.48 (L) 03/19/2022 07:03 AM    BUN/Creatinine ratio 44 (H) 03/19/2022 07:03 AM    GFR est AA >60 03/19/2022 07:03 AM    GFR est non-AA >60 03/19/2022 07:03 AM    Calcium 8.5 03/19/2022 07:03 AM    Bilirubin, total 0.6 03/17/2022 04:06 PM    Alk.  phosphatase 29 (L) 03/17/2022 04:06 PM    Protein, total 7.4 03/17/2022 04:06 PM    Albumin 4.0 03/17/2022 04:06 PM    Globulin 3.4 03/17/2022 04:06 PM    A-G Ratio 1.2 03/17/2022 04:06 PM    ALT (SGPT) 19 03/17/2022 04:06 PM    AST (SGOT) 16 03/17/2022 04:06 PM     CT Results (most recent):  Results from [place] encounter on 03/17/22    CT HEAD WO CONT    Narrative  EXAM: CT HEAD WO CONT  Somatostatin hemorrhage  INDICATION: reassess hemorrhage    COMPARISON: 3/18/2022. CONTRAST: None. TECHNIQUE: Unenhanced CT of the head was performed using 5 mm images. Brain and  bone windows were generated. Coronal and sagittal reformats. CT dose reduction  was achieved through use of a standardized protocol tailored for this  examination and automatic exposure control for dose modulation. FINDINGS:  Right cerebellar hemorrhage is stable 2.6 x 1.7 cm, cerebellar hyperdensity  related to vascular malformation redemonstrated; stable. . Mild temporal horn  enlargement is stable. There is no significant white matter disease. . The  basilar cisterns are open. No CT evidence of acute infarct. The bone windows demonstrate no abnormalities. The visualized portions of the  paranasal sinuses and mastoid air cells are clear. Impression  Right cerebellar hemorrhage is unchanged. Hyperdensity at the right cerebellopontine angle/inferior cerebellum is  associated with vascular anomaly as demonstrated on MRI and CTA. MRI Results (most recent):  Results from [place] encounter on 03/17/22    MRA NECK WO CONT    Narrative  *PRELIMINARY REPORT*    Cerebellar hemorrhage with stents of right cerebellar arteriovenous  malformation. Preliminary report was provided by Dr. Eliza Robledo, the on-call radiologist, at 3:12  AM    Final report to follow. *END PRELIMINARY REPORT*    EXAM:  MRI BRAIN W WO CONT, MRA NECK WO CONT, MRA BRAIN WO CONT    INDICATION:    R cerebellar    COMPARISON:  CT head 3/18/2022, CTA head and neck 3/17/2022. CONTRAST: 5 ml ProHance. TECHNIQUE:  MRI brain:  Multiplanar multisequence acquisition without and with contrast of the brain.     MRA Head/Neck:  Time-of-flight non-contrast MRA of the head and neck were performed. Multiplanar  and MIP reconstructions were obtained. FINDINGS:  MRI brain:  Stable size of right cerebellar intraparenchymal hemorrhage measuring 2.4 x 1.7  x 2.6 cm, with stable surrounding mild edema and mass effect resulting in  partial effacement of the fourth ventricle. There is no cerebellar tonsillar  herniation. There is no hydrocephalus with normal size of the ventricles. There  are multiple serpiginous vessels seen adjacent to the hemorrhage within the  right posterior fossa, consistent with arteriovenous malformation; see below. There is no evidence of an underlying mass. There is no acute infarct. The remaining brain parenchyma has normal signal  characteristics. There is no cerebellar tonsillar herniation. Expected arterial  flow-voids are present. Mild mucosal thickening in the bilateral maxillary  sinuses without air-fluid level. The mastoid air cells and middle ears are  clear. The orbital contents are within normal limits. No significant osseous or  scalp lesions are identified. MRA Head:  Right cerebellar arteriovenous malformation, with nidus measuring approximately  3.3 x 2.0 cm (series 3 image 99). There is supply is via an enlarged right PICA,  which contains an aneurysm measuring 1.2 x 0.9 x 1.2 cm (series 3 image 100). There are additional small arterial feeding vessels from the right vertebral  artery and from the AICA. There are multiple adjacent draining veins, one of the  largest courses along the right petrous ridge and drains into the right distal  transverse sinus, and a second largest which courses along the right posterior  cerebellum and drains into the torcular. There is no evidence of large vessel occlusion or flow-limiting stenosis of the  intracranial internal carotid, anterior cerebral, and middle cerebral arteries. The anterior communicating artery is patent, with small right A1 segment.     There is no evidence of large vessel occlusion or flow-limiting stenosis of the  intracranial vertebral arteries, basilar artery, or posterior cerebral arteries. The posterior communicating arteries are patent, right larger than left. MRA Neck:  The common carotid arteries demonstrate no flow-limiting stenosis. There is no  evidence of flow-limiting stenosis in the cervical right internal carotid  artery. There is no evidence of flow-limiting stenosis in the cervical left  internal carotid artery. There is a codominant vertebrobasilar arterial system. The vertebral arteries  and imaged portion of the basilar artery demonstrate no flow-limiting stenosis. Impression  MRI brain:  1. Stable right cerebellar intraparenchymal hemorrhage with stable surrounding  mild edema and mass effect with partial effacement of the fourth ventricle. No  cerebellar tonsillar herniation. No hydrocephalus. Adjacent right cerebellar  arteriovenous malformation; see below. MRA head:  1. Right cerebellar arteriovenous malformation as described in detail above with  nidus measuring approximately 3.3 x 2.0 cm, with predominant arterial supply  from the right PICA, and dominant draining veins draining into the distal right  transverse sinus and torcular. 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 MRA NECK:  1. No evidence of significant stenosis.     Assessment:     Active Problems:    Cerebellar hemorrhage ([address] (3/18/2022)      Plan:      Right Cerebellar Hemorrhage: ICH score of 1  · As seen on [facility] and MRI Brain  · Repeat CTH 03/19/22 stable   · NSGY and NIS onboard with their recommendations as outlined in their notes  · Will avoid unnecessary procedures that cause any stress or strain   · Continue with steroids 4mg Decadron Q 6hr per NSGY  · Pain management per intensivist without oversedating  · PRN antiemetics for n/v  · Continue with Q1hr neuro checks  · PRN cardene gtt to keep SBP<140  · Avoid any AC or antiplatelets at this time  · Pending Echocardiogram  · A1c and lipid panel normal  · NIS signed off (Plan for possible coil embolization of aneurysm and possible embolization of AVM as part of combined treatment approach in a non-urgent setting once hemorrhage resolves)  · Low threshold to repeat CT head if any worsening change in neurologic condition     Further neurology recommendations to follow by Dr. Loralie Severs     Chart reviewed    Case discussed with: intensivist, Taya Dawn, NP, patient, and ICU RN    >35% time spent in counseling or coordination of care of the above in the assessment and plan     Signed By: Martha Dotson NP                    March 20, 2022    Please note that this dictation was completed with [employer], the computer voice recognition software. Quite often unanticipated grammatical, syntax, homophones, and other interpretive errors are inadvertently transcribed by the computer software. Please disregard these errors. Please excuse any errors that have escaped final proofreading.

## 2022-03-21 LAB
ANION GAP SERPL CALC-SCNC: 6 MMOL/L (ref 5–15)
BUN SERPL-MCNC: 10 MG/DL (ref 6–20)
BUN/CREAT SERPL: 24 (ref 12–20)
CALCIUM SERPL-MCNC: 8.5 MG/DL (ref 8.5–10.1)
CHLORIDE SERPL-SCNC: 107 MMOL/L (ref 97–108)
CO2 SERPL-SCNC: 23 MMOL/L (ref 21–32)
CREAT SERPL-MCNC: 0.42 MG/DL (ref 0.55–1.02)
ERYTHROCYTE [DISTWIDTH] IN BLOOD BY AUTOMATED COUNT: 11.8 % (ref 11.5–14.5)
GLUCOSE SERPL-MCNC: 80 MG/DL (ref 65–100)
HCT VFR BLD AUTO: 37.1 % (ref 35–47)
HGB BLD-MCNC: 12.4 G/DL (ref 11.5–16)
MAGNESIUM SERPL-MCNC: 1.9 MG/DL (ref 1.6–2.4)
MCH RBC QN AUTO: 31.1 PG (ref 26–34)
MCHC RBC AUTO-ENTMCNC: 33.4 G/DL (ref 30–36.5)
MCV RBC AUTO: 93 FL (ref 80–99)
NRBC # BLD: 0 K/UL (ref 0–0.01)
NRBC BLD-RTO: 0 PER 100 WBC
PHOSPHATE SERPL-MCNC: 3 MG/DL (ref 2.6–4.7)
PLATELET # BLD AUTO: 197 K/UL (ref 150–400)
PMV BLD AUTO: 10.6 FL (ref 8.9–12.9)
POTASSIUM SERPL-SCNC: 4.2 MMOL/L (ref 3.5–5.1)
RBC # BLD AUTO: 3.99 M/UL (ref 3.8–5.2)
SODIUM SERPL-SCNC: 136 MMOL/L (ref 136–145)
WBC # BLD AUTO: 11.1 K/UL (ref 3.6–11)

## 2022-03-21 PROCEDURE — 36415 COLL VENOUS BLD VENIPUNCTURE: CPT

## 2022-03-21 PROCEDURE — 74011000250 HC RX REV CODE- 250: Performed by: NEUROLOGICAL SURGERY

## 2022-03-21 PROCEDURE — 74011250637 HC RX REV CODE- 250/637: Performed by: NURSE PRACTITIONER

## 2022-03-21 PROCEDURE — 74011250636 HC RX REV CODE- 250/636: Performed by: NURSE PRACTITIONER

## 2022-03-21 PROCEDURE — 83735 ASSAY OF MAGNESIUM: CPT

## 2022-03-21 PROCEDURE — 74011250637 HC RX REV CODE- 250/637: Performed by: NEUROLOGICAL SURGERY

## 2022-03-21 PROCEDURE — 65610000006 HC RM INTENSIVE CARE

## 2022-03-21 PROCEDURE — 74011250636 HC RX REV CODE- 250/636: Performed by: ANESTHESIOLOGY

## 2022-03-21 PROCEDURE — 85027 COMPLETE CBC AUTOMATED: CPT

## 2022-03-21 PROCEDURE — 80048 BASIC METABOLIC PNL TOTAL CA: CPT

## 2022-03-21 PROCEDURE — 84100 ASSAY OF PHOSPHORUS: CPT

## 2022-03-21 PROCEDURE — 74011250636 HC RX REV CODE- 250/636: Performed by: NEUROLOGICAL SURGERY

## 2022-03-21 PROCEDURE — 74011000250 HC RX REV CODE- 250: Performed by: NURSE PRACTITIONER

## 2022-03-21 RX ORDER — MAGNESIUM SULFATE HEPTAHYDRATE 40 MG/ML
2 INJECTION, SOLUTION INTRAVENOUS ONCE
Status: COMPLETED | OUTPATIENT
Start: 2022-03-21 | End: 2022-03-22

## 2022-03-21 RX ORDER — SCOLOPAMINE TRANSDERMAL SYSTEM 1 MG/1
1 PATCH, EXTENDED RELEASE TRANSDERMAL
Status: DISCONTINUED | OUTPATIENT
Start: 2022-03-21 | End: 2022-03-25 | Stop reason: HOSPADM

## 2022-03-21 RX ADMIN — SODIUM CHLORIDE, PRESERVATIVE FREE 5 MG: 5 INJECTION INTRAVENOUS at 05:16

## 2022-03-21 RX ADMIN — DEXAMETHASONE SODIUM PHOSPHATE 4 MG: 4 INJECTION, SOLUTION INTRAMUSCULAR; INTRAVENOUS at 08:36

## 2022-03-21 RX ADMIN — HYDROMORPHONE HYDROCHLORIDE 1 MG: 1 INJECTION, SOLUTION INTRAMUSCULAR; INTRAVENOUS; SUBCUTANEOUS at 23:27

## 2022-03-21 RX ADMIN — DEXAMETHASONE SODIUM PHOSPHATE 4 MG: 4 INJECTION, SOLUTION INTRAMUSCULAR; INTRAVENOUS at 21:07

## 2022-03-21 RX ADMIN — POTASSIUM CHLORIDE AND SODIUM CHLORIDE: 900; 150 INJECTION, SOLUTION INTRAVENOUS at 13:53

## 2022-03-21 RX ADMIN — MAGNESIUM SULFATE IN WATER 2 G: 40 INJECTION, SOLUTION INTRAVENOUS at 06:45

## 2022-03-21 RX ADMIN — METOCLOPRAMIDE HYDROCHLORIDE 10 MG: 5 INJECTION INTRAMUSCULAR; INTRAVENOUS at 15:54

## 2022-03-21 RX ADMIN — POTASSIUM CHLORIDE AND SODIUM CHLORIDE: 900; 150 INJECTION, SOLUTION INTRAVENOUS at 21:17

## 2022-03-21 RX ADMIN — ACETAMINOPHEN 650 MG: 325 TABLET ORAL at 05:20

## 2022-03-21 RX ADMIN — HYDROMORPHONE HYDROCHLORIDE 1 MG: 1 INJECTION, SOLUTION INTRAMUSCULAR; INTRAVENOUS; SUBCUTANEOUS at 08:38

## 2022-03-21 RX ADMIN — HYDROMORPHONE HYDROCHLORIDE 1 MG: 1 INJECTION, SOLUTION INTRAMUSCULAR; INTRAVENOUS; SUBCUTANEOUS at 12:19

## 2022-03-21 RX ADMIN — HYDROMORPHONE HYDROCHLORIDE 1 MG: 1 INJECTION, SOLUTION INTRAMUSCULAR; INTRAVENOUS; SUBCUTANEOUS at 15:55

## 2022-03-21 RX ADMIN — HYDROMORPHONE HYDROCHLORIDE 1 MG: 1 INJECTION, SOLUTION INTRAMUSCULAR; INTRAVENOUS; SUBCUTANEOUS at 18:53

## 2022-03-21 RX ADMIN — POTASSIUM CHLORIDE AND SODIUM CHLORIDE: 900; 150 INJECTION, SOLUTION INTRAVENOUS at 05:36

## 2022-03-21 RX ADMIN — PANTOPRAZOLE SODIUM 40 MG: 40 TABLET, DELAYED RELEASE ORAL at 08:36

## 2022-03-21 RX ADMIN — METOCLOPRAMIDE HYDROCHLORIDE 10 MG: 5 INJECTION INTRAMUSCULAR; INTRAVENOUS at 02:57

## 2022-03-21 RX ADMIN — SODIUM CHLORIDE, PRESERVATIVE FREE 5 MG: 5 INJECTION INTRAVENOUS at 18:53

## 2022-03-21 RX ADMIN — SODIUM CHLORIDE, PRESERVATIVE FREE 5 MG: 5 INJECTION INTRAVENOUS at 12:19

## 2022-03-21 RX ADMIN — METOCLOPRAMIDE HYDROCHLORIDE 10 MG: 5 INJECTION INTRAMUSCULAR; INTRAVENOUS at 08:36

## 2022-03-21 RX ADMIN — SODIUM CHLORIDE, PRESERVATIVE FREE 10 ML: 5 INJECTION INTRAVENOUS at 06:51

## 2022-03-21 NOTE — PROGRESS NOTES
Neurosurgery Progress Note  Ava Ca, [facility]          Admit Date: 3/17/2022   LOS: 4 days        Daily Progress Note: 3/21/2022      Subjective: The patient was found on the floor near the entrance to her home with vomit in a trash can nearby. She had a terrible headache and vertigo. She was taken by EMS to [facility] ER where her head CT revealed a right cerebellar hemorrhage with mild compression of the fourth ventricle. Her CTA revealed a right cerebellar AVM. She transferred to [facility]. She has had continued dizziness, headache, nausea, vomiting over the weekend. She has not been able to keep much food down. She states it feels like the room is still spinning. Denies numbness, tingling, chest pain, leg pain,  difficulty swallowing, and dyspnea. Objective:     Vital signs  Temp (24hrs), Av.1 °F (36.7 °C), Min:97.8 °F (36.6 °C), Max:98.3 °F (36.8 °C)   701 -  1900  In: 746 [P.O.:200; I.V.:375]  Out: -    190 -  0700  In: 3856.3 [I.V.:3856.3]  Out: 3850 [Urine:3850]    Visit Vitals  /80   Pulse 67   Temp 98 °F (36.7 °C)   Resp 12   Ht 5' (1.524 m)   Wt 39 kg (85 lb 15.7 oz)   SpO2 100%   BMI 16.79 kg/m²      O2 Device: None (Room air)     Pain control  Pain Assessment  Pain Scale 1: Numeric (0 - 10)  Pain Intensity 1: 3  Pain Onset 1: ongoing  Pain Location 1: Head  Pain Orientation 1: Anterior  Pain Description 1: Aching  Pain Intervention(s) 1: Medication (see MAR)    PT/OT  Gait                 Physical Exam:  Gen:NAD. Neuro: A&Ox3. Follows commands. Speech clear. Affect normal.  PERRL. Dysconjugate gaze on the right. Face symmetric. Tongue midline. FARRIS spontaneously. Strength 5/5 in UE and LE BL. Negative drift. Mild ataxia right hand. Gait deferred. CT head without contrast on 22 shows there is a right cerebellar hemorrhage with surrounding edema with extension into the right cerebellar peduncle.  There is compression of the fourth ventricle which is small and contains a small amount of hemorrhage. The temporal tips are slightly dilated may represent developing hydrocephalus. 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 Stable acute right cerebellar intraparenchymal hemorrhage, with stable mild surrounding edema and mass effect. No hydrocephalus. CT head without contrast on 03/20/22 shows right intra-cerebellar hemorrhage.  No significant change     24 hour results:    Recent Results (from the past 24 hour(s))   CBC W/O DIFF    Collection Time: 03/21/22  5:29 AM   Result Value Ref Range    WBC 11.1 (H) 3.6 - 11.0 K/uL    RBC 3.99 3.80 - 5.20 M/uL    HGB 12.4 11.5 - 16.0 g/dL    HCT 37.1 35.0 - 47.0 %    MCV 93.0 80.0 - 99.0 FL    MCH 31.1 26.0 - 34.0 PG    MCHC 33.4 30.0 - 36.5 g/dL    RDW 11.8 11.5 - 14.5 %    PLATELET 198 755 - 115 K/uL    MPV 10.6 8.9 - 12.9 FL    NRBC 0.0 0  WBC    ABSOLUTE NRBC 0.00 0.00 - 0.01 K/uL   MAGNESIUM    Collection Time: 03/21/22  5:29 AM   Result Value Ref Range    Magnesium 1.9 1.6 - 2.4 mg/dL   PHOSPHORUS    Collection Time: 03/21/22  5:29 AM   Result Value Ref Range    Phosphorus 3.0 2.6 - 4.7 MG/DL   METABOLIC PANEL, BASIC    Collection Time: 03/21/22  5:29 AM   Result Value Ref Range    Sodium 136 136 - 145 mmol/L    Potassium 4.2 3.5 - 5.1 mmol/L    Chloride 107 97 - 108 mmol/L    CO2 23 21 - 32 mmol/L    Anion gap 6 5 - 15 mmol/L    Glucose 80 65 - 100 mg/dL    BUN 10 6 - 20 MG/DL    Creatinine 0.42 (L) 0.55 - 1.02 MG/DL    BUN/Creatinine ratio 24 (H) 12 - 20      GFR est AA >60 >60 ml/min/1.73m2    GFR est non-AA >60 >60 ml/min/1.73m2    Calcium 8.5 8.5 - 10.1 MG/DL          Assessment:     Active Problems:    Cerebellar hemorrhage ([facility] (3/18/2022)        Plan: 1. Right cerebellar hemorrhage with brain compression   - no surgical intervention currently   - good BP control   - symptom management   - will discuss ICU vs NSTU with Dr. Shana Bender  2. Right cerebellar AVM   - no surgical intervention at this time. She may need multidisciplinary approach down the line  3. Nausea/vomiting   - reglan, compazine, zofran PRN   - add scopolamine patch  4. Thrombocytopenia   - Plt 115 yesterday, now 197   - Pepcid changed to Protonix    Activity: up with assist  DVT ppx: SCDs  Dispo: tbd    Will discuss plan with either Dr. Shana Bender or Dr. Kvng Hills.       Florence Tomlinson NP

## 2022-03-21 NOTE — PROGRESS NOTES
0730: Bedside and Verbal shift change report given to Mike Lu RN (oncoming nurse) by Celio Alfaro RN (offgoing nurse). Report included the following information SBAR, Kardex, ED Summary, Intake/Output, MAR, Accordion, Recent Results, Med Rec Status, Cardiac Rhythm NSR, Alarm Parameters  and Dual Neuro Assessment.

## 2022-03-21 NOTE — PROGRESS NOTES
1930:Bedside and Verbal shift change report given to MARTHA Sheehan RN (oncoming nurse) by Saeid Vieira. Mello Faria RN (offgoing nurse). Report included the following information SBAR, Kardex, ED Summary, Procedure Summary, Intake/Output, MAR, Accordion, Cardiac Rhythm Sinus Bradycardia, Alarm Parameters  and Dual Neuro Assessment. 0730: Bedside and Verbal shift change report given to VOLODYMYR Oden RN (oncoming nurse) by MARTHA Sheehan RN (offgoing nurse). Report included the following information SBAR, Kardex, ED Summary, Procedure Summary, Intake/Output, MAR, Accordion, Recent Results, Med Rec Status, Cardiac Rhythm Sinus Bradycardia, Alarm Parameters  and Dual Neuro Assessment. Shift Summary: Q2H neuro checks completed overnight. Pt remains A&Ox4, FARRIS, more steady on feet despite dizziness. Pt has diplopia, photosensitivity, and nystagmus intermittently present  . Pt C/O HA and nausea with 2 emesis episodes overnight. Voiding via bedside commode with 1 assist. No BM overnight.

## 2022-03-21 NOTE — PROGRESS NOTES
Discussed options with some of my neurosurgical colleagues and we all agree it is best to let her recover from her hemorrhage and then conventional surgery carries high risks of neuro deficits so would opt instead for interventional endovascular and possibly gamma knife tx

## 2022-03-21 NOTE — PROGRESS NOTES
Spiritual Care Assessment/Progress Note  [facility]      NAME: Verner Forest      MRN: [id number]  AGE: 40 y.o. SEX: female  Episcopalian Affiliation: Episcopal   Language: English     3/21/2022           Spiritual Assessment begun in [facility] 7S2 INTENSIVE CARE through conversation with:         [x]Patient        [] Family    [] Friend(s)        Reason for Consult: Initial/Spiritual assessment, critical care     Spiritual beliefs: (Please include comment if needed)     [x] Identifies with a maria luz tradition:         [] Supported by a maria luz community:            [] Claims no spiritual orientation:           [] Seeking spiritual identity:                [] Adheres to an individual form of spirituality:           [] Not able to assess:                           Identified resources for coping:      [x] Prayer                               [] Music                  [] Guided Imagery     [x] Family/friends                 [] Pet visits     [] Devotional reading                         [] Unknown     [] Other:                                               Interventions offered during this visit: (See comments for more details)    Patient Interventions: Affirmation of emotions/emotional suffering,Catharsis/review of pertinent events in supportive environment,Initial/Spiritual assessment, Critical care,Prayer (assurance of)     Family/Friend(s):  Affirmation of emotions/emotional suffering,Catharsis/review of pertinent events in supportive environment,Initial Assessment,Prayer (assurance of)     Plan of Care:     [x] Support spiritual and/or cultural needs    [] Support AMD and/or advance care planning process      [] Support grieving process   [] Coordinate Rites and/or Rituals    [] Coordination with community clergy   [] No spiritual needs identified at this time   [] Detailed Plan of Care below (See Comments)  [] Make referral to Music Therapy  [] Make referral to Pet Therapy     [] Make referral to Addiction services  [] Make referral to [facility]  [] Make referral to Spiritual Care Partner  [] No future visits requested        [x] Contact Spiritual Care for further referrals     Comments: Visited Ms Robert Dodd in ICU-11 for spiritual assessment; reviewed patient's chart prior to visit. Ms Robert Dodd was lying quietly in bed with eye mask over her eyes; patient's sister was sitting quietly at her bedside. Provided spiritual presence and active listening as Ms Robert Dodd shared that she still wasn't feeling very well; she expressed no specific concerns. Acknowledged her feelings and offered words of support. Patient's sister said that she was doing well. Patient shared that she would like for  to keep her in thoughts and prayers. Assured her and family of prayers on their behalf and of ongoing  availability. : Rev. Court Wynn.  Dao Valle; [facility], to contact [phone] [address]unders call: [phone]

## 2022-03-21 NOTE — PROGRESS NOTES
Transition of Care Plan   RUR- Low      DISPOSITION: TBD, pending medical progress   F/U with PCP/Specialist     Transport: family   Patient admitted with right cerebellar hemorrhage  Per notes with c/o headache and photophobia. Patient receiving Dilaudid for headache and reglan and compazine for nausea. Per notes no indication for surgery at this time. Prior to admission patient was independent. Contact: Mother Ron Joshua [phone].      Edward Lund RN,[facility]

## 2022-03-21 NOTE — H&P
[facility]    ICU TEAM Progress Note    Name: Donnell Sapp   : 1985   MRN: [id number]   Date: 3/21/2022      JOSEPH Espinoza is a 40year old female with pmhx of migraines who presents as a transfer from an OSH ED with a right cerebellar hemorrhage. She presented to OSH ED by EMS with sudden headache, dizziness, and nausea. She had just returned home from work and was taking with the sitter. She denies any precipitating events or trauma. Her headache is rated at 8/10 while at rest and 10/10 with activity. Fentanyl and hydromorphone were given at OSH ED with no improvement in symptoms. She was transferred to [facility] ICU for evaluation with neuro surgery and neuro interventional.    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 Neurological System  NSx following  Decadron  Q1 hour neurochecks  Analgesia: Dilaudid and Acetaminophen    2. Cardiovascular System  SBP Goal of: < 140 mmHg  MAP Goal of: > 65 mmHg  None - For above SBP/MAP goals  Transfusion Trigger (Hgb): <7 g/dL and <50K platelets  Keep K > 4; Mg > 2     3. Respiratory System  Incentive Spirometry  SpO2 Goal: > 92%  DVT Prophylaxis: SCD's or Sequential Compression Device     4. Renal/GI/Endocrine System  IVFs: NS KCl  Ulcer Prophylaxis: Protonix  Bowel Regimen: MiraLax  Feeding:  ADAT  Blood Sugar Goal 120-180 - Glycemic Control: Insulin    5. Infectious Disease  Indwelling Catheter:  Tubes: None  Lines: Peripheral IV    6. PT/OT: None at this time     7.  Goals of Care Discussion with family Yes     8. Plan of Care/Code Status: Full Code    9. Discussed Care Plan with Bedside RN    10. Documentation of Current Medications      Active Problem List:     Problem List  Date Reviewed: 2020          Codes Class    Cerebellar hemorrhage ([facility] ICD-10-CM: I61.4  ICD-9-CM: 060         DUB (dysfunctional uterine bleeding) ICD-10-CM: N93.8  ICD-9-CM: 626.8         Atonic postpartum hemorrhage ICD-10-CM: O72.1  ICD-9-CM: 666.14         History of postpartum depression ICD-10-CM: Z87.59, Z86.59  ICD-9-CM: V13.29, V11.8         ROM (rupture of membranes), premature ICD-10-CM: O42.90  ICD-9-CM: 658.10         Chronic pelvic pain in female ICD-10-CM: R10.2, G89.29  ICD-9-CM: 625.9, 338.29         Pregnancy ICD-10-CM: Z34.90  ICD-9-CM: V22.2         Breech presentation ICD-10-CM: O32. 1XX0  ICD-9-CM: 652.20         Threatened  labor ICD-10-CM: O47.00  ICD-9-CM: 644.00         Abdominal pain complicating pregnancy [id number]: O26.899, R10.9  ICD-9-CM: 646.80, 789.00         Appendicitis ICD-10-CM: K37  ICD-9-CM: 56               Past Medical History:      has a past medical history of Anxiety, Arthritis, Calculus of kidney, Dyspepsia and other specified disorders of function of stomach, GERD (gastroesophageal reflux disease), Kidney disease, Other ill-defined conditions(799.89), Postpartum hemorrhage (2017), and Psychiatric disorder.     She has no past medical history of Abnormal Pap smear, Abnormal Papanicolaou smear of cervix, Acquired hypothyroidism, Anemia, Anemia NEC, Asthma, Chlamydia, Complication of anesthesia, Diabetes mellitus, Essential hypertension, Essential hypertension, Genital herpes, unspecified, Gestational diabetes, Gestational hypertension, Gonorrhea, Heart abnormalities, Heart abnormality, Herpes gestationis, Herpes simplex without mention of complication, Human immunodeficiency virus (HIV) disease ([facility] OTHER MEDICAL, Infertility, Infertility, female, Phlebitis and thrombophlebitis of unspecified site, Pituitary disorder ([facility] Polycystic disease, ovaries, Postpartum depression, Rhesus isoimmunization unspecified as to episode of care in pregnancy, Sickle cell trait syndrome ([facility] Sickle-cell disease, unspecified, Syphilis, Systemic lupus erythematosus ([facility] Thyroid activity decreased, Trauma, Unspecified breast disorder, Unspecified diseases of blood and blood-forming organs, or Unspecified epilepsy without mention of intractable epilepsy. Past Surgical History:      has a past surgical history that includes hx wisdom teeth extraction; pr anesth,knee area surgery; hx heent; pr abdomen surgery proc unlisted (10/30/12); hx cholecystectomy; hx appendectomy; hx orthopaedic; upper gi endoscopy,biopsy (2015); pr  delivery only; hx dilation and curettage; hx gyn; hx gyn; and hx gyn. Home Medications:     Prior to Admission medications    Medication Sig Start Date End Date Taking? Authorizing Provider   IBUPROFEN PO Take  by mouth. Provider, Historical       Allergies/Social/Family History: Allergies   Allergen Reactions    Ambien [Zolpidem] Other (comments)     \"Hallucinations and black outs\"    Morphine Rash      Social History     Tobacco Use    Smoking status: Current Every Day Smoker     Packs/day: 0.50     Years: 15.00     Pack years: 7.50    Smokeless tobacco: Never Used    Tobacco comment: about 6 cigarettes per day at present   Substance Use Topics    Alcohol use: Yes     Comment: RARELY      Family History   Problem Relation Age of Onset    Emphysema Mother     No Known Problems Father     No Known Problems Son     No Known Problems Son     Anesth Problems Neg Hx        Review of Systems:     Review of Systems   Eyes: Positive for photophobia. Gastrointestinal: Positive for nausea and vomiting. Neurological: Positive for dizziness and headaches. All other systems reviewed and are negative.         Objective:   Vital Signs:  Visit Vitals  BP 104/71 (BP 1 Location: Left upper arm, BP Patient Position: At rest)   Pulse 60   Temp 97.8 °F (36.6 °C)   Resp 15   Ht 5' (1.524 m)   Wt 39 kg (85 lb 15.7 oz)   SpO2 97%   BMI 16.79 kg/m²      O2 Device: None (Room air) Temp (24hrs), Av.1 °F (36.7 °C), Min:97.8 °F (36.6 °C), Max:98.3 °F (36.8 °C)           Intake/Output:     Intake/Output Summary (Last 24 hours) at 3/21/2022 0618  Last data filed at 3/21/2022 0536  Gross per 24 hour   Intake 2400 ml   Output 3850 ml   Net -1450 ml       Physical Exam:    Physical Exam  Vitals and nursing note reviewed. Constitutional:       General: She is not in acute distress. Appearance: She is underweight. Eyes:      Extraocular Movements: Extraocular movements intact. Left eye: Left eye abnormal extraocular motion: photophobia. Cardiovascular:      Rate and Rhythm: Normal rate and regular rhythm. Pulmonary:      Effort: Pulmonary effort is normal.      Breath sounds: Normal breath sounds. Abdominal:      General: Abdomen is flat. Bowel sounds are normal.      Palpations: Abdomen is soft. Tenderness: There is no abdominal tenderness. Musculoskeletal:         General: Normal range of motion. Skin:     General: Skin is warm and dry. Capillary Refill: Capillary refill takes less than 2 seconds. Neurological:      General: No focal deficit present. Mental Status: She is alert and oriented to person, place, and time. Psychiatric:         Mood and Affect: Mood normal.         Judgment: Judgment normal.           LABS AND  DATA: Personally reviewed  Recent Labs     22  0529 22   WBC 11.1* 11.0   HGB 12.4 11.9   HCT 37.1 35.4    115*     Recent Labs     22  0529 22    136   K 4.2 4.1    108   CO2 23 23   BUN 10 13   CREA 0.42* 0.55   GLU 80 102*   CA 8.5 8.5   MG 1.9 2.1   PHOS 3.0 2.3*     No results for input(s): AP, TBIL, TP, ALB, GLOB, AML, LPSE in the last 72 hours.     No lab exists for component: SGOT, GPT, AMYP  No results for input(s): INR, PTP, APTT, INREXT, INREXT in the last 72 hours. No results for input(s): PHI, PCO2I, PO2I, FIO2I in the last 72 hours. No results for input(s): CPK, CKMB, TROIQ, BNPP in the last 72 hours. Imaging:  3/21/2022       Chest X-Ray:  CXR Results  (Last 48 hours)    None            ECHO:  N/A    Ventilator Settings:  Mode Rate Tidal Volume Pressure FiO2 PEEP                    Peak airway pressure:      Minute ventilation:          MEDS: Reviewed    Multidisciplinary Rounds Completed:  N/A    ABCDEF Bundle/Checklist Completed:  Yes    SPECIAL EQUIPMENT  None    DISPOSITION  Stay in ICU    CRITICAL CARE CONSULTANT NOTE  I had a face to face encounter with the patient, reviewed and interpreted patient data including clinical events, labs, images, vital signs, I/O's, and examined patient.   I have discussed the case and the plan and management of the patient's care with the consulting services, the bedside nurses and the respiratory therapist.      Moisés Altamirano DO   Staff Intensivist/Anesthesiologist  Critical Care Medicine

## 2022-03-22 LAB
ANION GAP SERPL CALC-SCNC: 5 MMOL/L (ref 5–15)
BUN SERPL-MCNC: 7 MG/DL (ref 6–20)
BUN/CREAT SERPL: 21 (ref 12–20)
CALCIUM SERPL-MCNC: 8.5 MG/DL (ref 8.5–10.1)
CHLORIDE SERPL-SCNC: 108 MMOL/L (ref 97–108)
CO2 SERPL-SCNC: 24 MMOL/L (ref 21–32)
CREAT SERPL-MCNC: 0.34 MG/DL (ref 0.55–1.02)
ERYTHROCYTE [DISTWIDTH] IN BLOOD BY AUTOMATED COUNT: 11.7 % (ref 11.5–14.5)
GLUCOSE SERPL-MCNC: 73 MG/DL (ref 65–100)
HCT VFR BLD AUTO: 37 % (ref 35–47)
HGB BLD-MCNC: 12.7 G/DL (ref 11.5–16)
MAGNESIUM SERPL-MCNC: 2 MG/DL (ref 1.6–2.4)
MCH RBC QN AUTO: 31.4 PG (ref 26–34)
MCHC RBC AUTO-ENTMCNC: 34.3 G/DL (ref 30–36.5)
MCV RBC AUTO: 91.6 FL (ref 80–99)
NRBC # BLD: 0 K/UL (ref 0–0.01)
NRBC BLD-RTO: 0 PER 100 WBC
PHOSPHATE SERPL-MCNC: 3.9 MG/DL (ref 2.6–4.7)
PLATELET # BLD AUTO: 207 K/UL (ref 150–400)
PMV BLD AUTO: 10.3 FL (ref 8.9–12.9)
POTASSIUM SERPL-SCNC: 5.1 MMOL/L (ref 3.5–5.1)
RBC # BLD AUTO: 4.04 M/UL (ref 3.8–5.2)
SODIUM SERPL-SCNC: 137 MMOL/L (ref 136–145)
WBC # BLD AUTO: 9.6 K/UL (ref 3.6–11)

## 2022-03-22 PROCEDURE — 80048 BASIC METABOLIC PNL TOTAL CA: CPT

## 2022-03-22 PROCEDURE — 97535 SELF CARE MNGMENT TRAINING: CPT

## 2022-03-22 PROCEDURE — 74011000250 HC RX REV CODE- 250: Performed by: NEUROLOGICAL SURGERY

## 2022-03-22 PROCEDURE — 83735 ASSAY OF MAGNESIUM: CPT

## 2022-03-22 PROCEDURE — 74011250637 HC RX REV CODE- 250/637: Performed by: NEUROLOGICAL SURGERY

## 2022-03-22 PROCEDURE — 97116 GAIT TRAINING THERAPY: CPT

## 2022-03-22 PROCEDURE — 74011250636 HC RX REV CODE- 250/636: Performed by: NURSE PRACTITIONER

## 2022-03-22 PROCEDURE — 65660000000 HC RM CCU STEPDOWN

## 2022-03-22 PROCEDURE — 85027 COMPLETE CBC AUTOMATED: CPT

## 2022-03-22 PROCEDURE — 84100 ASSAY OF PHOSPHORUS: CPT

## 2022-03-22 PROCEDURE — 97162 PT EVAL MOD COMPLEX 30 MIN: CPT

## 2022-03-22 PROCEDURE — 97165 OT EVAL LOW COMPLEX 30 MIN: CPT

## 2022-03-22 PROCEDURE — 74011250636 HC RX REV CODE- 250/636: Performed by: INTERNAL MEDICINE

## 2022-03-22 PROCEDURE — 36415 COLL VENOUS BLD VENIPUNCTURE: CPT

## 2022-03-22 PROCEDURE — 74011250636 HC RX REV CODE- 250/636: Performed by: NEUROLOGICAL SURGERY

## 2022-03-22 PROCEDURE — 74011000250 HC RX REV CODE- 250: Performed by: NURSE PRACTITIONER

## 2022-03-22 PROCEDURE — 74011250636 HC RX REV CODE- 250/636: Performed by: ANESTHESIOLOGY

## 2022-03-22 RX ORDER — MECLIZINE HCL 12.5 MG 12.5 MG/1
12.5 TABLET ORAL
Status: DISCONTINUED | OUTPATIENT
Start: 2022-03-22 | End: 2022-03-25 | Stop reason: HOSPADM

## 2022-03-22 RX ORDER — SODIUM CHLORIDE 9 MG/ML
75 INJECTION, SOLUTION INTRAVENOUS CONTINUOUS
Status: DISCONTINUED | OUTPATIENT
Start: 2022-03-22 | End: 2022-03-25

## 2022-03-22 RX ADMIN — HYDROMORPHONE HYDROCHLORIDE 1 MG: 1 INJECTION, SOLUTION INTRAMUSCULAR; INTRAVENOUS; SUBCUTANEOUS at 16:10

## 2022-03-22 RX ADMIN — PANTOPRAZOLE SODIUM 40 MG: 40 TABLET, DELAYED RELEASE ORAL at 08:10

## 2022-03-22 RX ADMIN — HYDROMORPHONE HYDROCHLORIDE 1 MG: 1 INJECTION, SOLUTION INTRAMUSCULAR; INTRAVENOUS; SUBCUTANEOUS at 19:19

## 2022-03-22 RX ADMIN — SODIUM CHLORIDE, PRESERVATIVE FREE 40 ML: 5 INJECTION INTRAVENOUS at 16:10

## 2022-03-22 RX ADMIN — DEXAMETHASONE SODIUM PHOSPHATE 4 MG: 4 INJECTION, SOLUTION INTRAMUSCULAR; INTRAVENOUS at 20:40

## 2022-03-22 RX ADMIN — METOCLOPRAMIDE HYDROCHLORIDE 10 MG: 5 INJECTION INTRAMUSCULAR; INTRAVENOUS at 06:58

## 2022-03-22 RX ADMIN — MECLIZINE 12.5 MG: 12.5 TABLET ORAL at 18:56

## 2022-03-22 RX ADMIN — SODIUM CHLORIDE, PRESERVATIVE FREE 10 ML: 5 INJECTION INTRAVENOUS at 21:58

## 2022-03-22 RX ADMIN — SODIUM CHLORIDE, PRESERVATIVE FREE 5 MG: 5 INJECTION INTRAVENOUS at 09:49

## 2022-03-22 RX ADMIN — METOCLOPRAMIDE HYDROCHLORIDE 10 MG: 5 INJECTION INTRAMUSCULAR; INTRAVENOUS at 23:34

## 2022-03-22 RX ADMIN — METOCLOPRAMIDE HYDROCHLORIDE 10 MG: 5 INJECTION INTRAMUSCULAR; INTRAVENOUS at 17:42

## 2022-03-22 RX ADMIN — SODIUM CHLORIDE, PRESERVATIVE FREE 5 MG: 5 INJECTION INTRAVENOUS at 16:10

## 2022-03-22 RX ADMIN — MECLIZINE 12.5 MG: 12.5 TABLET ORAL at 13:52

## 2022-03-22 RX ADMIN — SODIUM CHLORIDE, PRESERVATIVE FREE 5 MG: 5 INJECTION INTRAVENOUS at 03:29

## 2022-03-22 RX ADMIN — HYDROMORPHONE HYDROCHLORIDE 1 MG: 1 INJECTION, SOLUTION INTRAMUSCULAR; INTRAVENOUS; SUBCUTANEOUS at 09:49

## 2022-03-22 RX ADMIN — SODIUM CHLORIDE 75 ML/HR: 9 INJECTION, SOLUTION INTRAVENOUS at 09:46

## 2022-03-22 RX ADMIN — HYDROMORPHONE HYDROCHLORIDE 1 MG: 1 INJECTION, SOLUTION INTRAMUSCULAR; INTRAVENOUS; SUBCUTANEOUS at 03:29

## 2022-03-22 RX ADMIN — DEXAMETHASONE SODIUM PHOSPHATE 4 MG: 4 INJECTION, SOLUTION INTRAMUSCULAR; INTRAVENOUS at 08:10

## 2022-03-22 RX ADMIN — POTASSIUM CHLORIDE AND SODIUM CHLORIDE: 900; 150 INJECTION, SOLUTION INTRAVENOUS at 06:25

## 2022-03-22 RX ADMIN — HYDROMORPHONE HYDROCHLORIDE 1 MG: 1 INJECTION, SOLUTION INTRAMUSCULAR; INTRAVENOUS; SUBCUTANEOUS at 06:58

## 2022-03-22 RX ADMIN — HYDROMORPHONE HYDROCHLORIDE 1 MG: 1 INJECTION, SOLUTION INTRAMUSCULAR; INTRAVENOUS; SUBCUTANEOUS at 23:34

## 2022-03-22 NOTE — PROGRESS NOTES
Problem: Self Care Deficits Care Plan (Adult)  Goal: *Acute Goals and Plan of Care (Insert Text)  Description: FUNCTIONAL STATUS PRIOR TO ADMISSION: Patient was independent and active without use of DME.     HOME SUPPORT: The patient lived with family but did not require assist.    Occupational Therapy Goals  Initiated 3/22/2022  1. Patient will perform standing grooming with modified independence within 7 day(s). 2.  Patient will perform upper and lower body bathing with modified independence within 7 day(s). 3.  Patient will perform lower body dressing with modified independence within 7 day(s). 4.  Patient will perform toilet transfers with modified independence within 7 day(s). 5.  Patient will perform all aspects of toileting with modified independence within 7 day(s). 6.  Patient will participate in upper extremity therapeutic exercise/activities with supervision/set-up for 10 minutes within 7 day(s). 7.  Patient will utilize energy conservation techniques during functional activities with verbal cues within 7 day(s). Outcome: Not Met   OCCUPATIONAL THERAPY EVALUATION  Patient: Marquita Najjar (40 y.o. female)  Date: 3/22/2022  Primary Diagnosis: [facility]bellar [facility] [I61.4]        Precautions:   Fall (SBP <140)    ASSESSMENT  Based on the objective data described below, the patient presents with impaired balance, generalized weakness, mildly impaired LUE coordination, dizziness with movement, impaired vision (R lateral nystagmus, diplopia, light sensitivity), and decreased activity tolerance/endurance s/p admission for cerebellar hemorrhage. MRI indicating right intra-cerebellar hemorrhage and AVM - no surgical intervention planned at this time. Obtained clearance from RN. Pt received supine, with eye mask donned and agreeable to participation in therapy session. She performed bed mobility/functional transfers with min A and seated ADL tasks with CGA to min A.  Following brief ambulation (please see PT note), pt returned to supine and positioned for comfort. As pt is well below her functional baseline of independent, recommend d/c to IPR at this time. Current Level of Function Impacting Discharge (ADLs/self-care): min A toileting, CGA seated LB dressing    Functional Outcome Measure: The patient scored Total A-D  Total A-D (Motor Function): 66/66 on the Fugl-Friedman Assessment which is indicative of no impairment in upper extremity functional status. Other factors to consider for discharge: PLOF independent, lives with mother and sons (3 & 6 y.o)     Patient will benefit from skilled therapy intervention to address the above noted impairments. PLAN :  Recommendations and Planned Interventions: self care training, functional mobility training, therapeutic exercise, balance training, therapeutic activities, endurance activities, patient education and home safety training    Frequency/Duration: Patient will be followed by occupational therapy 5 times a week to address goals. Recommendation for discharge: (in order for the patient to meet his/her long term goals)  Therapy 3 hours per day 5-7 days per week    This discharge recommendation:  Has not yet been discussed the attending provider and/or case management    IF patient discharges home will need the following DME: TBD       SUBJECTIVE:   Patient stated I'm doing a little better today.     OBJECTIVE DATA SUMMARY:   HISTORY:   Past Medical History:   Diagnosis Date    Anxiety     Arthritis     Calculus of kidney     Dyspepsia and other specified disorders of function of stomach     GERD (gastroesophageal reflux disease)     Kidney disease     hx of kidney stone    Other ill-defined conditions(799.89)     kidney stone in pass,passed    Postpartum hemorrhage 2017    ALSO HAD HEMORRHAGE DURING MISCARRIAGE 2012    Psychiatric disorder     ADHD     Past Surgical History:   Procedure Laterality Date    ABDOMEN SURGERY PROC UNLISTED  10/30/12    Laparoscopic cholecystectomy     DELIVERY ONLY          HX APPENDECTOMY      HX CHOLECYSTECTOMY      HX DILATION AND CURETTAGE      HX GYN      miscarriage x2    HX GYN      C section    HX GYN      EXP LAPS    HX HEENT      wisdom teeth extraction    HX ORTHOPAEDIC      torn catiledge repair left knee    HX WISDOM TEETH EXTRACTION      IA ANESTH,KNEE AREA SURGERY      UPPER GI ENDOSCOPY,BIOPSY  2015          Expanded or extensive additional review of patient history:     Home Situation  Home Environment: Private residence  # Steps to Enter: 5  Rails to Enter: Yes  One/Two Story Residence: One story  Living Alone: No  Support Systems: Parent(s),Child(astrid) (4 +8 y.o sons)  Patient Expects to be Discharged to[de-identified] Home with family assistance  Current DME Used/Available at Home: Walker, rolling  Tub or Shower Type: Tub/Shower combination    Hand dominance: Right    EXAMINATION OF PERFORMANCE DEFICITS:  Cognitive/Behavioral Status:  Neurologic State: Alert  Orientation Level: Oriented X4  Cognition: Follows commands  Perception: Appears intact  Perseveration: No perseveration noted  Safety/Judgement: Insight into deficits;Good awareness of safety precautions    Skin: visually intact     Edema: none noted     Hearing: Auditory  Auditory Impairment: None    Vision/Perceptual:    Tracking: Able to track stimulus in all quadrants w/o difficulty (pt with nystagumus and diplopia in R lateral visual field)    Saccades: Decreased speed of pursuit between targets    Convergence: Normal (within 6 inches from nose)    Visual Fields:  (able to detect stimulus in all quadrants)  Diplopia: Yes  - double vision with shifting eyes target to target, constant blurriness  Acuity: Able to read clock/calendar on wall without difficulty; Able to read employee name badge without difficulty (pt reporting blurriness with near and far targets)    Corrective Lenses: Glasses    Range of Motion:    AROM: Within functional limits  PROM: Within functional limits                      Strength:    Strength: Within functional limits                Coordination:  Coordination: Generally decreased, functional (LUE minorly impaired)  Fine Motor Skills-Upper: Left Intact; Right Intact    Gross Motor Skills-Upper: Left Intact; Right Intact    Tone & Sensation:    Tone: Normal  Sensation: Intact                      Balance:  Sitting: Intact  Standing: Impaired  Standing - Static: Fair  Standing - Dynamic : Fair    Functional Mobility and Transfers for ADLs:  Bed Mobility:  Supine to Sit: Stand-by assistance  Sit to Supine: Stand-by assistance  Scooting: Stand-by assistance    Transfers:  Sit to Stand: Minimum assistance  Stand to Sit: Minimum assistance  Bed to Chair: Minimum assistance  Toilet Transfer : Minimum assistance    ADL Assessment:  Feeding: Modified independent (inferred)    Oral Facial Hygiene/Grooming: Contact guard assistance;Minimum assistance (inferred, standing)    Bathing: Minimum assistance    Upper Body Dressing: Modified independent (inferred, seated)    Lower Body Dressing: Contact guard assistance (seated)    Toileting: Contact guard assistance                ADL Intervention and task modifications:                           Lower Body Dressing Assistance  Socks: Contact guard assistance  Position Performed: Seated edge of bed  Cues: Verbal cues provided    Toileting  Toileting Assistance: Minimum assistance (on [facility])  Bladder Hygiene: Minimum assistance  Clothing Management: Contact guard assistance    Cognitive Retraining  Safety/Judgement: Insight into deficits;Good awareness of safety precautions    Functional Measure:  Fugl-Friedman Assessment of Motor Recovery after Stroke:   Reflex Activity  Flexors/Biceps/Fingers: Can be elicited  Extensors/Triceps: Can be elicited  Reflex Subtotal: 4    Volitional Movement Within Synergies  Shoulder Retraction: Full  Shoulder Elevation: Full  Shoulder Abduction (90 degrees): Full  Shoulder External Rotation: Full  Elbow Flexion: Full  Forearm Supination: Full  Shoulder Adduction/Internal Rotation: Full  Elbow Extension: Full  Forearm Pronation: Full  Subtotal: 18    Volitional Movement Mixing Synergies  Hand to Lumbar Spine: Full  Shoulder Flexion (0-90 degrees): Full  Pronation-Supination: Full  Subtotal: 6    Volitional Movement With Little or No Synergy  Shoulder Abduction (0-90 degrees): Full  Shoulder Flexion ( degrees): Full  Pronation/Supination: Full  Subtotal : 6    Normal Reflex Activity  Biceps, Triceps, Finger Flexors: Full  Subtotal : 2    Upper Extremity Total   Upper Extremity Total: 36    Wrist  Stability at 15 Degree Dorsiflexion: Full  Repeated Dorsiflexion/ Volar Flexion: Full  Stability at 15 Degree Dorsiflexion: Full  Repeated Dorsiflexion/ Volar Flexion: Full  Circumduction: Full  Wrist Total: 10    Hand  Mass Flexion: Full  Mass Extension: Full  Grasp A: Full  Grasp B: Full  Grasp C: Full  Grasp D: Full  Grasp E: Full  Hand Total: 14    Coordination/Speed  Tremor: None  Dysmetria: None  Time: <1s  Coordination/Speed Total : 6    Total A-D  Total A-D (Motor Function): 66/66     This is a reliable/valid measure of arm function after a neurological event. It has established value to characterize functional status and for measuring spontaneous and therapy-induced recovery; tests proximal and distal motor functions. Fugl-Friedman Assessment - UE scores recorded between five and 30 days post neurologic event can be used to predict UE recovery at six months post neurologic event. Severe = 0-21 points   Moderately Severe = 22-33 points   Moderate = 34-47 points   Mild = 48-66 points  CHINO Saldivar, ELIZABETH Jean, & XENIA Lee (1992). Measurement of motor recovery after stroke: Outcome assessment and sample size requirements. Stroke, 23, pp. 3608-4349. ------------------------------------------------------------------------------------------------------------------------------------------------------------------  MCID:  Stroke:   Lisette Freire et al, 2001; n = 171; mean age 79 (6) years; assessed within 16 (12) days of stroke, Acute Stroke)  FMA Motor Scores from Admission to Discharge    10 point increase in FMA Upper Extremity = 1.5 change in discharge FIM    10 point increase in FMA Lower Extremity = 1.9 change in discharge FIM  MDC:   Stroke:   José Miguel Sanchez et al, 2008, n = 14, mean age = 59.9 (14.6) years, assessed on average 14 (6.5) months post stroke, Chronic Stroke)    FMA = 5.2 points for the Upper Extremity portion of the assessment       Barthel Index:  Bathin  Bladder: 10  Bowels: 10  Groomin  Dressin  Feeding: 10  Mobility: 0  Stairs: 0  Toilet Use: 5  Transfer (Bed to Chair and Back): 10  Total: 50/100      The Barthel ADL Index: Guidelines  1. The index should be used as a record of what a patient does, not as a record of what a patient could do. 2. The main aim is to establish degree of independence from any help, physical or verbal, however minor and for whatever reason. 3. The need for supervision renders the patient not independent. 4. A patient's performance should be established using the best available evidence. Asking the patient, friends/relatives and nurses are the usual sources, but direct observation and common sense are also important. However direct testing is not needed. 5. Usually the patient's performance over the preceding 24-48 hours is important, but occasionally longer periods will be relevant. 6. Middle categories imply that the patient supplies over 50 per cent of the effort. 7. Use of aids to be independent is allowed.     Score Interpretation (from [address] [facility] 83)    Independent   60-79 Minimally independent   40-59 Partially dependent   20-39 Very dependent   <20 Totally dependent     -Jose Palmer., Barthel, DJeniseW. (1965). Functional evaluation: the Barthel Index. [address] ([address] (1997). The Barthel activities of daily living index: self-reporting versus actual performance in the old (> or = 75 years). Journal of [address] [place] [address] 45(7), [facility], RANDI, Juan Zhou.Jose Manuel (1999). Measuring the change in disability after inpatient rehabilitation; comparison of the responsiveness of the Barthel Index and Functional [place] Measure. Journal of Neurology, Neurosurgery, and Psychiatry, 66(4), 517-953. NADIYA Hernandez, SAMANTHA Aguirre, & Vito Fuentes M.A. (2004) Assessment of post-stroke quality of life in cost-effectiveness studies: The usefulness of the Barthel Index and the EuroQoL-5D. Quality of Life Research, 15, 976-85       Occupational Therapy Evaluation Charge Determination   History Examination Decision-Making   LOW Complexity : Brief history review  LOW Complexity : 1-3 performance deficits relating to physical, cognitive , or psychosocial skils that result in activity limitations and / or participation restrictions  MEDIUM Complexity : Patient may present with comorbidities that affect occupational performnce. Miniml to moderate modification of tasks or assistance (eg, physical or verbal ) with assesment(s) is necessary to enable patient to complete evaluation       Based on the above components, the patient evaluation is determined to be of the following complexity level: LOW   Pain Rating:  Mild HA/discomfort with movement and at rest, RN aware     Activity Tolerance:   Fair and requires rest breaks    After treatment patient left in no apparent distress:    Supine in bed, Heels elevated for pressure relief, Call bell within reach and Side rails x 3, RN present     COMMUNICATION/EDUCATION:   The patients plan of care was discussed with: Physical therapist and Registered nurse.      Patient was educated regarding her deficit(s) of generalized weakness, impaired balance, and impaired vision as this relates to her diagnosis of cerebellar hemorrhage. She demonstrated Good understanding as evidenced by verbal discussion/conversation. Patient and/or family was verbally educated on the BE FAST acronym for signs/symptoms of CVA and TIA. All questions answered with patient indicating good understanding. Patient/family have participated as able in goal setting and plan of care. and Patient/family agree to work toward stated goals and plan of care. This patients plan of care is appropriate for delegation to BENI.     Thank you for this referral.  Cayden Mckenzie OT  Time Calculation: 19 mins

## 2022-03-22 NOTE — PROGRESS NOTES
Discharge Instructions:  Dr. Spenser Cardoso    Call on next business day to arrange appointment for follow up in 3 week(s) -- [phone]. Activity:  Walk regularly starting immediately. No lifting more than 10 -15 pounds for 3 week(s). You may resume driving when off narcotics for pain. Work:  You may return to work in 1 week to light activity. No lifting more than 10 pounds (=\"light duty\") for 3 weeks. If your employer can not accommodate \"light duty,\" your employer will need to provide any necessary paperwork to our office. This document with serve as the initial \"note\" to your employer. Diet:  You may resume normal diet. Wound Care:  Glue dressing will fall off on its own. If you experience a lot of drainage, develop redness around the wound, or a fever over 101 F occurs please call the office. You may shower. No baths or swimming for 2 weeks. Medications:  Resume home medications as indicated on the Medical Reconciliation form. Do not use blood thinners (such as Aspirin, Coumadin, or Plavix) until 3 days after surgery. Pain medications:  Non steroidal antiinflammatories (ibuprofen -- Advil or Motrin) seem to work best for post surgical pain. Try these first as prescribed. A narcotic prescription will also be given for breakthrough pain. PUT ICE ON YOUR INCISIONS 20 MINUTES EVERY HOUR WHILE THEY REMAIN SORE. PLACE A CLOTH BETWEEN YOUR SKIN THE THE ICE BAG. Miralax should be used twice daily to prevent constipation while on narcotics. If you are still having trouble having a BM after 1-2 days, try milk of magnesia. If this does not work within 24 hours, try a bottle of magnesium citrate. Narcotics and anesthesia sometimes cause nausea and vomiting. If persistent please call the office. Do not hesitate to call with questions or concerns.     Lynnette Aguilera MD  Tel [phone]  Fax [phone]    DISCHARGE SUMMARY from Nurse    PATIENT Problem: Mobility Impaired (Adult and Pediatric)  Goal: *Acute Goals and Plan of Care (Insert Text)  Description:   FUNCTIONAL STATUS PRIOR TO ADMISSION: Patient was independent and active without use of DME.    HOME SUPPORT PRIOR TO ADMISSION: The patient lived with mother and 2 sons but did not require assist.    Physical Therapy Goals  Initiated 3/22/2022  1. Patient will move from supine to sit and sit to supine  in bed with modified independence within 7 day(s). 2.  Patient will transfer from bed to chair and chair to bed with contact guard assist using the least restrictive device within 7 day(s). 3.  Patient will perform sit to stand with minimal contact guard assist within 7 day(s). 4.  Patient will ambulate with contact guard assist for 100 feet with the least restrictive device within 7 day(s). 5.  Patient will ascend/descend 5 stairs with single handrail(s) with minimal assistance/contact guard assist within 7 day(s). 6.  Patient will improve Soriano Balance score by 7 points within 7 days. Outcome: Not Progressing Towards Goal   PHYSICAL THERAPY EVALUATION- NEURO POPULATION  Patient: Fabrice Moss (40 y.o. female)  Date: 3/22/2022  Primary Diagnosis: Cerebellar hemorrhage ([address] [I61.4]        Precautions:   Fall (SBP <140)      ASSESSMENT  Based on the objective data described below, the patient presents with impaired balance and gait, increased risk for falls, dizziness, and nystagmus (greatest to the R), light sensitivity, impaired vision limiting tolerance to activity s/p admission on 3/17 for Right intra-cerebellar hemorrhage and AVM. Pt being managed non-surgically. Pt received supine in bed and agreeable to therapy. Pt tolerated session well. Pt demonstrated good and equal LE strength, intact sensation, and coordination of BLEs. Pt completed sit<>stand with min A and noted R lateral leaning upon initial standing.  Pt tolerated gait training x 30 ft with min A and HHA demonstrating INSTRUCTIONS:    After general anesthesia or intravenous sedation, for 24 hours or while taking prescription Narcotics:  · Limit your activities  · Do not drive and operate hazardous machinery  · Do not make important personal or business decisions  · Do  not drink alcoholic beverages  · If you have not urinated within 8 hours after discharge, please contact your surgeon on call. Report the following to your surgeon:  · Excessive pain, swelling, redness or odor of or around the surgical area  · Temperature over 100.5  · Nausea and vomiting lasting longer than 4 hours or if unable to take medications  · Any signs of decreased circulation or nerve impairment to extremity: change in color, persistent  numbness, tingling, coldness or increase pain  · Any questions    What to do at Home:  A common side effect of anesthesia following surgery is nausea and/or vomiting. In order to decrease symptoms, it is wise to avoid foods that are high in fat, greasy foods, milk products, and spicy foods for the first 24 hours. Acceptable foods for the first 24 hours following surgery include but are not limited to:     soup   broth    toast    crackers    applesauce    bananas    mashed potatoes,   soft or scrambled eggs   oatmeal    jello    It is important to eat when taking your pain medication. This will help to prevent nausea. If possible, please try to time your meals with your medications. It is very important to stay hydrated following surgery. Sip fluids frequently while awake. Avoid acidic drinks such as citrus juices and soda for 24 hours. Carbonated beverages may cause bloating and gas. Acceptable fluids include:    - water (flavor packets may add variety)  - coffee or tea (in moderation)  - Gatorade  - Stefan-aid  - apple juice  - cranberry juice    You are encouraged to cough and deep breathe every hour when awake. This will help to prevent respiratory complications following anesthesia.  You may want to narrow TIKA, R lateral sway, R trunk sway and generalized instability. Pt assisted back to supine position with all needs met. Pt will continue to benefit from PT to progress mobility as tolerated. Patient will benefit from Inpatient rehab upon discharge for multi-disciplinary approach to maximize functional outcomes and reach highest level of independence    Current Level of Function Impacting Discharge (mobility/balance): min A gait training x 30 ft, transfers with min A     Functional Outcome Measure: The patient scored Total: 13/56 on the Soriano Balance Assessment which is indicative of high fall risk. Other factors to consider for discharge: previously independent, active, working in cleaning services,      Patient will benefit from skilled therapy intervention to address the above noted impairments. PLAN :  Recommendations and Planned Interventions: bed mobility training, transfer training, gait training, therapeutic exercises, neuromuscular re-education, patient and family training/education, and therapeutic activities      Frequency/Duration: Patient will be followed by physical therapy:  5 times a week to address goals.     Recommendation for discharge: (in order for the patient to meet his/her long term goals)  Therapy 3 hours per day 5-7 days per week    This discharge recommendation:  Has been made in collaboration with the attending provider and/or case management    IF patient discharges home will need the following DME: to be determined (TBD)         SUBJECTIVE:   Patient stated Orlando Tenorio will try my best.    OBJECTIVE DATA SUMMARY:   HISTORY:    Past Medical History:   Diagnosis Date    Anxiety     Arthritis     Calculus of kidney     Dyspepsia and other specified disorders of function of stomach     GERD (gastroesophageal reflux disease)     Kidney disease     hx of kidney stone    Other ill-defined conditions(799.89)     kidney stone in pass,passed    Postpartum hemorrhage 2017    ALSO HAD HEMORRHAGE DURING MISCARRIAGE 2012    Psychiatric disorder     ADHD     Past Surgical History:   Procedure Laterality Date    ABDOMEN SURGERY PROC UNLISTED  10/30/12    Laparoscopic cholecystectomy     DELIVERY ONLY      2014    HX APPENDECTOMY      HX CHOLECYSTECTOMY      HX DILATION AND CURETTAGE      HX GYN      miscarriage x2    HX GYN      C section    HX GYN      EXP LAPS    HX HEENT      wisdom teeth extraction    HX ORTHOPAEDIC      torn catiledge repair left knee    HX WISDOM TEETH EXTRACTION      ME ANESTH,KNEE AREA SURGERY      UPPER GI ENDOSCOPY,BIOPSY  2015            Personal factors and/or comorbidities impacting plan of care:     Home Situation  Home Environment: Private residence  # Steps to Enter: 5  Rails to Enter: Yes  One/Two Story Residence: One story  Living Alone: No  Support Systems: Parent(s),Child(astrid) (4 +8 y.o sons)  Patient Expects to be Discharged to[de-identified] Home with family assistance  Current DME Used/Available at Home: Walker, rolling  Tub or Shower Type: Tub/Shower combination    EXAMINATION/PRESENTATION/DECISION MAKING:   Critical Behavior:  Neurologic State: Alert  Orientation Level: Oriented X4  Cognition: Follows commands       Range Of Motion:  AROM: Within functional limits           PROM: Within functional limits           Strength:    Strength:  Within functional limits                    Tone & Sensation:   Tone: Normal              Sensation: Intact               Coordination:  Coordination: Generally decreased, functional  Vision:      Functional Mobility:  Bed Mobility:     Supine to Sit: Stand-by assistance  Sit to Supine: Stand-by assistance  Scooting: Stand-by assistance  Transfers:  Sit to Stand: Minimum assistance  Stand to Sit: Minimum assistance        Bed to Chair: Minimum assistance              Balance:   Sitting: Intact  Standing: Impaired  Standing - Static: Fair  Standing - Dynamic : Fair  Ambulation/Gait Training:  Distance (ft): 30 Feet hug a pillow when coughing and sneezing to add additional support to the surgical area and to decrease discomfort if you had abdominal or chest surgery. If you are discharged home with support stockings, you may remove them after 24 hours. Support stockings are used to help prevent blood clots in the legs following surgery. Please take time to review all of your Home Care Instructions and Medication Information sheets provided in your discharge packet. If you have any questions, please contact your surgeons office. Thank you. How to Care for Your Wound After Its Treated With  DERMABOND* Topical Skin Adhesive  DERMABOND* Topical Skin Adhesive (2-octyl cyanoacrylate) is a sterile, liquid skin adhesive  that holds wound edges together. The film will usually remain in place for 5 to 10 days, then  naturally fall off your skin. The following will answer some of your questions and provide instructions for proper care for your  wound while it is healing:    CHECK WOUND APPEARANCE   Some swelling, redness, and pain are common with all wounds and normally will go away as the  wound heals. If swelling, redness, or pain increases or if the wound feels warm to the touch,  contact a doctor. Also contact a doctor if the wound edges reopen or separate. REPLACE BANDAGES   If your wound is bandaged, keep the bandage dry.  Replace the dressing daily until the adhesive film has fallen off or if the  bandage should become wet, unless otherwise instructed by your  physician.  When changing the dressing, do not place tape directly over the  DERMABOND adhesive film, because removing the tape later may also  remove the film. AVOID TOPICAL MEDICATIONS   Do not apply liquid or ointment medications or any other product to your wound while the  DERMABOND adhesive film is in place. These may loosen the film before your wound is healed.   KEEP WOUND DRY AND PROTECTED   You may occasionally and briefly wet your wound (ft)  Assistive Device: Gait belt (HHA)  Ambulation - Level of Assistance: Minimal assistance        Gait Abnormalities: Decreased step clearance; Ataxic;Trunk sway increased        Base of Support: Narrowed     Speed/Dyana: Pace decreased (<100 feet/min)  Step Length: Right shortened;Left shortened            Functional Measure  Soriano Balance Test:    Sitting to Standin  Standing Unsupported: 1  Sitting with Back Unsupported: 4  Standing to Sittin  Transfers: 1  Standing Unsupported with Eyes Closed: 1  Standing Unsupported with Feet Together: 0  Reach Forward with Outstretched Arm: 0   Object: 0  Turn to Look Over Shoulders: 0  Turn 360 Degrees: 0  Alternate Foot on Step/Stool: 1  Standing Unsupported One Foot in Front: 0  Stand on One Le  Total: 13         56=Maximum possible score;   0-20=High fall risk  21-40=Moderate fall risk   41-56=Low fall risk      Based on the above components, the patient evaluation is determined to be of the following complexity level: MEDIUM    Pain Rating:  Pt reported minimal headache    Activity Tolerance:   Good and Fair      After treatment patient left in no apparent distress:   Supine in bed, Call bell within reach, and Side rails x 3    COMMUNICATION/EDUCATION:   The patients plan of care was discussed with: Occupational therapist and Registered nurse. Patient was educated regarding her deficit(s) of impaired balance, dizziness as this relates to her diagnosis of R cerebellar hemorrhage. She demonstrated Good understanding. Patient and/or family was verbally educated on the BE FAST acronym for signs/symptoms of CVA and TIA. BE FAST was written on patient's communication board  for visual education and reinforcement. All questions answered with patient indicating good understanding.        Fall prevention education was provided and the patient/caregiver indicated understanding., Patient/family have participated as able in goal setting and plan of in the shower or bath. Do not soak or scrub  your wound, do not swim, and avoid periods of heavy perspiration until the DERMABOND  adhesive has naturally fallen off. After showering or bathing, gently blot your wound dry with a  soft towel. If a protective dressing is being used, apply a fresh, dry bandage, being sure to keep  the tape off the DERMABOND adhesive film.  Apply a clean, dry bandage over the wound if necessary to protect it.  Protect your wound from injury until the skin has had sufficient time to heal.   Do not scratch, rub, or pick at the DERMABOND adhesive film. This may loosen the film before  your wound is healed.  Protect the wound from prolonged exposure to sunlight or tanning lamps while the film is in  place. If you have any questions or concerns about this product, please consult your doctor. *Trademark ©[employer] 2887MS PREVENT AN INFECTION      1. [address] YOUR HANDS     To prevent infection, good handwashing is the most important thing you or your caregiver can do.  Wash your hands with soap and water or use the hand  we gave you before you touch any wounds. 2. SHOWER     Use the antibacterial soap we gave you when you take a shower.  Shower with this soap until your wounds are healed.  To reach all areas of your body, you may need someone to help you.  Dont forget to clean your belly button with every shower. 3.  USE CLEAN SHEETS     Use freshly cleaned sheets on your bed after surgery.  To keep the surgery site clean, do not allow pets to sleep with you while your wound is still healing. 4. STOP SMOKING     Stop smoking, or at least cut back on smoking     Smoking slows your healing. 5.  CONTROL YOUR BLOOD SUGAR     High blood sugars slow wound healing. If you are diabetic, control your blood sugar levels before and after your surgery.          *  Please carry medication information at all times in case of emergency care., and Patient/family agree to work toward stated goals and plan of care.     Thank you for this referral.  Nery Londono, PT, DPT   Time Calculation: 19 mins situations. Patient Education   Patient Education      Ibuprofen (Advil, Advil Children's, Motrin, Children's Ibuprofen) - (By mouth)   Why this medicine is used:   Treats pain and fever. This medicine is an NSAID. Contact a nurse or doctor right away if you have:  · Change in how much or how often you urinate  · Severe stomach pain, vomiting blood, bloody or black tarry stools  · Swelling in your hands, ankles, or feet; rapid weight gain     Common side effects:  · Constipation, diarrhea, gas, mild upset stomach  · Ringing in your ears, dizziness, headache  © 2017 [address] is for End User's use only and may not be sold, redistributed or otherwise used for commercial purposes. Narcotic-Analgesic/Acetaminophen (Percocet, Norco, Lorcet HD, Lortab 10/325) - (By mouth)   Why this medicine is used:   Relieves pain. Contact a nurse or doctor right away if you have:  · Extreme weakness, shallow breathing, slow heartbeat  · Severe confusion, lightheadedness, dizziness, fainting  · Yellow skin or eyes, dark urine or pale stools  · Severe constipation, severe stomach pain, nausea, vomiting, loss of appetite  · Sweating or cold, clammy skin     Common side effects:  · Mild constipation, nausea, vomiting  · Sleepiness, tiredness  · Itching, rash  © 2017 [facility] Information is for End User's use only and may not be sold, redistributed or otherwise used for commercial purposes. No smoking/ No tobacco products/ Avoid exposure to second hand smoke  Surgeon General's Warning:  Quitting smoking now greatly reduces serious risk to your health.     Obesity, smoking, and sedentary lifestyle greatly increases your risk for illness    A healthy diet, regular physical exercise & weight monitoring are important for maintaining a healthy lifestyle    You may be retaining fluid if you have a history of heart failure or if you experience any of the following symptoms: Weight gain of 3 pounds or more overnight or 5 pounds in a week, increased swelling in our hands or feet or shortness of breath while lying flat in bed. Please call your doctor as soon as you notice any of these symptoms; do not wait until your next office visit. The discharge information has been reviewed with the {PATIENT PARENT GUARDIAN:53363}. The {PATIENT PARENT GUARDIAN:21168} verbalized understanding. Discharge medications reviewed with the {Dishcar meds reviewed GMWS:73567} and appropriate educational materials and side effects teaching were provided.   ___________________________________________________________________________________________________________________________________

## 2022-03-22 NOTE — PROGRESS NOTES
[address] Adult  Hospitalist Group     ICU Transfer/Accept Summary     [place] [address] [place]  DATE OF TRANSFER: 3/22/2022       PATIENT ID: Immanuel Gray  MRN: [id number]   YOB: 1985    PRIMARY CARE PROVIDER: None   DATE OF ADMISSION: 3/17/2022  7:46 PM    ATTENDING PHYSICIAN: Gris Brown MD  CONSULTATIONS:   IP CONSULT TO NEUROINTERVENTIONAL SURGERY  IP CONSULT TO NEUROLOGY  IP CONSULT TO NEUROINTERVENTIONAL SURGERY    PROCEDURES/SURGERIES:   * No surgery found *    REASON FOR ADMISSION: <principal problem not specified>     HOSPITAL PROBLEM LIST:  Patient Active Problem List   Diagnosis Code    Appendicitis K37    Abdominal pain complicating pregnancy A55.160, R10.9    Threatened  labor O47.00    Pregnancy Z34.90    Breech presentation O32. 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  She presented to OSH ED by EMS with sudden headache, dizziness, and nausea.  She had just returned home from work and was taking with the sitter. Fidelia Gilmore denies any precipitating events or trauma.  Her headache is rated at 8/10 while at rest and 10/10 with activity.  Fentanyl and hydromorphone were given at OSH ED with no improvement in symptoms.  She was transferred to [facility] ICU for evaluation with neuro surgery and neuro interventional.  Overnight Events:   3/17 -- Transferred from OSH ED for right cerebellar hemorrhage, started on 3% NS  3/18 -- Started on 3%  3/19 -- Angio yesterday PM  3/20 -- No acute events  3/21 -- No acute events  3/22: No acute event  Assessment and Plan:    Right cerebellar hemorrhage  - seen by N[facility]; decision is no ablation/embolization  - seen by Neurosurgery: decision not a surgical candidate     Low wt status  Body mass index is 16.82 kg/m². - per pt \"hasn't been able to keep wt on. ( has weighed up to 170 lbs )    PMH of note:  Pt notes    GB dz, removeal  Age 22   Appendectomy age 22   Endometriosis   Lt Knee repair in high school                      PHYSICAL EXAMINATION:  Visit Vitals  /63   Pulse 67   Temp 98.3 °F (36.8 °C)   Resp 9   Ht 5' (1.524 m)   Wt 39 kg (85 lb 15.7 oz)   SpO2 99%   BMI 16.79 kg/m²       General:          Alert, cooperative, no distress  HEENT:           Atraumatic, MMM            Neck:               Supple, symmetrical,  thyroid: non tender  Lungs:             Clear to auscultation bilaterally. No Wheezing or Rhonchi. No rales. Heart:              Regular  rhythm,  No  murmur   No edema  Abdomen:       Soft, non-tender. Not distended. Bowel sounds normal  Extremities:     No cyanosis. No clubbing,  +2 distal pulses  Skin:                Not pale. Not Jaundiced  No rashes   Psych:             Not anxious or agitated. Neurologic:      Alert, moves all extremities, oriented X 3. Labs:     Recent Labs     03/22/22  0613 03/21/22  0529   WBC 9.6 11.1*   HGB 12.7 12.4   HCT 37.0 37.1    197     Recent Labs     03/22/22  0613 03/21/22  0529 03/20/22  0427    136 136   K 5.1 4.2 4.1    107 108   CO2 24 23 23   BUN 7 10 13   CREA 0.34* 0.42* 0.55   GLU 73 80 102*   CA 8.5 8.5 8.5   MG 2.0 1.9 2.1   PHOS 3.9 3.0 2.3*     No results for input(s): ALT, AP, TBIL, TBILI, TP, ALB, GLOB, GGT, AML, LPSE in the last 72 hours. No lab exists for component: SGOT, GPT, AMYP, HLPSE  No results for input(s): INR, PTP, APTT, INREXT in the last 72 hours. No results for input(s): FE, TIBC, PSAT, FERR in the last 72 hours.    No results found for: FOL, RBCF   No results for input(s): PH, PCO2, PO2 in the last 72 hours. No results for input(s): CPK, CKNDX, TROIQ in the last 72 hours.     No lab exists for component: CPKMB  Lab Results   Component Value Date/Time    Cholesterol, total 119 03/19/2022 04:18 AM    HDL Cholesterol 43 03/19/2022 04:18 AM    LDL, calculated 63 03/19/2022 04:18 AM    Triglyceride 65 03/19/2022 04:18 AM    CHOL/HDL Ratio 2.8 03/19/2022 04:18 AM     No results found for: [facility]  Lab Results   Component Value Date/Time    Color YELLOW/STRAW 01/29/2020 11:23 AM    Appearance CLEAR 01/29/2020 11:23 AM    Specific gravity 1.010 01/29/2020 11:23 AM    Specific gravity >1.030 (H) 12/19/2015 07:52 AM    pH (UA) 8.5 (H) 01/29/2020 11:23 AM    Protein NEGATIVE  01/29/2020 11:23 AM    Glucose NEGATIVE  01/29/2020 11:23 AM    Ketone NEGATIVE  01/29/2020 11:23 AM    Bilirubin NEGATIVE  01/29/2020 11:23 AM    Urobilinogen 0.2 01/29/2020 11:23 AM    Nitrites NEGATIVE  01/29/2020 11:23 AM    Leukocyte Esterase NEGATIVE  01/29/2020 11:23 AM    Epithelial cells FEW 01/29/2020 11:23 AM    Bacteria NEGATIVE  01/29/2020 11:23 AM    WBC 0-4 01/29/2020 11:23 AM    RBC 0-5 01/29/2020 11:23 AM         CODE STATUS:   Full Code    DNR    Partial    Comfort Care       Signed:   Rafaela Simons MD  Date of Service:  3/22/2022  4:51 PM

## 2022-03-22 NOTE — PROGRESS NOTES
Neurosurgery Progress Note  Yuli Cantu, [facility]          Admit Date: 3/17/2022   LOS: 5 days        Daily Progress Note: 3/22/2022    HPI: The patient was found on the floor near the entrance to her home with vomit in a trash can nearby. She had a terrible headache and vertigo. She was taken by EMS to [facility] ER where her head CT revealed a right cerebellar hemorrhage with mild compression of the fourth ventricle. Her CTA revealed a right cerebellar AVM. She transferred to [facility]. She has had continued dizziness, headache, nausea, vomiting over the weekend. She has not been able to keep much food down. She states it feels like the room is still spinning. Subjective: The patient is lying in bed in the dark with her eye mask on. She seems more comfortable today. She states she is slightly better and is interested in eating actual food today. Still with vertiginous symptoms. Denies numbness, tingling, chest pain, leg pain, difficulty swallowing, and dyspnea. Objective:     Vital signs  Temp (24hrs), Av.5 °F (36.9 °C), Min:98.3 °F (36.8 °C), Max:98.7 °F (37.1 °C)    0701 -  1900  In: 470.8 [I.V.:470.8]  Out: -    190 -  0700  In: 4581.3 [P.O.:450; I.V.:4131.3]  Out: 2250 [Urine:2250]    Visit Vitals  BP (!) 133/95   Pulse 93   Temp 98.3 °F (36.8 °C)   Resp 18   Ht 5' (1.524 m)   Wt 39 kg (85 lb 15.7 oz)   SpO2 99%   BMI 16.79 kg/m²      O2 Device: None (Room air)     Pain control  Pain Assessment  Pain Scale 1: Numeric (0 - 10)  Pain Intensity 1: 6  Pain Onset 1: constant  Pain Location 1: Head  Pain Orientation 1: Anterior  Pain Description 1: Aching,Sore  Pain Intervention(s) 1: Medication (see MAR)    PT/OT  Gait                 Physical Exam:  Gen:NAD. Neuro: A&Ox3. Follows commands. Speech clear. Affect normal.  PERRL. Dysconjugate gaze on the right. Face symmetric. Tongue midline. FARRIS spontaneously. Strength 5/5 in UE and LE BL. Negative drift.   Mild ataxia right hand.  Gait deferred. CT head without contrast on 03/17/22 shows there is a right cerebellar hemorrhage with surrounding edema with extension into the right cerebellar peduncle. There is compression of the fourth ventricle which is small and contains a small amount of hemorrhage. The temporal tips are slightly dilated may represent developing hydrocephalus. 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 Stable acute right cerebellar intraparenchymal hemorrhage, with stable mild surrounding edema and mass effect. No hydrocephalus. CT head without contrast on 03/20/22 shows right intra-cerebellar hemorrhage.  No significant change     24 hour results:    Recent Results (from the past 24 hour(s))   CBC W/O DIFF    Collection Time: 03/22/22  6:13 AM   Result Value Ref Range    WBC 9.6 3.6 - 11.0 K/uL    RBC 4.04 3.80 - 5.20 M/uL    HGB 12.7 11.5 - 16.0 g/dL    HCT 37.0 35.0 - 47.0 %    MCV 91.6 80.0 - 99.0 FL    MCH 31.4 26.0 - 34.0 PG    MCHC 34.3 30.0 - 36.5 g/dL    RDW 11.7 11.5 - 14.5 %    PLATELET 545 693 - 492 K/uL    MPV 10.3 8.9 - 12.9 FL    NRBC 0.0 0  WBC    ABSOLUTE NRBC 0.00 0.00 - 0.01 K/uL   MAGNESIUM    Collection Time: 03/22/22  6:13 AM   Result Value Ref Range    Magnesium 2.0 1.6 - 2.4 mg/dL   PHOSPHORUS    Collection Time: 03/22/22  6:13 AM   Result Value Ref Range    Phosphorus 3.9 2.6 - 4.7 MG/DL   METABOLIC PANEL, BASIC    Collection Time: 03/22/22  6:13 AM   Result Value Ref Range    Sodium 137 136 - 145 mmol/L    Potassium 5.1 3.5 - 5.1 mmol/L    Chloride 108 97 - 108 mmol/L    CO2 24 21 - 32 mmol/L    Anion gap 5 5 - 15 mmol/L    Glucose 73 65 - 100 mg/dL    BUN 7 6 - 20 MG/DL    Creatinine 0.34 (L) 0.55 - 1.02 MG/DL    BUN/Creatinine ratio 21 (H) 12 - 20      GFR est AA >60 >60 ml/min/1.73m2    GFR est non-AA >60 >60 ml/min/1.73m2    Calcium 8.5 8.5 - 10.1 MG/DL          Assessment:     Active Problems:    Cerebellar hemorrhage ([address] (3/18/2022)        Plan:   1. Right cerebellar hemorrhage with brain compression   - no surgical intervention currently   - good BP control   - symptom management   - add meclizine prn for dizziness   - PT/OT consults   - ADAT   - ok to transfer out of ICU to [facility] hospitalist servuce   2. Right cerebellar AVM   - no surgical intervention at this time. She may need multidisciplinary approach down the line  3. Nausea/vomiting   - reglan, compazine, zofran PRN   - cont scopolamine patch  4.  Thrombocytopenia   - Plt 115 03/21, now 207   - Pepcid changed to Protonix 03/21    Activity: up with assist  DVT ppx: SCDs  Dispo: tbd    Plan d/w Dr. Tucker Kirkland and intensivist.      Kirstin Milner NP

## 2022-03-22 NOTE — PROGRESS NOTES
[facility]    ICU TEAM Progress Note    Name: Lucas Garcia   : 1985   MRN: [id number]   Date: 3/22/2022      I  Subjective:   Progress Note: 3/22/2022      Reason for ICU Admission: Right cerebellar hemorrhage    Interval history:  40year old female with pmhx of migraines who presents as a transfer from an OSH ED with a right cerebellar hemorrhage. She presented to OSH ED by EMS with sudden headache, dizziness, and nausea. She had just returned home from work and was taking with the sitter. She denies any precipitating events or trauma. Her headache is rated at 8/10 while at rest and 10/10 with activity. Fentanyl and hydromorphone were given at OSH ED with no improvement in symptoms. She was transferred to [facility] ICU for evaluation with neuro surgery and neuro interventional.  Overnight Events:   3/17 -- Transferred from OSH ED for right cerebellar hemorrhage, started on 3% NS  3/18 -- Started on 3%  3/19 -- Angio yesterday PM  3/20 -- No acute events  3/21 -- No acute events  3/22: No acute event  Active Problem List:     Problem List  Date Reviewed: 2020          Codes Class    Cerebellar hemorrhage ([facility] [address] ICD-10-CM: I61.4  ICD-9-CM: 997         DUB (dysfunctional uterine bleeding) ICD-10-CM: N93.8  ICD-9-CM: 626.8         Atonic postpartum hemorrhage ICD-10-CM: O72.1  ICD-9-CM: 666.14         History of postpartum depression ICD-10-CM: Z87.59, Z86.59  ICD-9-CM: V13.29, V11.8         ROM (rupture of membranes), premature ICD-10-CM: O42.90  ICD-9-CM: 658.10         Chronic pelvic pain in female ICD-10-CM: R10.2, G89.29  ICD-9-CM: 625.9, 338.29         Pregnancy ICD-10-CM: Z34.90  ICD-9-CM: V22.2         Breech presentation ICD-10-CM: O32. 1XX0  ICD-9-CM: 652.20         Threatened  labor ICD-10-CM: O47.00  ICD-9-CM: 644.00         Abdominal pain complicating pregnancy UHJ-08-BX: O26.899, R10.9  ICD-9-CM: 646.80, 789.00         Appendicitis ICD-10-CM: K37  ICD-9-CM: 962 Past Medical History:      has a past medical history of Anxiety, Arthritis, Calculus of kidney, Dyspepsia and other specified disorders of function of stomach, GERD (gastroesophageal reflux disease), Kidney disease, Other ill-defined conditions(799.89), Postpartum hemorrhage (2017), and Psychiatric disorder. She has no past medical history of Abnormal Pap smear, Abnormal Papanicolaou smear of cervix, Acquired hypothyroidism, Anemia, Anemia NEC, Asthma, Chlamydia, Complication of anesthesia, Diabetes mellitus, Essential hypertension, Essential hypertension, Genital herpes, unspecified, Gestational diabetes, Gestational hypertension, Gonorrhea, Heart abnormalities, Heart abnormality, Herpes gestationis, Herpes simplex without mention of complication, Human immunodeficiency virus (HIV) disease ([facility] [address] OTHER MEDICAL, Infertility, Infertility, female, Phlebitis and thrombophlebitis of unspecified site, Pituitary disorder ([facility] [address] Polycystic disease, ovaries, Postpartum depression, Rhesus isoimmunization unspecified as to episode of care in pregnancy, Sickle cell trait syndrome ([facility] [address] Sickle-cell disease, unspecified, Syphilis, Systemic lupus erythematosus ([facility] [address] Thyroid activity decreased, Trauma, Unspecified breast disorder, Unspecified diseases of blood and blood-forming organs, or Unspecified epilepsy without mention of intractable epilepsy. Past Surgical History:      has a past surgical history that includes hx wisdom teeth extraction; pr anesth,knee area surgery; hx heent; pr abdomen surgery proc unlisted (10/30/12); hx cholecystectomy; hx appendectomy; hx orthopaedic; upper gi endoscopy,biopsy (2015); pr  delivery only; hx dilation and curettage; hx gyn; hx gyn; and hx gyn. Home Medications:     Prior to Admission medications    Medication Sig Start Date End Date Taking? Authorizing Provider   IBUPROFEN PO Take  by mouth.     Provider, Historical       Allergies/Social/Family History: Allergies   Allergen Reactions    Ambien [Zolpidem] Other (comments)     \"Hallucinations and black outs\"    Morphine Rash      Social History     Tobacco Use    Smoking status: Current Every Day Smoker     Packs/day: 0.50     Years: 15.00     Pack years: 7.50    Smokeless tobacco: Never Used    Tobacco comment: about 6 cigarettes per day at present   Substance Use Topics    Alcohol use: Yes     Comment: RARELY      Family History   Problem Relation Age of Onset    Emphysema Mother     No Known Problems Father     No Known Problems Son     No Known Problems Son     Anesth Problems Neg Hx        Review of Systems:     10 system reviewed and negative but as in interval history    Objective:   Vital Signs:  Visit Vitals  /74 (BP 1 Location: Left arm, BP Patient Position: At rest)   Pulse 62   Temp 98.3 °F (36.8 °C)   Resp 11   Ht 5' (1.524 m)   Wt 39 kg (85 lb 15.7 oz)   SpO2 99%   BMI 16.79 kg/m²      O2 Device: None (Room air) Temp (24hrs), Av.5 °F (36.9 °C), Min:98.2 °F (36.8 °C), Max:98.7 °F (37.1 °C)           Intake/Output:     Intake/Output Summary (Last 24 hours) at 3/22/2022 0850  Last data filed at 3/22/2022 0800  Gross per 24 hour   Intake 3250 ml   Output 550 ml   Net 2700 ml       Physical Exam:    Vitals and nursing note reviewed. Constitutional:       General: She is not in acute distress. Appearance: She is underweight. Eyes:      Extraocular Movements: Extraocular movements intact. Left eye: Left eye abnormal extraocular motion: photophobia. Cardiovascular:      Rate and Rhythm: Normal rate and regular rhythm. Pulmonary:      Effort: Pulmonary effort is normal.      Breath sounds: Normal breath sounds. Abdominal:      General: Abdomen is flat. Bowel sounds are normal.      Palpations: Abdomen is soft. Tenderness: There is no abdominal tenderness. Musculoskeletal:         General: Normal range of motion. Skin:     General: Skin is warm and dry. Capillary Refill: Capillary refill takes less than 2 seconds. Neurological:      General: No focal deficit present. Mental Status: She is alert and oriented to person, place, and time. Psychiatric:         Mood and Affect: Mood normal.         Judgment: Judgment normal.     LABS AND  DATA: Personally reviewed  Recent Labs     03/22/22  0613 03/21/22  0529   WBC 9.6 11.1*   HGB 12.7 12.4   HCT 37.0 37.1    197     Recent Labs     03/22/22  0613 03/21/22  0529    136   K 5.1 4.2    107   CO2 24 23   BUN 7 10   CREA 0.34* 0.42*   GLU 73 80   CA 8.5 8.5   MG 2.0 1.9   PHOS 3.9 3.0     No results for input(s): AP, TBIL, TP, ALB, GLOB, AML, LPSE in the last 72 hours. No lab exists for component: SGOT, GPT, AMYP  No results for input(s): INR, PTP, APTT, INREXT in the last 72 hours. No results for input(s): PHI, PCO2I, PO2I, FIO2I in the last 72 hours. No results for input(s): CPK, CKMB, TROIQ, BNPP in the last 72 hours. 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 Dr. Charles Lund note from March 21 noted. Currently on dexamethasone and management for nausea and vomiting. I believe it is reasonable to transfer to intermediate care. Will discuss with neurosurgery first.    DISPOSITION  Transfer to non-ICU bed    CRITICAL CARE CONSULTANT NOTE  I had a face to face encounter with the patient, reviewed and interpreted patient data including clinical events, labs, images, vital signs, I/O's, and examined patient.   I have discussed the case and the plan and management of the patient's care with the consulting services, the bedside nurses and the respiratory therapist.      NOTE OF PERSONAL INVOLVEMENT IN CARE   This patient has a high probability of imminent, clinically significant deterioration, which requires the highest level of preparedness to intervene urgently. I participated in the decision-making and personally managed or directed the management of the following life and organ supporting interventions that required my frequent assessment to treat or prevent imminent deterioration. Radha Brewer M.D.   Staff Intensivist/Pulmonologist  [facility]  3/22/2022

## 2022-03-22 NOTE — PROGRESS NOTES
Improving clinically  No need for neurosurgical intervention at this time  Hopefully the AVM can be treated without surgical removal as it has high risk of causing permanent neurological deficit

## 2022-03-23 LAB
ANION GAP SERPL CALC-SCNC: 4 MMOL/L (ref 5–15)
BUN SERPL-MCNC: 10 MG/DL (ref 6–20)
BUN/CREAT SERPL: 20 (ref 12–20)
CALCIUM SERPL-MCNC: 9.3 MG/DL (ref 8.5–10.1)
CHLORIDE SERPL-SCNC: 104 MMOL/L (ref 97–108)
CO2 SERPL-SCNC: 28 MMOL/L (ref 21–32)
CREAT SERPL-MCNC: 0.5 MG/DL (ref 0.55–1.02)
ERYTHROCYTE [DISTWIDTH] IN BLOOD BY AUTOMATED COUNT: 11.6 % (ref 11.5–14.5)
GLUCOSE SERPL-MCNC: 101 MG/DL (ref 65–100)
HCT VFR BLD AUTO: 42.8 % (ref 35–47)
HGB BLD-MCNC: 14.6 G/DL (ref 11.5–16)
MAGNESIUM SERPL-MCNC: 2.1 MG/DL (ref 1.6–2.4)
MCH RBC QN AUTO: 30.9 PG (ref 26–34)
MCHC RBC AUTO-ENTMCNC: 34.1 G/DL (ref 30–36.5)
MCV RBC AUTO: 90.5 FL (ref 80–99)
NRBC # BLD: 0 K/UL (ref 0–0.01)
NRBC BLD-RTO: 0 PER 100 WBC
PHOSPHATE SERPL-MCNC: 4.6 MG/DL (ref 2.6–4.7)
PLATELET # BLD AUTO: 235 K/UL (ref 150–400)
PMV BLD AUTO: 10 FL (ref 8.9–12.9)
POTASSIUM SERPL-SCNC: 4.5 MMOL/L (ref 3.5–5.1)
RBC # BLD AUTO: 4.73 M/UL (ref 3.8–5.2)
SODIUM SERPL-SCNC: 136 MMOL/L (ref 136–145)
T4 FREE SERPL-MCNC: 1.3 NG/DL (ref 0.8–1.5)
TSH SERPL DL<=0.05 MIU/L-ACNC: 1.38 UIU/ML (ref 0.36–3.74)
WBC # BLD AUTO: 13.7 K/UL (ref 3.6–11)

## 2022-03-23 PROCEDURE — 65660000000 HC RM CCU STEPDOWN

## 2022-03-23 PROCEDURE — 74011250636 HC RX REV CODE- 250/636: Performed by: NURSE PRACTITIONER

## 2022-03-23 PROCEDURE — 85027 COMPLETE CBC AUTOMATED: CPT

## 2022-03-23 PROCEDURE — 84443 ASSAY THYROID STIM HORMONE: CPT

## 2022-03-23 PROCEDURE — 84439 ASSAY OF FREE THYROXINE: CPT

## 2022-03-23 PROCEDURE — 97530 THERAPEUTIC ACTIVITIES: CPT

## 2022-03-23 PROCEDURE — 84100 ASSAY OF PHOSPHORUS: CPT

## 2022-03-23 PROCEDURE — 74011250637 HC RX REV CODE- 250/637: Performed by: NEUROLOGICAL SURGERY

## 2022-03-23 PROCEDURE — 74011000250 HC RX REV CODE- 250: Performed by: NEUROLOGICAL SURGERY

## 2022-03-23 PROCEDURE — 74011250636 HC RX REV CODE- 250/636: Performed by: INTERNAL MEDICINE

## 2022-03-23 PROCEDURE — 36415 COLL VENOUS BLD VENIPUNCTURE: CPT

## 2022-03-23 PROCEDURE — 74011000250 HC RX REV CODE- 250: Performed by: NURSE PRACTITIONER

## 2022-03-23 PROCEDURE — 83735 ASSAY OF MAGNESIUM: CPT

## 2022-03-23 PROCEDURE — 97535 SELF CARE MNGMENT TRAINING: CPT

## 2022-03-23 PROCEDURE — 74011250636 HC RX REV CODE- 250/636: Performed by: NEUROLOGICAL SURGERY

## 2022-03-23 PROCEDURE — 80048 BASIC METABOLIC PNL TOTAL CA: CPT

## 2022-03-23 PROCEDURE — 97116 GAIT TRAINING THERAPY: CPT

## 2022-03-23 RX ADMIN — ONDANSETRON 4 MG: 2 INJECTION INTRAMUSCULAR; INTRAVENOUS at 18:14

## 2022-03-23 RX ADMIN — SODIUM CHLORIDE, PRESERVATIVE FREE 10 ML: 5 INJECTION INTRAVENOUS at 12:25

## 2022-03-23 RX ADMIN — DEXAMETHASONE SODIUM PHOSPHATE 4 MG: 4 INJECTION, SOLUTION INTRAMUSCULAR; INTRAVENOUS at 09:05

## 2022-03-23 RX ADMIN — SODIUM CHLORIDE, PRESERVATIVE FREE 10 ML: 5 INJECTION INTRAVENOUS at 05:24

## 2022-03-23 RX ADMIN — HYDROMORPHONE HYDROCHLORIDE 1 MG: 1 INJECTION, SOLUTION INTRAMUSCULAR; INTRAVENOUS; SUBCUTANEOUS at 09:04

## 2022-03-23 RX ADMIN — SODIUM CHLORIDE, PRESERVATIVE FREE 5 MG: 5 INJECTION INTRAVENOUS at 15:10

## 2022-03-23 RX ADMIN — DEXAMETHASONE SODIUM PHOSPHATE 4 MG: 4 INJECTION, SOLUTION INTRAMUSCULAR; INTRAVENOUS at 21:08

## 2022-03-23 RX ADMIN — METOCLOPRAMIDE HYDROCHLORIDE 10 MG: 5 INJECTION INTRAMUSCULAR; INTRAVENOUS at 06:14

## 2022-03-23 RX ADMIN — ONDANSETRON 4 MG: 2 INJECTION INTRAMUSCULAR; INTRAVENOUS at 09:04

## 2022-03-23 RX ADMIN — HYDROMORPHONE HYDROCHLORIDE 1 MG: 1 INJECTION, SOLUTION INTRAMUSCULAR; INTRAVENOUS; SUBCUTANEOUS at 18:14

## 2022-03-23 RX ADMIN — HYDROMORPHONE HYDROCHLORIDE 1 MG: 1 INJECTION, SOLUTION INTRAMUSCULAR; INTRAVENOUS; SUBCUTANEOUS at 21:08

## 2022-03-23 RX ADMIN — PANTOPRAZOLE SODIUM 40 MG: 40 TABLET, DELAYED RELEASE ORAL at 08:01

## 2022-03-23 RX ADMIN — SODIUM CHLORIDE, PRESERVATIVE FREE 5 MG: 5 INJECTION INTRAVENOUS at 03:38

## 2022-03-23 RX ADMIN — HYDROMORPHONE HYDROCHLORIDE 1 MG: 1 INJECTION, SOLUTION INTRAMUSCULAR; INTRAVENOUS; SUBCUTANEOUS at 15:09

## 2022-03-23 RX ADMIN — SODIUM CHLORIDE 75 ML/HR: 9 INJECTION, SOLUTION INTRAVENOUS at 15:09

## 2022-03-23 RX ADMIN — SODIUM CHLORIDE 75 ML/HR: 9 INJECTION, SOLUTION INTRAVENOUS at 00:35

## 2022-03-23 RX ADMIN — HYDROMORPHONE HYDROCHLORIDE 1 MG: 1 INJECTION, SOLUTION INTRAMUSCULAR; INTRAVENOUS; SUBCUTANEOUS at 03:39

## 2022-03-23 RX ADMIN — METOCLOPRAMIDE HYDROCHLORIDE 10 MG: 5 INJECTION INTRAMUSCULAR; INTRAVENOUS at 12:24

## 2022-03-23 RX ADMIN — SODIUM CHLORIDE, PRESERVATIVE FREE 10 ML: 5 INJECTION INTRAVENOUS at 21:09

## 2022-03-23 RX ADMIN — MECLIZINE 12.5 MG: 12.5 TABLET ORAL at 12:24

## 2022-03-23 RX ADMIN — HYDROMORPHONE HYDROCHLORIDE 1 MG: 1 INJECTION, SOLUTION INTRAMUSCULAR; INTRAVENOUS; SUBCUTANEOUS at 12:24

## 2022-03-23 NOTE — PROGRESS NOTES
1930: Bedside and Verbal shift change report given to Juvenal Uriostegui RN (oncoming nurse) by Wilmar Schmitz RN (offgoing nurse). Report included the following information SBAR, Kardex, ED Summary, OR Summary, Procedure Summary, Intake/Output, MAR, Accordion, Recent Results, Med Rec Status, Cardiac Rhythm sinus rhythm and Dual Neuro Assessment. Assumed care of patient. A&O x4, FARRIS, FC. Nystagmus when looking right. Also c/o diplopia when looking right. PERRLA. C/o HA above right eye 2/10, currently. Advised patient of bed assignment on [facility].     2100: TRANSFER - OUT REPORT:    Verbal report given to Danilo Maria RN(name) on Sterling Do  being transferred to [facility](unit) for routine progression of care       Report consisted of patients Situation, Background, Assessment and   Recommendations(SBAR). Information from the following report(s) SBAR, Kardex, ED Summary, OR Summary, Procedure Summary, Intake/Output, MAR, Accordion, Recent Results, Med Rec Status and Cardiac Rhythm sinus rhythm was reviewed with the receiving nurse. Lines:   PICC Triple Lumen 56/63/42 Right;Basilic (Active)   Central Line Being Utilized Yes 03/22/22 2000   Criteria for Appropriate Use Limited/no vessel suitable for conventional peripheral access 03/22/22 2000   Site Assessment Clean, dry, & intact 03/22/22 2000   Phlebitis Assessment 0 03/22/22 2000   Infiltration Assessment 0 03/22/22 2000   Arm Circumference (cm) 21 cm 03/18/22 1032   Date of Last Dressing Change 03/22/22 03/22/22 2000   Dressing Status Clean, dry, & intact 03/22/22 2000   External Catheter Length (cm) 0 centimeters 03/22/22 2000   Dressing Type Disk with Chlorhexadine gluconate (CHG); Transparent 03/22/22 2000   Action Taken Open ports on tubing capped 03/22/22 2000   Hub Color/Line Status Red; Infusing 03/22/22 2000   Positive Blood Return (Site #1) Yes 03/22/22 2000   Hub Color/Line Status Gray;Flushed;Capped 03/22/22 2000   Positive Blood Return (Site #2) Yes 03/22/22 2000   Hub Color/Line Status White;Flushed;Capped 03/22/22 2000   Positive Blood Return (Site #3) Yes 03/22/22 2000   Alcohol Cap Used Yes 03/22/22 2000       Peripheral IV 03/17/22 Left Forearm (Active)   Site Assessment Clean, dry, & intact 03/22/22 2000   Phlebitis Assessment 0 03/22/22 2000   Infiltration Assessment 0 03/22/22 2000   Dressing Status Clean, dry, & intact 03/22/22 2000   Dressing Type Transparent 03/22/22 2000   Hub Color/Line Status Blue;Flushed;Capped 03/22/22 2000   Action Taken Open ports on tubing capped 03/22/22 2000   Alcohol Cap Used Yes 03/22/22 2000        Opportunity for questions and clarification was provided.       Patient transported with:   Monitor  Registered Nurse

## 2022-03-23 NOTE — PROGRESS NOTES
Problem: Mobility Impaired (Adult and Pediatric)  Goal: *Acute Goals and Plan of Care (Insert Text)  Description:   FUNCTIONAL STATUS PRIOR TO ADMISSION: Patient was independent and active without use of DME.    HOME SUPPORT PRIOR TO ADMISSION: The patient lived with mother and 2 sons but did not require assist.    Physical Therapy Goals  Initiated 3/22/2022  1. Patient will move from supine to sit and sit to supine  in bed with modified independence within 7 day(s). 2.  Patient will transfer from bed to chair and chair to bed with contact guard assist using the least restrictive device within 7 day(s). 3.  Patient will perform sit to stand with minimal contact guard assist within 7 day(s). 4.  Patient will ambulate with contact guard assist for 100 feet with the least restrictive device within 7 day(s). 5.  Patient will ascend/descend 5 stairs with single handrail(s) with minimal assistance/contact guard assist within 7 day(s). 6.  Patient will improve Soriano Balance score by 7 points within 7 days. Outcome: Progressing Towards Goal   PHYSICAL THERAPY TREATMENT  Patient: Lul Jaramillo (24 y.o. female)  Date: 3/23/2022  Diagnosis: [facility]bellar [facility] [I61.4] <principal problem not specified>       Precautions: Fall (SBP <140)  Chart, physical therapy assessment, plan of care and goals were reviewed. ASSESSMENT  Patient continues with skilled PT services and is progressing towards goals. Barriers to indep with functional mobility include impaired vision (diplopia, light sensitivity, nystagmus R eye), impaired balance/ coordination, general weakness, decreased activity tolerance, gait dysfunction. Pt reports continued headache with pain level manageable due to recent medication. Motivated and cooperative throughout session. Pt tolerated short distance gait training without device, min HHA for balance and use of occasional external support from environment. Gait training initiated with RW. Pt demo improved stability and able to tolerate increased distance with use of device. Noted pt use of gaze fixation to assist with maintaining balance during amb. Pt tolerated functional dynamic standing activities related to toileting with single UE support and CGA. After brief rest, pt participated in balance training for static stand without UE support including feet together eyes open, tandem stance (unable to achieve with R foot in back), SLS (unable to achieve on R LE); required up to mod A for safety. Pt fatigued at end of session, but motivated to remain up in chair. Pt presents well below functional baseline with high risk for fall. Recommend follow up IPR at d/c for neuro re-ed, balance training, mobility progression to enable return to max level of functional independence. Current Level of Function Impacting Discharge (mobility/balance): transfers CGA, amb with RW min A/ CGA    Other factors to consider for discharge: ongoing headaches and visual disturbance         PLAN :  Patient continues to benefit from skilled intervention to address the above impairments. Continue treatment per established plan of care. to address goals.     Recommendation for discharge: (in order for the patient to meet his/her long term goals)  Therapy 3 hours per day 5-7 days per week    This discharge recommendation:  Has been made in collaboration with the attending provider and/or case management    IF patient discharges home will need the following DME: to be determined (TBD)       SUBJECTIVE:   Patient stated I'm really tired now after session    OBJECTIVE DATA SUMMARY:   Critical Behavior:  Neurologic State: Alert  Orientation Level: Oriented X4  Cognition: Follows commands  Safety/Judgement: Insight into deficits,Good awareness of safety precautions  Functional Mobility Training:  Bed Mobility:      not observed              Transfers:  Sit to Stand: Contact guard assistance  Stand to Sit: Contact guard assistance                             Balance:  Sitting: Intact  Standing: Impaired  Standing - Static: Fair;Good;Constant support (RW)  Standing - Dynamic : Fair;Constant support  Ambulation/Gait Training:  Distance (ft): 45 Feet (ft) (also 15')  Assistive Device: Gait belt;Walker, rolling (also short distance with HHA)  Ambulation - Level of Assistance: Contact guard assistance;Minimal assistance (min A without device, CGA with use of walker)        Gait Abnormalities: Decreased step clearance;Trunk sway increased              Speed/Dyana: Pace decreased (<100 feet/min)  Step Length: Left shortened;Right shortened        Pain Rating:  Pt reports headache that is manageable (medicated prior to session)    Activity Tolerance:   Good    After treatment patient left in no apparent distress:   Sitting in chair, Call bell within reach, and Bed / chair alarm activated    COMMUNICATION/COLLABORATION:   The patients plan of care was discussed with: Registered nurse.      Cailin Cortes, PT   Time Calculation: 24 mins

## 2022-03-23 NOTE — PROGRESS NOTES
0730 Report received from [facility].     Yohana Abdi 79, NP @bedside. 1300 Dr. Conor Gonzales @bedside. Labs ordered, obtained, and sent. 1400 Pt tolerated OOB in chair x3 hours. 1530 Bedside and Verbal shift change report given to Vanessa Browne RN (oncoming nurse) by Sunday Gleason RN (offgoing nurse). Report included the following information SBAR, Kardex, Intake/Output, MAR, Accordion, Recent Results and Cardiac Rhythm NSR.

## 2022-03-23 NOTE — PROGRESS NOTES
Neurosurgery Progress Note  Chantal Marquisns, [facility]          Admit Date: 3/17/2022   LOS: 6 days        Daily Progress Note: 3/23/2022    HPI: The patient was found on the floor near the entrance to her home with vomit in a trash can nearby. She had a terrible headache and vertigo. She was taken by EMS to [address] [facility] ER where her head CT revealed a right cerebellar hemorrhage with mild compression of the fourth ventricle. Her CTA revealed a right cerebellar AVM. She transferred to [facility]. She has had continued dizziness, headache, nausea, vomiting over the weekend. She has not been able to keep much food down. She states it feels like the room is still spinning. Subjective: The patient is sitting up in the chair and looks much brighter today, more interactive. Denies numbness, tingling, chest pain, leg pain, difficulty swallowing, and dyspnea. Objective:     Vital signs  Temp (24hrs), Av.2 °F (36.8 °C), Min:98 °F (36.7 °C), Max:98.5 °F (36.9 °C)   701 - 1900  In: 345 [P.O.:120; I.V.:225]  Out: -   1901 -  0700  In: 2853.3 [P.O.:240;  I.V.:2613.3]  Out: -     Visit Vitals  /77 (BP 1 Location: Left arm, BP Patient Position: At rest)   Pulse 76   Temp 98 °F (36.7 °C)   Resp 15   Ht 5' (1.524 m)   Wt 39.1 kg (86 lb 1.6 oz)   SpO2 98%   BMI 16.82 kg/m²      O2 Device: None (Room air)     Pain control  Pain Assessment  Pain Scale 1: Numeric (0 - 10)  Pain Intensity 1: 0  Pain Onset 1: constant  Pain Location 1: Head  Pain Orientation 1: Posterior  Pain Description 1: Aching  Pain Intervention(s) 1: Medication (see MAR),Environmental changes    PT/OT  Gait     Gait  Base of Support: Narrowed  Speed/Dyana: Pace decreased (<100 feet/min)  Step Length: Right shortened,Left shortened  Gait Abnormalities: Decreased step clearance,Ataxic,Trunk sway increased  Ambulation - Level of Assistance: Minimal assistance  Distance (ft): 30 Feet (ft)  Assistive Device: Gait belt (HHA) Physical Exam:  Gen:NAD. Neuro: A&Ox3. Follows commands. Speech clear. Affect normal.  PERRL. Dysconjugate gaze on the right. Mild right lateral nystagmus though improving. Face symmetric. Tongue midline. FARRIS spontaneously. Strength 5/5 in UE and LE BL. Negative drift. Mild ataxia right hand. Gait deferred. CT head without contrast on 03/17/22 shows there is a right cerebellar hemorrhage with surrounding edema with extension into the right cerebellar peduncle. There is compression of the fourth ventricle which is small and contains a small amount of hemorrhage. The temporal tips are slightly dilated may represent developing hydrocephalus. 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 Stable acute right cerebellar intraparenchymal hemorrhage, with stable mild surrounding edema and mass effect. No hydrocephalus. CT head without contrast on 03/20/22 shows right intra-cerebellar hemorrhage.  No significant change     24 hour results:    Recent Results (from the past 24 hour(s))   CBC W/O DIFF    Collection Time: 03/23/22  5:17 AM   Result Value Ref Range    WBC 13.7 (H) 3.6 - 11.0 K/uL    RBC 4.73 3.80 - 5.20 M/uL    HGB 14.6 11.5 - 16.0 g/dL    HCT 42.8 35.0 - 47.0 %    MCV 90.5 80.0 - 99.0 FL    MCH 30.9 26.0 - 34.0 PG    MCHC 34.1 30.0 - 36.5 g/dL    RDW 11.6 11.5 - 14.5 %    PLATELET 731 680 - 489 K/uL    MPV 10.0 8.9 - 12.9 FL    NRBC 0.0 0  WBC    ABSOLUTE NRBC 0.00 0.00 - 0.01 K/uL   MAGNESIUM    Collection Time: 03/23/22  5:17 AM   Result Value Ref Range    Magnesium 2.1 1.6 - 2.4 mg/dL   PHOSPHORUS    Collection Time: 03/23/22  5:17 AM   Result Value Ref Range    Phosphorus 4.6 2.6 - 4.7 MG/DL   METABOLIC PANEL, BASIC    Collection Time: 03/23/22  5:17 AM   Result Value Ref Range    Sodium 136 136 - 145 mmol/L    Potassium 4.5 3.5 - 5.1 mmol/L    Chloride 104 97 - 108 mmol/L    CO2 28 21 - 32 mmol/L    Anion gap 4 (L) 5 - 15 mmol/L    Glucose 101 (H) 65 - 100 mg/dL    BUN 10 6 - 20 MG/DL    Creatinine 0.50 (L) 0.55 - 1.02 MG/DL    BUN/Creatinine ratio 20 12 - 20      GFR est AA >60 >60 ml/min/1.73m2    GFR est non-AA >60 >60 ml/min/1.73m2    Calcium 9.3 8.5 - 10.1 MG/DL          Assessment:     Active Problems:    Cerebellar hemorrhage ([address] (3/18/2022)        Plan:   1. Right cerebellar hemorrhage with brain compression   - no surgical intervention currently   - good BP control   - symptom management   - add meclizine prn for dizziness   - PT/OT consults   - ok to go to inpatient rehab when accepted  2. Right cerebellar AVM   - no surgical intervention at this time. She may need multidisciplinary approach down the line. Dr. Ana Eason will review the films for possible GK. Pt will need to f/u with [facility] outpatient to discuss endovascular treatment as well as Dr. Ana Eason for possible [place] treatment  3. Nausea/vomiting   - reglan, compazine, zofran PRN   - cont scopolamine patch  4. Thrombocytopenia   - Plt 115 03/21, now 235   - Pepcid changed to Protonix 03/21    Activity: up with assist  DVT ppx: SCDs  Dispo: inpt rehab    Plan d/w Dr. Peri Duran, hospitalist, nurse.       Mesfin Waite, NP

## 2022-03-23 NOTE — PROGRESS NOTES
Problem: Self Care Deficits Care Plan (Adult)  Goal: *Acute Goals and Plan of Care (Insert Text)  Description: FUNCTIONAL STATUS PRIOR TO ADMISSION: Patient was independent and active without use of DME.     HOME SUPPORT: The patient lived with family but did not require assist.    Occupational Therapy Goals  Initiated 3/22/2022  1. Patient will perform standing grooming with modified independence within 7 day(s). 2.  Patient will perform upper and lower body bathing with modified independence within 7 day(s). 3.  Patient will perform lower body dressing with modified independence within 7 day(s). 4.  Patient will perform toilet transfers with modified independence within 7 day(s). 5.  Patient will perform all aspects of toileting with modified independence within 7 day(s). 6.  Patient will participate in upper extremity therapeutic exercise/activities with supervision/set-up for 10 minutes within 7 day(s). 7.  Patient will utilize energy conservation techniques during functional activities with verbal cues within 7 day(s). Outcome: Progressing Towards Goal   OCCUPATIONAL THERAPY TREATMENT  Patient: Priscilla Hou (68 y.o. female)  Date: 3/23/2022  Diagnosis: Cerebellar hemorrhage ([facility] [address] [I61.4] <principal problem not specified>       Precautions: Fall (SBP <140)  Chart, occupational therapy assessment, plan of care, and goals were reviewed. ASSESSMENT  Patient continues with skilled OT services and is progressing towards goals. Pt's performance of ADL/IADL tasks continues to be limited at this time by generalized weakness, HA/dizziness, impaired balance, double vision, R visual field diplopia and nystagmus, and decreased activity tolerance/endurance. Pt received supine and eager to participate in therapy session. Pt performed bed mobility with stand by assist and functional transfers with close CGA, however pt frequently reaching out for furniture in environment [facility].  Pt educated on maintaining upright posture to facilitate maintenance of balance, and discussed with PT. Upon formal assessment this date, pt demonstrating mild undershooting of targets in R visual field however no difficulty with functional task (I.e. grooming - gathering supplies and completing task). Pt returned to bedside chair and left with all needs met. Current Level of Function Impacting Discharge (ADLs): close CGA standing ADLs    Other factors to consider for discharge: PLOF         PLAN :  Patient continues to benefit from skilled intervention to address the above impairments. Continue treatment per established plan of care to address goals. Recommendation for discharge: (in order for the patient to meet his/her long term goals)  Therapy 3 hours per day 5-7 days per week    This discharge recommendation:  Has been made in collaboration with the attending provider and/or case management    IF patient discharges home will need the following DME: TBD       SUBJECTIVE:   Patient stated I do feel a lot better today.     OBJECTIVE DATA SUMMARY:   Cognitive/Behavioral Status:  Neurologic State: Alert  Orientation Level: Oriented X4  Cognition: Follows commands             Functional Mobility and Transfers for ADLs:  Bed Mobility:  Supine to Sit: Stand-by assistance  Sit to Supine:  (pt remained in bedside chair)    Transfers:  Sit to Stand: Contact guard assistance     Bed to Chair: Contact guard assistance    Balance:  Sitting: Intact  Standing: Impaired  Standing - Static: Fair;Good;Constant support (RW)  Standing - Dynamic : Fair;Constant support    ADL Intervention:       Grooming  Position Performed: Standing  Washing Hands: Contact guard assistance  Brushing Teeth: Contact guard assistance                                    Pain:  8/10 following activity, VSS    Activity Tolerance:   Good and Fair    After treatment patient left in no apparent distress:   Sitting in chair, Call bell within reach and Bed / chair alarm activated    COMMUNICATION/COLLABORATION:   The patients plan of care was discussed with: Physical therapist and Registered nurse.      Cj Jordan OT  Time Calculation: 26 mins

## 2022-03-23 NOTE — PROGRESS NOTES
Transition of Care  1. RUR 8%  2. Disposition     IPR CM placed referrals [facility]Accepted -CM notified hold Delcie Prom / Kendell Backbone notified hold Delcie Prom.  /patient 1st choice [facility]-Accepted [facility] [id number] will begin Medicaid auth. And follow up with CM. 3. Transportation  - BLS  4. Follow Up PCP/Specialist     Dr. Basilia Ramos onboard with PT/OT recommendation IPR. Transition of Care Plan:    The Plan for Transition of Care is related to the following treatment goals: The Patient was provided with a choice of provider for IPR and agrees  with the discharge plan. Yes  No    A Freedom of choice list was provided with basic dialogue that supports the patient's individualized plan of care/goals and shares the quality data associated with the providers.        Yes  No      Teresa Hartman RN

## 2022-03-23 NOTE — PROGRESS NOTES
[facility] Adult  Hospitalist Group                                                                                          Hospitalist Progress Note  Milla Richards MD  Answering service: [phone] OR [phone] from in house phone        Date of Service:  3/23/2022  NAME:  Sonu Palacio  :  1985  MRN:  [id number]      Admission Summary:   Copied form admit: \" 40year old female with pmhx of migraines who presents as a transfer from an OSH ED with a right cerebellar hemorrhage.  She presented to OSH ED by EMS with sudden headache, dizziness, and nausea.  She had just returned home from work and was taking with the sitter. Sue Macon denies any precipitating events or trauma.  Her headache is rated at 8/10 while at rest and 10/10 with activity.  Fentanyl and hydromorphone were given at OSH ED with no improvement in symptoms.  She was transferred to [facility] ICU for evaluation with neuro surgery and neuro interventional.  Overnight Events:   3/17 -- Transferred from OSH ED for right cerebellar hemorrhage, started on 3% NS  3/18 -- Started on 3%  3/19 -- Angio yesterday PM  3/20 -- No acute events  3/21 -- No acute events  3/22: No acute event \"    Interval history / Subjective:     3/23/2022 :    Headache/dizziness better when in dark   Acey Richy as below        Assessment & Plan:        Assessment and Plan:     Right cerebellar hemorrhage  - seen by [facility]; decision is no ablation/embolization  - seen by Neurosurgery: decision not a surgical candidate   - dispo planing to IPR     Low wt status  Body mass index is 16.82 kg/m². - per pt \"hasn't been able to keep wt on.  ( has weighed up to 170 lbs )     PMH of note:  Pt notes     GB dz, removeal  Age 22   Appendectomy age 22   Endometriosis   Lt Knee repair in high school                               Hospital Problems  Date Reviewed: 2020          Codes Class Noted POA    Cerebellar hemorrhage ([facility] [address] ICD-10-CM: I61.4  ICD-9-CM: 471  3/18/2022 Unknown After personally interviewing pt at bedside the following is noted on 3/23/2022 :    Review of Systems   Constitutional: Negative for chills, diaphoresis, fever and malaise/fatigue. Respiratory: Negative for cough and shortness of breath. Cardiovascular: Negative for chest pain and leg swelling. Neurological:        Some dizziness    Psychiatric/Behavioral: Negative. All other systems reviewed and are negative. I had a face to face encounter with patient on 3/23/2022 at bedside for the following physical exam:     PHYSICAL EXAM:    Visit Vitals  /77 (BP 1 Location: Left arm, BP Patient Position: At rest)   Pulse 76   Temp 98 °F (36.7 °C)   Resp 15   Ht 5' (1.524 m)   Wt 39.1 kg (86 lb 1.6 oz)   SpO2 98%   BMI 16.82 kg/m²          Physical Exam  Constitutional:       Appearance: Normal appearance. Comments: Low wt status    HENT:      Head: Normocephalic and atraumatic. Right Ear: There is no impacted cerumen. Eyes:      General:         Right eye: No discharge. Left eye: No discharge. Cardiovascular:      Rate and Rhythm: Normal rate. Pulses: Normal pulses. Heart sounds: Normal heart sounds. Pulmonary:      Effort: Pulmonary effort is normal.      Breath sounds: Normal breath sounds. Abdominal:      General: Abdomen is flat. There is no distension. Palpations: Abdomen is soft. Tenderness: There is no abdominal tenderness. Skin:     General: Skin is warm and dry. Neurological:      General: No focal deficit present. Mental Status: She is alert and oriented to person, place, and time.    Psychiatric:         Mood and Affect: Mood normal.         Behavior: Behavior normal.                     Data Review:    Review and/or order of clinical lab test      Labs:     Recent Labs     03/23/22  0517 03/22/22  0613   WBC 13.7* 9.6   HGB 14.6 12.7   HCT 42.8 37.0    207     Recent Labs     03/23/22  0517 03/22/22  7150 03/21/22  0529    137 136   K 4.5 5.1 4.2    108 107   CO2 28 24 23   BUN 10 7 10   CREA 0.50* 0.34* 0.42*   * 73 80   CA 9.3 8.5 8.5   MG 2.1 2.0 1.9   PHOS 4.6 3.9 3.0     No results for input(s): ALT, AP, TBIL, TBILI, TP, ALB, GLOB, GGT, AML, LPSE in the last 72 hours. No lab exists for component: SGOT, GPT, AMYP, HLPSE  No results for input(s): INR, PTP, APTT, INREXT in the last 72 hours. No results for input(s): FE, TIBC, PSAT, FERR in the last 72 hours. No results found for: FOL, RBCF   No results for input(s): PH, PCO2, PO2 in the last 72 hours. No results for input(s): CPK, CKNDX, TROIQ in the last 72 hours.     No lab exists for component: CPKMB  Lab Results   Component Value Date/Time    Cholesterol, total 119 03/19/2022 04:18 AM    HDL Cholesterol 43 03/19/2022 04:18 AM    LDL, calculated 63 03/19/2022 04:18 AM    Triglyceride 65 03/19/2022 04:18 AM    CHOL/HDL Ratio 2.8 03/19/2022 04:18 AM     No results found for: [facility]  Lab Results   Component Value Date/Time    Color YELLOW/STRAW 01/29/2020 11:23 AM    Appearance CLEAR 01/29/2020 11:23 AM    Specific gravity 1.010 01/29/2020 11:23 AM    Specific gravity >1.030 (H) 12/19/2015 07:52 AM    pH (UA) 8.5 (H) 01/29/2020 11:23 AM    Protein NEGATIVE  01/29/2020 11:23 AM    Glucose NEGATIVE  01/29/2020 11:23 AM    Ketone NEGATIVE  01/29/2020 11:23 AM    Bilirubin NEGATIVE  01/29/2020 11:23 AM    Urobilinogen 0.2 01/29/2020 11:23 AM    Nitrites NEGATIVE  01/29/2020 11:23 AM    Leukocyte Esterase NEGATIVE  01/29/2020 11:23 AM    Epithelial cells FEW 01/29/2020 11:23 AM    Bacteria NEGATIVE  01/29/2020 11:23 AM    WBC 0-4 01/29/2020 11:23 AM    RBC 0-5 01/29/2020 11:23 AM         Medications Reviewed:     Current Facility-Administered Medications   Medication Dose Route Frequency    0.9% sodium chloride infusion  75 mL/hr IntraVENous CONTINUOUS    meclizine (ANTIVERT) tablet 12.5 mg  12.5 mg Oral Q6H PRN    scopolamine (TRANSDERM-SCOP) 1 mg over 3 days 1 Patch  1 Patch TransDERmal Q72H    pantoprazole (PROTONIX) tablet 40 mg  40 mg Oral ACB    prochlorperazine (COMPAZINE) with saline injection 5 mg  5 mg IntraVENous Q6H PRN    HYDROmorphone (DILAUDID) injection 1 mg  1 mg IntraVENous Q3H PRN    butalbital-acetaminophen-caffeine (FIORICET, ESGIC) -40 mg per tablet 1 Tablet  1 Tablet Oral Q4H PRN    0.9% sodium chloride infusion 25 mL  25 mL IntraVENous PRN    polyethylene glycol (MIRALAX) packet 17 g  17 g Oral DAILY PRN    dexamethasone (DECADRON) 4 mg/mL injection 4 mg  4 mg IntraVENous Q12H    sodium chloride (NS) flush 5-40 mL  5-40 mL IntraVENous Q8H    sodium chloride (NS) flush 5-40 mL  5-40 mL IntraVENous PRN    acetaminophen (TYLENOL) tablet 650 mg  650 mg Oral Q6H PRN    Or    acetaminophen (TYLENOL) suppository 650 mg  650 mg Rectal Q6H PRN    ondansetron (ZOFRAN) injection 4 mg  4 mg IntraVENous Q6H PRN    metoclopramide HCl (REGLAN) injection 10 mg  10 mg IntraVENous Q6H PRN     ______________________________________________________________________  EXPECTED LENGTH OF STAY: 4d 9h  ACTUAL LENGTH OF STAY:          6                 Mary Alice Martini MD

## 2022-03-24 PROBLEM — I61.4 CEREBELLAR HEMORRHAGE (HCC): Status: ACTIVE | Noted: 2022-03-18

## 2022-03-24 PROBLEM — I61.4 CEREBELLAR HEMORRHAGE, ACUTE (HCC): Status: ACTIVE | Noted: 2022-03-01

## 2022-03-24 LAB
ANION GAP SERPL CALC-SCNC: 4 MMOL/L (ref 5–15)
BUN SERPL-MCNC: 14 MG/DL (ref 6–20)
BUN/CREAT SERPL: 25 (ref 12–20)
CALCIUM SERPL-MCNC: 9 MG/DL (ref 8.5–10.1)
CHLORIDE SERPL-SCNC: 105 MMOL/L (ref 97–108)
CO2 SERPL-SCNC: 27 MMOL/L (ref 21–32)
CREAT SERPL-MCNC: 0.56 MG/DL (ref 0.55–1.02)
ERYTHROCYTE [DISTWIDTH] IN BLOOD BY AUTOMATED COUNT: 11.9 % (ref 11.5–14.5)
GLUCOSE SERPL-MCNC: 130 MG/DL (ref 65–100)
HCT VFR BLD AUTO: 40.4 % (ref 35–47)
HGB BLD-MCNC: 13.6 G/DL (ref 11.5–16)
MAGNESIUM SERPL-MCNC: 2 MG/DL (ref 1.6–2.4)
MCH RBC QN AUTO: 31.1 PG (ref 26–34)
MCHC RBC AUTO-ENTMCNC: 33.7 G/DL (ref 30–36.5)
MCV RBC AUTO: 92.2 FL (ref 80–99)
NRBC # BLD: 0 K/UL (ref 0–0.01)
NRBC BLD-RTO: 0 PER 100 WBC
PHOSPHATE SERPL-MCNC: 4.6 MG/DL (ref 2.6–4.7)
PLATELET # BLD AUTO: 262 K/UL (ref 150–400)
PMV BLD AUTO: 10.3 FL (ref 8.9–12.9)
POTASSIUM SERPL-SCNC: 4.4 MMOL/L (ref 3.5–5.1)
RBC # BLD AUTO: 4.38 M/UL (ref 3.8–5.2)
SODIUM SERPL-SCNC: 136 MMOL/L (ref 136–145)
WBC # BLD AUTO: 13 K/UL (ref 3.6–11)

## 2022-03-24 PROCEDURE — 74011250637 HC RX REV CODE- 250/637: Performed by: NURSE PRACTITIONER

## 2022-03-24 PROCEDURE — 74011000250 HC RX REV CODE- 250: Performed by: NEUROLOGICAL SURGERY

## 2022-03-24 PROCEDURE — 97530 THERAPEUTIC ACTIVITIES: CPT

## 2022-03-24 PROCEDURE — 80048 BASIC METABOLIC PNL TOTAL CA: CPT

## 2022-03-24 PROCEDURE — 74011000250 HC RX REV CODE- 250: Performed by: NURSE PRACTITIONER

## 2022-03-24 PROCEDURE — 83735 ASSAY OF MAGNESIUM: CPT

## 2022-03-24 PROCEDURE — 84100 ASSAY OF PHOSPHORUS: CPT

## 2022-03-24 PROCEDURE — 97116 GAIT TRAINING THERAPY: CPT

## 2022-03-24 PROCEDURE — 74011250636 HC RX REV CODE- 250/636: Performed by: NURSE PRACTITIONER

## 2022-03-24 PROCEDURE — 74011250636 HC RX REV CODE- 250/636: Performed by: INTERNAL MEDICINE

## 2022-03-24 PROCEDURE — 97535 SELF CARE MNGMENT TRAINING: CPT

## 2022-03-24 PROCEDURE — 36415 COLL VENOUS BLD VENIPUNCTURE: CPT

## 2022-03-24 PROCEDURE — 74011250637 HC RX REV CODE- 250/637: Performed by: NEUROLOGICAL SURGERY

## 2022-03-24 PROCEDURE — 65660000000 HC RM CCU STEPDOWN

## 2022-03-24 PROCEDURE — 85027 COMPLETE CBC AUTOMATED: CPT

## 2022-03-24 PROCEDURE — 74011250636 HC RX REV CODE- 250/636: Performed by: NEUROLOGICAL SURGERY

## 2022-03-24 RX ADMIN — BUTALBITAL, ACETAMINOPHEN, AND CAFFEINE 1 TABLET: 50; 325; 40 TABLET ORAL at 05:48

## 2022-03-24 RX ADMIN — DEXAMETHASONE SODIUM PHOSPHATE 4 MG: 4 INJECTION, SOLUTION INTRAMUSCULAR; INTRAVENOUS at 08:30

## 2022-03-24 RX ADMIN — SODIUM CHLORIDE, PRESERVATIVE FREE 10 ML: 5 INJECTION INTRAVENOUS at 05:49

## 2022-03-24 RX ADMIN — SODIUM CHLORIDE 75 ML/HR: 9 INJECTION, SOLUTION INTRAVENOUS at 03:11

## 2022-03-24 RX ADMIN — SODIUM CHLORIDE, PRESERVATIVE FREE 5 MG: 5 INJECTION INTRAVENOUS at 13:48

## 2022-03-24 RX ADMIN — SODIUM CHLORIDE, PRESERVATIVE FREE 10 ML: 5 INJECTION INTRAVENOUS at 13:48

## 2022-03-24 RX ADMIN — ONDANSETRON 4 MG: 2 INJECTION INTRAMUSCULAR; INTRAVENOUS at 00:45

## 2022-03-24 RX ADMIN — HYDROMORPHONE HYDROCHLORIDE 1 MG: 1 INJECTION, SOLUTION INTRAMUSCULAR; INTRAVENOUS; SUBCUTANEOUS at 20:50

## 2022-03-24 RX ADMIN — ONDANSETRON 4 MG: 2 INJECTION INTRAMUSCULAR; INTRAVENOUS at 08:30

## 2022-03-24 RX ADMIN — HYDROMORPHONE HYDROCHLORIDE 1 MG: 1 INJECTION, SOLUTION INTRAMUSCULAR; INTRAVENOUS; SUBCUTANEOUS at 17:33

## 2022-03-24 RX ADMIN — HYDROMORPHONE HYDROCHLORIDE 1 MG: 1 INJECTION, SOLUTION INTRAMUSCULAR; INTRAVENOUS; SUBCUTANEOUS at 08:30

## 2022-03-24 RX ADMIN — PANTOPRAZOLE SODIUM 40 MG: 40 TABLET, DELAYED RELEASE ORAL at 05:48

## 2022-03-24 RX ADMIN — MECLIZINE 12.5 MG: 12.5 TABLET ORAL at 19:09

## 2022-03-24 RX ADMIN — MECLIZINE 12.5 MG: 12.5 TABLET ORAL at 04:38

## 2022-03-24 RX ADMIN — DEXAMETHASONE SODIUM PHOSPHATE 4 MG: 4 INJECTION, SOLUTION INTRAMUSCULAR; INTRAVENOUS at 20:50

## 2022-03-24 RX ADMIN — HYDROMORPHONE HYDROCHLORIDE 1 MG: 1 INJECTION, SOLUTION INTRAMUSCULAR; INTRAVENOUS; SUBCUTANEOUS at 13:48

## 2022-03-24 RX ADMIN — HYDROMORPHONE HYDROCHLORIDE 1 MG: 1 INJECTION, SOLUTION INTRAMUSCULAR; INTRAVENOUS; SUBCUTANEOUS at 00:45

## 2022-03-24 NOTE — PROGRESS NOTES
Transition of Care Plan: IPR ([facility] accepted and states that they have gotten auth)    RUR: 8%    PCP F/U: Dr. Mitchell Mesa for Thursday, 3/24/22 at 8:30 a.m    Disposition: IPR    Transportation: family     Main Contact: Mother Sukumar Packer [phone]    0743: RAVI with patient at the bedside. Explained that all IPR facilities had accepted. Unfortunately, [facility] liaison was not notified to cancel referral and she proceeded with auth over [facility]. Patient notified that insurance is denying IPR and is recommending home health or outpatient therapy. States that whatever the MD decides, she would be agreeable to. If P2P is done and is approved, disposition would be [facility]. Instructions for peer to peer: [phone], Ref # [id number]. Member ID: [id number]  Deadline 3/30. Notified attending that insurance is requesting P2P.    8002: Isra Hartmann from [facility] called and states that they have gotten auth. Checking to see if they have a bed tomorrow. Will continue to follow.      Emerson Lam RN, CRM

## 2022-03-24 NOTE — PROGRESS NOTES
[facility] Brief Progress Note:    Rounded on pt today with Dr. Sis Nielsen. Discussed with patient that Dr. Sis Nielsen will discuss AVM treatment plan with Dr. Uzair Dong as pt will likely need combined gamma knife/endovascular repair. Pt verbalized understanding. Dr. Sis Nielsen will see her in the [facility] clinic on 5/17/22 at 2pm to discuss treatment plan.      Marcio Chavira NP  Neurocritical Care Nurse Practitioner  [phone]

## 2022-03-24 NOTE — PROGRESS NOTES
Neurosurgery    Spoke with Dr. Leia Amaro about possible gamma knife for treatment in combined approach with neurointerventional team after hemorrhage has reabsorbed. He will reach out to Dr. Alayna Butts. He would like to see how much of the AVM they can embolize prior to considering gamma knife radiosurgery treatment. [phone] [address] Tony for patient to go to inpatient rehab.      Elieser Robledo, NP

## 2022-03-24 NOTE — PROGRESS NOTES
Problem: Mobility Impaired (Adult and Pediatric)  Goal: *Acute Goals and Plan of Care (Insert Text)  Description:   FUNCTIONAL STATUS PRIOR TO ADMISSION: Patient was independent and active without use of DME.    HOME SUPPORT PRIOR TO ADMISSION: The patient lived with mother and 2 sons but did not require assist.    Physical Therapy Goals  Initiated 3/22/2022  1. Patient will move from supine to sit and sit to supine  in bed with modified independence within 7 day(s). 2.  Patient will transfer from bed to chair and chair to bed with contact guard assist using the least restrictive device within 7 day(s). 3.  Patient will perform sit to stand with minimal contact guard assist within 7 day(s). 4.  Patient will ambulate with contact guard assist for 100 feet with the least restrictive device within 7 day(s). 5.  Patient will ascend/descend 5 stairs with single handrail(s) with minimal assistance/contact guard assist within 7 day(s). 6.  Patient will improve Soriano Balance score by 7 points within 7 days. Outcome: Progressing Towards Goal   PHYSICAL THERAPY TREATMENT  Patient: Jesika Mayorga (24 y.o. female)  Date: 3/24/2022  Diagnosis: Cerebellar hemorrhage ([facility] [I61.4] <principal problem not specified>       Precautions: Fall (SBP <140)  Chart, physical therapy assessment, plan of care and goals were reviewed. ASSESSMENT  Patient continues with skilled PT services and is progressing towards goals. Barriers to function with mobility include visual disturbance (blurred vision, diplopia/ dizziness with tracking/ head turns to R), impaired standing balance, decreased coordination, general weakness, decreased activity tolerance, gait dysfunction, increased risk for fall. Pt received in supine due to increased HA and fatigue following OT session. Notes continued difficulty with use of cell phone due to visual disturbance and exacerbation of symptoms.  Pt motivated to participate with PT and attempt amb on unit (reports decreasing light sensitivity). Pt transferred sit to stand to RW with CGA, cues for hand placement. Tolerated amb on unit with RW, CGA for balance, cues for attending to obstacles on R as pt with poor tolerance to head turns R>L due to exacerbation of visual symptoms/ dizziness. Pt returned to room and requested use of toilet. Required cues for safe walker mgmt with turn to sit. Completed functional dynamic sitting activities with supervision, dynamic standing activities with UE support and CGA. Pt requested return to bed at end of session due to HA. Pt remains well below functional baseline with good rehab potential Demo strong motivation to participate in rehab process and maximize functional indep. Recommend follow up IPR at d/c as pt will require intensive multidisciplinary therapy environment to facilitate pt return to max level of functional independence. Current Level of Function Impacting Discharge (mobility/balance): bed mob SBA, sit to stand CGA, amb with RW CGA and cues    Other factors to consider for discharge: indep baseline         PLAN :  Patient continues to benefit from skilled intervention to address the above impairments. Continue treatment per established plan of care. to address goals.     Recommendation for discharge: (in order for the patient to meet his/her long term goals)  Therapy 3 hours per day 5-7 days per week    This discharge recommendation:  Has been made in collaboration with the attending provider and/or case management    IF patient discharges home will need the following DME: rolling walker       SUBJECTIVE:   Patient stated If I turn my head, it's all worse\"    OBJECTIVE DATA SUMMARY:   Critical Behavior:  Neurologic State: Alert  Orientation Level: Oriented X4  Cognition: Follows commands,Appropriate for age attention/concentration (decreased safety awareness/need for assistance)  Safety/Judgement: Decreased awareness of need for safety,Decreased awareness of need for assistance  Functional Mobility Training:  Bed Mobility:     Supine to Sit: Stand-by assistance  Sit to Supine: Stand-by assistance  Scooting: Stand-by assistance (for scoot to EOB)        Transfers:  Sit to Stand: Contact guard assistance  Stand to Sit: Contact guard assistance        Bed to Chair: Contact guard assistance;Minimum assistance (assist with balance and safe RW use/placement )                    Balance:  Sitting: Intact  Standing: Impaired; With support (RW)  Standing - Static: Fair;Good;Constant support  Standing - Dynamic : Fair;Constant support  Ambulation/Gait Training:  Distance (ft): 50 Feet (ft)  Assistive Device: Gait belt;Walker, rolling  Ambulation - Level of Assistance: Contact guard assistance (cues for obstacle negotiation on R)        Gait Abnormalities: Decreased step clearance; Path deviations (path deviation to R)        Base of Support: Narrowed     Speed/Dyana: Pace decreased (<100 feet/min)  Step Length: Left shortened;Right shortened     Pain Rating:  Pt reports headache    Activity Tolerance:   Good    After treatment patient left in no apparent distress:   Supine in bed, Call bell within reach, and Side rails x 3    COMMUNICATION/COLLABORATION:   The patients plan of care was discussed with: Registered nurse.      Haim Humphrey, PT   Time Calculation: 24 mins

## 2022-03-24 NOTE — PROGRESS NOTES
Bedside and Verbal shift change report given to [address] Mccurdy (oncoming nurse) by Patricia Martinez (offgoing nurse). Report included the following information SBAR, Kardex and Recent Results.

## 2022-03-24 NOTE — PROGRESS NOTES
0800: Bedside shift change report given to [address] [place] [address] David (oncoming nurse) by Alejandra Gordon (offgoing nurse). Report included the following information SBAR, Kardex, MAR and Cardiac Rhythm NSR.

## 2022-03-24 NOTE — PROGRESS NOTES
Orthostatic BPs completed       03/24/22 1534   Vital Signs   Pulse (Heart Rate) 72  (patient is SUPINE)   Resp Rate 18  (patient is SUPINE)   /72  (patient is SUPINE)   MAP (Monitor) 81  (patient is SUPINE)   MAP (Calculated) 82      03/24/22 1535   Vital Signs   Pulse (Heart Rate) 72  (patient is SITTING)   Resp Rate 12  (patient is SITTING)   /71  (patient is SITTING)   MAP (Monitor) 80  (patient is SITTING)   MAP (Calculated) 81        03/24/22 1536   Vital Signs   Temp 97.8 °F (36.6 °C)   Temp Source Oral   Pulse (Heart Rate) 91  (patient is STANDING)   Heart Rate Source Monitor   Cardiac Rhythm Sinus Rhythm   Resp Rate (!) 35  (patient is STANDING)   BP 95/70  (patient is STANDING)   MAP (Monitor) 78  (patient is STANDING)   MAP (Calculated) 78

## 2022-03-24 NOTE — PROGRESS NOTES
Problem: Self Care Deficits Care Plan (Adult)  Goal: *Acute Goals and Plan of Care (Insert Text)  Description: FUNCTIONAL STATUS PRIOR TO ADMISSION: Patient was independent and active without use of DME.     HOME SUPPORT: The patient lived with family but did not require assist.    Occupational Therapy Goals  Initiated 3/22/2022  1. Patient will perform standing grooming with modified independence within 7 day(s). 2.  Patient will perform upper and lower body bathing with modified independence within 7 day(s). 3.  Patient will perform lower body dressing with modified independence within 7 day(s). 4.  Patient will perform toilet transfers with modified independence within 7 day(s). 5.  Patient will perform all aspects of toileting with modified independence within 7 day(s). 6.  Patient will participate in upper extremity therapeutic exercise/activities with supervision/set-up for 10 minutes within 7 day(s). 7.  Patient will utilize energy conservation techniques during functional activities with verbal cues within 7 day(s). Outcome: Progressing Towards Goal   OCCUPATIONAL THERAPY TREATMENT  Patient: Immanuel Gray (56 y.o. female)  Date: 3/24/2022  Diagnosis: Cerebellar hemorrhage ([facility] [I61.4] <principal problem not specified>       Precautions: Fall (SBP <140)  Chart, occupational therapy assessment, plan of care, and goals were reviewed. ASSESSMENT  Patient continues with skilled OT services and is progressing towards goals. Pt's functional independence and performance of ADL/IADL tasks continues to be limited at this time by impaired balance, decreased safety awareness/need for assistance, generalized weakness, fair/poor activity tolerance/endurance, and impaired vision (mild diplopia - improving, blurriness, R nystagmus, impaired depth perception - undershooting). Pt received supine and agreeable to participation in therapy session.  She performed bed mobility with SBA and functional transfers with close CGA to min A for balance and safe utilization/navigation of RW within environment. While ambulating to sink to perform grooming tasks, pt utilizing fixation gaze to compensate for visual deficits (pt reports increase in blurriness/dipopia with R lateral gaze), resulting in impaired navigation of obstacles in environment. While standing at sink, pt observed with undershooting during grooming task with pt self-correcting with min verbal cues. Pt returned to bedside chair and educated on impact of symptoms on balance and ability to safety navigate environment independently. RN notified of session. Continue to recommend d/c to IPR as pt remains well below her functional baseline of independent and is highly motivated to participate in regain independence. Current Level of Function Impacting Discharge (ADLs): SBA to close CGA for maintenance of balance     Other factors to consider for discharge: PLOF independent, decreased safety awareness         PLAN :  Patient continues to benefit from skilled intervention to address the above impairments. Continue treatment per established plan of care to address goals. Recommendation for discharge: (in order for the patient to meet his/her long term goals)  Therapy 3 hours per day 5-7 days per week    This discharge recommendation:  Has been made in collaboration with the attending provider and/or case management    IF patient discharges home will need the following DME: TBD       SUBJECTIVE:   Patient stated I'm having a little more blurriness today after looking at my phone/tv.     OBJECTIVE DATA SUMMARY:   Cognitive/Behavioral Status:  Neurologic State: Alert  Orientation Level: Oriented X4  Cognition: Follows commands; Appropriate for age attention/concentration (decreased safety awareness/need for assistance)  Perception: Appears intact  Perseveration: No perseveration noted  Safety/Judgement: Decreased awareness of need for safety;Decreased awareness of need for assistance    Functional Mobility and Transfers for ADLs:  Bed Mobility:  Supine to Sit: Stand-by assistance  Sit to Supine:  (pt remained in bedside chair)  Scooting: Stand-by assistance (to EOB and in bedside chair)    Transfers:  Sit to Stand: Contact guard assistance     Bed to Chair: Contact guard assistance;Minimum assistance (assist with balance and safe RW use/placement )    Balance:  Sitting: Intact  Standing: Impaired; With support  Standing - Static: Good;Fair;Constant support  Standing - Dynamic : Fair;Constant support    ADL Intervention:       Grooming  Position Performed: Standing  Washing Hands: Contact guard assistance  Brushing Teeth: Contact guard assistance (pt with undershooting of sink)  Cues: Verbal cues provided;Visual cues provided              Upper Body [address]: Minimum  assistance  Cues: Verbal cues provided    Lower Body Dressing Assistance  Pants With Elastic Waist: Contact guard assistance  Position Performed: Seated edge of bed  Cues: Verbal cues provided         Cognitive Retraining  Safety/Judgement: Decreased awareness of need for safety;Decreased awareness of need for assistance    Pain:  None reported     Activity Tolerance:   Good and Fair    After treatment patient left in no apparent distress:   Sitting in chair, Call bell within reach and Bed / chair alarm activated, RN notified     COMMUNICATION/COLLABORATION:   The patients plan of care was discussed with: Registered nurse.      Bebeto Horton OT  Time Calculation: 25 mins

## 2022-03-25 VITALS
WEIGHT: 81.79 LBS | BODY MASS INDEX: 16.06 KG/M2 | OXYGEN SATURATION: 100 % | RESPIRATION RATE: 15 BRPM | DIASTOLIC BLOOD PRESSURE: 79 MMHG | HEART RATE: 89 BPM | HEIGHT: 60 IN | TEMPERATURE: 98.1 F | SYSTOLIC BLOOD PRESSURE: 123 MMHG

## 2022-03-25 LAB
ANION GAP SERPL CALC-SCNC: 6 MMOL/L (ref 5–15)
BUN SERPL-MCNC: 18 MG/DL (ref 6–20)
BUN/CREAT SERPL: 34 (ref 12–20)
CALCIUM SERPL-MCNC: 9.5 MG/DL (ref 8.5–10.1)
CHLORIDE SERPL-SCNC: 105 MMOL/L (ref 97–108)
CO2 SERPL-SCNC: 25 MMOL/L (ref 21–32)
CREAT SERPL-MCNC: 0.53 MG/DL (ref 0.55–1.02)
ERYTHROCYTE [DISTWIDTH] IN BLOOD BY AUTOMATED COUNT: 12 % (ref 11.5–14.5)
FLUAV RNA SPEC QL NAA+PROBE: NOT DETECTED
FLUBV RNA SPEC QL NAA+PROBE: NOT DETECTED
GLUCOSE SERPL-MCNC: 105 MG/DL (ref 65–100)
HCT VFR BLD AUTO: 39.9 % (ref 35–47)
HGB BLD-MCNC: 13.7 G/DL (ref 11.5–16)
MAGNESIUM SERPL-MCNC: 2.1 MG/DL (ref 1.6–2.4)
MCH RBC QN AUTO: 31.3 PG (ref 26–34)
MCHC RBC AUTO-ENTMCNC: 34.3 G/DL (ref 30–36.5)
MCV RBC AUTO: 91.1 FL (ref 80–99)
NRBC # BLD: 0 K/UL (ref 0–0.01)
NRBC BLD-RTO: 0 PER 100 WBC
PHOSPHATE SERPL-MCNC: 5 MG/DL (ref 2.6–4.7)
PLATELET # BLD AUTO: 274 K/UL (ref 150–400)
PMV BLD AUTO: 10.2 FL (ref 8.9–12.9)
POTASSIUM SERPL-SCNC: 4.2 MMOL/L (ref 3.5–5.1)
RBC # BLD AUTO: 4.38 M/UL (ref 3.8–5.2)
SARS-COV-2, COV2: NOT DETECTED
SODIUM SERPL-SCNC: 136 MMOL/L (ref 136–145)
WBC # BLD AUTO: 14.2 K/UL (ref 3.6–11)

## 2022-03-25 PROCEDURE — 74011250636 HC RX REV CODE- 250/636: Performed by: NURSE PRACTITIONER

## 2022-03-25 PROCEDURE — 74011250636 HC RX REV CODE- 250/636: Performed by: NEUROLOGICAL SURGERY

## 2022-03-25 PROCEDURE — 80048 BASIC METABOLIC PNL TOTAL CA: CPT

## 2022-03-25 PROCEDURE — 83735 ASSAY OF MAGNESIUM: CPT

## 2022-03-25 PROCEDURE — 74011000250 HC RX REV CODE- 250: Performed by: NURSE PRACTITIONER

## 2022-03-25 PROCEDURE — 87636 SARSCOV2 & INF A&B AMP PRB: CPT

## 2022-03-25 PROCEDURE — 84100 ASSAY OF PHOSPHORUS: CPT

## 2022-03-25 PROCEDURE — 74011250637 HC RX REV CODE- 250/637: Performed by: NEUROLOGICAL SURGERY

## 2022-03-25 PROCEDURE — 36415 COLL VENOUS BLD VENIPUNCTURE: CPT

## 2022-03-25 PROCEDURE — 85027 COMPLETE CBC AUTOMATED: CPT

## 2022-03-25 RX ORDER — SCOLOPAMINE TRANSDERMAL SYSTEM 1 MG/1
1 PATCH, EXTENDED RELEASE TRANSDERMAL
Qty: 10 PATCH | Refills: 0 | Status: SHIPPED | OUTPATIENT
Start: 2022-03-27 | End: 2022-04-26

## 2022-03-25 RX ORDER — MECLIZINE HCL 12.5 MG 12.5 MG/1
12.5 TABLET ORAL
Status: COMPLETED | OUTPATIENT
Start: 2022-03-25 | End: 2022-03-25

## 2022-03-25 RX ORDER — DEXAMETHASONE 2 MG/1
TABLET ORAL
Qty: 6 TABLET | Refills: 0 | Status: SHIPPED
Start: 2022-03-25 | End: 2022-06-22

## 2022-03-25 RX ORDER — HYDROMORPHONE HYDROCHLORIDE 2 MG/1
1 TABLET ORAL
Qty: 20 TABLET | Refills: 0 | Status: SHIPPED | OUTPATIENT
Start: 2022-03-25 | End: 2022-03-28

## 2022-03-25 RX ORDER — ACETAMINOPHEN 325 MG/1
650 TABLET ORAL
Qty: 30 TABLET | Refills: 0 | Status: SHIPPED
Start: 2022-03-25

## 2022-03-25 RX ORDER — BUTALBITAL, ACETAMINOPHEN AND CAFFEINE 50; 325; 40 MG/1; MG/1; MG/1
1 TABLET ORAL
Qty: 20 TABLET | Refills: 0 | Status: SHIPPED | OUTPATIENT
Start: 2022-03-25

## 2022-03-25 RX ORDER — PROCHLORPERAZINE MALEATE 10 MG
5 TABLET ORAL
Qty: 30 TABLET | Refills: 0 | Status: SHIPPED | OUTPATIENT
Start: 2022-03-25 | End: 2022-04-01

## 2022-03-25 RX ORDER — DEXAMETHASONE 4 MG/1
4 TABLET ORAL 2 TIMES DAILY WITH MEALS
Qty: 28 TABLET | Refills: 0 | Status: SHIPPED | OUTPATIENT
Start: 2022-03-25 | End: 2022-03-25 | Stop reason: SDUPTHER

## 2022-03-25 RX ORDER — PANTOPRAZOLE SODIUM 40 MG/1
40 TABLET, DELAYED RELEASE ORAL
Qty: 30 TABLET | Refills: 0 | Status: SHIPPED | OUTPATIENT
Start: 2022-03-26 | End: 2022-06-22

## 2022-03-25 RX ORDER — MECLIZINE HCL 12.5 MG 12.5 MG/1
12.5 TABLET ORAL 2 TIMES DAILY
Qty: 60 TABLET | Refills: 0 | Status: SHIPPED | OUTPATIENT
Start: 2022-03-25 | End: 2022-05-04

## 2022-03-25 RX ADMIN — PANTOPRAZOLE SODIUM 40 MG: 40 TABLET, DELAYED RELEASE ORAL at 08:09

## 2022-03-25 RX ADMIN — MECLIZINE 12.5 MG: 12.5 TABLET ORAL at 00:41

## 2022-03-25 RX ADMIN — HYDROMORPHONE HYDROCHLORIDE 1 MG: 1 INJECTION, SOLUTION INTRAMUSCULAR; INTRAVENOUS; SUBCUTANEOUS at 09:52

## 2022-03-25 RX ADMIN — HYDROMORPHONE HYDROCHLORIDE 1 MG: 1 INJECTION, SOLUTION INTRAMUSCULAR; INTRAVENOUS; SUBCUTANEOUS at 13:53

## 2022-03-25 RX ADMIN — MECLIZINE 12.5 MG: 12.5 TABLET ORAL at 03:38

## 2022-03-25 RX ADMIN — DEXAMETHASONE SODIUM PHOSPHATE 4 MG: 4 INJECTION, SOLUTION INTRAMUSCULAR; INTRAVENOUS at 09:51

## 2022-03-25 RX ADMIN — ONDANSETRON 4 MG: 2 INJECTION INTRAMUSCULAR; INTRAVENOUS at 09:52

## 2022-03-25 RX ADMIN — MECLIZINE 12.5 MG: 12.5 TABLET ORAL at 09:51

## 2022-03-25 RX ADMIN — SODIUM CHLORIDE, PRESERVATIVE FREE 10 ML: 5 INJECTION INTRAVENOUS at 00:41

## 2022-03-25 RX ADMIN — SODIUM CHLORIDE, PRESERVATIVE FREE 10 ML: 5 INJECTION INTRAVENOUS at 05:38

## 2022-03-25 RX ADMIN — ONDANSETRON 4 MG: 2 INJECTION INTRAMUSCULAR; INTRAVENOUS at 00:41

## 2022-03-25 RX ADMIN — SODIUM CHLORIDE, PRESERVATIVE FREE 10 ML: 5 INJECTION INTRAVENOUS at 13:53

## 2022-03-25 RX ADMIN — HYDROMORPHONE HYDROCHLORIDE 1 MG: 1 INJECTION, SOLUTION INTRAMUSCULAR; INTRAVENOUS; SUBCUTANEOUS at 05:37

## 2022-03-25 RX ADMIN — MECLIZINE 12.5 MG: 12.5 TABLET ORAL at 18:00

## 2022-03-25 NOTE — PROGRESS NOTES
I have reviewed discharge instructions with the [facility]. The [facility] verbalized understanding.

## 2022-03-25 NOTE — PROGRESS NOTES
Transition of Care Plan: IPR-MICHELLE  RN to call report to [phone], [employer] transport     RUR: 8%     PCP F/U: Dr. Navdeep Rosales for Thursday, 3/24/22 at 8:30 a.m     Disposition: IPR-room number [id number]     Transportation: [facility]-6:15pm     Main Contact: Mother Ivy Johnston [phone]    3601: [facility] states that they can accept today. Will need COVID PCR. MD notified. Facility requests transport to be set up after 2pm. Will provide [employer] when available. 1684: [facility] confirmed ETA for 3pm    0935: Patient notified of discharge to [facility] today and transport ETA. RN notified as well and to obtain COVID PCR.     1201: [employer] info still pending. 1213: [employer] info received.      1630: New ETA-6:15pm. RN notified     Katherine Gamez RN, CRM

## 2022-03-25 NOTE — PROGRESS NOTES
[facility] Adult  Hospitalist Group                                                                                          Hospitalist Progress Note  Danna Esparza MD  Answering service: [phone] OR [phone] from in house phone        Date of Service:  3/24/2022  NAME:  Herbert Bo  :  1985  MRN:  [id number]      Admission Summary:   Copied form admit: \" 40year old female with pmhx of migraines who presents as a transfer from an OSH ED with a right cerebellar hemorrhage.  She presented to OSH ED by EMS with sudden headache, dizziness, and nausea.  She had just returned home from work and was taking with the sitter. Eliceo Ribeiro denies any precipitating events or trauma.  Her headache is rated at 8/10 while at rest and 10/10 with activity.  Fentanyl and hydromorphone were given at OSH ED with no improvement in symptoms.  She was transferred to [facility] ICU for evaluation with neuro surgery and neuro interventional.  Overnight Events:   3/17 -- Transferred from OSH ED for right cerebellar hemorrhage, started on 3% NS  3/18 -- Started on 3%  3/19 -- Angio yesterday PM  3/20 -- No acute events  3/21 -- No acute events  3/22: No acute event \"    Interval history / Subjective:     3/24/2022 :    Headache/dizziness better when in dark   Geroge Blew as below        Assessment & Plan:        Assessment and Plan:     Right cerebellar hemorrhage  - seen by [facility]; decision is no ablation/embolization  - seen by Neurosurgery: decision not a surgical candidate   - dispo planing to IPR     Low wt status  Body mass index is 16.82 kg/m². - per pt \"hasn't been able to keep wt on.  ( has weighed up to 170 lbs )     PMH of note:  Pt notes     GB dz, removeal  Age 22   Appendectomy age 22   Endometriosis   Lt Knee repair in high school                               Hospital Problems  Date Reviewed: 2020          Codes Class Noted POA    Cerebellar hemorrhage ([facility] ICD-10-CM: I61.4  ICD-9-CM: 017  3/18/2022 Unknown After personally interviewing pt at bedside the following is noted on 3/24/2022 :    Review of Systems   Constitutional: Negative for chills, diaphoresis, fever and malaise/fatigue. Respiratory: Negative for cough and shortness of breath. Cardiovascular: Negative for chest pain and leg swelling. Neurological:        Some dizziness    Psychiatric/Behavioral: Negative. All other systems reviewed and are negative. I had a face to face encounter with patient on 3/24/2022 at bedside for the following physical exam:     PHYSICAL EXAM:    Visit Vitals  BP 96/67 (BP 1 Location: Left upper arm, BP Patient Position: At rest)   Pulse (!) 58 Comment: Simultaneous filing. User may not have seen previous data. Temp 97.5 °F (36.4 °C)   Resp 13   Ht 5' (1.524 m)   Wt 40.1 kg (88 lb 6.5 oz)   SpO2 99%   BMI 17.27 kg/m²          Physical Exam  Constitutional:       Appearance: Normal appearance. Comments: Low wt status    HENT:      Head: Normocephalic and atraumatic. Right Ear: There is no impacted cerumen. Eyes:      General:         Right eye: No discharge. Left eye: No discharge. Cardiovascular:      Rate and Rhythm: Normal rate. Pulses: Normal pulses. Heart sounds: Normal heart sounds. Pulmonary:      Effort: Pulmonary effort is normal.      Breath sounds: Normal breath sounds. Abdominal:      General: Abdomen is flat. There is no distension. Palpations: Abdomen is soft. Tenderness: There is no abdominal tenderness. Skin:     General: Skin is warm and dry. Neurological:      General: No focal deficit present. Mental Status: She is alert and oriented to person, place, and time.    Psychiatric:         Mood and Affect: Mood normal.         Behavior: Behavior normal.                     Data Review:    Review and/or order of clinical lab test      Labs:     Recent Labs     03/24/22  0313 03/23/22  0517   WBC 13.0* 13.7*   HGB 13.6 14.6   HCT 40.4 42.8    235     Recent Labs     03/24/22  0313 03/23/22  0517 03/22/22  0613    136 137   K 4.4 4.5 5.1    104 108   CO2 27 28 24   BUN 14 10 7   CREA 0.56 0.50* 0.34*   * 101* 73   CA 9.0 9.3 8.5   MG 2.0 2.1 2.0   PHOS 4.6 4.6 3.9     No results for input(s): ALT, AP, TBIL, TBILI, TP, ALB, GLOB, GGT, AML, LPSE in the last 72 hours. No lab exists for component: SGOT, GPT, AMYP, HLPSE  No results for input(s): INR, PTP, APTT, INREXT, INREXT in the last 72 hours. No results for input(s): FE, TIBC, PSAT, FERR in the last 72 hours. No results found for: FOL, RBCF   No results for input(s): PH, PCO2, PO2 in the last 72 hours. No results for input(s): CPK, CKNDX, TROIQ in the last 72 hours.     No lab exists for component: CPKMB  Lab Results   Component Value Date/Time    Cholesterol, total 119 03/19/2022 04:18 AM    HDL Cholesterol 43 03/19/2022 04:18 AM    LDL, calculated 63 03/19/2022 04:18 AM    Triglyceride 65 03/19/2022 04:18 AM    CHOL/HDL Ratio 2.8 03/19/2022 04:18 AM     No results found for: [facility]  Lab Results   Component Value Date/Time    Color YELLOW/STRAW 01/29/2020 11:23 AM    Appearance CLEAR 01/29/2020 11:23 AM    Specific gravity 1.010 01/29/2020 11:23 AM    Specific gravity >1.030 (H) 12/19/2015 07:52 AM    pH (UA) 8.5 (H) 01/29/2020 11:23 AM    Protein NEGATIVE  01/29/2020 11:23 AM    Glucose NEGATIVE  01/29/2020 11:23 AM    Ketone NEGATIVE  01/29/2020 11:23 AM    Bilirubin NEGATIVE  01/29/2020 11:23 AM    Urobilinogen 0.2 01/29/2020 11:23 AM    Nitrites NEGATIVE  01/29/2020 11:23 AM    Leukocyte Esterase NEGATIVE  01/29/2020 11:23 AM    Epithelial cells FEW 01/29/2020 11:23 AM    Bacteria NEGATIVE  01/29/2020 11:23 AM    WBC 0-4 01/29/2020 11:23 AM    RBC 0-5 01/29/2020 11:23 AM         Medications Reviewed:     Current Facility-Administered Medications   Medication Dose Route Frequency    0.9% sodium chloride infusion  75 mL/hr IntraVENous CONTINUOUS    meclizine (ANTIVERT) tablet 12.5 mg  12.5 mg Oral Q6H PRN    scopolamine (TRANSDERM-SCOP) 1 mg over 3 days 1 Patch  1 Patch TransDERmal Q72H    pantoprazole (PROTONIX) tablet 40 mg  40 mg Oral ACB    prochlorperazine (COMPAZINE) with saline injection 5 mg  5 mg IntraVENous Q6H PRN    HYDROmorphone (DILAUDID) injection 1 mg  1 mg IntraVENous Q3H PRN    butalbital-acetaminophen-caffeine (FIORICET, ESGIC) -40 mg per tablet 1 Tablet  1 Tablet Oral Q4H PRN    0.9% sodium chloride infusion 25 mL  25 mL IntraVENous PRN    polyethylene glycol (MIRALAX) packet 17 g  17 g Oral DAILY PRN    dexamethasone (DECADRON) 4 mg/mL injection 4 mg  4 mg IntraVENous Q12H    sodium chloride (NS) flush 5-40 mL  5-40 mL IntraVENous Q8H    sodium chloride (NS) flush 5-40 mL  5-40 mL IntraVENous PRN    acetaminophen (TYLENOL) tablet 650 mg  650 mg Oral Q6H PRN    Or    acetaminophen (TYLENOL) suppository 650 mg  650 mg Rectal Q6H PRN    ondansetron (ZOFRAN) injection 4 mg  4 mg IntraVENous Q6H PRN    metoclopramide HCl (REGLAN) injection 10 mg  10 mg IntraVENous Q6H PRN     ______________________________________________________________________  EXPECTED LENGTH OF STAY: 4d 9h  ACTUAL LENGTH OF STAY:          7                 Nazia Chilel MD

## 2022-03-25 NOTE — ADT AUTH CERT NOTES
3/24/22 MD note by Shorty Mccurdy RN       Review Status Review Entered   In Primary 3/25/2022 08:06      Criteria Review   Neurosurgery     Spoke with Dr. Uzair Dong about possible gamma knife for treatment in combined approach with neurointerventional team after hemorrhage has reabsorbed. He will reach out to Dr. Sis Nielsen. He would like to see how much of the AVM they can embolize prior to considering gamma knife radiosurgery treatment.  [id number] Sarah Tony for patient to go to inpatient rehab.            Stroke: Hemorrhagic - Care Day 7 (3/24/2022) by Shorty Mccurdy RN       Review Status Review Entered   Completed 3/24/2022 14:52      Criteria Review      Care Day: 7 Care Date: 3/24/2022 Level of Care: Inpatient Floor    Guideline Day 3    Level Of Care    (X) Stroke unit or ICU    3/24/2022 14:51:07 EDT by Estella Bowen Neuro/Stroke bed since 3/22    Clinical Status    (X) * Mental status at baseline or stable    3/24/2022 14:51:07 EDT by Shorty Mccurdy      A/O    (X) * Neurologic deficits absent or stable    3/24/2022 14:51:07 EDT by Shorty Mccurdy      absent    (X) * Unimpaired swallowing    (X) * Dangerous arrhythmia absent    3/24/2022 14:51:07 EDT by Shorty Mccurdy      Sinus rhythm    Activity    ( ) * Up to chair    (X) Possible assisted ambulation    3/24/2022 14:51:07 EDT by Estella Bowen Ambulated 50ft w/PT    Routes    ( ) * Oral hydration    3/24/2022 14:51:07 EDT by Shorty Mccurdy      NS 75hr    ( ) * Oral medications    3/24/2022 14:51:07 EDT by Shorty Mccurdy      Fiorcet 1 tab x1, Decadron 4mg IV q12, Dilaudid 1mg IV x3, Antivert 12.5mg x1, Zofran 4mg IV x2, Protonix 40mg po qd, Compazine 5mg IV x1, Transderm Scop patch q72h    (X) * Central lines absent    3/24/2022 14:51:07 EDT by Shorty Mccurdy      no central lines    (X) Advance diet as tolerated    3/24/2022 14:51:07 EDT by Shorty Mccurdy      Regular diet    Interventions    (X) * Arterial catheter absent    3/24/2022 [facility] Emergency Department    [place]  [address] [address]    Phone:  [phone]    Fax:  [address]   MRN: [id number]    Department:  [facility] Emergency Department   Date of Visit:  1/1 14:51:07 EDT by Zari Saleh no a line    (X) Neurologic checks    3/24/2022 14:51:07 EDT by Elizabeth Plvasquez      q4h    (X) DVT prophylaxis    3/24/2022 14:51:07 EDT by Elizabeth Plump      SCDs    (X) Possible physical therapy    (X) Possible occupational and speech therapy    3/24/2022 14:51:07 EDT by Elizabeth Plump      OT    Medications    (X) * Sedation absent    * Milestone   Additional Notes   3/24/22        LOC: INPT    NEURO/STROKE BED        97.7-72-15, 125/75, 98% ra        WBC 13.0, Glu 130 IF THERE IS ANY CHANGE OR WORSENING OF YOUR CONDITION, CALL YOUR PRIMARY CARE PHYSICIAN AT ONCE OR RETURN IMMEDIATELY TO THE EMERGENCY DEPARTMENT.     If you have been prescribed any medication(s), please fill your prescription right away and begin taking t

## 2022-03-25 NOTE — PROGRESS NOTES
Problem: Falls - Risk of  Goal: *Absence of Falls  Description: Document Tonia Don Fall Risk and appropriate interventions in the flowsheet. Outcome: Progressing Towards Goal  Note: Fall Risk Interventions:  Mobility Interventions: Bed/chair exit alarm,Communicate number of staff needed for ambulation/transfer,Patient to call before getting OOB,PT Consult for mobility concerns,Utilize walker, cane, or other assistive device         Medication Interventions: Bed/chair exit alarm,Evaluate medications/consider consulting pharmacy,Patient to call before getting OOB,Teach patient to arise slowly    Elimination Interventions: Call light in reach,Patient to call for help with toileting needs,Stay With Me (per policy),Toileting schedule/hourly rounds,Toilet paper/wipes in reach              Problem: Pressure Injury - Risk of  Goal: *Prevention of pressure injury  Description: Document Srinivasa Scale and appropriate interventions in the flowsheet. Outcome: Progressing Towards Goal  Note: Pressure Injury Interventions:             Activity Interventions: Increase time out of bed,Pressure redistribution bed/mattress(bed type),PT/OT evaluation    Mobility Interventions: HOB 30 degrees or less,Pressure redistribution bed/mattress (bed type),PT/OT evaluation    Nutrition Interventions: Document food/fluid/supplement intake                     Problem: Pain  Goal: *Control of Pain  Outcome: Progressing Towards Goal  Goal: *PALLIATIVE CARE:  Alleviation of Pain  Outcome: Progressing Towards Goal Additional Anesthesia Volume In Cc: 6

## 2022-03-25 NOTE — DISCHARGE INSTRUCTIONS
You were admitted and diagnosed with an acute intracranial bleed of the right cerebellum- discharge recommendations are as follows:     Dr. Shana Wood will discuss AVM treatment plan with Dr. Sheilah Pallas- neurosurgery as pt will likely need combined gamma knife/endovascular repair. But Dr. Sheilah Pallas  would like to see how much of the AVM they can embolized prior to considering gamma knife radiosurgery treatment. Follow up appointment with  Dr. Joao Darby - neurointerventional radiologist- will see her in the NIS clinic on 5/17/22 at 2pm to discuss treatment plan.    [address] [place] [address]  [phone]

## 2022-03-25 NOTE — DISCHARGE SUMMARY
Discharge Summary       PATIENT ID: Lucas Garcia  MRN: [id number]   YOB: 1985    DATE OF ADMISSION: 3/17/2022  7:46 PM    DATE OF DISCHARGE: 3/25/22 1:30pm   PRIMARY CARE PROVIDER: None     ATTENDING PHYSICIAN: DESI  DISCHARGING PROVIDER: King Heide MD    To contact this individual call [phone] and ask the  to page. If unavailable ask to be transferred the Adult Hospitalist Department.     CONSULTATIONS: IP CONSULT TO NEUROINTERVENTIONAL SURGERY  IP CONSULT TO NEUROLOGY  IP CONSULT TO NEUROINTERVENTIONAL SURGERY    PROCEDURES/SURGERIES: * No surgery found *    ADMISSION SUMMARY AND HOSPITAL COURSE:   Copied form admit: \" 40year old female with pmhx of migraines who presents as a transfer from an OSH ED with a right cerebellar hemorrhage.  She presented to OSH ED by EMS with sudden headache, dizziness, and nausea.  She had just returned home from work and was taking with the sitter. Tin [place] denies any precipitating events or trauma.  Her headache is rated at 8/10 while at rest and 10/10 with activity.  Fentanyl and hydromorphone were given at OSH ED with no improvement in symptoms.  She was transferred to [facility] ICU for evaluation with neuro surgery and neuro interventional.  Overnight Events:   3/17 -- Transferred from OSH ED for right cerebellar hemorrhage, started on 3% NS  3/18 -- Started on 3%  3/19 -- Angio yesterday PM  3/20 -- No acute events  3/21 -- No acute events  3/22: No acute event \"    DISCHARGE DIAGNOSES / PLAN:      Right cerebellar hemorrhage  - seen by NIS; decision is no ablation/embolization  - seen by Neurosurgery: decision not a surgical candidate   - dispo planing to IPR  Started on steroids by neurosurgery  Ataxia improved with meclizine and scopolamine patch  Dilaudid for pain, compazine for nausea    BP - low normal, stable HR  Mildly elevated glucose levels due to steroids    GB dz, removeal  Age 21   Appendectomy age 22   Endometriosis   Lt Knee repair in high school       PENDING TEST RESULTS:   At the time of discharge the following test results are still pending: none     ADDITIONAL CARE RECOMMENDATIONS:   You were admitted and diagnosed with an acute intracranial bleed of the right cerebellum- discharge recommendations are as follows:     Dr. Gastelum will discuss AVM treatment plan with Dr. Aidee Corea- neurosurgery as pt will likely need combined gamma knife/endovascular repair. But Dr. Aidee Corea  would like to see how much of the AVM they can embolized prior to considering gamma knife radiosurgery treatment. Follow up appointment with  Dr. Walter Oneill - neurointerventional radiologist- will see her in the NIS clinic on 5/17/22 at 2pm to discuss treatment plan. Melissa Grover       NOTIFY YOUR PHYSICIAN FOR ANY OF THE FOLLOWING:   Fever over 101 degrees for 24 hours. Chest pain, shortness of breath, fever, chills, nausea, vomiting, diarrhea, change in mentation, falling, weakness, bleeding. Severe pain or pain not relieved by medications, as well as any other signs or symptoms that you may have questions about. FOLLOW UP APPOINTMENTS:    Follow-up Information     Follow up With Specialties Details Why Contact Info    Jonathan Montalvo MD Internal Medicine On 3/24/2022 Hospital follow up new primary care appointment Thursday, 3/24/22 at 8:30 a.m. Please arrive 15 minutes early with photo ID, insurance card and a listing of all medications. [address] [place] [address] Drive      Anastacia Klinefelter, MD Endovascular Surgical Neuroradiology On 5/17/2022 Tuesday May 17, 2022 at 2:00 PM to discuss AVM treatment.   Melissa Carrizales MD Neurosurgery Schedule an appointment as soon as possible for a visit after you see Dr Gastelum to discuss possible gamma knife surgery for cerebral AVM [address] [place] [address] [address] [place] [address] [place] [address]  [phone]      None    None (395) Patient stated that they have no PCP               DIET: regular    ACTIVITY: Activity as tolerated and PT/OT Eval and Treat    EQUIPMENT needed: none    DISCHARGE MEDICATIONS:  Current Discharge Medication List      START taking these medications    Details   acetaminophen (TYLENOL) 325 mg tablet Take 2 Tablets by mouth every six (6) hours as needed for Pain or Fever (headache). Qty: 30 Tablet, Refills: 0  Start date: 3/25/2022      butalbital-acetaminophen-caffeine (FIORICET, ESGIC) -40 mg per tablet Take 1 Tablet by mouth every four (4) hours as needed for Migraine. Qty: 20 Tablet, Refills: 0  Start date: 3/25/2022      dexAMETHasone (DECADRON) 4 mg tablet Take 4 mg by mouth two (2) times daily (with meals) for 14 days. Qty: 28 Tablet, Refills: 0  Start date: 3/25/2022, End date: 4/8/2022      meclizine (ANTIVERT) 12.5 mg tablet Take 1 Tablet by mouth two (2) times a day for 40 days. Qty: 60 Tablet, Refills: 0  Start date: 3/25/2022, End date: 5/4/2022      pantoprazole (PROTONIX) 40 mg tablet Take 1 Tablet by mouth Daily (before breakfast). Qty: 30 Tablet, Refills: 0  Start date: 3/26/2022      scopolamine (TRANSDERM-SCOP) 1 mg over 3 days pt3d 1 Patch by TransDERmal route every seventy-two (72) hours for 30 days. Qty: 10 Patch, Refills: 0  Start date: 3/27/2022, End date: 4/26/2022      prochlorperazine (Compazine) 10 mg tablet Take 0.5 Tablets by mouth every six (6) hours as needed for Nausea or Vomiting for up to 7 days. Qty: 30 Tablet, Refills: 0  Start date: 3/25/2022, End date: 4/1/2022      HYDROmorphone (Dilaudid) 2 mg tablet Take 0.5 Tablets by mouth every four (4) hours as needed (severe pain) for up to 3 days. Max Daily Amount: 6 mg.   Qty: 20 Tablet, Refills: 0  Start date: 3/25/2022, End date: 3/28/2022    Associated Diagnoses: Cerebellar hemorrhage, acute ([facility] [address]         STOP taking these medications       IBUPROFEN PO Comments: Reason for Stopping:               DISPOSITION:    Home With:   OT  PT  HH  RN      X  Inpatient Rehab    Independent/assisted living    Hospice    Other:       PATIENT CONDITION AT DISCHARGE:     Functional status    Poor    X Deconditioned     Independent      Cognition   X  Lucid     Forgetful     Dementia      Catheters/lines (plus indication)    Myers     PICC     PEG    X None      Code status   X  Full code     DNR      PHYSICAL EXAMINATION AT DISCHARGE:  Patient Vitals for the past 24 hrs:   Temp Pulse Resp BP   03/25/22 1200 -- 74 -- --   03/25/22 1000 -- 84 -- --   03/25/22 0610 97.1 °F (36.2 °C) 65 16 103/75   03/25/22 0600 -- 62 -- --   03/25/22 0400 -- 64 -- --   03/25/22 0219 98 °F (36.7 °C) 63 13 103/76   03/25/22 0200 -- 77 -- --   03/25/22 0000 -- 61 -- --   03/24/22 2200 97.5 °F (36.4 °C) 60 13 96/67   03/24/22 2000 -- 75 -- --   03/24/22 1814 98 °F (36.7 °C) 78 21 108/73   03/24/22 1800 -- 60 -- --   03/24/22 1536 97.8 °F (36.6 °C) 91 (!) 35 95/70   03/24/22 1535 -- 72 12 100/71   03/24/22 1534 -- 72 18 101/72     Constitutional:       Appearance: Normal appearance. Comments: Low wt status    HENT:      Head: Normocephalic and atraumatic. Right Ear: There is no impacted cerumen. Eyes:      General:         Right eye: No discharge. Left eye: No discharge. Cardiovascular:      Rate and Rhythm: Normal rate. Pulses: Normal pulses. Heart sounds: Normal heart sounds. Pulmonary:      Effort: Pulmonary effort is normal.      Breath sounds: Normal breath sounds. Abdominal:      General: Abdomen is flat. There is no distension. Palpations: Abdomen is soft. Tenderness: There is no abdominal tenderness. Skin:     General: Skin is warm and dry. Neurological:      General: No focal deficit present. Mental Status: She is alert and oriented to person, place, and time.   Some dizziness and ataxia with ambulation   Psychiatric:         Mood and Affect: Mood normal.         Behavior: Behavior normal.                         Data Review:    Review and/or order of clinical lab test     MRI BRAIN W WO CONT, MRA NECK WO CONT, MRA BRAIN WO CONT     INDICATION:    R cerebellar     COMPARISON:  CT head 3/18/2022, CTA head and neck 3/17/2022.     CONTRAST: 5 ml ProHance.     TECHNIQUE:    MRI brain:  Multiplanar multisequence acquisition without and with contrast of the brain.     MRA Head/Neck:  Time-of-flight non-contrast MRA of the head and neck were performed. Multiplanar  and MIP reconstructions were obtained.     FINDINGS:  MRI brain:  Stable size of right cerebellar intraparenchymal hemorrhage measuring 2.4 x 1.7  x 2.6 cm, with stable surrounding mild edema and mass effect resulting in  partial effacement of the fourth ventricle. There is no cerebellar tonsillar  herniation. There is no hydrocephalus with normal size of the ventricles. There  are multiple serpiginous vessels seen adjacent to the hemorrhage within the  right posterior fossa, consistent with arteriovenous malformation; see below. There is no evidence of an underlying mass.     There is no acute infarct. The remaining brain parenchyma has normal signal  characteristics. There is no cerebellar tonsillar herniation. Expected arterial  flow-voids are present. Mild mucosal thickening in the bilateral maxillary  sinuses without air-fluid level. The mastoid air cells and middle ears are  clear. The orbital contents are within normal limits. No significant osseous or  scalp lesions are identified.     MRA Head:  Right cerebellar arteriovenous malformation, with nidus measuring approximately  3.3 x 2.0 cm (series 3 image 99). There is supply is via an enlarged right PICA,  which contains an aneurysm measuring 1.2 x 0.9 x 1.2 cm (series 3 image 100). There are additional small arterial feeding vessels from the right vertebral  artery and from the AICA.  There are multiple adjacent draining veins, one of the  largest courses along the right petrous ridge and drains into the right distal  transverse sinus, and a second largest which courses along the right posterior  cerebellum and drains into the torcular.     There is no evidence of large vessel occlusion or flow-limiting stenosis of the  intracranial internal carotid, anterior cerebral, and middle cerebral arteries. The anterior communicating artery is patent, with small right A1 segment.      There is no evidence of large vessel occlusion or flow-limiting stenosis of the  intracranial vertebral arteries, basilar artery, or posterior cerebral arteries. The posterior communicating arteries are patent, right larger than left.      MRA Neck:  The common carotid arteries demonstrate no flow-limiting stenosis. There is no  evidence of flow-limiting stenosis in the cervical right internal carotid  artery. There is no evidence of flow-limiting stenosis in the cervical left  internal carotid artery.     There is a codominant vertebrobasilar arterial system. The vertebral arteries  and imaged portion of the basilar artery demonstrate no flow-limiting stenosis.      IMPRESSION  MRI brain:  1. Stable right cerebellar intraparenchymal hemorrhage with stable surrounding  mild edema and mass effect with partial effacement of the fourth ventricle. No  cerebellar tonsillar herniation. No hydrocephalus. Adjacent right cerebellar  arteriovenous malformation; see below.     MRA head:  1. Right cerebellar arteriovenous malformation as described in detail above with  nidus measuring approximately 3.3 x 2.0 cm, with predominant arterial supply  from the right PICA, and dominant draining veins draining into the distal right  transverse sinus and torcular. 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     MRA NECK:  1.  No evidence of significant stenosis.     Labs:           Recent Labs     03/24/22  0313 03/23/22  0517   WBC 13.0* 13.7*   HGB 13.6 14.6 HCT 40.4 42.8    235            Recent Labs     03/24/22  0313 03/23/22  0517 03/22/22  0613    136 137   K 4.4 4.5 5.1    104 108   CO2 27 28 24   BUN 14 10 7   CREA 0.56 0.50* 0.34*   * 101* 73   CA 9.0 9.3 8.5   MG 2.0 2.1 2.0   PHOS 4.6 4.6 3.9      No results for input(s): ALT, AP, TBIL, TBILI, TP, ALB, GLOB, GGT, AML, LPSE in the last 72 hours.     No lab exists for component: SGOT, GPT, AMYP, HLPSE  No results for input(s): INR, PTP, APTT, INREXT, INREXT in the last 72 hours. No results for input(s): FE, TIBC, PSAT, FERR in the last 72 hours. No results found for: FOL, RBCF   No results for input(s): PH, PCO2, PO2 in the last 72 hours.   No results for input(s): CPK, CKNDX, TROIQ in the last 72 hours.     No lab exists for component: CPKMB        Lab Results   Component Value Date/Time     Cholesterol, total 119 03/19/2022 04:18 AM     HDL Cholesterol 43 03/19/2022 04:18 AM     LDL, calculated 63 03/19/2022 04:18 AM     Triglyceride 65 03/19/2022 04:18 AM     CHOL/HDL Ratio 2.8 03/19/2022 04:18 AM      No results found for: [facility]        Lab Results   Component Value Date/Time     Color YELLOW/STRAW 01/29/2020 11:23 AM     Appearance CLEAR 01/29/2020 11:23 AM     Specific gravity 1.010 01/29/2020 11:23 AM     Specific gravity >1.030 (H) 12/19/2015 07:52 AM     pH (UA) 8.5 (H) 01/29/2020 11:23 AM     Protein NEGATIVE  01/29/2020 11:23 AM     Glucose NEGATIVE  01/29/2020 11:23 AM     Ketone NEGATIVE  01/29/2020 11:23 AM     Bilirubin NEGATIVE  01/29/2020 11:23 AM     Urobilinogen 0.2 01/29/2020 11:23 AM     Nitrites NEGATIVE  01/29/2020 11:23 AM     Leukocyte Esterase NEGATIVE  01/29/2020 11:23 AM     Epithelial cells FEW 01/29/2020 11:23 AM     Bacteria NEGATIVE  01/29/2020 11:23 AM     WBC 0-4 01/29/2020 11:23 AM     RBC 0-5 01/29/2020 11:23 AM            CHRONIC MEDICAL DIAGNOSES:  Problem List as of 3/25/2022 Date Reviewed: 9/23/2020          Codes Class Noted - Resolved Cerebellar hemorrhage, acute [facility]) ICD-10-CM: I61.4  ICD-9-CM: 522  3/2022 - Present    Overview Signed 3/24/2022  7:45 AM by Isai Alvarenga MD     secondary to AVM (IR angiography 3/22)             Current smoker ICD-10-CM: F17.200  ICD-9-CM: 305.1  Unknown - Present        Cerebellar hemorrhage ([address] ICD-10-CM: I61.4  ICD-9-CM: 836  3/18/2022 - Present        DUB (dysfunctional uterine bleeding) ICD-10-CM: N93.8  ICD-9-CM: 626.8  10/24/2018 - Present        Atonic postpartum hemorrhage ICD-10-CM: O72.1  ICD-9-CM: 666.14  10/7/2017 - Present        History of postpartum depression ICD-10-CM: Z87.59, Z86.59  ICD-9-CM: V13.29, V11.8  10/7/2017 - Present        ROM (rupture of membranes), premature ICD-10-CM: O42.90  ICD-9-CM: 658.10  10/6/2017 - Present        Chronic pelvic pain in female ICD-10-CM: R10.2, G89.29  ICD-9-CM: 625.9, 338.29  2014 - Present        Pregnancy ICD-10-CM: Z34.90  ICD-9-CM: V22.2  2014 - Present        Breech presentation ICD-10-CM: O32. 1XX0  ICD-9-CM: 652.20  2014 - Present        Threatened  labor ICD-10-CM: O47.00  ICD-9-CM: 644.00  10/7/2013 - Present        Abdominal pain complicating pregnancy [facility]: O26.899, R10.9  ICD-9-CM: 646.80, 789.00  10/1/2013 - Present        Appendicitis ICD-10-CM: K37  ICD-9-CM: 205  2012 - Present        RESOLVED:  labor ICD-10-CM: O60.00  ICD-9-CM: 644.00  10/1/2013 - 10/1/2013              Greater than 30 minutes were spent with the patient on counseling and coordination of care    Signed:   Andrea Cisneros MD  3/25/2022  1:24 PM   .

## 2022-03-27 NOTE — PROGRESS NOTES
Physician Progress Note      PATIENT:               Cris SR #:                  [id number]  :                       1985  ADMIT DATE:       3/17/2022 7:46 PM  Sera Villar DATE:        3/25/2022 7:08 PM  RESPONDING  PROVIDER #:        Hemant Viveros MD          QUERY TEXT:    Patient with BMI 16.82. If possible, please document in progress notes and discharge summary if you are evaluating and /or treating any of the following: The medical record reflects the following:  Risk Factors: smoker, cannabis use  Clinical Indicators: [t noted to have BMI 16.82, with pt report of inability to keep weight on. Treatment: Oral nutritional supplement daily, High calorie High protein diet. ASPEN Criteria:  https://aspenjournals. onlinelibrary. farias. com/doi/full/10.1177/7583962796855530    Thank you,  Chaparrita Mcintyre RN  Clinical Documentation  [phone]  Options provided:  -- Underweight with BMI 16.82  -- Other - I will add my own diagnosis  -- Disagree - Not applicable / Not valid  -- Disagree - Clinically unable to determine / Unknown  -- Refer to Clinical Documentation Reviewer    PROVIDER RESPONSE TEXT:    This patient is underweight with a BMI of 16.82.     Query created by: Katie Echeverria on 3/23/2022 3:49 PM      Electronically signed by:  Hemant Viveros MD 3/27/2022 9:23 AM

## 2022-04-20 ENCOUNTER — APPOINTMENT (OUTPATIENT)
Dept: CT IMAGING | Age: 37
End: 2022-04-20
Attending: EMERGENCY MEDICINE
Payer: MEDICAID

## 2022-04-20 ENCOUNTER — HOSPITAL ENCOUNTER (EMERGENCY)
Age: 37
Discharge: HOME | End: 2022-04-20
Attending: EMERGENCY MEDICINE
Payer: MEDICAID

## 2022-04-20 VITALS
BODY MASS INDEX: 18.31 KG/M2 | DIASTOLIC BLOOD PRESSURE: 77 MMHG | RESPIRATION RATE: 17 BRPM | OXYGEN SATURATION: 100 % | SYSTOLIC BLOOD PRESSURE: 105 MMHG | TEMPERATURE: 98.2 F | HEART RATE: 83 BPM | WEIGHT: 93.25 LBS | HEIGHT: 60 IN

## 2022-04-20 DIAGNOSIS — Q28.2 AVM (ARTERIOVENOUS MALFORMATION) BRAIN: ICD-10-CM

## 2022-04-20 DIAGNOSIS — H53.9 VISUAL DISTURBANCE: Primary | ICD-10-CM

## 2022-04-20 LAB
ALBUMIN SERPL-MCNC: 3.8 G/DL (ref 3.5–5)
ALBUMIN/GLOB SERPL: 1.1 {RATIO} (ref 1.1–2.2)
ALP SERPL-CCNC: 52 U/L (ref 45–117)
ALT SERPL-CCNC: 84 U/L (ref 12–78)
ANION GAP SERPL CALC-SCNC: 5 MMOL/L (ref 5–15)
AST SERPL-CCNC: 36 U/L (ref 15–37)
BASOPHILS # BLD: 0 K/UL (ref 0–0.1)
BASOPHILS NFR BLD: 0 % (ref 0–1)
BILIRUB SERPL-MCNC: 0.4 MG/DL (ref 0.2–1)
BUN SERPL-MCNC: 13 MG/DL (ref 6–20)
BUN/CREAT SERPL: 22 (ref 12–20)
CALCIUM SERPL-MCNC: 9.1 MG/DL (ref 8.5–10.1)
CHLORIDE SERPL-SCNC: 106 MMOL/L (ref 97–108)
CO2 SERPL-SCNC: 27 MMOL/L (ref 21–32)
CREAT SERPL-MCNC: 0.6 MG/DL (ref 0.55–1.02)
DIFFERENTIAL METHOD BLD: NORMAL
EOSINOPHIL # BLD: 0.1 K/UL (ref 0–0.4)
EOSINOPHIL NFR BLD: 2 % (ref 0–7)
ERYTHROCYTE [DISTWIDTH] IN BLOOD BY AUTOMATED COUNT: 13.9 % (ref 11.5–14.5)
GLOBULIN SER CALC-MCNC: 3.6 G/DL (ref 2–4)
GLUCOSE SERPL-MCNC: 85 MG/DL (ref 65–100)
HCT VFR BLD AUTO: 44.2 % (ref 35–47)
HGB BLD-MCNC: 14.7 G/DL (ref 11.5–16)
IMM GRANULOCYTES # BLD AUTO: 0 K/UL (ref 0–0.04)
IMM GRANULOCYTES NFR BLD AUTO: 0 % (ref 0–0.5)
INR PPP: 1 (ref 0.9–1.1)
LYMPHOCYTES # BLD: 1.8 K/UL (ref 0.8–3.5)
LYMPHOCYTES NFR BLD: 20 % (ref 12–49)
MCH RBC QN AUTO: 32.2 PG (ref 26–34)
MCHC RBC AUTO-ENTMCNC: 33.3 G/DL (ref 30–36.5)
MCV RBC AUTO: 96.7 FL (ref 80–99)
MONOCYTES # BLD: 0.7 K/UL (ref 0–1)
MONOCYTES NFR BLD: 8 % (ref 5–13)
NEUTS SEG # BLD: 6.3 K/UL (ref 1.8–8)
NEUTS SEG NFR BLD: 70 % (ref 32–75)
NRBC # BLD: 0 K/UL (ref 0–0.01)
NRBC BLD-RTO: 0 PER 100 WBC
PLATELET # BLD AUTO: 280 K/UL (ref 150–400)
PMV BLD AUTO: 9.8 FL (ref 8.9–12.9)
POTASSIUM SERPL-SCNC: 3.9 MMOL/L (ref 3.5–5.1)
PROT SERPL-MCNC: 7.4 G/DL (ref 6.4–8.2)
PROTHROMBIN TIME: 10.3 SEC (ref 9–11.1)
RBC # BLD AUTO: 4.57 M/UL (ref 3.8–5.2)
SODIUM SERPL-SCNC: 138 MMOL/L (ref 136–145)
WBC # BLD AUTO: 9 K/UL (ref 3.6–11)

## 2022-04-20 PROCEDURE — 99284 EMERGENCY DEPT VISIT MOD MDM: CPT

## 2022-04-20 PROCEDURE — 80053 COMPREHEN METABOLIC PANEL: CPT

## 2022-04-20 PROCEDURE — 85025 COMPLETE CBC W/AUTO DIFF WBC: CPT

## 2022-04-20 PROCEDURE — 70450 CT HEAD/BRAIN W/O DYE: CPT

## 2022-04-20 PROCEDURE — 36415 COLL VENOUS BLD VENIPUNCTURE: CPT

## 2022-04-20 PROCEDURE — 85610 PROTHROMBIN TIME: CPT

## 2022-04-20 PROCEDURE — 74011250637 HC RX REV CODE- 250/637: Performed by: EMERGENCY MEDICINE

## 2022-04-20 RX ORDER — ACETAMINOPHEN 500 MG
1000 TABLET ORAL
Status: COMPLETED | OUTPATIENT
Start: 2022-04-20 | End: 2022-04-20

## 2022-04-20 RX ADMIN — ACETAMINOPHEN 1000 MG: 500 TABLET ORAL at 16:47

## 2022-04-20 NOTE — ED NOTES
Pt reporting that she was placed on valacyclovir for shingles, rash to bilateral flank. Pt reporting \"tunneled vision\" intermittently, intermittent blurry vision, throbbing head pain since late last night/today.

## 2022-04-20 NOTE — DISCHARGE INSTRUCTIONS
It was a pleasure taking care of you at [facility] Emergency Department today. We know that when you come to [facility], you are entrusting us with your health, comfort, and safety. Our physicians and nurses honor that trust, and we truly appreciate the opportunity to care for you and your loved ones. We also value your feedback. If you receive a survey about your Emergency Department experience today, please fill it out. We care about our patients' feedback, and we listen to what you have to say. Thank you!

## 2022-04-25 NOTE — ED PROVIDER NOTES
EMERGENCY DEPARTMENT HISTORY AND PHYSICAL EXAM      Date: 4/20/2022  Patient Name: Rhianna Delong    History of Presenting Illness     Chief Complaint   Patient presents with    Blurred Vision     Pt ambulatory into triage with a cc of blurry vision, dizziness and generalized weakness for more than 24 hours; pt states she started taking a new medication 2 days ago and is concerned that the medication may be causing the symptoms; however pt is also complaining of high HR; pts HR is 97 bpm in triage; pt is awaiting surgery for a brain bleed     Dizziness       History Provided By: Patient    HPI: Rhianna Delong, 40 y.o. female with a past medical history significant for history of recent intracerebral hemorrhage secondary to aneurysms, medical problems as stated below presents to the ED with cc of complaints of blurry vision and dizziness. Patient reports symptoms seem to be, she started medications for treatment of zoster infection to her trunk. She reports no other associated symptoms. No other exacerbating or mounting factors. Patient is concerned because it feels very similar to patient's general hemorrhage and they want to make sure it has not worsened. No other associated symptoms. No other exacerbating ameliorating factors. There are no other complaints, changes, or physical findings at this time. PCP: None    No current facility-administered medications on file prior to encounter. Current Outpatient Medications on File Prior to Encounter   Medication Sig Dispense Refill    acetaminophen (TYLENOL) 325 mg tablet Take 2 Tablets by mouth every six (6) hours as needed for Pain or Fever (headache). 30 Tablet 0    butalbital-acetaminophen-caffeine (FIORICET, ESGIC) -40 mg per tablet Take 1 Tablet by mouth every four (4) hours as needed for Migraine. 20 Tablet 0    meclizine (ANTIVERT) 12.5 mg tablet Take 1 Tablet by mouth two (2) times a day for 40 days.  60 Tablet 0    pantoprazole (PROTONIX) 40 mg tablet Take 1 Tablet by mouth Daily (before breakfast). 30 Tablet 0    scopolamine (TRANSDERM-SCOP) 1 mg over 3 days pt3d 1 Patch by TransDERmal route every seventy-two (72) hours for 30 days. 10 Patch 0    dexAMETHasone (DECADRON) 2 mg tablet Take 1 tab PO every 12 hours x 2 days then 1 tab PO daily x 2 days then stop.  6 Tablet 0       Past History     Past Medical History:  Past Medical History:   Diagnosis Date    Anxiety     Arthritis     Calculus of kidney     Cerebellar hemorrhage, acute ([facility] [address] 2022    secondary to AVM (IR angiography 3/22)    Current smoker     Dyspepsia and other specified disorders of function of stomach     GERD (gastroesophageal reflux disease)     Kidney disease     hx of kidney stone    Other ill-defined conditions(799.89)     kidney stone in pass,passed    Postpartum hemorrhage 2017    ALSO HAD HEMORRHAGE DURING MISCARRIAGE 2012    Psychiatric disorder     ADHD       Past Surgical History:  Past Surgical History:   Procedure Laterality Date    HX APPENDECTOMY      HX CHOLECYSTECTOMY      HX DILATION AND CURETTAGE      HX GYN      miscarriage x2    HX GYN      C section    HX GYN      EXP LAPS    HX HEENT      wisdom teeth extraction    HX ORTHOPAEDIC      torn catiledge repair left knee    HX WISDOM TEETH EXTRACTION      AK ABDOMEN SURGERY PROC UNLISTED  10/30/12    Laparoscopic cholecystectomy    AK ANESTH,KNEE AREA SURGERY      AK  DELIVERY ONLY          UPPER GI ENDOSCOPY,BIOPSY  2015            Family History:  Family History   Problem Relation Age of Onset    Emphysema Mother     No Known Problems Father     No Known Problems Son     No Known Problems Son     Anesth Problems Neg Hx        Social History:  Social History     Tobacco Use    Smoking status: Current Every Day Smoker     Packs/day: 0.50     Years: 15.00     Pack years: 7.50    Smokeless tobacco: Never Used    Tobacco comment: about 6 cigarettes per day at present   Substance Use Topics    Alcohol use: Yes     Comment: RARELY    Drug use: No       Allergies: Allergies   Allergen Reactions    Ambien [Zolpidem] Other (comments)     \"Hallucinations and black outs\"    Morphine Rash         Review of Systems   Review of Systems   Constitutional: Negative for chills, diaphoresis, fatigue and fever. HENT: Negative for ear pain and sore throat. Eyes: Positive for visual disturbance. Negative for pain and redness. Respiratory: Negative for cough and shortness of breath. Cardiovascular: Negative for chest pain and leg swelling. Gastrointestinal: Negative for abdominal pain, diarrhea, nausea and vomiting. Endocrine: Negative for cold intolerance and heat intolerance. Genitourinary: Negative for flank pain and hematuria. Musculoskeletal: Negative for back pain and neck stiffness. Skin: Negative for rash and wound. Neurological: Positive for dizziness. Negative for syncope and headaches. All other systems reviewed and are negative. Physical Exam   Physical Exam  Vitals and nursing note reviewed. Constitutional:       General: She is not in acute distress. Appearance: She is well-developed. She is not ill-appearing. HENT:      Head: Normocephalic and atraumatic. Mouth/Throat:      Pharynx: No oropharyngeal exudate. Eyes:      Conjunctiva/sclera: Conjunctivae normal.      Pupils: Pupils are equal, round, and reactive to light. Cardiovascular:      Rate and Rhythm: Normal rate and regular rhythm. Heart sounds: No murmur heard. Pulmonary:      Effort: Pulmonary effort is normal. No respiratory distress. Breath sounds: Normal breath sounds. No wheezing. Abdominal:      General: Bowel sounds are normal. There is no distension. Palpations: Abdomen is soft. Tenderness: There is no abdominal tenderness. Musculoskeletal:         General: No deformity. Normal range of motion.       Cervical back: Normal range of motion. Skin:     General: Skin is warm and dry. Findings: No rash. Neurological:      Mental Status: She is alert and oriented to person, place, and time. GCS: GCS eye subscore is 4. GCS verbal subscore is 5. GCS motor subscore is 6. Cranial Nerves: No dysarthria or facial asymmetry. Sensory: Sensation is intact. Motor: Motor function is intact. No pronator drift. Coordination: Coordination normal.   Psychiatric:         Behavior: Behavior normal.         Diagnostic Study Results     Labs -   No results found for this or any previous visit (from the past 24 hour(s)). Radiologic Studies -   CT HEAD WO CONT   Final Result   Interval slight decrease in size and overall significant decrease in density of   right cerebral intraparenchymal hemorrhage, but the anterior most aspect of the   hemorrhage appears slightly more dense concerning for new/worsening acute   component. Persistent subtle mass effect upon the fourth ventricle, without   overt hydrocephalus no herniation      I discussed this impression with Donn Timmons MD at 1537 hours on   4/20/2022. CT Results  (Last 48 hours)    None        CXR Results  (Last 48 hours)    None            Medical Decision Making   I am the first provider for this patient. I reviewed the vital signs, available nursing notes, past medical history, past surgical history, family history and social history. Vital Signs-Reviewed the patient's vital signs. No data found. Vitals:    04/20/22 1456 04/20/22 1533 04/20/22 1633 04/20/22 1649   BP:  100/66 105/77    Pulse:    83   Resp:    17   Temp:       SpO2: 100% 98% 99% 100%   Weight:       Height:           Records Reviewed: Nursing records and medical records reviewed    MDM:      Provider Notes (Medical Decision Making):   81-year-old female presenting with reevaluation after having intracerebral hemorrhage.   CT scan showed normal evolution of patient's [place] I reviewed this with both the neuro interventionalists and the neurosurgeon on-call. Patient has a normal neurologic exam and I suspect her symptoms likely related to get the medication she is taking for her per zoster infection, or other herpes zoster itself. She will follow-up as scheduled as an outpatient for aneurysm repair and further treatment. ED Course:   Initial assessment performed. The patients presenting problems have been discussed, and they are in agreement with the care plan formulated and outlined with them. I have encouraged them to ask questions as they arise throughout their visit. Medications Administered     acetaminophen (TYLENOL) tablet 1,000 mg     Admin Date  04/20/2022 Action  Given Dose  1,000 mg Route  Oral Administered By  Remigio Dupont RN                    Critical Care:  None      Disposition:  4:12 PM  Bertha Prieto's  results have been reviewed with her. She has been counseled regarding her diagnosis. She verbally conveys understanding and agreement of the signs, symptoms, diagnosis, treatment and prognosis and additionally agrees to follow up as recommended with Dr. None in 24 - 48 hours. She also agrees with the care-plan and conveys that all of her questions have been answered. I have also put together some discharge instructions for her that include: 1) educational information regarding their diagnosis, 2) how to care for their diagnosis at home, as well a 3) list of reasons why they would want to return to the ED prior to their follow-up appointment, should their condition change. DISCHARGE PLAN:  1. Discharge Medication List as of 4/20/2022  6:36 PM        2. Follow-up Information     Follow up With Specialties Details Why Contact Info    Tomeka Dickey MD Endovascular Surgical Neuroradiology In 1 week For a follow-up evaluation. Keep your next available appointment.  [phone]  [phone]          3. Return to ED if worse     Diagnosis     Clinical Impression:   1. Visual disturbance    2. AVM (arteriovenous malformation) brain        Attestations:    Broderick Soto MD    Please note that this dictation was completed with [employer], the computer voice recognition software. Quite often unanticipated grammatical, syntax, homophones, and other interpretive errors are inadvertently transcribed by the computer software. Please disregard these errors. Please excuse any errors that have escaped final proofreading. Thank you.

## 2022-05-10 ENCOUNTER — TELEPHONE (OUTPATIENT)
Dept: NEUROSURGERY | Age: 37
End: 2022-05-10

## 2022-05-10 NOTE — TELEPHONE ENCOUNTER
Called patient to change time for upcoming appointment. No answer, patients mailbox is full unable to leave message.

## 2022-06-09 ENCOUNTER — OFFICE VISIT (OUTPATIENT)
Dept: NEUROSURGERY | Age: 37
End: 2022-06-09
Payer: MEDICAID

## 2022-06-09 VITALS
BODY MASS INDEX: 19.94 KG/M2 | HEART RATE: 94 BPM | DIASTOLIC BLOOD PRESSURE: 70 MMHG | HEIGHT: 60 IN | SYSTOLIC BLOOD PRESSURE: 110 MMHG | OXYGEN SATURATION: 98 % | WEIGHT: 101.6 LBS | TEMPERATURE: 97.5 F

## 2022-06-09 DIAGNOSIS — Q28.2 AVM (ARTERIOVENOUS MALFORMATION) BRAIN: Primary | ICD-10-CM

## 2022-06-09 DIAGNOSIS — I67.1 CEREBRAL ANEURYSM: ICD-10-CM

## 2022-06-09 PROCEDURE — 99215 OFFICE O/P EST HI 40 MIN: CPT | Performed by: RADIOLOGY

## 2022-06-09 RX ORDER — CLOPIDOGREL BISULFATE 75 MG/1
75 TABLET ORAL DAILY
Qty: 30 TABLET | Refills: 5 | Status: SHIPPED | OUTPATIENT
Start: 2022-06-09

## 2022-06-09 NOTE — PATIENT INSTRUCTIONS
You will have P[facility] at [facility] will be called with date and time. You will be contacted by [facility] to schedule an appointment for labs, chest xray, ekg. Start Plavix 75 mg daily 2 weeks prior to procedure. No eating or drinking after midnight the day prior to procedure and take morning medications the morning of procedure with sips of water. Do not stop taking Blood Thinners unless notified by this office. Learning About How Hospitals and Clinics Keep You Safe From COVID-19  Overview     Many hospitals and clinics now are treating people who are infected with COVID-19. So if you're in the hospital or clinic for any other reason, this may be an unsettling time. It's common to be concerned about becoming infected with the virus. But hospitals and clinics have policies to prevent the spread of infections. For example, doctors and nurses are trained to wash their hands before they treat you. Health care centers have stepped up these policies now. They are taking further steps to protect their patients. As long as ZHPRD-66 remains a public health problem, things are going to be different when you go to a health care facility. They may have new rules for your safety. These could include having you wear a cloth face cover, meeting you outside the clinic, and having you sit away from others in the waiting room. What are hospitals and clinics doing to keep you safe? Your care team is very aware of the threat of COVID-19. They are doing everything they can to keep you safe. Hospitals and clinics may have different policies. But in general, you may expect many of these measures:  · You will be screened for COVID-19. When you come to the hospital, you may have your temperature taken. You'll be asked about any symptoms, such as a fever, a cough, or shortness of breath. You may be asked if you've had contact with anyone who's been diagnosed with the virus.  And you may be asked if you've traveled to any place that has had an outbreak. · The staff may try to do as much as possible outside the facility. For example, you may be asked to fill out paperwork online or in your car before you come inside. The person giving you a ride home may be asked to wait outside. These new rules to help protect you may make routine tasks take longer than usual.  · People who have COVID-19 are treated in a separate area. Many hospitals and clinics have staff members who treat only these patients. This helps limit the spread of the infection. · Visitors may be limited. In some cases, no visitors are allowed. In others, only one healthy visitor is allowed. Children may be limited to having only one adult with them. You can connect with family and friends using your phone or computer. If you need something brought from home, such as glasses or a phone , find out where the item can be dropped off. · The hospital or clinic follows guidelines to prevent infection. These include:  ? Washing hands often. ? Disinfecting high-touch surfaces. ? Wearing face masks or other protective equipment. ? Making extra space for social distancing. What can you do to stay safe? We all have a role to play in keeping ourselves safe and preventing the spread of COVID-19. Here are some things you can do while you're receiving care. If you're in a hospital, stay in your room. This will limit your exposure to the virus. It may be boring, but it's the safest place for you. Wash your hands often. Use soap and water, and scrub for 20 seconds. Then rinse and dry them well. Always wash them after you use the bathroom, before you eat, and after you cough, sneeze, or blow your nose. If you can't wash your hands, use hand . Speak up if you have safety concerns. Don't be shy to correct health care workers if they aren't washing their hands properly, wearing face masks, or taking other precautions.  These actions are vital to prevent the spread of infection. Try to be understanding. This is a stressful time for everyone, including your care team. They are doing their best to keep you safe and provide you with good care. Where can you learn more? Go to http://www.gray.com/  Enter A134 in the search box to learn more about \"Learning About How Hospitals and [facility] Safe From COVID-19. \"  Current as of: March 26, 2021               Content Version: 13.2  © 2006-2022 [employer]. Care instructions adapted under license by [employer] (which disclaims liability or warranty for this information). If you have questions about a medical condition or this instruction, always ask your healthcare professional. [address] any warranty or liability for your use of this information.

## 2022-06-09 NOTE — PROGRESS NOTES
Neurointerventional Surgery Clinic Note    Patient: Roseann Benson MRN: [id number]  SSN: [id number]    YOB: 1985  Age: 40 y.o. Sex: female      Subjective:      Roseann Benson is a 40 y.o. 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 Patient presents in person with her grandmother. She states that she has been doing well since discharge from the hospital.  Her blurred vision has mostly cleared up, but she states that she feels that her right eye is \"pulling\" to the right. She reports difficulty retrieving information and positional dizziness. She has occasional mild headaches that are approximately 3 out of 10 in severity. She also has occasional \"iffy\" balance but states that her coordination is fine. She reports that all of her symptoms are only \"slight\" now. She denies numbness and tingling, weakness, and nausea and vomiting.     Past Medical History:   Diagnosis Date    Anxiety     Arthritis     Calculus of kidney     Cerebellar hemorrhage, acute ([address] 03/2022    secondary to AVM (IR angiography 3/22)    Current smoker     Dyspepsia and other specified disorders of function of stomach     GERD (gastroesophageal reflux disease)     Kidney disease     hx of kidney stone    Other ill-defined conditions(799.89)     kidney stone in [place],passed    Postpartum hemorrhage 2017    ALSO HAD HEMORRHAGE DURING MISCARRIAGE 2012    Psychiatric disorder     ADHD     Past Surgical History:   Procedure Laterality Date    HX APPENDECTOMY      HX CHOLECYSTECTOMY      HX DILATION AND CURETTAGE      HX GYN      miscarriage x2    HX GYN      C section    HX GYN      EXP LAPS    HX HEENT      wisdom teeth extraction    HX ORTHOPAEDIC      torn catiledge repair left knee    HX WISDOM TEETH EXTRACTION      CA ABDOMEN SURGERY PROC UNLISTED  10/30/12    Laparoscopic cholecystectomy    PA ANESTH,KNEE AREA SURGERY      PA  DELIVERY ONLY          UPPER GI ENDOSCOPY,BIOPSY  2015           Family History   Problem Relation Age of Onset    Emphysema Mother     No Known Problems Father     No Known Problems Son     No Known Problems Son     Anesth Problems Neg Hx      Social History     Tobacco Use    Smoking status: Current Every Day Smoker     Packs/day: 0.50     Years: 15.00     Pack years: 7.50    Smokeless tobacco: Never Used    Tobacco comment: about 6 cigarettes per day at present   Substance Use Topics    Alcohol use: Not Currently     Comment: RARELY      Current Outpatient Medications   Medication Sig Dispense Refill    clopidogreL (Plavix) 75 mg tab Take 1 Tablet by mouth daily. 30 Tablet 5    acetaminophen (TYLENOL) 325 mg tablet Take 2 Tablets by mouth every six (6) hours as needed for Pain or Fever (headache). (Patient not taking: Reported on 2022) 30 Tablet 0    butalbital-acetaminophen-caffeine (FIORICET, ESGIC) -40 mg per tablet Take 1 Tablet by mouth every four (4) hours as needed for Migraine. (Patient not taking: Reported on 2022) 20 Tablet 0    pantoprazole (PROTONIX) 40 mg tablet Take 1 Tablet by mouth Daily (before breakfast). (Patient not taking: Reported on 2022) 30 Tablet 0    dexAMETHasone (DECADRON) 2 mg tablet Take 1 tab PO every 12 hours x 2 days then 1 tab PO daily x 2 days then stop. (Patient not taking: Reported on 2022) 6 Tablet 0        Allergies   Allergen Reactions    Ambien [Zolpidem] Other (comments)     \"Hallucinations and black outs\"    Morphine Rash       Review of Systems:  Pertinent items are noted in the History of Present Illness.     Objective:     Vitals:    22 1414   BP: 110/70   Pulse: 94   Temp: 97.5 °F (36.4 °C)   TempSrc: Temporal   SpO2: 98%   Weight: 101 lb 9.6 oz (46.1 kg)   Height: 5' (1.524 m)        Physical Exam:  GENERAL: alert, cooperative, no distress, appears stated age  EYE: negative  NECK: no carotid bruit  LUNG: clear to auscultation bilaterally  HEART: regular rate and rhythm, S1, S2 normal, no murmur, click, rub or gallop  EXTREMITIES:  extremities normal, atraumatic, no cyanosis or edema    Neurologic Exam:  Mental Status:  Alert and oriented x 4. Appropriate affect, mood and behavior. Language:    Normal fluency, repetition, comprehension and naming. Cranial Nerves:   Pupils equal, round and reactive to light. Visual fields full to confrontation. Extraocular movements intact. Facial sensation intact V1 - V3. Full facial strength, no asymmetry. Hearing intact bilaterally. No dysarthria. Tongue protrudes to midline, palate elevates symmetrically. Shoulder shrug 5/5 bilaterally. Motor:    No pronator drift. Bulk and tone normal.      5/5 power in all extremities proximally and distally. No involuntary movements. Sensation:    Sensation intact throughout to light touch    Reflexes:    Deferred    Coordination & Gait: Normal      Imaging:  I personally reviewed the following imaging studies. The impressions listed below are those of the interpreting radiologist(s). Diagnostic cerebral angiogram 3/18/2022:  1. 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   2. Four flow related aneurysms arising from the markedly enlarged right posterior inferior cerebellar artery, including a wide neck 2.1 x 3.3 x 3.6 mm aneurysm projecting inferomedially from the proximal lateral medullary segment, a wide neck 12.6 x 8.4 x 8.7 mm aneurysm with 7 mm neck projecting posterolaterally from the distal lateral medullary segment, a fusiform aneurysm with associated 2 x 1.7 mm bleb involving the proximal tonsillomedullary segment, and a fusiform aneurysm involving the distal tonsillomedullary segment. MRA brain 3/18/2022:  1. Right cerebellar arteriovenous malformation as described in detail above with nidus measuring approximately 3.3 x 2.0 cm, with predominant arterial supply from the right PICA, and dominant draining veins draining into the distal right transverse sinus and torcular. 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 MRI brain 3/18/2022:  1. Stable right cerebellar intraparenchymal hemorrhage with stable surrounding mild edema and mass effect with partial effacement of the fourth ventricle. No cerebellar tonsillar herniation. No hydrocephalus. Adjacent right cerebellar arteriovenous malformation; see below. CTA head and neck 3/17/2022:  CTA Head:  1. Right cerebellar arteriovenous malformation with nidus measuring approximately 3.5 x 2.3 x 2.1 cm as described in detail above, including predominant arterial supply from the right PICA with an associated right PICA aneurysm measuring 1.1 x 0.8 x 0.9 cm.   2. Stable acute right cerebellar intraparenchymal hemorrhage, with stable mild surrounding edema and mass effect. No hydrocephalus.    CTA Neck:  1. No evidence of significant stenosis. CT head 3/17/2022:  1. There is a right cerebellar hemorrhage with surrounding edema with extension into the right cerebellar peduncle. There is compression of the fourth ventricle which is small and contains a small amount of hemorrhage. The temporal tips are slightly dilated may represent developing hydrocephalus. Results called to the ER.     Assessment:   40 y.o. 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 Patient presents in person with her grandmother. She states that she has been doing well since discharge from the hospital.  Her blurred vision has mostly cleared up, but she states that she feels that her right eye is \"pulling\" to the right. She reports difficulty retrieving information and positional dizziness. She has occasional mild headaches that are approximately 3 out of 10 in severity. She also has occasional \"iffy\" balance but states that her coordination is fine. She reports that all of her symptoms are only \"slight\" now. She denies numbness and tingling, weakness, and nausea and vomiting. Neurologic exam is normal.    We reviewed her imaging together. We discussed the findings of the arteriovenous malformation as well as the associated flow related aneurysms. We discussed the natural history of arteriovenous malformations and cerebral aneurysms. We also discussed the different treatment options for both. Previous discussions with Dr. Brain Hernandez and Dr. Gavin Hassan from Neurosurgery elucidated gamma knife therapy as the treatment plan for the AVM. Given the increased risk of pre-nidal flow related aneurysm rupture after gamma knife therapy, treating the flow related aneurysms is necessary. Although one of the aneurysms, the largest, is amenable to coil embolization, the other small aneurysms would need to be treated with stent assisted coil embolization and/or flow diversion. If coil embolization were to be pursued for the largest aneurysm, the ensuing large coil ball mass would be problematic for effective gamma knife therapy. Given that the other treatable aneurysms also require alternative treatments, flow diversion of the 3 treatable aneurysms is the most reasonable option. This would also result in the lowest metallic artifact burden.     If flow diversion is pursued, antiplatelet therapy is required in addition to procedural anticoagulation. However, the safety of antiplatelet therapy and anticoagulation after rupture of an arteriovenous malformation is not clear. We know that there is an increased risk of AVM rupture if there is a history of prior rupture. There are reports of antiplatelet therapy/anticoagulation in the setting of previous AVM rupture without differential mortality. In addition, there are reports of flow diversion of flow related aneurysms in patients with unruptured and previously ruptured AVMs. Given the availability of the new Pipeline Flex Embolization Device with [employer] and high flow nature of the AVM, treatment with antiplatelet monotherapy and Pipeline embolization of the 3 treatable aneurysms (likely with 2 devices) was proposed. We discussed the risks, benefits, and alternatives to this treatment strategy. In particular, we discussed the increased risk of AVM rebleed in the setting of antiplatelet therapy and procedural anticoagulation. We also discussed the potential for AVM rebleed post flow diversion procedure. Steps that can be taken to minimize the likelihood of these complications include gentle procedural heparinization, heparin reversal at the end of the procedure, and strict blood pressure management. In addition, we discussed the possibility of thrombosis of the flow diverter device/devices which could cause an ischemic and/or hemorrhagic stroke. We also discussed the lack of other more favorable treatment strategies. Based on these discussions, patient would like to proceed with flow diversion of the 3 pre-nidal flow related aneurysms using Pipeline Flex Embolization Device with [employer]. She expressed that she does not tolerate aspirin well. Therefore, rather than aspirin monotherapy, we will initiate Plavix monotherapy 1 week prior to the intervention.   Patient expressed good understanding of the anticipated risks and benefits of this management strategy. All questions were answered. A follow-up CTA will be performed 3 months postprocedure to evaluate the progression of aneurysm occlusion. We also discussed the possibility of embolization of the arteriovenous malformation using silk and PVA particles, if so requested by Dr. Varun Flowers and only to decrease the size of the nidus to an appropriate treatable focus. This would be performed in a separate procedure at a different time, after aneurysm treatment. Patient expressed understanding. This plan was discussed with Dr. Varun Flowers, and he will plan to see the patient to arrange gamma knife therapy after the 3-month follow-up CTA. Plan:     -Schedule flow diversion treatment of the 3 pre-nidal flow related aneurysms using Pipeline Flex Embolization Device with [employer]  -Initiate Plavix monotherapy at 75 mg daily 1 week prior to treatment    Thank you for allowing me to participate in the care of this patient. Greater than 60 minutes were spent in patient management, greater than half of which was spent in counseling and coordination of care.         Signed By: Cherelle Hogue MD     June 9, 2022

## 2022-06-09 NOTE — H&P (VIEW-ONLY)
Neurointerventional Surgery Clinic Note    Patient: Kim Beckett MRN: [id number]  SSN: [id number]    YOB: 1985  Age: 40 y.o. Sex: female      Subjective:      Kim Beckett is a 40 y.o. 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 Patient presents in person with her grandmother. She states that she has been doing well since discharge from the hospital.  Her blurred vision has mostly cleared up, but she states that she feels that her right eye is \"pulling\" to the right. She reports difficulty retrieving information and positional dizziness. She has occasional mild headaches that are approximately 3 out of 10 in severity. She also has occasional \"iffy\" balance but states that her coordination is fine. She reports that all of her symptoms are only \"slight\" now. She denies numbness and tingling, weakness, and nausea and vomiting.     Past Medical History:   Diagnosis Date    Anxiety     Arthritis     Calculus of kidney     Cerebellar hemorrhage, acute ([address] 03/2022    secondary to AVM (IR angiography 3/22)    Current smoker     Dyspepsia and other specified disorders of function of stomach     GERD (gastroesophageal reflux disease)     Kidney disease     hx of kidney stone    Other ill-defined conditions(799.89)     kidney stone in [place],passed    Postpartum hemorrhage 2017    ALSO HAD HEMORRHAGE DURING MISCARRIAGE 2012    Psychiatric disorder     ADHD     Past Surgical History:   Procedure Laterality Date    HX APPENDECTOMY      HX CHOLECYSTECTOMY      HX DILATION AND CURETTAGE      HX GYN      miscarriage x2    HX GYN      C section    HX GYN      EXP LAPS    HX HEENT      wisdom teeth extraction    HX ORTHOPAEDIC      torn catiledge repair left knee    HX WISDOM TEETH EXTRACTION      [place] ABDOMEN SURGERY PROC UNLISTED  10/30/12    Laparoscopic cholecystectomy    MS ANESTH,KNEE AREA SURGERY      MS  DELIVERY ONLY          UPPER GI ENDOSCOPY,BIOPSY  2015           Family History   Problem Relation Age of Onset    Emphysema Mother     No Known Problems Father     No Known Problems Son     No Known Problems Son     Anesth Problems Neg Hx      Social History     Tobacco Use    Smoking status: Current Every Day Smoker     Packs/day: 0.50     Years: 15.00     Pack years: 7.50    Smokeless tobacco: Never Used    Tobacco comment: about 6 cigarettes per day at present   Substance Use Topics    Alcohol use: Not Currently     Comment: RARELY      Current Outpatient Medications   Medication Sig Dispense Refill    clopidogreL (Plavix) 75 mg tab Take 1 Tablet by mouth daily. 30 Tablet 5    acetaminophen (TYLENOL) 325 mg tablet Take 2 Tablets by mouth every six (6) hours as needed for Pain or Fever (headache). (Patient not taking: Reported on 2022) 30 Tablet 0    butalbital-acetaminophen-caffeine (FIORICET, ESGIC) -40 mg per tablet Take 1 Tablet by mouth every four (4) hours as needed for Migraine. (Patient not taking: Reported on 2022) 20 Tablet 0    pantoprazole (PROTONIX) 40 mg tablet Take 1 Tablet by mouth Daily (before breakfast). (Patient not taking: Reported on 2022) 30 Tablet 0    dexAMETHasone (DECADRON) 2 mg tablet Take 1 tab PO every 12 hours x 2 days then 1 tab PO daily x 2 days then stop. (Patient not taking: Reported on 2022) 6 Tablet 0        Allergies   Allergen Reactions    Ambien [Zolpidem] Other (comments)     \"Hallucinations and black outs\"    Morphine Rash       Review of Systems:  Pertinent items are noted in the History of Present Illness.     Objective:     Vitals:    22 1414   BP: 110/70   Pulse: 94   Temp: 97.5 °F (36.4 °C)   TempSrc: Temporal   SpO2: 98%   Weight: 101 lb 9.6 oz (46.1 kg)   Height: 5' (1.524 m)        Physical Exam:  GENERAL: alert, cooperative, no distress, appears stated age  EYE: negative  NECK: no carotid bruit  LUNG: clear to auscultation bilaterally  HEART: regular rate and rhythm, S1, S2 normal, no murmur, click, rub or gallop  EXTREMITIES:  extremities normal, atraumatic, no cyanosis or edema    Neurologic Exam:  Mental Status:  Alert and oriented x 4. Appropriate affect, mood and behavior. Language:    Normal fluency, repetition, comprehension and naming. Cranial Nerves:   Pupils equal, round and reactive to light. Visual fields full to confrontation. Extraocular movements intact. Facial sensation intact V1 - V3. Full facial strength, no asymmetry. Hearing intact bilaterally. No dysarthria. Tongue protrudes to midline, palate elevates symmetrically. Shoulder shrug 5/5 bilaterally. Motor:    No pronator drift. Bulk and tone normal.      5/5 power in all extremities proximally and distally. No involuntary movements. Sensation:    Sensation intact throughout to light touch    Reflexes:    Deferred    Coordination & Gait: Normal      Imaging:  I personally reviewed the following imaging studies. The impressions listed below are those of the interpreting radiologist(s). Diagnostic cerebral angiogram 3/18/2022:  1. 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   2. Four flow related aneurysms arising from the markedly enlarged right posterior inferior cerebellar artery, including a wide neck 2.1 x 3.3 x 3.6 mm aneurysm projecting inferomedially from the proximal lateral medullary segment, a wide neck 12.6 x 8.4 x 8.7 mm aneurysm with 7 mm neck projecting posterolaterally from the distal lateral medullary segment, a fusiform aneurysm with associated 2 x 1.7 mm bleb involving the proximal tonsillomedullary segment, and a fusiform aneurysm involving the distal tonsillomedullary segment. MRA brain 3/18/2022:  1. Right cerebellar arteriovenous malformation as described in detail above with nidus measuring approximately 3.3 x 2.0 cm, with predominant arterial supply from the right PICA, and dominant draining veins draining into the distal right transverse sinus and torcular. 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 MRI brain 3/18/2022:  1. Stable right cerebellar intraparenchymal hemorrhage with stable surrounding mild edema and mass effect with partial effacement of the fourth ventricle. No cerebellar tonsillar herniation. No hydrocephalus. Adjacent right cerebellar arteriovenous malformation; see below. CTA head and neck 3/17/2022:  CTA Head:  1. Right cerebellar arteriovenous malformation with nidus measuring approximately 3.5 x 2.3 x 2.1 cm as described in detail above, including predominant arterial supply from the right PICA with an associated right PICA aneurysm measuring 1.1 x 0.8 x 0.9 cm.   2. Stable acute right cerebellar intraparenchymal hemorrhage, with stable mild surrounding edema and mass effect. No hydrocephalus.    CTA Neck:  1. No evidence of significant stenosis. CT head 3/17/2022:  1. There is a right cerebellar hemorrhage with surrounding edema with extension into the right cerebellar peduncle. There is compression of the fourth ventricle which is small and contains a small amount of hemorrhage. The temporal tips are slightly dilated may represent developing hydrocephalus. Results called to the ER.     Assessment:   40 y.o. 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 Patient presents in person with her grandmother. She states that she has been doing well since discharge from the hospital.  Her blurred vision has mostly cleared up, but she states that she feels that her right eye is \"pulling\" to the right. She reports difficulty retrieving information and positional dizziness. She has occasional mild headaches that are approximately 3 out of 10 in severity. She also has occasional \"iffy\" balance but states that her coordination is fine. She reports that all of her symptoms are only \"slight\" now. She denies numbness and tingling, weakness, and nausea and vomiting. Neurologic exam is normal.    We reviewed her imaging together. We discussed the findings of the arteriovenous malformation as well as the associated flow related aneurysms. We discussed the natural history of arteriovenous malformations and cerebral aneurysms. We also discussed the different treatment options for both. Previous discussions with Dr. India Sood and Dr. Luba Aden from Neurosurgery elucidated gamma knife therapy as the treatment plan for the AVM. Given the increased risk of pre-nidal flow related aneurysm rupture after gamma knife therapy, treating the flow related aneurysms is necessary. Although one of the aneurysms, the largest, is amenable to coil embolization, the other small aneurysms would need to be treated with stent assisted coil embolization and/or flow diversion. If coil embolization were to be pursued for the largest aneurysm, the ensuing large coil ball mass would be problematic for effective gamma knife therapy. Given that the other treatable aneurysms also require alternative treatments, flow diversion of the 3 treatable aneurysms is the most reasonable option. This would also result in the lowest metallic artifact burden.     If flow diversion is pursued, antiplatelet therapy is required in addition to procedural anticoagulation. However, the safety of antiplatelet therapy and anticoagulation after rupture of an arteriovenous malformation is not clear. We know that there is an increased risk of AVM rupture if there is a history of prior rupture. There are reports of antiplatelet therapy/anticoagulation in the setting of previous AVM rupture without differential mortality. In addition, there are reports of flow diversion of flow related aneurysms in patients with unruptured and previously ruptured AVMs. Given the availability of the new Pipeline Flex Embolization Device with [employer] and high flow nature of the AVM, treatment with antiplatelet monotherapy and Pipeline embolization of the 3 treatable aneurysms (likely with 2 devices) was proposed. We discussed the risks, benefits, and alternatives to this treatment strategy. In particular, we discussed the increased risk of AVM rebleed in the setting of antiplatelet therapy and procedural anticoagulation. We also discussed the potential for AVM rebleed post flow diversion procedure. Steps that can be taken to minimize the likelihood of these complications include gentle procedural heparinization, heparin reversal at the end of the procedure, and strict blood pressure management. In addition, we discussed the possibility of thrombosis of the flow diverter device/devices which could cause an ischemic and/or hemorrhagic stroke. We also discussed the lack of other more favorable treatment strategies. Based on these discussions, patient would like to proceed with flow diversion of the 3 pre-nidal flow related aneurysms using Pipeline Flex Embolization Device with [employer]. She expressed that she does not tolerate aspirin well. Therefore, rather than aspirin monotherapy, we will initiate Plavix monotherapy 1 week prior to the intervention.   Patient expressed good understanding of the anticipated risks and benefits of this management strategy. All questions were answered. A follow-up CTA will be performed 3 months postprocedure to evaluate the progression of aneurysm occlusion. We also discussed the possibility of embolization of the arteriovenous malformation using silk and PVA particles, if so requested by Dr. Ant Cowart and only to decrease the size of the nidus to an appropriate treatable focus. This would be performed in a separate procedure at a different time, after aneurysm treatment. Patient expressed understanding. This plan was discussed with Dr. Ant Cowart, and he will plan to see the patient to arrange gamma knife therapy after the 3-month follow-up CTA. Plan:     -Schedule flow diversion treatment of the 3 pre-nidal flow related aneurysms using Pipeline Flex Embolization Device with [employer]  -Initiate Plavix monotherapy at 75 mg daily 1 week prior to treatment    Thank you for allowing me to participate in the care of this patient. Greater than 60 minutes were spent in patient management, greater than half of which was spent in counseling and coordination of care.         Signed By: Iva Tong MD     June 9, 2022

## 2022-06-14 ENCOUNTER — TELEPHONE (OUTPATIENT)
Dept: NEUROSURGERY | Age: 37
End: 2022-06-14

## 2022-06-14 DIAGNOSIS — I67.1 CEREBRAL ANEURYSM: ICD-10-CM

## 2022-06-14 DIAGNOSIS — Q28.2 AVM (ARTERIOVENOUS MALFORMATION) BRAIN: Primary | ICD-10-CM

## 2022-06-14 NOTE — TELEPHONE ENCOUNTER
Spoke to patient to confirm Pipeline embolization on 6/29/2022 at [facility]. Arrival time 7:00 am.  Informed patient to start Plavix 75 mg tomorrow and PAT will contact her to schedule an appointment for testing. Patient stated understanding.

## 2022-06-22 ENCOUNTER — HOSPITAL ENCOUNTER (OUTPATIENT)
Dept: PREADMISSION TESTING | Age: 37
Discharge: HOME | End: 2022-06-22
Payer: MEDICAID

## 2022-06-22 ENCOUNTER — HOSPITAL ENCOUNTER (OUTPATIENT)
Dept: GENERAL RADIOLOGY | Age: 37
Discharge: HOME | End: 2022-06-22
Attending: RADIOLOGY
Payer: MEDICAID

## 2022-06-22 VITALS
TEMPERATURE: 97.7 F | HEIGHT: 60 IN | DIASTOLIC BLOOD PRESSURE: 64 MMHG | BODY MASS INDEX: 19.78 KG/M2 | HEART RATE: 65 BPM | WEIGHT: 100.75 LBS | SYSTOLIC BLOOD PRESSURE: 102 MMHG

## 2022-06-22 LAB
ABO + RH BLD: NORMAL
ALBUMIN SERPL-MCNC: 4.4 G/DL (ref 3.5–5)
ALBUMIN/GLOB SERPL: 1.3 {RATIO} (ref 1.1–2.2)
ALP SERPL-CCNC: 47 U/L (ref 45–117)
ALT SERPL-CCNC: 13 U/L (ref 12–78)
ANION GAP SERPL CALC-SCNC: 3 MMOL/L (ref 5–15)
AST SERPL-CCNC: 9 U/L (ref 15–37)
BASOPHILS # BLD: 0 K/UL (ref 0–0.1)
BASOPHILS NFR BLD: 1 % (ref 0–1)
BILIRUB SERPL-MCNC: 0.2 MG/DL (ref 0.2–1)
BLOOD GROUP ANTIBODIES SERPL: NORMAL
BUN SERPL-MCNC: 13 MG/DL (ref 6–20)
BUN/CREAT SERPL: 20 (ref 12–20)
CALCIUM SERPL-MCNC: 10.1 MG/DL (ref 8.5–10.1)
CHLORIDE SERPL-SCNC: 106 MMOL/L (ref 97–108)
CO2 SERPL-SCNC: 31 MMOL/L (ref 21–32)
CREAT SERPL-MCNC: 0.64 MG/DL (ref 0.55–1.02)
DIFFERENTIAL METHOD BLD: ABNORMAL
EOSINOPHIL # BLD: 0.2 K/UL (ref 0–0.4)
EOSINOPHIL NFR BLD: 3 % (ref 0–7)
ERYTHROCYTE [DISTWIDTH] IN BLOOD BY AUTOMATED COUNT: 11.9 % (ref 11.5–14.5)
GLOBULIN SER CALC-MCNC: 3.3 G/DL (ref 2–4)
GLUCOSE SERPL-MCNC: 64 MG/DL (ref 65–100)
HCG UR QL: NEGATIVE
HCT VFR BLD AUTO: 48.3 % (ref 35–47)
HGB BLD-MCNC: 16.1 G/DL (ref 11.5–16)
IMM GRANULOCYTES # BLD AUTO: 0 K/UL (ref 0–0.04)
IMM GRANULOCYTES NFR BLD AUTO: 0 % (ref 0–0.5)
LYMPHOCYTES # BLD: 2.5 K/UL (ref 0.8–3.5)
LYMPHOCYTES NFR BLD: 41 % (ref 12–49)
MCH RBC QN AUTO: 32.1 PG (ref 26–34)
MCHC RBC AUTO-ENTMCNC: 33.3 G/DL (ref 30–36.5)
MCV RBC AUTO: 96.2 FL (ref 80–99)
MONOCYTES # BLD: 0.4 K/UL (ref 0–1)
MONOCYTES NFR BLD: 7 % (ref 5–13)
NEUTS SEG # BLD: 2.9 K/UL (ref 1.8–8)
NEUTS SEG NFR BLD: 48 % (ref 32–75)
NRBC # BLD: 0 K/UL (ref 0–0.01)
NRBC BLD-RTO: 0 PER 100 WBC
P2Y12 PLT RESPONSE,PPPR: 89 PRU (ref 194–418)
PLATELET # BLD AUTO: 256 K/UL (ref 150–400)
PMV BLD AUTO: 11 FL (ref 8.9–12.9)
POTASSIUM SERPL-SCNC: 3.9 MMOL/L (ref 3.5–5.1)
PROT SERPL-MCNC: 7.7 G/DL (ref 6.4–8.2)
RBC # BLD AUTO: 5.02 M/UL (ref 3.8–5.2)
SODIUM SERPL-SCNC: 140 MMOL/L (ref 136–145)
SPECIMEN EXP DATE BLD: NORMAL
WBC # BLD AUTO: 6.1 K/UL (ref 3.6–11)

## 2022-06-22 PROCEDURE — 81025 URINE PREGNANCY TEST: CPT

## 2022-06-22 PROCEDURE — 85576 BLOOD PLATELET AGGREGATION: CPT

## 2022-06-22 PROCEDURE — 71046 X-RAY EXAM CHEST 2 VIEWS: CPT

## 2022-06-22 PROCEDURE — 36415 COLL VENOUS BLD VENIPUNCTURE: CPT

## 2022-06-22 PROCEDURE — 86900 BLOOD TYPING SEROLOGIC ABO: CPT

## 2022-06-22 PROCEDURE — 80053 COMPREHEN METABOLIC PANEL: CPT

## 2022-06-22 PROCEDURE — 85025 COMPLETE CBC W/AUTO DIFF WBC: CPT

## 2022-06-22 NOTE — PERIOP NOTES
[facility]'S  PREOPERATIVE INSTRUCTIONS  Neuro Interventional Surgery    Surgery Date:   6/29/22     Your surgeon's office will call you to confirm day of surgery and arrival time. If you do not hear from them, please call [phone] to confirm day of surgery and arrival time. 1. Please report to [facility] Patient Access/Admitting on the 1st floor. Bring your insurance card, photo identification, and any copayment ( if applicable). 2. If you are going home the same day of your surgery, you must have a responsible adult to drive you home. You need to have a responsible adult to stay with you the first 24 hours after surgery and you should not drive a car for 24 hours following your surgery. 3. Nothing to eat or drink after midnight the night before surgery. This includes no water, gum, mints, coffee, juice, etc.  Please note special instructions, if applicable, below for medications. 4. Do NOT drink alcohol or smoke 24 hours before surgery. STOP smoking for 14 days prior as it helps with breathing and healing after surgery. 5. If you are being admitted to the hospital, please leave personal belongings/luggage in your car until you have an assigned hospital room number. 6. Please wear comfortable clothes. Wear your glasses instead of contacts. We ask that all money, jewelry and valuables be left at home. Wear no make up, particularly mascara, the day of surgery. 7.  All body piercings, rings, and jewelry need to be removed and left at home. Please remove any nail polish or artifical nails from your fingernails. Please wear your hair loose or down. Please no pony-tails, buns, or any metal hair accessories. If you shower the morning of surgery, please do not apply any lotions or powders afterwards. You may wear deodorant. Do not shave any body area within 24 hours of your surgery. 8. Please follow all instructions to avoid any potential surgical cancellation.   9. Should your physical condition change, (i.e. fever, cold, flu, etc.) please notify your surgeon as soon as possible. 10. It is important to be on time. If a situation occurs where you may be delayed, please call:  [phone] on the day of surgery. 11. The Preadmission Testing staff can be reached at [phone]. 12. Special instructions: NONE    Current Outpatient Medications   Medication Sig    clopidogreL (Plavix) 75 mg tab Take 1 Tablet by mouth daily.  acetaminophen (TYLENOL) 325 mg tablet Take 2 Tablets by mouth every six (6) hours as needed for Pain or Fever (headache).  butalbital-acetaminophen-caffeine (FIORICET, ESGIC) -40 mg per tablet Take 1 Tablet by mouth every four (4) hours as needed for Migraine. No current facility-administered medications for this encounter. 1. YOU MUST ONLY TAKE THESE MEDICATIONS THE MORNING OF SURGERY WITH A SIP OF WATER:NONE  2. MEDICATIONS TO TAKE THE MORNING OF SURGERY ONLY IF NEEDED:NONE  3. HOLD THESE PRESCRIPTION MEDICATIONS BEFORE SURGERY:NONE  4. Ask your surgeon/prescribing physician about when/if to STOP taking these medications:PLAVIX  5. Stop all vitamins, herbal medicines and any non-steroidal anti-inflammatory drugs (i.e. Ibuprofen, Naproxen, Advil, Aleve) 7 days prior to surgery. You may take Tylenol. 6. If you are currently taking Aspirin, Plavix, Brilinta, Coumadin or any other blood-thinning/anticoagulant medication contact your surgeon for instructions. Patient Information Regarding COVID Restrictions    Day of Procedure     Please park in the parking deck or any designated visitor parking lot.  Enter the facility through the [facility].   On the day of surgery, please provide the cell phone number of the person who will be waiting for you to the Patient Access representative at the time of registration.  Please wear a mask on the day of your procedure.  We are now allowing two designated visitors per stay.  Pediatric patients may have 2 designated visitors. These two people may come in with you on the day of your procedure.  The designated visitor must also wear a mask.  Once your procedure and the immediate recovery period is completed, a nurse in the recovery area will contact your designated visitor to inform them of your room number or to otherwise review other pertinent information regarding your care.  Social distancing practices are to be adhered to in waiting areas and the cafeteria. The patient was contacted  in person. She verbalized understanding of all instructions does not  need reinforcement.

## 2022-06-22 NOTE — PERIOP NOTES
NOT VACCINATED WITH COVID 19 VACCINE          PATIENT GIVEN WRITTEN INSTRUCTIONS TO GO TO THE IMAGING CENTER/CANCER CENTER ON [address] TO HAVE CHEST XRAY COMPLETED.

## 2022-06-23 NOTE — PERIOP NOTES
PAT Nurse Practitioner     Pre-Operative Chart Review/Assessment:    NEURO INTERVENTIONAL SURGERY                 Patient Name:  Courtney Roberson                    MRN:   [id number]     Sex:   female                                       Age:   40 y.o.    :  1985       Today's Date:  2022     Date of PAT:   2022      Date of Surgery:    2022      Procedure(s):   Pipeline Embolization     Surgeon:  Dr. Nikki Oropeza:       1)  PCP: [facility]        2)  Clearances Required: CXR, EKG and METs reviewed. No further cardiac evaluation requested. 3/17/22 EKG: normal EKG, normal sinus rhythm, unchanged from previous tracings. 3)  Sleep Apnea evaluation:  Not indicated.  CHAI Score 0.       4)  Additional Concerns: Former smoker, THC use, CVA, GERD, anxiety        5) Contrast Allergy: No       6) ASA: Pt not on ASA       7) P2Y12: 89 on 22    **P2Y12 results routed to [facility], [facility] on 22 @ 0915**                Vital Signs:         Vitals:    22 1338   BP: 102/64   Pulse: 65   Temp: 97.7 °F (36.5 °C)   Weight: 45.7 kg (100 lb 12 oz)   Height: 5' (1.524 m)   LMP: 2022            ____________________________________________  PAST MEDICAL HISTORY  Past Medical History:   Diagnosis Date    Aneurysm ([facility]     AVM    Anxiety     Arthritis     Calculus of kidney     Cerebellar hemorrhage, acute ([facility] 2022    secondary to AVM (IR angiography 3/22)    Chronic pain     Current smoker     Dyspepsia and other specified disorders of function of stomach     GERD (gastroesophageal reflux disease)     Irregular heart beat     Kidney disease     hx of kidney stone    Other ill-defined conditions(799.89)     kidney stone in pass,passed    Postpartum hemorrhage 2017    ALSO HAD HEMORRHAGE DURING MISCARRIAGE     Psychiatric disorder     ADHD    Stroke ([facility] 2022      ____________________________________________  PAST SURGICAL Reason For Visit  KYREE HARDY is an established patient here today for a follow-up management of asthma.   Patient accompanied by mother .   Referred By:   Dr. Spring JOHNSON   [place], [place]     Phone #: [phone]      Quality    Asthma CI assessment & Intervention History Asthma Symptoms Evaluated - ACT: 10      History of Present Illness    Patient last seen January 2018. At that time recommendations included:    1- Symbicort 2 puffs twice a day   2- Fluticasone nasal spray 1 squirt per nostril once a day  3- Albuterol as needed per the asthma action plan and 10-15 minutes before sports   4- Cefdnir for sinus infection with ENT follow-up as scheduled ( 21 day course prescribed)   5- CT of sinuses reviewed   6- Return in 3-4 months , consider bronchoscopy at later date    Mom reports that his cough resolved after treating his sinus infection. Mom took him off the Symbicort since February. Mom reports that is he coughing with weather changes and can for the last 2 weeks he reports trouble sleeping at night due to difficulty breathing. Patient thinks it has to do with nasal congestion. No oral steroids. No hospitalizations or ER visits. Patient had been on Singulair in the past but mom reports he could not sleep on it and it didn't work.         Review of Systems    All other systems reviewed and negative.      Allergies  No Known Drug Allergies    Current Meds   1. AeroChamber Plus Checo-Vu w/Mask; USE AS DIRECTED;   Therapy: 18Kxj8360 to (Evaluate:01Jan2017)  Requested for: 50Bmg2166; Last   Rx:91Htm9686 Ordered   2. Albuterol Sulfate (2.5 MG/3ML) 0.083% Inhalation Nebulization Solution; USE 1 UNIT   DOSE IN NEBULIZER EVERY 4 HOURS AS NEEDED;   Therapy: 74Gbw1613 to (Evaluate:12Loj0848)  Requested for: 79Fuh9026; Last   Rx:15Mxb7757 Ordered   3. Amoxicillin-Pot Clavulanate 400-57 MG/5ML Oral Suspension Reconstituted; Take 5.5 ml   twice daily for 7 days;   Therapy: 90Rnc1454 to (Evaluate:15Eku7908)  Requested  HISTORY  Past Surgical History:   Procedure Laterality Date    HX APPENDECTOMY      HX  SECTION      HX CHOLECYSTECTOMY  10/30/2012    HX DILATION AND CURETTAGE      HX GI      COLONOSCOPY    HX GYN      miscarriage x2    HX GYN      ENDOMETRIOSIS, LAPROSCOPY X2    HX KNEE ARTHROSCOPY Left     torn catiledge repair left knee    HX WISDOM TEETH EXTRACTION      MA ANESTH,KNEE AREA SURGERY      MA  DELIVERY ONLY          UPPER GI ENDOSCOPY,BIOPSY  2015           ____________________________________________  HOME MEDICATIONS  Current Outpatient Medications   Medication Sig    clopidogreL (Plavix) 75 mg tab Take 1 Tablet by mouth daily.  acetaminophen (TYLENOL) 325 mg tablet Take 2 Tablets by mouth every six (6) hours as needed for Pain or Fever (headache).  butalbital-acetaminophen-caffeine (FIORICET, ESGIC) -40 mg per tablet Take 1 Tablet by mouth every four (4) hours as needed for Migraine.      No current facility-administered medications for this encounter.      ____________________________________________  ALLERGIES  Allergies   Allergen Reactions    Ambien [Zolpidem] Other (comments)     \"Hallucinations and black outs\"    Morphine Rash      ____________________________________________  SOCIAL HISTORY  Social History     Tobacco Use    Smoking status: Former Smoker     Packs/day: 0.50     Years: 15.00     Pack years: 7.50     Quit date: 2022     Years since quittin.3    Smokeless tobacco: Never Used    Tobacco comment: about 6 cigarettes per day at present   Substance Use Topics    Alcohol use: Not Currently      ___________________________________________   Internal Administration   First Dose      Second Dose         Last COVID Lab SARS-CoV-2 by PCR ( )   Date Value   2022 Not detected          ____________________________________________      Labs:    Hospital Outpatient Visit on 2022   Component Date Value Ref Range Status    WBC for: 52Hjb5750; Last   Rx:37Gkx5664 Ordered   4. Budesonide 0.5 MG/2ML Inhalation Suspension; USE 1 UNIT DOSE VIA NEBULIZER   TWO TIMES A DAY FOR 2 WEEKS, THEN NEB ONCE DAILY;   Therapy: 94Xsw2981 to (Last Rx:88Vlb6850)  Requested for: 10Auq8551 Ordered   5. Cefdinir 250 MG/5ML Oral Suspension Reconstituted; 5 ml twice daily;   Therapy: 26Jan2018 to (Evaluate:09Eab1965)  Requested for: 26Jan2018; Last   Rx:26Jan2018 Ordered   6. Florastor Kids 250 MG Oral Packet; Take 1 packet BID;   Therapy: 65Ilo9852 to (Last Rx:03Ukk8154) Ordered   7. Fluticasone Propionate 50 MCG/ACT Nasal Suspension; USE 1 SPRAY IN EACH   NOSTRIL ONCE DAILY;   Therapy: 06Oct2017 to (Last Rx:05Fac8262)  Requested for: 06Oct2017 Ordered   8. Iron Supplement Childrens 75 (15 Fe) MG/ML Oral Solution; 5mL = 75 mg elemental iron   BID;   Therapy: 21Oct2016 to (Last Rx:23Vdw9494)  Requested for: 09Oct2017 Ordered   9. Loratadine 10 MG Oral Tablet; TAKE 1 TABLET AT BEDTIME;   Therapy: 02Jun2017 to (Evaluate:50Bss8846)  Requested for: 02Jun2017; Last   Rx:02Jun2017 Ordered   10. Montelukast Sodium 5 MG Oral Tablet Chewable; CHEW AND SWALLOW 1 TABLET AT    BEDTIME;    Therapy: 02Jun2017 to (Evaluate:94Tvb9378)  Requested for: 02Jun2017; Last    Rx:02Jun2017 Ordered   11. Nasal Decongestant Spray SOLN; USE 1 SPRAY IN EACH NOSTRIL TWICE DAILY;    Therapy: (Recorded:18Pnu5498) to Recorded   12. ProAir  (90 Base) MCG/ACT Inhalation Aerosol Solution; INHALE 2 PUFFS BY    MOUTH 15 MINUTES BEFORE EXERCISE OR 2 PUFFS EVERY 4HOURS AS NEEDED    FOR WHEEZING;    Therapy: 77Qiw3346 to (Evaluate:24Kpi5543)  Requested for: 27Zot9446; Last    Rx:34Bkd5360 Ordered   13. ProAir  (90 Base) MCG/ACT Inhalation Aerosol Solution; INHALE 2 PUFFS BY    MOUTH 15 MINUTES BEFORE EXERCISE OR 2 PUFFS EVERY 4HOURS AS NEEDED    FOR WHEEZING;    Therapy: 08Hrc5403 to (Evaluate:29Nov2017)  Requested for: 36Qsx5416; Last    Rx:82Mlz1904 Ordered   14. ProAir  (90  06/22/2022 6.1  3.6 - 11.0 K/uL Final    RBC 06/22/2022 5.02  3.80 - 5.20 M/uL Final    HGB 06/22/2022 16.1* 11.5 - 16.0 g/dL Final    HCT 06/22/2022 48.3* 35.0 - 47.0 % Final    MCV 06/22/2022 96.2  80.0 - 99.0 FL Final    MCH 06/22/2022 32.1  26.0 - 34.0 PG Final    MCHC 06/22/2022 33.3  30.0 - 36.5 g/dL Final    RDW 06/22/2022 11.9  11.5 - 14.5 % Final    PLATELET 90/43/3531 228  150 - 400 K/uL Final    MPV 06/22/2022 11.0  8.9 - 12.9 FL Final    NRBC 06/22/2022 0.0  0  WBC Final    ABSOLUTE NRBC 06/22/2022 0.00  0.00 - 0.01 K/uL Final    NEUTROPHILS 06/22/2022 48  32 - 75 % Final    LYMPHOCYTES 06/22/2022 41  12 - 49 % Final    MONOCYTES 06/22/2022 7  5 - 13 % Final    EOSINOPHILS 06/22/2022 3  0 - 7 % Final    BASOPHILS 06/22/2022 1  0 - 1 % Final    IMMATURE GRANULOCYTES 06/22/2022 0  0.0 - 0.5 % Final    ABS. NEUTROPHILS 06/22/2022 2.9  1.8 - 8.0 K/UL Final    ABS. LYMPHOCYTES 06/22/2022 2.5  0.8 - 3.5 K/UL Final    ABS. MONOCYTES 06/22/2022 0.4  0.0 - 1.0 K/UL Final    ABS. EOSINOPHILS 06/22/2022 0.2  0.0 - 0.4 K/UL Final    ABS. BASOPHILS 06/22/2022 0.0  0.0 - 0.1 K/UL Final    ABS. IMM.  GRANS. 06/22/2022 0.0  0.00 - 0.04 K/UL Final    DF 06/22/2022 AUTOMATED    Final    HCG urine, QL 06/22/2022 Negative  NEG   Final    Sodium 06/22/2022 140  136 - 145 mmol/L Final    Potassium 06/22/2022 3.9  3.5 - 5.1 mmol/L Final    Chloride 06/22/2022 106  97 - 108 mmol/L Final    CO2 06/22/2022 31  21 - 32 mmol/L Final    Anion gap 06/22/2022 3* 5 - 15 mmol/L Final    Glucose 06/22/2022 64* 65 - 100 mg/dL Final    BUN 06/22/2022 13  6 - 20 MG/DL Final    Creatinine 06/22/2022 0.64  0.55 - 1.02 MG/DL Final    BUN/Creatinine ratio 06/22/2022 20  12 - 20   Final    GFR est AA 06/22/2022 >60  >60 ml/min/1.73m2 Final    GFR est non-AA 06/22/2022 >60  >60 ml/min/1.73m2 Final    Estimated GFR is calculated using the IDMS-traceable Modification of Diet in Renal Disease (MDRD) Study Base) MCG/ACT Inhalation Aerosol Solution; INHALE TWO PUFFS BY    MOUTH 15 MINUTES BEFORE EXERCISE OR TWO PUFFS EVERY 4 HOURS AS    NEEDED FOR WHEEZING;    Therapy: 54Eba2034 to (Evaluate:13Duk0794)  Requested for: 70Hzc8103; Last    Rx:30Pgn7061 Ordered   15. Qvar 80 MCG/ACT Inhalation Aerosol Solution; INHALE 2 PUFFS TWICE DAILY via    Spacer with Mask Rinse mouth after use;    Therapy: 31Nak4383 to (Evaluate:59Muz0321) Recorded   16. Symbicort 160-4.5 MCG/ACT Inhalation Aerosol; INHALE 2 PUFFS BY MOUTH TWICE    DAILY, RINSE MOUTH AFTER USE;    Therapy: 52Msa6223 to (Evaluate:47Eys9109)  Requested for: 94Vcj1620; Last    Rx:12Lcp9594 Ordered    Past Medical History  History of Encounter for routine child health examination with abnormal findings  (Z00.121)  History of acute pharyngitis (Z87.09)  History of acute sinusitis (Z87.09)  History of allergic rhinitis (Z87.09)  History of allergic rhinitis (Z87.09)  Need for hepatitis A vaccination (Z23)  Need for immunization against influenza (Z23)  Need for pneumococcal vaccination (Z23)  Need for prophylactic vaccination with unspecified combined vaccine (Z23)  History of DAVIS (obstructive sleep apnea) (G47.33)  History of DAVIS (obstructive sleep apnea) (G47.33)  Personal history of asthma (Z87.09)  History of Pharyngitis due to group A beta hemolytic Streptococci (J02.0)    Surgical History  History of Myringotomy  History of Tonsillectomy With Adenoidectomy  History of Umbilical Hernia Repair    Family History  Family history of Asthma  Family history of Diabetes Mellitus  Family history of Epilepsy And Recurrent Seizures  Family history of Reported A History Of Cancer    Social History  Behavioral problems  Child Is Cared For At Home    He lives with his parents, 1 brothers, 1 sisters and no smokers or pets.     He is a 1st grader      Review  Past medical history, problem list, family history, surgical history and social history reviewed.      Physical  Exam  Constitutional: well developed, well nourished and in no acute distress.   Head and Face: atraumatic, no deformities, normocephalic and normal facies.   Eyes: Conjunctiva and Lids: normal conjunctiva and no discharge.   ENT: normal appearing outer ear and normal appearing nose. examination of the tympanic membrane showed normal landmarks. normal lips. oral mucosa pink and moist.   Neck: normal appearing neck and supple neck.   Lymphatic: normal right neck node, normal left neck node and no lymphadenopathy.   Chest: Chest: normal appearance and no tenderness. normal chest appearance.   Pulmonary: Respiratory Effort: no respiratory distress, normal respiratory rate and effort and no accessory muscle use. Auscultation: breath sounds clear to auscultation bilaterally.   Cardiovascular: Auscultation of heart: normal rate and regular rhythm.   Abdomen: soft, nontender, nondistended, normal bowel sounds and no abdominal mass. no hepatomegaly and no splenomegaly. no umbilical hernia was discovered.   Musculoskeletal: no clubbing or cyanosis of the fingernails. normal ROM of all extremities. muscle tone was normal.   Neurologic: normal DTRs.   Psychiatric: alert and awake, interactive and mood/affect were appropriate for age .    Skin: normal skin color and pigmentation, no rash, normal bruising for age/ development. normal skin turgor.      Results/Data  Innofidei 85Zpo0554 08:43PM JOMAR, [facility]   Ordering Provider: LILIAN OSHEA.    Reason For Study: pain s/p surgery, concern for abscess,pain s/p surgery, concern for abscess.     Test Name Result Flag Reference   Innofidei (Report)     Accession #    [id number]    EXAM: Kaymu ABDOMEN Panopto    CLINICAL INDICATION: Appendectomy three weeks ago. Right upper quadrant pain, right lower   quadrant pain    COMPARISON: 01/16/2018    FINDINGS AND IMPRESSION: Trace free fluid present in the right lower quadrant. No large   drainable fluid collections.    The visualized  equation, reported for both  Americans (GFRAA) and non- Americans (GFRNA), and normalized to 1.73m2 body surface area. The physician must decide which value applies to the patient.  Calcium 06/22/2022 10.1  8.5 - 10.1 MG/DL Final    Bilirubin, total 06/22/2022 0.2  0.2 - 1.0 MG/DL Final    ALT (SGPT) 06/22/2022 13  12 - 78 U/L Final    AST (SGOT) 06/22/2022 9* 15 - 37 U/L Final    Alk. phosphatase 06/22/2022 47  45 - 117 U/L Final    Protein, total 06/22/2022 7.7  6.4 - 8.2 g/dL Final    Albumin 06/22/2022 4.4  3.5 - 5.0 g/dL Final    Globulin 06/22/2022 3.3  2.0 - 4.0 g/dL Final    A-G Ratio 06/22/2022 1.3  1.1 - 2.2   Final    P2Y12 Plt response 06/22/2022 89* 194 - 418 PRU Final    Comment: (NOTE)  The clopidogrel inhibitory effect on P2Y12 receptors is demonstrated   by a decrease in the VerifyNow P2Y12 result compared to the baseline   value obtained prior to initiation of clopidogrel. The nonmedicated   reference range of 194-418 PRU (P2Y12 Reaction Units) indicates the   absence of P2Y12 inhibition. Following consumption of clopidogrel,   VerifyNow P2Y12 test results less than 194 are associated with   expected antiplatelet effect. Of note, results can be affected by   GPIIa IIIb inhibitors, inherited platelet disorders, low hematocrit   and or low platelet counts.  Crossmatch Expiration 06/22/2022 07/02/2022,2359   Final    ABO/Rh(D) 06/22/2022 A POSITIVE   Final    Antibody screen 06/22/2022 NEG   Final       XR Results (most recent):  Results from Hospital Encounter encounter on 06/22/22    XR CHEST PA LAT    Narrative  EXAM: XR CHEST PA LAT    HISTORY: pre-op testing; high risk; smoker. COMPARISON: CT 8/29/2016    FINDINGS: 2 view(s) of the chest. The lungs are well inflated. No focal  consolidation, pleural effusion, or pneumothorax. The cardiomediastinal  silhouette is unremarkable. The visualized osseous structures are unremarkable.   Surgical clips are seen in the portion of the liver, gallbladder and right kidney are unremarkable.    **** F I N A L ****    Transcribed By: JARVIS   02/07/18 8:49 pm    Dictated By:      LISBETH APARICIO MD    Electronically Reviewed and Approved By:      LISBETH APARICIO MD 02/07/18 8:51 pm     URINE MACRO-POC 45Taw0735 04:50PM [facility]*, [facility]   Ordering Provider: PHYSICIAN,UNASSIGNED     Test Name Result Flag Reference   NITRITE-POC NEGATIVE  NEG   KETONES-POC NEGATIVE mg/dl  NEG   UROBILINOGEN-POC 0.2 mg/dl  0.0-1.0   BILIRUBIN-POC NEGATIVE  NEG   PROTEIN-POC NEGATIVE mg/dl  NEG   PH-POC 7.0 Units  5.0-7.0   COLOR-POC YELLOW  YEL   OCCULT BLOOD-POC NEGATIVE  NEG   SPECIFIC GRAVITY-POC 1.020  1.005-1.030   WBC ESTERASE-POC NEGATIVE  NEG   APPEARANCE-POC CLEAR     GLUCOSE-POC NEGATIVE mg/dl  NEG     US APPENDIX 40Ujv1501 07:27PM [facility]   Ordering Provider: REJI STRICKLAND.    Reason For Study: Abdominal Pain,Abdominal Pain.     Test Name Result Flag Reference   US APPENDIX (Report)     Accession #    [id number]    EXAM: APPENDIX ULTRASOUND    CLINICAL INDICATION: Right lower quadrant pain    COMPARISON: None    TECHNIQUE: Grayscale evaluation of the right lower quadrant was performed. Permanent images are   stored in PACS. Radiology resident was present for portion of the exam.    FINDINGS AND IMPRESSION:    There is a dilated blind-ending tubular structure with internal vascularity measuring up to 1.1   cm. Within the proximal portion of this structure, there is a 6 mm shadowing calcification   compatible with an appendicolith. Findings are compatible with acute appendicitis. No free   fluid, fluid collection, or ileocolic lymphadenopathy.    Dr. Crowder discussed findings with Dr. Andrade at 7:20 PM on 01/16/2018.    LISBETH AGUILERA M.D., have reviewed the images and report and concur with these findings   interpreted by DEBBI CROWDER M.D..    **** F I N A L ****    Transcribed By: JARVIS   01/16/18 7:29 pm    Dictated By:       right upper quadrant. Impression  No acute cardiopulmonary process. Skin:     Denies open wounds, cuts, sores, rashes or other areas of concern in PAT assessment.          Geovany Chaves, NP SHIV, DEBBI CARBAJAL    Electronically Reviewed and Approved By:      MARKUS, LISBETH CARBAJAL 01/16/18 7:42 pm     URINALYSIS, COMPLETE INCLUDING MICROSCOPIC AND URINE CULTURE REFLEX 16Jan2018 06:31PM JEFF[facility]*, [facility]   Ordering Provider: REJI SANDERS     Test Name Result Flag Reference   URINE COLOR YELLOW  YEL   APPEARANCE, URINE CLOUDY     URINE SPECIFIC GRAVITY 1.025  1.005-1.030   PH-URINE 5.0 Units  5.0-7.0   URINE PROTEIN NEGATIVE mg/dl  NEG   URINE GLUCOSE NEGATIVE mg/dl  NEG   KETONES 80 mg/dl A NEG   CALLED TO, READ BACK AND CONFIRMED  ELIZABETH BRIGGS/RN AT 1855 01/16/18 BY [id number].   UROBILINOGEN-URINE 0.2 mg/dl  0.0-1.0   URINE BILIRUBIN NEGATIVE  NEG   RBC-URINE NEGATIVE  NEG   NITRITE NEGATIVE  NEG   WBC-URINE NEGATIVE  NEG   SQUAMOUS EPITHELIAL CELLS 1 to 5 /HPF  0-5   ERYTHROCYTES 4 to 5 /HPF H 0-3   LEUKOCYTES 1 to 5 /HPF  0-5   BACTERIA NONE SEEN /HPF  NSN   HYALINE CASTS NOT APPLICABLE     SPECIMEN TYPE      URINE, CLEAN CATCH/MIDSTREAM   YEAST NOT APPLICABLE     RENAL EPIS NOT APPLICABLE     MUCUS PRESENT     CA OXALATE CRYSTALS NOT APPLICABLE     URINE REMARKS NOT APPLICABLE     AMORPHOUS MATERIAL NOT APPLICABLE     URIC ACID CRYSTALS NOT APPLICABLE     TRIPLE PHOS CRYSTALS NOT APPLICABLE     GRANULAR CASTS NOT APPLICABLE     SPERMATOZOA NOT APPLICABLE     EPITHELIAL CAST NOT APPLICABLE     HYPHAE NOT APPLICABLE     WBC CAST NOT APPLICABLE     TRICHOMONAS NOT APPLICABLE     CELLULAR CAST NOT APPLICABLE     WAXY CAST NOT APPLICABLE     FATTY CAST NOT APPLICABLE     RBC CAST NOT APPLICABLE     URINE REFLEX NOT APPLICABLE       CBC WITH AUTOMATED DIFFERENTIAL 16Jan2018 06:21PM JOMAR, [facility]   Ordering Provider: REJI SANDERS     Test Name Result Flag Reference   WHITE BLOOD COUNT 17.3 K/mcL H 5.0-14.5   RED CELL COUNT 4.76 mil/mcL  3.90-5.30   HEMOGLOBIN 13.3 g/dl  11.5-15.5   HEMATOCRIT 39.5 %  35.0-45.0   MEAN CORPUSCULAR VOLUME 83.0 fL  77.0-95.0   MEAN CORPUSCULAR HEMOGLOBIN 27.9 pg  25.0-33.0    MEAN CORPUSCULAR HGB CONC 33.7 g/dl  31.0-37.0   RDW-CV 13.0 %  11.0-15.0   PLATELET COUNT 298 K/mcL  140-450   SHEILA% 92 %     LYM% 4 %     MON% 4 %     EOS% 0 %     BASO% 0 %     SHEILA ABS 15.9 K/mcL H 1.5-8.0   LYM ABS 0.7 K/mcL L 1.5-6.8   MON ABS 0.7 K/mcL  0.0-0.8   EOS ABS 0.0 K/mcL L 0.1-0.7   BASO ABS 0.0 K/mcL  0.0-0.2   SEG% NOT APPLICABLE     DIFF TYPE      AUTOMATED DIFFERENTIAL   ANALYZER ANC NOT APPLICABLE     NRBC NOT APPLICABLE       BASIC METABOLIC PNL 16Jan2018 06:21PM FLOR*, [facility]   Ordering Provider: REJI SANDERS     Test Name Result Flag Reference   SODIUM 134 mmol/L L 135-145   POTASSIUM 4.2 mmol/L  3.4-5.1   CHLORIDE 98 mmol/L     CARBON DIOXIDE 20 mmol/L L 21-32   ANION GAP 20 mmol/L  10-20   GLUCOSE 124 mg/dl H 65-99   BUN 13 mg/dl  5-18   CREATININE 0.39 mg/dl  0.21-0.70   GFR EST. AMER Not calculated.     GFR NOT CALCULATED FOR AGE LESS THAN 18 YEARS   GFR EST.NONAFRI AMER Not calculated.     GFR NOT CALCULATED FOR AGE LESS THAN 18 YEARS   BUN/CREATININE RATIO 33 H 7-25   CALCIUM 10.1 mg/dl  8.0-11.0     CRP 16Jan2018 06:21PM FLOR*, [facility]   Ordering Provider: GIAN LOPEZ     Test Name Result Flag Reference   C-REACTIVE PROTEIN 6.3 mg/dl H <1.0     prc STREP TEST RAPID, IN-OFFICE 27Oct2017 06:08PM RENETTA HAUSER     Test Name Result Flag Reference   RAPID STREP GROUP A Positive       FERRITIN 06Oct2017 02:30PM SILVESTRE JONES     Test Name Result Flag Reference   FERRITIN 29 ng/ml       COMP METABOLIC PANEL WITH CBCA, LIPID PANEL AND TSH (CMP,CBCA,LIPFA,TSH) 30Sep2017 07:50AM BLANKA GONZALEZ     Test Name Result Flag Reference   WHITE BLOOD COUNT 4.9 K/mcL L 5.0-14.5   RED CELL COUNT 4.59 mil/mcL  3.90-5.30   HEMOGLOBIN 12.5 g/dl  11.5-15.5   HEMATOCRIT 38.5 %  35.0-45.0   MEAN CORPUSCULAR VOLUME 83.9 fL  77.0-95.0   MEAN CORPUSCULAR HEMOGLOBIN 27.2 pg  25.0-33.0   MEAN CORPUSCULAR HGB CONC 32.5 g/dl  31.0-37.0   RDW-CV 13.7 %  11.0-15.0   PLATELET COUNT 277  K/mcL  140-450   SHEILA% 49 %     LYM% 41 %     MON% 8 %     EOS% 2 %     BASO% 0 %     SHEILA ABS 2.4 K/mcL  1.5-8.0   LYM ABS 2.0 K/mcL  1.5-6.8   MON ABS 0.4 K/mcL  0.0-0.8   EOS ABS 0.1 K/mcL  0.1-0.7   BASO ABS 0.0 K/mcL  0.0-0.2   SODIUM 138 mmol/L  135-145   POTASSIUM 4.5 mmol/L  3.4-5.1   CHLORIDE 107 mmol/L     CARBON DIOXIDE 26 mmol/L  21-32   ANION GAP 10 mmol/L  10-20   GLUCOSE 87 mg/dl  65-99   BUN 10 mg/dl  5-18   CREATININE 0.42 mg/dl  0.21-0.70   GFR EST. AMER Not calculated.     GFR NOT CALCULATED FOR AGE LESS THAN 18 YEARS   GFR EST.NONAFRI AMER Not calculated.     GFR NOT CALCULATED FOR AGE LESS THAN 18 YEARS   BUN/CREATININE RATIO 24  7-25   BILIRUBIN TOTAL 0.2 mg/dl  0.2-1.4   GOT/AST 18 Units/L  10-55   ALKALINE PHOSPHATASE 211 Units/L  150-360   ALBUMIN 4.7 g/dl  3.6-5.1   TOTAL PROTEIN 7.8 g/dl  6.0-8.0   GLOBULIN (CALCULATED) 3.1 g/dl  2.0-4.0   A/G RATIO 1.5  1.0-2.4   CALCIUM 10.1 mg/dl  8.0-11.0   GPT/ALT 18 Units/L  10-35   FASTING STATUS UNKNOWN hrs     CHOLESTEROL 154 mg/dl  <170   Desirable            <170  Borderline High      170 to 199  High                 >=200   HDL CHOLESTEROL 50 mg/dl  >45   Low            <40  Borderline Low 40 to 45  Optimal        >45   TRIGLYCERIDES 57 mg/dl  <75   Desirable                <75  Borderline High          75 to 99  High                     >=100   LDL CHOLESTEROL (CALCULATED) 93 mg/dl  <110   Desirable         <110  Borderline High   110 to 129  High              >=130   NON-HDL CHOLESTEROL 104 mg/dl  <120   Desirable          <120  Borderline High    120 to 144  High               >=145   CHOLESTEROL/HDL RATIO 3.1  <4.5   TSH 2.211 mcUnits/mL  0.662-4.010   DIFF TYPE      AUTOMATED DIFFERENTIAL   FASTING STATUS UNKNOWN hrs       VITAMIN D,25 HYDROXY 44Wus2225 07:50BLANKA PORTILLO     Test Name Result Flag Reference   VIT D,25 HYDROXY 29.4 ng/ml L 30.0-100.0   <20  ng/mL=Vitamin D deficiency  20-29  ng/mL=Vitamin D  insufficiency   ng/mL=Optimal Vitamin D  >150 ng/mL=Possible toxicity     XR SINUSES PARANASAL CMPL MIN 3V [id number] 03:23PM SILVESTRE JONES   Ordering Provider: SILVESTRE JONES.    Reason For Study: SINUSITIS,SINUSITIS.     Test Name Result Flag Reference   XR SINUSES PARANASAL CMPL MIN 3V (Report)     Accession #    [id number]    HISTORY: Acute maxillary sinusitis    COMPARISON: 03/03/2017    Upright views of the paranasal sinuses, three views, show no clouding of the sinuses, mucosal   thickening, or air-fluid levels.    IMPRESSION: No definite abnormalities noted in the paranasal sinuses.    **** F I N A L ****    Transcribed By: JARVIS   05/05/17 3:33 pm    Dictated By:      LE TODD MD    Electronically Reviewed and Approved By:      LE TODD MD 05/05/17 3:34 pm     XR NECK SOFT TISSUE 03Mar2017 08:24AM SILVESTRE JONES   Ordering Provider: SILVESTRE JONES.    Reason For Study: chronic cough,chronic cough.     Test Name Result Flag Reference   XR NECK SOFT TISSUE (Report)     Accession #    [id number]    EXAM XR NECK SOFT TISSUE.    CLINICAL INDICATION: Cough.    COMPARISON: None.    IMPRESSION: The adenoids are not enlarged. Prevertebral soft tissues and epiglottis are not   thickened. The airway is not compromised. No radiopaque foreign objects.    **** F I N A L ****    Transcribed By: JARVIS   03/03/17 8:50 am    Dictated By:      MAGGY MANLEY    Electronically Reviewed and Approved By:      MAGGY MANLEY 03/03/17 8:50 am     XR CHEST 2V [id number] 09:45AM FLOR*, [facility]   Ordering Provider: REJI STRICKLAND.    Reason For Study: Chest Pain,Chest Pain.     Test Name Result Flag Reference   XR CHEST PA, LATERAL 2V (Report)     Accession #    [id number]    EXAM: XR CHEST PA, LATERAL 2V    CLINICAL INDICATION: Chest pain, fever    COMPARISON: 09/16/2016    IMPRESSION:    Normal heart size. No lung consolidation or effusions. Suggestion of minimal bronchial  wall   thickening slightly greater on the left. No change or possible minimal worsening compared to   prior study. Consider viral bronchiolitis or reactive airway disease but correlate clinically.    **** F I N A L ****    Transcribed By: JARVIS   02/22/17 10:38 am    Dictated By:      LC VALLADARES    Electronically Reviewed and Approved By:      LC VALLADARES 02/22/17 10:56 am     IRON AND TIBC 21Oct2016 07:45AM JONES, SILVESTRE     Test Name Result Flag Reference   IRON 58 mcg/dl L    % IRON SATURATION 19 %  15-45   IRON BINDING CAPACITY 309 mcg/dl  250-400     IMMUNOGLOBULIN A, IGA 21Oct2016 07:45AM JONES, SILVESTRE     Test Name Result Flag Reference   IMMUNOGLOBULIN A,  mg/dl       IMMUNOGLOBULIN E, IGE 21Oct2016 07:45AM JONES, SILVESTRE     Test Name Result Flag Reference   IMMUNOGLOBULIN E 58.0 IUnits/mL  <100.0     IMMUNOGLOBULIN G, IGG 21Oct2016 07:45AM JONES, SILVESTRE     Test Name Result Flag Reference   IGG-QUANT 871 mg/dl  608-1229     IMMUNOGLOBULIN M, IGM 21Oct2016 07:45AM JONES, SILVESTRE     Test Name Result Flag Reference   IGM-QUANT 94 mg/dl       IGG SUBCLASSES 1,2,3,4 21Oct2016 07:45AM JONES, SILVESTRE     Test Name Result Flag Reference   IgG SUBCLASS 1 562.2 mg/dl  228.0-918.0   IgG SUBCLASS 2 168.4 mg/dl  44.0-375.0   IgG SUBCLASS 3 25.0 mg/dl  15.0-85.0   IgG SUBCLASS 4 12.3 mg/dl  1.0-99.0     SWCL/SWEAT CHLORIDE TEST 03Oct2016 09:14AM JONES, [employer]     Test Name Result Flag Reference   SWEAT AMOUNT SITE 1 0.2970 g  0.075-2.000   SWEAT AMOUNT SITE 1 0.3249 g  0.075-2.000   SWEAT CL SITE 1 11 mmol/L  0-60   SWEAT CL SITE 2 10 mmol/L  0-60   SWEAT COMMENT      PEDIATRIC SWEAT CHLORIDE REFERENCE RANGE   For infants up to and including 6 months of age:  Less than or equal to 29 mmol/L=cystic fibrosis is unlikely  30 to 59 mmol/L=intermediate  Greater than or equal to 60 mmol/L=indicative of cystic fibrosis     For individuals older than 6 months of  age:  Less than or equal to 39 mmol/L=cystic fibrosis is unlikely  40 to 59 mmol/L=intermediate  Greater than or equal to 60 mmol/L=indicative of cystic fibrosis     NOTE: Sweat chloride values less than 30 mmol/L have been documented in genetically proven CF patients.  Clinical correlation is necessary.     PTINR & PTT COMBINATION 14Oct2015 12:01AM BLANKA GONZALEZ     Test Name Result Flag Reference   PROTHROMBIN TIME 10.4 sec  9.7-11.6   INR 1.0     INR Therapeutic Range: 2.0 to 3.0 (2.5 to 3.5 recommended for recurrent thrombotic episodes and mechanical prosthetic heart valves.)   PTT 29 sec  22-30   PTT  Therapeutic Range:  47-67 seconds.     ALLERGEN: RESPIRATORY DISEASE PROFILE, REGION 8, [facility] ([place], [place], [place]) 04Jun2013 04:20PM BLANKA GONZALEZ     Test Name Result Flag Reference   ALLERGEN IMMUNOGLOBULIN E 30 IU/ml  2-307   See Comment  REFERENCE INTERVAL: Immunoglobulin E, Serum    Access complete set of age- and/or gender-specific  reference intervals for this test in the [facility]  Test Directory ([email or web address]).   ALLERGEN: ALTERNARIA TENUIS <0.10 kU/L  <=0.34   ALLERGEN: MAPLE/BOX ELDER <0.10 kU/L  <=0.34   ALLERGEN: OAK TREE <0.10 kU/L  <=0.34   ALLERGEN: ELM TREE <0.10 kU/L  <=0.34   ALLERGEN COCKROACH <0.10 kU/L  <=0.34   ALLERGEN RAGWEED,SHORT <0.10 kU/L  <=0.34   ALLERGEN: MARSH ELDER <0.10 kU/L  <=0.34   ALLERGEN CAT EPITH&DANDER <0.10 kU/L  <=0.34   ALLERGEN: DOG DANDER <0.10 kU/L  <=0.34   ALLERGEN: D. FARINAE 1.23 kU/L H <=0.34   ALLERGEN: D. PTERONYSSIMUS 1.94 kU/L H <=0.34   ALLERGEN: ASPERGILLUS FUMIGATU <0.10 kU/L  <=0.34   ALLERGEN: HORMODENDRUM(CL) <0.10 kU/L  <=0.34   ALLERGEN, M[place] TREE IgE <0.10 kU/L  <=0.34   ALLERGEN: COT[place] TREE <0.10 kU/L  <=0.34   ALLERGEN MILK COWS IGE <0.10 kU/L  <=0.34   ALLERGEN PEANUT IGE IMMUNOCAP <0.10 kU/L  <=0.34   ALLERGEN: PIGWEED <0.10 kU/L  <=0.34   ALLERGEN: Belgian THISTLES <0.10 kU/L  <=0.34   ALLERGEN: JAVON GRASS  <0.10 kU/L  <=0.34   ALLERGEN: BERMUDA GRASS <0.10 kU/L  <=0.34   ALLERGEN: WHITE TANA TREE <0.10 kU/L  <=0.34   ALLERGEN: PENICILLIUM NOTATUM <0.10 kU/L  <=0.34   ALLERGEN: SYCAMORE TREE <0.10 kU/L  <=0.34   ALLERGEN, WALNUT TREE IgE <0.10 kU/L  <=0.34   ALLERGEN: PECAN(WHT HICKORY)TREE <0.10 kU/L  <=0.34   MOUSER <0.10 kU/L  <=0.34   ALLERGEN, MUCO RACEMOSOS <0.10 kU/L  <=0.34   ALLERGEN, WHITE MULBERRY TREE IgE <0.10 kU/L  <=0.34     ALLERGEN: INTERPRETATION 04Jun2013 04:20PM BLANKA GONZALEZ     Test Name Result Flag Reference   ALLERGEN INTERPRETAION See Note     See Comment  REFERENCE INTERVAL: Allergen, Interpretation    Less than 0.10 kU/L......No significant level detected  0.10-0.34 kU/L...........Clinical relevance undetermined  0.35-0.70 kU/L...........Low  0.71-3.50 kU/L...........Moderate  3.51-17.50 kU/L..........High  17.51 kU/L or Greater....Very High    Allergen results of 0.10-0.34 kU/L are intended for  specialist use as the clinical relevance is undetermined.  Even though increasing ranges are reflective of increasing  concentrations of allergen-specific IgE, these  concentrations may not correlate with the degree of  clinical response or skin testing results when challenged  with a specific allergen. The correlation of allergy  laboratory results with clinical history and in vivo  reactivity to specific allergens is essential. A negative  test may not rule out clinical allergy or even anaphylaxis.     ALLERGEN: WHEAT 04Jun2013 04:20PM BLANKA GONZALEZ     Test Name Result Flag Reference   ALLERGEN: WHEAT <0.10 kU/L  <=0.34     ALLERGEN: CORN IgE 04Jun2013 04:20PM BLANKA GONZALEZ     Test Name Result Flag Reference   ALLERGEN CORN <0.10 kU/L  <=0.34     ALLERGEN: EGG WHITE IgE 04Jun2013 04:20PM BLANKA GONZALEZ     Test Name Result Flag Reference   ALLERGEN: EGG WHITE <0.10 kU/L  <=0.34     ALLERGEN: SHRIMP 04Jun2013 04:20PM BLANKA GONZALEZ     Test Name Result Flag Reference   ALLERGEN: SHRIMP  <0.10 kU/L  <=0.34     ALLERGEN: WALNUT/BLACK WA 04Jun2013 04:20PM YANONG, BLANKA     Test Name Result Flag Reference   ALLERGEN: WALNUT/BLACK WA <0.10 kU/L  <=0.34     ALLERGEN: CLAM 04Jun2013 04:20PM YANONG, BLANKA     Test Name Result Flag Reference   ALLERGEN: CLAM <0.10 kU/L  <=0.34     ALLERGEN: SOYBEAN 04Jun2013 04:20PM YANONG, BLANKA     Test Name Result Flag Reference   ALLERGEN: SOYBEAN <0.10 kU/L  <=0.34     ALLERGEN: SCALLOP 04Jun2013 04:20PM YANONG, BLANKA     Test Name Result Flag Reference   ALLERGEN: SCALLOP <0.10 kU/L  <=0.34     ALLERGEN: CODFISH 04Jun2013 04:20PM YANONG, BLANKA     Test Name Result Flag Reference   ALLERGEN CODFISH <0.10 kU/L  <=0.34     LEAD,BLOOD/ANGELINA 25Mar2013 06:15PM YANONG, BLANKA     Test Name Result Flag Reference   LEAD BLOOD/ANGELINA <2 mcg/dl  0-9   CHILDREN TO AGE 16 (CDC GUIDELINES)  NORMAL:               up to 9  MCG/DL  SLIGHTLY ELEVATED:    10-15    MCG/DL  ELEVATED:             >15      MCG/DL  MEDICAL EVALUATION:   >20      MCG/DL  MEDICAL EMERGENCY:    >45      MCG/DL                                         OCCUPATIONALLY EXPOSED ADULTS (OSHA)  NORMAL:               up to 9  MCG/DL  PERMISSIBLE EXPOSURE: up to 45 MCG/DL  MAXIMUM ALLOWABLE:    53       MCG/DL  POSSIBLE TOXICITY:    >70      MCG/DL   LEAD, OSHA <2 MCG/100G       CPK - CREATINE KINASE 30Jul2012 11:55AM [facility] [[facility]], BLANKA     Test Name Result Flag Reference   Creatine Kinase 125 Units/L       RBC SED RATE 30Jul2012 11:55AM [facility] [[facility]], BLANKA     Test Name Result Flag Reference   RBC SED RATE 18 mm/hr H 0-15     ALLERGEN,PEDIATRIC PROGRESSION PROFILE 03May2011 07:15AM [facility] [[facility]], BLANKA     Test Name Result Flag Reference   ALLERGEN IMMUNOGLOBULIN E 12  0-12   ALLERGEN: ALTERNARIA TENUIS <0.10  <=0.34   ALLERGEN: D. FARINAE <0.10  <=0.34   ALLERGEN: D. PTERONYSSIMUS <0.10  <=0.34   ALLERGEN COCKROACH <0.10  <=0.34   ALLERGEN CAT EPITH&DANDER <0.10  <=0.34   ALLERGEN: DOG DANDER  <0.10  <=0.34   ALLERGEN: EGG WHITE <0.10  <=0.34   ALLERGEN MILK COWS IGE 0.27  <=0.34   ALLERGEN CODFISH <0.10  <=0.34   ALLERGEN PEANUT IGE IMMUNOCAP <0.10  <=0.34   ALLERGEN: SOYBEAN <0.10  <=0.34   ALLERGEN: WHEAT <0.10  <=0.34   INTERPRETATION PEDIATRIC MARCH PROFILE See Note       COMP METABOLIC PNL 25Apr2011 10:20AM [facility] [[facility]], BLANKA     Test Name Result Flag Reference   SODIUM 138  135-145   POTASSIUM 4.2  3.4-5.1   CHLORIDE 106     CARBON DIOXIDE 20 L 21-32   ANION GAP 16  10-20   GLUCOSE 83  65-99   BUN 11  10-20   CREATININE 0.40  0.30-0.70   GFR EST. AMER NOT CALCULATED     GFR EST.NONAFRI AMER NOT CALCULATED     BUN/CREATININE RATIO 28 H 7-25   BILIRUBIN TOTAL 0.2  <1.5   GOT/AST 24  23-58   ALKALINE PHOSPHATASE 181  145-320   ALBUMIN 4.5  3.4-5.0   TOTAL PROTEIN 7.3 H 5.6-7.2   GLOBULIN (CALCULATED) 2.8  2.0-4.0   A/G RATIO 1.6  1.0-2.4   CALCIUM 10.0  8.4-10.2   GPT/ALT 19  5-45       Procedure  Pulmonary Function Tests:    Interpretation: 3-3-2017 Pt performed spirometry in clinic today: FVC 1.87 or 114%, FEV1 1.42 or 99% and HOK90-99 1.17 or 117%. No other needs at this time. EMERY Camargo RRT  6-2-2017 FVC 2.0 or 118%, FEV1 1.62 or 109%, YBZ19-28 1.64 or 91%. CApollo Camargo RRT  10-6-2017 FVC 2.13 or 123%, FEV1 1.51 or 100%, LQU89-63 1.08 or 60%. CApollo [facility] RRT   6-1-2018 FVC 2.30 or 122%, FEV1 1.79 or 109%, DWA92-42 1.57 or 80%. EMERY [facility] RRT      Assessment  Moderate persistent asthma (J45.40)  DAVIS (obstructive sleep apnea) (G47.33)    Plan  Flovent HFA 44 MCG/ACT Inhalation Aerosol; INHALE 2 PUFFS TWICE DAILY via  spacer with mask    1- Start Flovent 44 mcg 2 puffs twice a day   2- Fluticasone nasal spray 1 squirt per nostril once a day  3- Albuterol as needed per the asthma action plan and 10-15 minutes before sports   4- Schedule sleep study   5- ENT appointment today  6- Return in 3-4 months.   Asthma Education: an Asthma Action Plan was prepared and reviewed.           FERRITIN;  Status:Resulted - Requires Verification;   Done: [id number] 12:00AM  Due:05Nov2017;Ordered;    For:DAVIS (obstructive sleep apnea); Ordered By:SILVESTRE JONES;         Signatures   Electronically signed by : SILVESTRE JONES M.D.; Jun 1 2018 10:45AM CST (Author)

## 2022-06-28 ENCOUNTER — ANESTHESIA EVENT (OUTPATIENT)
Dept: INTERVENTIONAL RADIOLOGY/VASCULAR | Age: 37
End: 2022-06-28
Payer: MEDICAID

## 2022-06-28 ENCOUNTER — TELEPHONE (OUTPATIENT)
Dept: NEUROSURGERY | Age: 37
End: 2022-06-28

## 2022-06-28 NOTE — TELEPHONE ENCOUNTER
Spoke to patient to confirm Pipeline Embolization tomorrow at [facility]. Arrival time 7:00 am.  Confirmed patient is taking Plavix daily. Informed patient no eating or drinking after midnight and take morning medications, including Plavix with sips of water. Confirmed allergies. Patient stated understanding.

## 2022-06-29 ENCOUNTER — APPOINTMENT (OUTPATIENT)
Dept: CT IMAGING | Age: 37
End: 2022-06-29
Attending: RADIOLOGY
Payer: MEDICAID

## 2022-06-29 ENCOUNTER — ANESTHESIA (OUTPATIENT)
Dept: INTERVENTIONAL RADIOLOGY/VASCULAR | Age: 37
End: 2022-06-29
Payer: MEDICAID

## 2022-06-29 ENCOUNTER — APPOINTMENT (OUTPATIENT)
Dept: VASCULAR SURGERY | Age: 37
End: 2022-06-29
Attending: NURSE PRACTITIONER
Payer: MEDICAID

## 2022-06-29 ENCOUNTER — HOSPITAL ENCOUNTER (OUTPATIENT)
Dept: INTERVENTIONAL RADIOLOGY/VASCULAR | Age: 37
Setting detail: OBSERVATION
Discharge: HOME | End: 2022-06-30
Attending: RADIOLOGY | Admitting: RADIOLOGY
Payer: MEDICAID

## 2022-06-29 DIAGNOSIS — Q28.2 AVM (ARTERIOVENOUS MALFORMATION) BRAIN: ICD-10-CM

## 2022-06-29 DIAGNOSIS — I67.1 CEREBRAL ANEURYSM: ICD-10-CM

## 2022-06-29 LAB
ACT BLD: 132 SECS (ref 79–138)
ACT BLD: 202 SECS (ref 79–138)
HCG UR QL: NEGATIVE

## 2022-06-29 PROCEDURE — G0378 HOSPITAL OBSERVATION PER HR: HCPCS

## 2022-06-29 PROCEDURE — 96376 TX/PRO/DX INJ SAME DRUG ADON: CPT

## 2022-06-29 PROCEDURE — 70450 CT HEAD/BRAIN W/O DYE: CPT

## 2022-06-29 PROCEDURE — 77030020268 HC MISC GENERAL SUPPLY

## 2022-06-29 PROCEDURE — 74011250636 HC RX REV CODE- 250/636: Performed by: NURSE PRACTITIONER

## 2022-06-29 PROCEDURE — 74011000250 HC RX REV CODE- 250: Performed by: RADIOLOGY

## 2022-06-29 PROCEDURE — 77030026438 HC STYL ET INTUB CARD -A: Performed by: NURSE ANESTHETIST, CERTIFIED REGISTERED

## 2022-06-29 PROCEDURE — 36224 PLACE CATH CAROTD ART: CPT

## 2022-06-29 PROCEDURE — 93971 EXTREMITY STUDY: CPT

## 2022-06-29 PROCEDURE — B31D1ZZ FLUOROSCOPY OF RIGHT VERTEBRAL ARTERY USING LOW OSMOLAR CONTRAST: ICD-10-PCS | Performed by: RADIOLOGY

## 2022-06-29 PROCEDURE — C1887 CATHETER, GUIDING: HCPCS

## 2022-06-29 PROCEDURE — 2709999900 HC NON-CHARGEABLE SUPPLY

## 2022-06-29 PROCEDURE — 77030020269 HC MISC IMPL

## 2022-06-29 PROCEDURE — 65610000006 HC RM INTENSIVE CARE

## 2022-06-29 PROCEDURE — C1894 INTRO/SHEATH, NON-LASER: HCPCS

## 2022-06-29 PROCEDURE — 77030029997 HC DEV COM RDL R BND TELE -B

## 2022-06-29 PROCEDURE — 77030012468 HC VLV BLEEDBK CNTRL ABBT -B

## 2022-06-29 PROCEDURE — 77030013797 HC KT TRNSDUC PRSSR EDWD -A

## 2022-06-29 PROCEDURE — C1769 GUIDE WIRE: HCPCS

## 2022-06-29 PROCEDURE — 74011000250 HC RX REV CODE- 250: Performed by: NURSE PRACTITIONER

## 2022-06-29 PROCEDURE — 77030003394 HC NDL ART COOK -A

## 2022-06-29 PROCEDURE — 96366 THER/PROPH/DIAG IV INF ADDON: CPT

## 2022-06-29 PROCEDURE — 81025 URINE PREGNANCY TEST: CPT

## 2022-06-29 PROCEDURE — 74011250636 HC RX REV CODE- 250/636: Performed by: ANESTHESIOLOGY

## 2022-06-29 PROCEDURE — 77030005402 HC CATH RAD ART LN KT TELE -B

## 2022-06-29 PROCEDURE — 74011000250 HC RX REV CODE- 250

## 2022-06-29 PROCEDURE — 76060000038 HC ANESTHESIA 3.5 TO 4 HR

## 2022-06-29 PROCEDURE — 77030008684 HC TU ET CUF COVD -B: Performed by: NURSE ANESTHETIST, CERTIFIED REGISTERED

## 2022-06-29 PROCEDURE — 85347 COAGULATION TIME ACTIVATED: CPT

## 2022-06-29 PROCEDURE — 74011000250 HC RX REV CODE- 250: Performed by: NURSE ANESTHETIST, CERTIFIED REGISTERED

## 2022-06-29 PROCEDURE — 74011250636 HC RX REV CODE- 250/636: Performed by: RADIOLOGY

## 2022-06-29 PROCEDURE — 74011000250 HC RX REV CODE- 250: Performed by: ANESTHESIOLOGY

## 2022-06-29 PROCEDURE — 96375 TX/PRO/DX INJ NEW DRUG ADDON: CPT

## 2022-06-29 PROCEDURE — 74011250636 HC RX REV CODE- 250/636: Performed by: NURSE ANESTHETIST, CERTIFIED REGISTERED

## 2022-06-29 PROCEDURE — 74011250637 HC RX REV CODE- 250/637: Performed by: RADIOLOGY

## 2022-06-29 PROCEDURE — 96365 THER/PROPH/DIAG IV INF INIT: CPT

## 2022-06-29 PROCEDURE — APPNB45 APP NON BILLABLE 31-45 MINUTES: Performed by: NURSE PRACTITIONER

## 2022-06-29 PROCEDURE — 74011250637 HC RX REV CODE- 250/637: Performed by: NURSE PRACTITIONER

## 2022-06-29 PROCEDURE — 74011000636 HC RX REV CODE- 636: Performed by: RADIOLOGY

## 2022-06-29 PROCEDURE — 03VG3HZ RESTRICTION OF INTRACRANIAL ARTERY WITH INTRALUMINAL DEVICE, FLOW DIVERTER, PERCUTANEOUS APPROACH: ICD-10-PCS | Performed by: RADIOLOGY

## 2022-06-29 PROCEDURE — 77030038563 HC TU ART PRESSR ICUM -Z

## 2022-06-29 RX ORDER — ONDANSETRON 2 MG/ML
4 INJECTION INTRAMUSCULAR; INTRAVENOUS
Status: DISCONTINUED | OUTPATIENT
Start: 2022-06-29 | End: 2022-06-30 | Stop reason: HOSPADM

## 2022-06-29 RX ORDER — PHENYLEPHRINE HCL IN 0.9% NACL 0.4MG/10ML
SYRINGE (ML) INTRAVENOUS AS NEEDED
Status: DISCONTINUED | OUTPATIENT
Start: 2022-06-29 | End: 2022-06-29 | Stop reason: HOSPADM

## 2022-06-29 RX ORDER — BUTALBITAL, ACETAMINOPHEN AND CAFFEINE 50; 325; 40 MG/1; MG/1; MG/1
1 TABLET ORAL
Status: DISCONTINUED | OUTPATIENT
Start: 2022-06-29 | End: 2022-06-30 | Stop reason: HOSPADM

## 2022-06-29 RX ORDER — FENTANYL CITRATE 50 UG/ML
INJECTION, SOLUTION INTRAMUSCULAR; INTRAVENOUS
Status: COMPLETED
Start: 2022-06-29 | End: 2022-06-29

## 2022-06-29 RX ORDER — METOCLOPRAMIDE HYDROCHLORIDE 5 MG/ML
5 INJECTION INTRAMUSCULAR; INTRAVENOUS
Status: DISCONTINUED | OUTPATIENT
Start: 2022-06-29 | End: 2022-06-29

## 2022-06-29 RX ORDER — SODIUM CHLORIDE 9 MG/ML
INJECTION, SOLUTION INTRAVENOUS
Status: DISCONTINUED | OUTPATIENT
Start: 2022-06-29 | End: 2022-06-29 | Stop reason: HOSPADM

## 2022-06-29 RX ORDER — ROCURONIUM BROMIDE 10 MG/ML
INJECTION, SOLUTION INTRAVENOUS AS NEEDED
Status: DISCONTINUED | OUTPATIENT
Start: 2022-06-29 | End: 2022-06-29 | Stop reason: HOSPADM

## 2022-06-29 RX ORDER — ACETAMINOPHEN 325 MG/1
650 TABLET ORAL
Status: DISCONTINUED | OUTPATIENT
Start: 2022-06-29 | End: 2022-06-30 | Stop reason: HOSPADM

## 2022-06-29 RX ORDER — SODIUM CHLORIDE 0.9 % (FLUSH) 0.9 %
5-40 SYRINGE (ML) INJECTION EVERY 8 HOURS
Status: DISCONTINUED | OUTPATIENT
Start: 2022-06-29 | End: 2022-06-30 | Stop reason: HOSPADM

## 2022-06-29 RX ORDER — DEXAMETHASONE SODIUM PHOSPHATE 4 MG/ML
4 INJECTION, SOLUTION INTRA-ARTICULAR; INTRALESIONAL; INTRAMUSCULAR; INTRAVENOUS; SOFT TISSUE ONCE
Status: COMPLETED | OUTPATIENT
Start: 2022-06-29 | End: 2022-06-29

## 2022-06-29 RX ORDER — SODIUM CHLORIDE, SODIUM LACTATE, POTASSIUM CHLORIDE, CALCIUM CHLORIDE 600; 310; 30; 20 MG/100ML; MG/100ML; MG/100ML; MG/100ML
50 INJECTION, SOLUTION INTRAVENOUS CONTINUOUS
Status: DISCONTINUED | OUTPATIENT
Start: 2022-06-29 | End: 2022-06-30

## 2022-06-29 RX ORDER — LIDOCAINE 40 MG/G
CREAM TOPICAL
Status: DISCONTINUED | OUTPATIENT
Start: 2022-06-29 | End: 2022-06-30 | Stop reason: HOSPADM

## 2022-06-29 RX ORDER — HEPARIN SODIUM 1000 [USP'U]/ML
1000-10000 INJECTION, SOLUTION INTRAVENOUS; SUBCUTANEOUS
Status: DISCONTINUED | OUTPATIENT
Start: 2022-06-29 | End: 2022-06-30 | Stop reason: HOSPADM

## 2022-06-29 RX ORDER — FENTANYL CITRATE 50 UG/ML
INJECTION, SOLUTION INTRAMUSCULAR; INTRAVENOUS AS NEEDED
Status: DISCONTINUED | OUTPATIENT
Start: 2022-06-29 | End: 2022-06-29 | Stop reason: HOSPADM

## 2022-06-29 RX ORDER — PROTAMINE SULFATE 10 MG/ML
INJECTION, SOLUTION INTRAVENOUS AS NEEDED
Status: DISCONTINUED | OUTPATIENT
Start: 2022-06-29 | End: 2022-06-29 | Stop reason: HOSPADM

## 2022-06-29 RX ORDER — BACITRACIN 500 UNIT/G
1 PACKET (EA) TOPICAL
Status: COMPLETED | OUTPATIENT
Start: 2022-06-29 | End: 2022-06-29

## 2022-06-29 RX ORDER — ONDANSETRON 2 MG/ML
4 INJECTION INTRAMUSCULAR; INTRAVENOUS
Status: DISCONTINUED | OUTPATIENT
Start: 2022-06-29 | End: 2022-06-29 | Stop reason: SDUPTHER

## 2022-06-29 RX ORDER — DEXAMETHASONE SODIUM PHOSPHATE 4 MG/ML
2 INJECTION, SOLUTION INTRA-ARTICULAR; INTRALESIONAL; INTRAMUSCULAR; INTRAVENOUS; SOFT TISSUE ONCE
Status: COMPLETED | OUTPATIENT
Start: 2022-06-30 | End: 2022-06-29

## 2022-06-29 RX ORDER — VERAPAMIL HYDROCHLORIDE 2.5 MG/ML
2.5-1 INJECTION, SOLUTION INTRAVENOUS
Status: DISCONTINUED | OUTPATIENT
Start: 2022-06-29 | End: 2022-06-30 | Stop reason: HOSPADM

## 2022-06-29 RX ORDER — SODIUM CHLORIDE 0.9 % (FLUSH) 0.9 %
5-40 SYRINGE (ML) INJECTION AS NEEDED
Status: DISCONTINUED | OUTPATIENT
Start: 2022-06-29 | End: 2022-06-30 | Stop reason: HOSPADM

## 2022-06-29 RX ORDER — FENTANYL CITRATE 50 UG/ML
INJECTION, SOLUTION INTRAMUSCULAR; INTRAVENOUS
Status: DISPENSED
Start: 2022-06-29 | End: 2022-06-30

## 2022-06-29 RX ORDER — DEXAMETHASONE SODIUM PHOSPHATE 4 MG/ML
INJECTION, SOLUTION INTRA-ARTICULAR; INTRALESIONAL; INTRAMUSCULAR; INTRAVENOUS; SOFT TISSUE AS NEEDED
Status: DISCONTINUED | OUTPATIENT
Start: 2022-06-29 | End: 2022-06-29 | Stop reason: HOSPADM

## 2022-06-29 RX ORDER — GLYCOPYRROLATE 0.2 MG/ML
INJECTION INTRAMUSCULAR; INTRAVENOUS AS NEEDED
Status: DISCONTINUED | OUTPATIENT
Start: 2022-06-29 | End: 2022-06-29 | Stop reason: HOSPADM

## 2022-06-29 RX ORDER — HYDRALAZINE HYDROCHLORIDE 20 MG/ML
10 INJECTION INTRAMUSCULAR; INTRAVENOUS
Status: DISCONTINUED | OUTPATIENT
Start: 2022-06-29 | End: 2022-06-30 | Stop reason: HOSPADM

## 2022-06-29 RX ORDER — NEOSTIGMINE METHYLSULFATE 1 MG/ML
INJECTION, SOLUTION INTRAVENOUS AS NEEDED
Status: DISCONTINUED | OUTPATIENT
Start: 2022-06-29 | End: 2022-06-29 | Stop reason: HOSPADM

## 2022-06-29 RX ORDER — LABETALOL HCL 20 MG/4 ML
10 SYRINGE (ML) INTRAVENOUS
Status: DISCONTINUED | OUTPATIENT
Start: 2022-06-29 | End: 2022-06-30 | Stop reason: HOSPADM

## 2022-06-29 RX ORDER — BACITRACIN 500 UNIT/G
PACKET (EA) TOPICAL
Status: COMPLETED
Start: 2022-06-29 | End: 2022-06-29

## 2022-06-29 RX ORDER — LIDOCAINE HYDROCHLORIDE 20 MG/ML
20 INJECTION, SOLUTION INFILTRATION; PERINEURAL
Status: COMPLETED | OUTPATIENT
Start: 2022-06-29 | End: 2022-06-29

## 2022-06-29 RX ORDER — CLOPIDOGREL BISULFATE 75 MG/1
75 TABLET ORAL DAILY
Status: DISCONTINUED | OUTPATIENT
Start: 2022-06-30 | End: 2022-06-30 | Stop reason: HOSPADM

## 2022-06-29 RX ORDER — ONDANSETRON 2 MG/ML
INJECTION INTRAMUSCULAR; INTRAVENOUS AS NEEDED
Status: DISCONTINUED | OUTPATIENT
Start: 2022-06-29 | End: 2022-06-29 | Stop reason: HOSPADM

## 2022-06-29 RX ORDER — PROPOFOL 10 MG/ML
INJECTION, EMULSION INTRAVENOUS AS NEEDED
Status: DISCONTINUED | OUTPATIENT
Start: 2022-06-29 | End: 2022-06-29 | Stop reason: HOSPADM

## 2022-06-29 RX ORDER — HEPARIN SODIUM 1000 [USP'U]/ML
INJECTION, SOLUTION INTRAVENOUS; SUBCUTANEOUS AS NEEDED
Status: DISCONTINUED | OUTPATIENT
Start: 2022-06-29 | End: 2022-06-29 | Stop reason: HOSPADM

## 2022-06-29 RX ORDER — LIDOCAINE HYDROCHLORIDE 20 MG/ML
INJECTION, SOLUTION EPIDURAL; INFILTRATION; INTRACAUDAL; PERINEURAL AS NEEDED
Status: DISCONTINUED | OUTPATIENT
Start: 2022-06-29 | End: 2022-06-29 | Stop reason: HOSPADM

## 2022-06-29 RX ORDER — METOCLOPRAMIDE HYDROCHLORIDE 5 MG/ML
10 INJECTION INTRAMUSCULAR; INTRAVENOUS
Status: DISCONTINUED | OUTPATIENT
Start: 2022-06-29 | End: 2022-06-30 | Stop reason: HOSPADM

## 2022-06-29 RX ORDER — HEPARIN SODIUM 1000 [USP'U]/ML
10000 INJECTION, SOLUTION INTRAVENOUS; SUBCUTANEOUS
Status: DISCONTINUED | OUTPATIENT
Start: 2022-06-29 | End: 2022-06-29 | Stop reason: HOSPADM

## 2022-06-29 RX ORDER — PROTAMINE SULFATE 10 MG/ML
50 INJECTION, SOLUTION INTRAVENOUS
Status: DISCONTINUED | OUTPATIENT
Start: 2022-06-29 | End: 2022-06-29 | Stop reason: HOSPADM

## 2022-06-29 RX ADMIN — VERAPAMIL HYDROCHLORIDE 2.5 MG: 2.5 INJECTION, SOLUTION INTRAVENOUS at 09:32

## 2022-06-29 RX ADMIN — Medication 4000 UNITS: at 09:05

## 2022-06-29 RX ADMIN — BUTALBITAL, ACETAMINOPHEN, AND CAFFEINE 1 TABLET: 50; 325; 40 TABLET ORAL at 19:31

## 2022-06-29 RX ADMIN — IOPAMIDOL 71 ML: 612 INJECTION, SOLUTION INTRAVENOUS at 11:43

## 2022-06-29 RX ADMIN — NEOSTIGMINE METHYLSULFATE 3 MG: 1 INJECTION, SOLUTION INTRAVENOUS at 11:39

## 2022-06-29 RX ADMIN — PROPOFOL 50 MG: 10 INJECTION, EMULSION INTRAVENOUS at 11:18

## 2022-06-29 RX ADMIN — FENTANYL CITRATE 50 MCG: 50 INJECTION, SOLUTION INTRAMUSCULAR; INTRAVENOUS at 12:30

## 2022-06-29 RX ADMIN — PROPOFOL 50 MG: 10 INJECTION, EMULSION INTRAVENOUS at 10:50

## 2022-06-29 RX ADMIN — LIDOCAINE HYDROCHLORIDE 60 MG: 20 INJECTION, SOLUTION EPIDURAL; INFILTRATION; INTRACAUDAL; PERINEURAL at 08:45

## 2022-06-29 RX ADMIN — Medication 40 MCG: at 11:31

## 2022-06-29 RX ADMIN — SODIUM CHLORIDE, POTASSIUM CHLORIDE, SODIUM LACTATE AND CALCIUM CHLORIDE 50 ML/HR: 600; 310; 30; 20 INJECTION, SOLUTION INTRAVENOUS at 18:00

## 2022-06-29 RX ADMIN — Medication 4000 UNITS: at 09:00

## 2022-06-29 RX ADMIN — LIDOCAINE HYDROCHLORIDE 2 MG: 20 INJECTION, SOLUTION INFILTRATION; PERINEURAL at 09:27

## 2022-06-29 RX ADMIN — PROPOFOL 50 MG: 10 INJECTION, EMULSION INTRAVENOUS at 11:25

## 2022-06-29 RX ADMIN — HYDRALAZINE HYDROCHLORIDE 20 MG: 20 INJECTION INTRAMUSCULAR; INTRAVENOUS at 13:38

## 2022-06-29 RX ADMIN — ROCURONIUM BROMIDE 10 MG: 10 INJECTION INTRAVENOUS at 11:04

## 2022-06-29 RX ADMIN — ACETAMINOPHEN 650 MG: 325 TABLET ORAL at 18:15

## 2022-06-29 RX ADMIN — SODIUM CHLORIDE, PRESERVATIVE FREE 10 ML: 5 INJECTION INTRAVENOUS at 18:00

## 2022-06-29 RX ADMIN — DEXAMETHASONE SODIUM PHOSPHATE 4 MG: 4 INJECTION, SOLUTION INTRAMUSCULAR; INTRAVENOUS at 08:45

## 2022-06-29 RX ADMIN — NITROGLYCERIN 200 MCG: 10 INJECTION INTRAVENOUS at 09:33

## 2022-06-29 RX ADMIN — HEPARIN SODIUM 2000 UNITS: 1000 INJECTION, SOLUTION INTRAVENOUS; SUBCUTANEOUS at 09:45

## 2022-06-29 RX ADMIN — PROTAMINE SULFATE 20 MG: 10 INJECTION, SOLUTION INTRAVENOUS at 11:36

## 2022-06-29 RX ADMIN — PROPOFOL 100 MG: 10 INJECTION, EMULSION INTRAVENOUS at 10:13

## 2022-06-29 RX ADMIN — NICARDIPINE HYDROCHLORIDE 15 MG/HR: 25 INJECTION, SOLUTION INTRAVENOUS at 13:10

## 2022-06-29 RX ADMIN — DEXAMETHASONE SODIUM PHOSPHATE 2 MG: 4 INJECTION, SOLUTION INTRAMUSCULAR; INTRAVENOUS at 23:28

## 2022-06-29 RX ADMIN — HEPARIN SODIUM 2000 UNITS: 1000 INJECTION INTRAVENOUS; SUBCUTANEOUS at 09:33

## 2022-06-29 RX ADMIN — BUTALBITAL, ACETAMINOPHEN, AND CAFFEINE 1 TABLET: 50; 325; 40 TABLET ORAL at 23:26

## 2022-06-29 RX ADMIN — SODIUM CHLORIDE: 900 INJECTION, SOLUTION INTRAVENOUS at 08:38

## 2022-06-29 RX ADMIN — ONDANSETRON HYDROCHLORIDE 4 MG: 2 SOLUTION INTRAMUSCULAR; INTRAVENOUS at 14:57

## 2022-06-29 RX ADMIN — Medication 1 PACKET: at 22:13

## 2022-06-29 RX ADMIN — ROCURONIUM BROMIDE 30 MG: 10 INJECTION INTRAVENOUS at 10:11

## 2022-06-29 RX ADMIN — Medication 4000 UNITS: at 09:06

## 2022-06-29 RX ADMIN — Medication 4000 UNITS: at 09:01

## 2022-06-29 RX ADMIN — GLYCOPYRROLATE 0.4 MG: 0.2 INJECTION INTRAMUSCULAR; INTRAVENOUS at 11:39

## 2022-06-29 RX ADMIN — FENTANYL CITRATE 50 MCG: 50 INJECTION, SOLUTION INTRAMUSCULAR; INTRAVENOUS at 10:13

## 2022-06-29 RX ADMIN — PROPOFOL 50 MG: 10 INJECTION, EMULSION INTRAVENOUS at 10:30

## 2022-06-29 RX ADMIN — FENTANYL CITRATE 25 MCG: 50 INJECTION, SOLUTION INTRAMUSCULAR; INTRAVENOUS at 11:25

## 2022-06-29 RX ADMIN — PROPOFOL 180 MG: 10 INJECTION, EMULSION INTRAVENOUS at 08:45

## 2022-06-29 RX ADMIN — ROCURONIUM BROMIDE 50 MG: 10 INJECTION INTRAVENOUS at 08:45

## 2022-06-29 RX ADMIN — NITROGLYCERIN 1 INCH: 20 OINTMENT TOPICAL at 08:20

## 2022-06-29 RX ADMIN — LIDOCAINE 4%: 4 CREAM TOPICAL at 08:20

## 2022-06-29 RX ADMIN — METOCLOPRAMIDE HYDROCHLORIDE 10 MG: 5 INJECTION INTRAMUSCULAR; INTRAVENOUS at 21:31

## 2022-06-29 RX ADMIN — METOCLOPRAMIDE 5 MG: 5 INJECTION, SOLUTION INTRAMUSCULAR; INTRAVENOUS at 15:40

## 2022-06-29 RX ADMIN — FENTANYL CITRATE 100 MCG: 50 INJECTION, SOLUTION INTRAMUSCULAR; INTRAVENOUS at 08:45

## 2022-06-29 RX ADMIN — Medication 4000 UNITS: at 09:03

## 2022-06-29 RX ADMIN — ONDANSETRON HYDROCHLORIDE 4 MG: 2 INJECTION, SOLUTION INTRAMUSCULAR; INTRAVENOUS at 11:38

## 2022-06-29 RX ADMIN — DEXAMETHASONE SODIUM PHOSPHATE 4 MG: 4 INJECTION, SOLUTION INTRAMUSCULAR; INTRAVENOUS at 18:15

## 2022-06-29 RX ADMIN — Medication 4000 UNITS: at 09:04

## 2022-06-29 RX ADMIN — Medication 80 MCG: at 09:33

## 2022-06-29 RX ADMIN — FENTANYL CITRATE 25 MCG: 50 INJECTION, SOLUTION INTRAMUSCULAR; INTRAVENOUS at 10:30

## 2022-06-29 NOTE — PROGRESS NOTES
Neurocritical Care Brief Progress Note:    71-year-old female s/p pipeline embolization of prenidal aneurysms by Dr. Elvia Wilson    Physical Exam:  Gen: NAD, calm, cooperative  Neuro: A&Ox4. Follows commands. Speech clear. Affect normal. PERRL, 3 mm bilaterally. Blinks to threat. No disconjugate gaze present. EOMI. Face symmetric. Palate symmetric. Tongue midline. Valentine spontaneously. Strength 5/5 in UE and LE BL. Negative drift. Bulk and tone normal. No involuntary movements. Gait deferred. Skin: Warm, dry, color appropriate for ethnicity. Right wrist arteriotomy site with no hematoma, no active bleeding, no bruising, TR band off    NIHSS:      1a-LOC:0    1b-Month/Age:0    1c-Open/Close Hand:0    2-Best Gaze:0    3-Visual Fields:0    4-Facial Palsy:0    5a-Left Arm:0    5b-Right Arm:0    6a-Left Le    6b-Right Le    7-Limb Ataxia:0    8-Sensory:0    9-Best Language:0    10-Dysarthria:0    11-Extinction/Inattention:0  TOTAL SCORE:0    PLAN:   - VS and Neuro/Vascular checks post NIS protocol  -SBP goal  Cardene/hydralazine/labetalol as needed  -CTH post procedure negative for hemorrhage  -Continue Zofran 4 mg q 6 hours prn nausea/vomiting  -Add Reglan 5 mg q 6 hours prn if Zofran not working    Addendum: 16: 20. Nausea subsided with Reglan. Left pinky and ring finger and ulnar side of arm with decreased sensation. Will move BP cuff to other arm to right arm, possibly from laying left arm on pillow as well. Neuro intact otherwise. Okay to loosen BP goals to SBP . Start IVF LR 50 ml/hr    Addendum: 17:28. HA now 6/10. Give Decadron 4 mg IV now and 2 mg IV at MN tonight. Give po Tylenol 650 mg po q 6 hours as needed. Venous Duplex left upper extremity. Noted swelling in left hand and wrist. Still with decreased sensation in hand and ulnar side of forearm. Addendum: 18:15 Venous duplex of left upper extremity negative for DVT.      Tanika Rocha, NP  Neurocritical Care Nurse [facility]  [phone]

## 2022-06-29 NOTE — INTERVAL H&P NOTE
Update History & Physical    The Patient's History and Physical of 6/9/22, 1400,  H & P was reviewed with the patient and I examined the patient. There was no plan change. The surgical site was confirmed by the patient and me. Plan:  The risk, benefits, expected outcome, and alternative to the recommended procedure have been discussed with the patient. Patient understands and wants to proceed with the procedure. Patient NPO after MN and patient took her Plavix this am.     GENERAL: alert, cooperative, no distress, appears stated age  EYE: negative  NECK: no carotid bruit  LUNG: clear to auscultation bilaterally  HEART: regular rate and rhythm, S1, S2 normal, no murmur, click, rub or gallop  EXTREMITIES:  extremities normal, atraumatic, no cyanosis or edema. Positive Ata's test right wrist.   Neurologic Exam:  Mental Status:  Alert and oriented x 4. Fully attentive. Appropriate affect, mood and behavior. Language:    Normal fluency, repetition, comprehension and naming. Cranial Nerves:   Pupils equal, round and reactive to light. Visual fields normal in all quadrants in both eyes. Extraocular movements intact w/o nystagmus      Facial sensation intact to LT and symmetric, no extinction. Full facial strength, no asymmetry. Hearing intact bilaterally. No dysarthria. Tongue protrudes to midline, palate elevates symmetrically. Shoulder shrug 5/5 bilaterally. Motor:    Bulk and tone normal.      5/5 strength in all extremities proximally and distally. No pronator drift. No involuntary movements. Sensation:    Sensation intact throughout to light touch, no extinction. Coordination & Gait: No ataxia with finger to nose and heel to shin testing. Gait deferred.     Electronically signed by Daisy Vilchis NP on 6/29/2022 at 8:04 AM

## 2022-06-29 NOTE — PROGRESS NOTES
Pt arrives ambulatory to angio department accompanied by mother and grandmother for angiogram with pipeline procedure. All assessments completed and consent was reviewed. Education given was regarding procedure, general anesthesia , post-procedure care and  management/follow-up. Opportunity for questions was provided and all questions and concerns were addressed. 0844 Pt positioned supine on procedure table, intubated, miguel, art line for general anesthesia. ANGELICA Larson/Dr Cam Falk to manage airway, vital signs, medications.

## 2022-06-29 NOTE — PROGRESS NOTES
1415 TRANSFER - IN REPORT:    Verbal report received from BRITTNI Bryan (name) on Elenita Walsh  being received from Angio (unit) for routine progression of care      Report consisted of patients Situation, Background, Assessment and   Recommendations(SBAR). Information from the following report(s) SBAR, Kardex, Procedure Summary, Intake/Output, MAR, Recent Results, Cardiac Rhythm NSR/bigeminy, Alarm Parameters  and Dual Neuro Assessment was reviewed with the receiving nurse. Opportunity for questions and clarification was provided. Assessment completed upon patients arrival to unit and care assumed. 1430 TR band removed, mild bruising around puncture site. Neurologically intact     1500 Pt complaining of nausea, vomited x2. Zofran given. BP as low at 83/33 on 15 mg/hr of cardene. Cardene weaned off. BP 95/56 off of cardene. Callum Fat, NP at bedside. 1610 Pt complaining of decreased sensation/tightness on L hand, pinky side. Otherwise, neurologically intact. Lawilsa Marquis, NP notified. IV intact, no signs of infiltration, IV gets blood return.

## 2022-06-29 NOTE — ROUTINE PROCESS
TRANSFER - OUT REPORT:    Verbal report given to [facility] RN(name) on Barb Vargas  being transferred to ICU Rm 11(unit) for routine post - op       Report consisted of patients Situation, Background, Assessment and   Recommendations(SBAR). Information from the following report(s) SBAR, Kardex, Procedure Summary, MAR and Recent Results was reviewed with the receiving nurse. Lines:   Peripheral IV 06/29/22 Left;Posterior Hand (Active)       Arterial Line 06/29/22 Left Radial artery (Active)        Opportunity for questions and clarification was provided.       Patient transported with:   Monitor  Registered Nurse

## 2022-06-29 NOTE — ANESTHESIA PROCEDURE NOTES
Arterial Line Placement    Start time: 6/29/2022 8:51 AM  End time: 6/29/2022 8:55 AM  Performed by: Anurag Watkins MD  Authorized by: Anurag Watkins MD     Pre-Procedure  Indications:  Arterial pressure monitoring  Preanesthetic Checklist: patient identified, risks and benefits discussed, anesthesia consent, site marked, patient being monitored, timeout performed and patient being monitored    Timeout Time: 08:51 EDT        Procedure:   Prep:  Chlorhexidine  Seldinger Technique?: Yes    Orientation:  Left  Location:  Radial artery  Catheter size:  20 G  Number of attempts:  1  Cont Cardiac Output Sensor: No      Assessment:   Post-procedure:  Line secured and sterile dressing applied  Patient Tolerance:  Patient tolerated the procedure well with no immediate complications

## 2022-06-29 NOTE — ANESTHESIA PREPROCEDURE EVALUATION
Anesthetic History   No history of anesthetic complications            Review of Systems / Medical History  Patient summary reviewed, nursing notes reviewed and pertinent labs reviewed    Pulmonary  Within defined limits                 Neuro/Psych             Comments: Cerebellar AVM with cerebellar hemorrhage     Cerebral edema with brain compression on hypertonic saline Cardiovascular  Within defined limits                Exercise tolerance: >4 METS     GI/Hepatic/Renal     GERD           Endo/Other        Arthritis     Other Findings   Comments: Current workup for nic danlos due to hypermobile joints and increase skin elasticity            Physical Exam    Airway  Mallampati: I  TM Distance: > 6 cm  Neck ROM: normal range of motion   Mouth opening: Normal     Cardiovascular  Regular rate and rhythm,  S1 and S2 normal,  no murmur, click, rub, or gallop             Dental  No notable dental hx       Pulmonary  Breath sounds clear to auscultation               Abdominal  GI exam deferred       Other Findings            Anesthetic Plan    ASA: 3  Anesthesia type: general    Monitoring Plan: Arterial line      Induction: Intravenous  Anesthetic plan and risks discussed with: Patient

## 2022-06-29 NOTE — ANESTHESIA POSTPROCEDURE EVALUATION
Post-Anesthesia Evaluation and Assessment    Patient: Armando Ferrer MRN: [id number]  SSN: [id number]    YOB: 1985  Age: 40 y.o. Sex: female      I have evaluated the patient and they are stable and ready for discharge from the PACU. Cardiovascular Function/Vital Signs  Visit Vitals  /60   Pulse 72   Temp 36.9 °C (98.5 °F)   Resp 12   SpO2 99%       Patient is status post * No anesthesia type entered * anesthesia for * No procedures listed *. Nausea/Vomiting: None    Postoperative hydration reviewed and adequate. Pain:      Managed    Neurological Status:   Neuro  Neurologic State: Alert (06/29/22 1215)  Orientation Level: Oriented X4 (06/29/22 1215)  Cognition: Follows commands (06/29/22 1215)  Speech: Clear (06/29/22 1215)  Assessment L Pupil: Brisk;Round (06/29/22 1215)  Size L Pupil (mm): 3 (06/29/22 1215)  Assessment R Pupil: Brisk;Round (06/29/22 1215)  Size R Pupil (mm): 3 (06/29/22 1215)  LUE Motor Response: Purposeful (06/29/22 1215)  LLE Motor Response: Purposeful (06/29/22 1215)  RUE Motor Response: Purposeful (06/29/22 1215)  RLE Motor Response: Purposeful (06/29/22 1215)   At baseline    Mental Status, Level of Consciousness: Alert and  oriented to person, place, and time    Pulmonary Status:   O2 Device: Nasal cannula (06/29/22 1234)   Adequate oxygenation and airway patent    Complications related to anesthesia: None    Post-anesthesia assessment completed. No concerns    Signed By: Willis Serrano MD     June 29, 2022              Post-Anesthesia Evaluation and Assessment    Patient: Armando Ferrer MRN: [id number]  SSN: [id number]    YOB: 1985  Age: 40 y.o. Sex: female      I have evaluated the patient and they are stable and ready for discharge from the PACU.      Cardiovascular Function/Vital Signs  Visit Vitals  /60   Pulse 72   Temp 36.9 °C (98.5 °F)   Resp 12   SpO2 99%       Patient is status post * No anesthesia type entered * anesthesia for * No procedures listed *. Nausea/Vomiting: None    Postoperative hydration reviewed and adequate. Pain:      Managed    Neurological Status:   Neuro  Neurologic State: Alert (06/29/22 1215)  Orientation Level: Oriented X4 (06/29/22 1215)  Cognition: Follows commands (06/29/22 1215)  Speech: Clear (06/29/22 1215)  Assessment L Pupil: Brisk;Round (06/29/22 1215)  Size L Pupil (mm): 3 (06/29/22 1215)  Assessment R Pupil: Brisk;Round (06/29/22 1215)  Size R Pupil (mm): 3 (06/29/22 1215)  LUE Motor Response: Purposeful (06/29/22 1215)  LLE Motor Response: Purposeful (06/29/22 1215)  RUE Motor Response: Purposeful (06/29/22 1215)  RLE Motor Response: Purposeful (06/29/22 1215)   At baseline    Mental Status, Level of Consciousness: Alert and  oriented to person, place, and time    Pulmonary Status:   O2 Device: Nasal cannula (06/29/22 1234)   Adequate oxygenation and airway patent    Complications related to anesthesia: None    Post-anesthesia assessment completed. No concerns    Signed By: Kiara Almanzar MD     June 29, 2022              * No procedures listed *.    general, MAC    <BSHSIANPOST>    INITIAL Post-op Vital signs:   Vitals Value Taken Time   BP     Temp     Pulse 87 06/29/22 1250   Resp 13 06/29/22 1250   SpO2 99 % 06/29/22 1250   Vitals shown include unvalidated device data.

## 2022-06-30 VITALS
WEIGHT: 104.94 LBS | OXYGEN SATURATION: 96 % | DIASTOLIC BLOOD PRESSURE: 57 MMHG | RESPIRATION RATE: 21 BRPM | TEMPERATURE: 98 F | HEART RATE: 79 BPM | SYSTOLIC BLOOD PRESSURE: 98 MMHG | BODY MASS INDEX: 20.49 KG/M2

## 2022-06-30 PROBLEM — Z87.59 HISTORY OF POSTPARTUM DEPRESSION: Status: RESOLVED | Noted: 2017-10-07 | Resolved: 2022-06-30

## 2022-06-30 PROBLEM — N80.9 ENDOMETRIOSIS: Status: ACTIVE | Noted: 2019-12-16

## 2022-06-30 PROBLEM — Z86.59 HISTORY OF POSTPARTUM DEPRESSION: Status: RESOLVED | Noted: 2017-10-07 | Resolved: 2022-06-30

## 2022-06-30 PROBLEM — N93.8 DUB (DYSFUNCTIONAL UTERINE BLEEDING): Status: RESOLVED | Noted: 2018-10-24 | Resolved: 2022-06-30

## 2022-06-30 PROBLEM — O42.90 ROM (RUPTURE OF MEMBRANES), PREMATURE: Status: RESOLVED | Noted: 2017-10-06 | Resolved: 2022-06-30

## 2022-06-30 PROBLEM — I61.4 CEREBELLAR HEMORRHAGE (HCC): Status: RESOLVED | Noted: 2022-03-18 | Resolved: 2022-06-30

## 2022-06-30 PROBLEM — N93.8 DYSFUNCTIONAL UTERINE BLEEDING: Status: ACTIVE | Noted: 2018-10-15

## 2022-06-30 PROBLEM — F41.8 MIXED ANXIETY AND DEPRESSIVE DISORDER: Status: ACTIVE | Noted: 2019-01-21

## 2022-06-30 PROBLEM — N92.6 IRREGULAR PERIODS: Status: ACTIVE | Noted: 2019-06-03

## 2022-06-30 PROBLEM — I61.4 CEREBELLAR HEMORRHAGE, ACUTE (HCC): Status: RESOLVED | Noted: 2022-03-01 | Resolved: 2022-06-30

## 2022-06-30 PROBLEM — F41.9 ANXIETY: Status: ACTIVE | Noted: 2018-11-14

## 2022-06-30 LAB
ANION GAP SERPL CALC-SCNC: 6 MMOL/L (ref 5–15)
BUN SERPL-MCNC: 8 MG/DL (ref 6–20)
BUN/CREAT SERPL: 10 (ref 12–20)
CALCIUM SERPL-MCNC: 8.8 MG/DL (ref 8.5–10.1)
CHLORIDE SERPL-SCNC: 112 MMOL/L (ref 97–108)
CO2 SERPL-SCNC: 22 MMOL/L (ref 21–32)
CREAT SERPL-MCNC: 0.81 MG/DL (ref 0.55–1.02)
ERYTHROCYTE [DISTWIDTH] IN BLOOD BY AUTOMATED COUNT: 11.9 % (ref 11.5–14.5)
GLUCOSE SERPL-MCNC: 135 MG/DL (ref 65–100)
HCT VFR BLD AUTO: 42.6 % (ref 35–47)
HGB BLD-MCNC: 14.2 G/DL (ref 11.5–16)
MCH RBC QN AUTO: 31.1 PG (ref 26–34)
MCHC RBC AUTO-ENTMCNC: 33.3 G/DL (ref 30–36.5)
MCV RBC AUTO: 93.2 FL (ref 80–99)
NRBC # BLD: 0 K/UL (ref 0–0.01)
NRBC BLD-RTO: 0 PER 100 WBC
P2Y12 PLT RESPONSE,PPPR: 107 PRU (ref 194–418)
PLATELET # BLD AUTO: 263 K/UL (ref 150–400)
PMV BLD AUTO: 10.7 FL (ref 8.9–12.9)
POTASSIUM SERPL-SCNC: 3.4 MMOL/L (ref 3.5–5.1)
RBC # BLD AUTO: 4.57 M/UL (ref 3.8–5.2)
SODIUM SERPL-SCNC: 140 MMOL/L (ref 136–145)
WBC # BLD AUTO: 11.2 K/UL (ref 3.6–11)

## 2022-06-30 PROCEDURE — 36415 COLL VENOUS BLD VENIPUNCTURE: CPT

## 2022-06-30 PROCEDURE — G0378 HOSPITAL OBSERVATION PER HR: HCPCS

## 2022-06-30 PROCEDURE — 74011250636 HC RX REV CODE- 250/636: Performed by: NURSE PRACTITIONER

## 2022-06-30 PROCEDURE — 99239 HOSP IP/OBS DSCHRG MGMT >30: CPT | Performed by: RADIOLOGY

## 2022-06-30 PROCEDURE — 74011000250 HC RX REV CODE- 250: Performed by: NURSE PRACTITIONER

## 2022-06-30 PROCEDURE — 85576 BLOOD PLATELET AGGREGATION: CPT

## 2022-06-30 PROCEDURE — 85027 COMPLETE CBC AUTOMATED: CPT

## 2022-06-30 PROCEDURE — 74011250637 HC RX REV CODE- 250/637: Performed by: NURSE PRACTITIONER

## 2022-06-30 PROCEDURE — APPNB45 APP NON BILLABLE 31-45 MINUTES: Performed by: NURSE PRACTITIONER

## 2022-06-30 PROCEDURE — 80048 BASIC METABOLIC PNL TOTAL CA: CPT

## 2022-06-30 PROCEDURE — 96376 TX/PRO/DX INJ SAME DRUG ADON: CPT

## 2022-06-30 RX ORDER — METHYLPREDNISOLONE 4 MG/1
TABLET ORAL
Qty: 1 DOSE PACK | Refills: 0 | Status: SHIPPED | OUTPATIENT
Start: 2022-06-30

## 2022-06-30 RX ORDER — POTASSIUM CHLORIDE 750 MG/1
20 TABLET, FILM COATED, EXTENDED RELEASE ORAL ONCE
Status: COMPLETED | OUTPATIENT
Start: 2022-06-30 | End: 2022-06-30

## 2022-06-30 RX ADMIN — ACETAMINOPHEN 650 MG: 325 TABLET ORAL at 09:25

## 2022-06-30 RX ADMIN — POTASSIUM CHLORIDE 20 MEQ: 750 TABLET, FILM COATED, EXTENDED RELEASE ORAL at 06:06

## 2022-06-30 RX ADMIN — SODIUM CHLORIDE, PRESERVATIVE FREE 10 ML: 5 INJECTION INTRAVENOUS at 03:11

## 2022-06-30 RX ADMIN — SODIUM CHLORIDE, PRESERVATIVE FREE 10 ML: 5 INJECTION INTRAVENOUS at 09:00

## 2022-06-30 RX ADMIN — ACETAMINOPHEN 650 MG: 325 TABLET ORAL at 03:11

## 2022-06-30 RX ADMIN — METOCLOPRAMIDE HYDROCHLORIDE 10 MG: 5 INJECTION INTRAMUSCULAR; INTRAVENOUS at 04:20

## 2022-06-30 RX ADMIN — CLOPIDOGREL BISULFATE 75 MG: 75 TABLET ORAL at 08:59

## 2022-06-30 NOTE — DISCHARGE INSTRUCTIONS
Patient Education        Learning About Brain Aneurysms  What is a brain aneurysm? A brain (cerebral) aneurysm is a bulging, weak area in the wall of an artery that supplies blood to the brain. Most brain aneurysms don't cause problems. Sometimes an aneurysm bursts, or ruptures. Blood may spill into the area between the brain and the skull (subarachnoid hemorrhage). This bleeding in the brain is also called a hemorrhagic stroke. The bleeding may lead to brain damage or even death. What causes it? Brain aneurysms seem to run in some families. They may also form because of hardening of the arteries (atherosclerosis) and aging. Some things can increase your risk for an aneurysm. They may also increase the risk that an aneurysm will rupture. These include:  Family history. You may be more likely to have a brain aneurysm if someone else in your family had one. Aneurysms may also be linked to certain inherited diseases. Previous aneurysm. If you had one brain aneurysm, you're more likely to have another. Being a woman. Women have a higher risk of brain aneurysms than men do. High blood pressure. High blood pressure damages artery walls and makes an aneurysm more likely to rupture. Smoking. Tobacco use damages arteries and increases the risk that an aneurysm will rupture. Drugs or heavy alcohol use. Drugs, such as cocaine, or drinking a lot of alcohol raises the risk of an aneurysm. What are the symptoms? Most brain aneurysms don't cause symptoms. But in some cases, an aneurysm may press on areas in the brain. This may cause symptoms such as headaches, vision problems, changes in speech, or neck pain. The symptoms depend on what areas of the brain are affected and how big the aneurysm is. If a brain aneurysm ruptures, symptoms often come on suddenly. They may include:  · A sudden, severe headache that is different from past headaches. · Neck pain. · Nausea and vomiting.   · Sensitivity to light.  · Fainting or loss of consciousness. · Seizures. How is it diagnosed? Brain aneurysms are sometimes found by chance during tests done for another condition. If your doctor thinks you have a brain aneurysm, you may have tests such as:  Computed tomography (CT) scan. A CT scan can help a doctor find bleeding in the brain. CT angiogram.  This is a CT scan in which dye (contrast material) is injected into the blood. It gives more detailed images of blood vessels than a standard CT scan. Magnetic resonance angiogram (MRA). An MRA uses a magnetic field and radio waves to provide pictures of blood vessels. A dye may be used to make blood vessels show up more clearly. Cerebral angiogram.   A doctor puts a thin tube (catheter) through a blood vessel in the groin or arm and moves it up into one of the brain arteries. A dye is injected to help problems show up in an X-ray. How is it treated? Procedures to treat aneurysms have risks, and treatment isn't always needed. To decide what's best, you and your doctor will think about things like the aneurysm's size and location, your age, and your medical history. · If the aneurysm has a low risk of rupture, you may have regular tests to check it. · If the aneurysm is large or causing symptoms or if you've had a ruptured aneurysm in the past, your doctor may recommend treatment. An aneurysm that has bled needs to be treated right away. Treatments include:  A catheter procedure. Tools such as tiny coils may be used to stop blood flow to the aneurysm. A surgery called clipping. Placing a clip around the base of the aneurysm stops blood flow. It's important to take steps to reduce your risk of rupture. This includes managing high blood pressure and not smoking. Where can you learn more? Go to http://www.gray.com/  Enter P104 in the search box to learn more about \"Learning About Brain Aneurysms. \"  Current as of: July 6, 2021               Content Version: 13.2  © 2006-2022 [employer]. Care instructions adapted under license by [employer] (which disclaims liability or warranty for this information). If you have questions about a medical condition or this instruction, always ask your healthcare professional. [address] any warranty or liability for your use of this information. Patient Education          Patient Education        Brain Aneurysm Repair: What to Expect at Home  Your Recovery     An aneurysm is a bulging, weak section of a blood vessel. Sometimes aneurysms put pressure on nerves. They can also bleed or break open (rupture). A procedure can repair an aneurysm in your brain. This can prevent strokes, bleeding, and brain damage. During the procedure, the doctor made a small cut (incision) in your groin or wrist. The doctor put a small tube (catheter) through the incision and used X-ray equipment to guide the catheter to the aneurysm in your brain. Then the doctor used a tool, such as a coil, to block the opening to the aneurysm. This prevents blood from entering the aneurysm. You may feel tired for a few days after the procedure. Rose Anger probably be able to return to work or your normal routine in 3 to 7 days. You may have some bruising around the incision, but you should not have much pain. If you do have pain, your doctor may recommend or prescribe pain medicines. Your doctor will regularly check the site of your aneurysm. Some people need to have this procedure more than once. This care sheet gives you a general idea about how long it will take for you to recover. But each person recovers at a different pace. Follow the steps below to get better as quickly as possible. How can you care for yourself at home? Activity    · Do not do strenuous exercise and do not lift, pull, or push anything heavy until your doctor says it is okay. This may be for several days. You can walk around the house and do light activity, such as cooking.     · If the catheter was placed in your groin, try not to walk up stairs for the first couple of days.     · If the catheter was placed in your wrist, do not bend your wrist deeply for the first couple of days. Be careful using your hand to get into and out of a chair or bed.     · Rest when you feel tired. Getting enough sleep will help you recover. Diet    · Follow your doctor's orders about how much fluid you should drink after the procedure.     · You can eat your normal diet. If your stomach is upset, try bland, low-fat foods like plain rice, broiled chicken, toast, and yogurt. Medicines    · Your doctor will tell you if and when you can restart your medicines. Your doctor will also give you instructions about taking any new medicines.     · If you take aspirin or some other blood thinner, ask your doctor if and when to start taking it again. Make sure that you understand exactly what your doctor wants you to do.     · Be safe with medicines. Take pain medicines exactly as directed. ? If the doctor gave you a prescription medicine for pain, take it as prescribed. ? If you are not taking a prescription pain medicine, ask your doctor if you can take an over-the-counter medicine.     · If you think your pain medicine is making you sick to your stomach:  ? Take your medicine after meals (unless your doctor has told you not to). ? Ask your doctor for a different pain medicine. Care of the catheter site    · If you have strips of tape on the cut (incision) the doctor made, leave the tape on for a week or until it falls off.     · Put ice or a cold pack on the area for 10 to 20 minutes at a time to help with soreness or swelling. Put a thin cloth between the ice and your skin.     · You may shower 24 to 48 hours after the procedure, if your doctor okays it. Pat the incision dry.     · Do not soak the incision until it is healed.  Don't take a bath for 1 week, or until your doctor tells you it is okay.     · Watch for bleeding from the incision. A small amount of blood (up to the size of a quarter) on the bandage can be normal.     · If you are bleeding, lie down and press on the area for 15 minutes to try to make it stop. If the bleeding does not stop, call your doctor or seek immediate medical care. Follow-up care is a key part of your treatment and safety. Be sure to make and go to all appointments, and call your doctor if you are having problems. It's also a good idea to know your test results and keep a list of the medicines you take. When should you call for help? Call 911 anytime you think you may need emergency care. For example, call if:    · You passed out (lost consciousness).     · You have severe trouble breathing.     · You have sudden chest pain and shortness of breath, or you cough up blood.     · It is hard to think, move, speak, or see.     · Your body is jerking or shaking. Call your doctor now or seek immediate medical care if:    · You have a fever with a stiff neck or a severe headache.     · You are bleeding from the area where the catheter was put in your artery.     · You have a fast-growing, painful lump at the catheter site.     · You have signs of infection, such as:  ? Increased pain, swelling, warmth, or redness. ? Red streaks leading from the incision. ? Pus draining from the incision. ? Swollen lymph nodes in your neck, armpits, or groin. ? A fever.     · Your leg or hand is painful, looks blue or feels cold, numb, or tingly.     · You have any sudden vision changes.     · You have new or worse headaches.     · You fall and hit your head.     · You are sleeping more than you are awake.     · You have a headache and you throw up.     · You have pain that does not get better after you take pain medicine.     · You have a fever over 100°F.    Watch closely for changes in your health, and be sure to contact your doctor if you have any problems. Where can you learn more? Go to http://www.gray.com/  Enter L241 in the search box to learn more about \"Brain Aneurysm Repair: What to Expect at Home. \"  Current as of: July 6, 2021               Content Version: 13.2  © 9893-0984 [employer], [employer]. Care instructions adapted under license by [employer] (which disclaims liability or warranty for this information). If you have questions about a medical condition or this instruction, always ask your healthcare professional. [address] any warranty or liability for your use of this information.

## 2022-06-30 NOTE — DISCHARGE SUMMARY
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 Patient had diagnostic cerebral angiogram and pipeline embolization of prenidal aneurysms by Dr. Aleta Schuster yesterday. Neuro exams have been stable. Had some headache, nausea and vomiting post procedure which resolved. FU CT was negative for hemorrhage. Had some decreased sensation in left arm which resolved when Art-line was removed. Patient is feeling well and appropriate for discharge home. Plan to discharge home today, ambulatory and in stable condition. Physical Exam:  GENERAL: alert, cooperative, no distress, appears stated age  EXTREMITIES:  extremities normal, atraumatic, no cyanosis or edema  SKIN: Warm, dry and appropriate for ethnicity. Right wrist arteriotomy sight has slight bruising but is soft and without drainage. Neurologic Exam:  Mental Status:             Alert and oriented x 4. Appropriate affect, mood and behavior. Language:                   Normal fluency, repetition, comprehension and naming. Cranial Nerves:           Pupils equal, round and reactive to light. Visual fields full to confrontation. Extraocular movements intact. Facial sensation intact V1 - V3. Full facial strength, no asymmetry. Hearing intact bilaterally. No dysarthria. Tongue protrudes to midline, palate elevates symmetrically. Shoulder shrug 5/5 bilaterally. Motor:                          No pronator drift. Bulk and tone normal.                                       5/5 power in all extremities proximally and distally. No involuntary movements. Sensation:                   Sensation intact throughout to light touch     Reflexes:                     Deferred     Coordination & Gait:   Normal. FTN and HTS intact. DISCHARGE DIAGNOSES:   Hospital Problems as of 6/30/2022 Date Reviewed: 6/30/2022          Codes Class Noted - Resolved POA    Multiple aneurysms of cerebral artery ICD-10-CM: I67.1  ICD-9-CM: 437.3  6/29/2022 - Present Unknown              FOLLOW UP APPOINTMENTS:     July 8, 2022 with Dr. Bonifacio Boland at 2:00 pm in office     ADDITIONAL CARE RECOMMENDATIONS:   Patient to avoid tub baths, swimming or hot tubs for two weeks. May take showers. Please call 911 and proceed to emergency room for any future difficulties with  balance, vision, weakness in the face, arm or leg, or speech difficulties. DIET: Normal diet     ACTIVITY: Do not lift anything over 10 lbs for two weeks. Try to limit going up and down stairs as much as possible. Rest and hydrate. May resume all physical activities as tolerated after 2 weeks. WOUND CARE: Monitor for signs and sx of infection including fever, expanding inflammation and discolored drainage. Report any changes in temperature in affected extremity, numbness, weakness or hematoma.   If you experience any increased bruising or bleeding at your groin puncture site either outside or under the skin please apply direct, firm pressure for 20 minutes. Keep track of the bruising at the groin site by marking it with a pen or marker. If the bruising continues to be dark purple or blue in color OR is expanding , please call our office to let the on call doctor know. We have someone on call 24/7. DISCHARGE MEDICATIONS:      1. Continue Plavix 75 mg po q day. 2. Take Medrol Dose Pack as Prescribed. 3. Tylenol 650 mg po q 6 hours as needed  4. Fioricet 1 tab every 4 hours as needed (but call Dr. Wilfrid Oliver office if headache is bad enough to take Fioricet as he may want to see you if your headache is bad enough for the Fioricet. Take all other home meds as usual.     Avoid NSAIDS including (Advil, motrin, ibuprofen, Celebrex etc ) while you are taking aspirin and plavix/Brilinta, you may take Tylenol for pain. Current Discharge Medication List      START taking these medications    Details   methylPREDNISolone (MEDROL DOSEPACK) 4 mg tablet As directed. Qty: 1 Dose Pack, Refills: 0  Start date: 6/30/2022         CONTINUE these medications which have NOT CHANGED    Details   clopidogreL (Plavix) 75 mg tab Take 1 Tablet by mouth daily. Qty: 30 Tablet, Refills: 5    Associated Diagnoses: Cerebral aneurysm      acetaminophen (TYLENOL) 325 mg tablet Take 2 Tablets by mouth every six (6) hours as needed for Pain or Fever (headache). Qty: 30 Tablet, Refills: 0      butalbital-acetaminophen-caffeine (FIORICET, ESGIC) -40 mg per tablet Take 1 Tablet by mouth every four (4) hours as needed for Migraine. Qty: 20 Tablet, Refills: 0               Greater than 45 minutes were spent with the patient on counseling and coordination of care.       Signed:  Loc Pinedo NP  Neurointerventional Surgery

## 2022-06-30 NOTE — PROGRESS NOTES
Neurocritical Care Brief Progress Note:    69-year-old female w/ hx of anxiety disorder, renal stones, gastroesophageal reflux disease, postpartum hemorrhage, and ADHD who was recently transferred from [facility] to [facility] on 03/17/22 with right cerebellar hemorrhage. Was found to have vascular malformation on CTA H/N. Patient was dc and f/u with [facility] Dr. Tay Garcia in the clinic after hospital dc on 06/09/22 to discuss plan for her aneurysms treatment. Patient returned for an elective pipeline embolization of prenidal aneurysms today 06/29/22 by Dr. Tay Garcia. The patient is neurologically intact post procedure. C/o n/v and 8/10 headache which was treated with decadron, Reglan, and tylenol with no relief. The patient also c/o LUE tingly/numbness. Noted to be edematous but palpable pulse and cap refill<3sec. Venous duplex of left upper extremity negative for DVT. The patient reported no improvement with headache after treatment. continued to c/o headache thus CTH was obtained which showed no acute intracranial abnormalities. Right posterior fossa embolization noted. A-line malfunctioning with no blood return which has being dc. Patient c/o improvement with numb/tingling after dc. Swelling seems to mildly subsided. Will continue to monitor patient very closely. Addendum @ 04:30 am  Patient stated her numb/tingling has resolved and currently denied any other neurological issues except nausea. Received Reglan prn. H/A improved  Mild low K+~repleted  p2y12 therapeutic  Mild Leukocytosis is due to steroids. Patient is afebrile       Physical Exam:  Gen: NAD, calm, cooperative  Neuro: A&Ox4. Follows commands. Speech clear. Affect normal. PERRL, 3 mm bilaterally. Blinks to threat. No disconjugate gaze present. EOMI. Face symmetric. Palate symmetric. Tongue midline. Valentine spontaneously. Strength 5/5 in UE and LE BL. Negative drift. Bulk and tone normal. No involuntary movements. Gait deferred.   Skin: Warm, dry, color appropriate for ethnicity. Right wrist arteriotomy site with no hematoma, no active bleeding, no bruising, TR band off, site is c/d/i with mild bruise. +pulse, no hematoma or bleed noted. No tenderness.      NIHSS:      1a-LOC:0    1b-Month/Age:0    1c-Open/Close Hand:0    2-Best Gaze:0    3-Visual Fields:0    4-Facial Palsy:0    5a-Left Arm:0    5b-Right Arm:0    6a-Left Le    6b-Right Le    7-Limb Ataxia:0    8-Sensory:0    9-Best Language:0    10-Dysarthria:0    11-Extinction/Inattention:0  TOTAL SCORE:0      Shalini Dennison NP  Neurocritical Care Nurse Practitioner  [phone]

## 2022-06-30 NOTE — PROGRESS NOTES
0730 Bedside, Verbal and Written shift change report given to William Ahn RN (oncoming nurse) by Malcolm Reyes RN (offgoing nurse). Report included the following information SBAR, Kardex, ED Summary, Procedure Summary, Intake/Output, MAR, Recent Results, Cardiac Rhythm NSR, Alarm Parameters  and Dual Neuro Assessment. 1220 Pt discharged, all belongings taken. Discharge instructions reviewed with patient and all questions answered. Stroke: After Your Visit     Your Care Instructions     You have had a stroke. Risk factors for stroke include being overweight, smoking, and sedentary lifestyle. This means that the blood flow to a part of your brain was blocked for some time, which damages the nerve cells in that part of the brain. The part of your body controlled by that part of your brain may not function properly now. The brain is an amazing organ that can heal itself to some degree. The stroke you had damaged part of your brain, but other parts of your brain may take over in some way for the damaged areas. You have already started this process. Going home may be hard for you and your family. The more you can try to do for yourself, the better. Remember to take each day one at a time. Follow-up care is a key part of your treatment and safety. Be sure to make and go to all appointments, and call your doctor if you are having problems. Its also a good idea to know your test results and keep a list of the medicines you take. How can you care for yourself at home? Enter a stroke rehabilitation (rehab) program, if your doctor recommends it. Physical, speech, and occupational therapies can help you manage bathing, dressing, eating, and other basics of daily living. Eat a heart-healthy diet that is low in cholesterol, saturated fat, and salt. Eat lots of fresh fruits and vegetables and foods high in fiber. Increase your activities slowly. Take short rest breaks when you get tired.  Gradually increase the amount you walk. Start out by walking a little more than you did the day before. Do not drive until your doctor says it is okay. It is normal to feel sad or depressed after a stroke. If the blues last, talk to your doctor. If you are having problems with urine leakage, go to the bathroom at regular times, including when you first wake up and at bedtime. Also, limit fluids after dinner. If you are constipated, drink plenty of fluids, enough so that your urine is light yellow or clear like water. If you have kidney, heart, or liver disease and have to limit fluids, talk with your doctor before you increase the amount of fluids you drink. Set up a regular time for using the toilet. If you continue to have constipation, your doctor may suggest using a bulking agent, such as Metamucil, or a stool softener, laxative, or enema. Medicines  Take your medicines exactly as prescribed. Call your doctor if you think you are having a problem with your medicine. You may be taking several medicines. ACE (angiotensin-converting enzyme) inhibitors, angiotensin II receptor blockers (ARBs), beta-blockers, diuretics (water pills), and calcium channel blockers control your blood pressure. Statins help lower cholesterol. Your doctor may also prescribe medicines for depression, pain, sleep problems, anxiety, or agitation. If your doctor has given you medicine that prevents blood clots, such as warfarin (Coumadin), aspirin combined with extended-release dipyridamole (Aggrenox), clopidogrel (Plavix), or aspirin to prevent another stroke, you should:  Tell your dentist, pharmacist, and other health professionals that you take these medicines. Watch for unusual bruising or bleeding, such as blood in your urine, red or black stools, or bleeding from your nose or gums. Get regular blood tests to check your clotting time if you are taking Coumadin. Wear medical alert jewelry that says you take blood thinners.  You can buy this at most drugstores. Do not take any over-the-counter medicines or herbal products without talking to your doctor first.  If you take birth control pills or hormone replacement therapy, talk to your doctor about whether they are right for you. For family members and caregivers  Make the home safe. Set up a room so that your loved one does not have to climb stairs. Be sure the bathroom is on the same floor. Move throw rugs and furniture that could cause falls, and make sure that the lighting is good. Put grab bars and seats in tubs and showers. Find out what your loved one can do and what he or she needs help with. Try not to do things for your loved one that your loved one can do on his or her own. Help him or her learn and practice new skills. Visit and talk with your loved one often. Try doing activities together that you both enjoy, such as playing cards or board games. Keep in touch with your loved one's friends as much as you can, and encourage them to visit. Take care of yourself. Do not try to do everything yourself. Ask other family members to help. Eat well, get enough rest, and take time to do things that you enjoy. Keep up with your own doctor visits, and make sure to take your medicines regularly. Get out of the house as much as you can. Join a local support group. Find out if you qualify for home health care visits to help with rehab or for adult day care. When should you call for help? Call 911 anytime you think you may need emergency care. For example, call if:  You have signs of another stroke. These may include:  Sudden numbness, paralysis, or weakness in your face, arm, or leg, especially on only one side of your body. New problems with walking or balance. Sudden vision changes. Drooling or slurred speech. New problems speaking or understanding simple statements, or you feel confused. A sudden, severe headache that is different from past headaches.   Call 911 even if these symptoms go away in a few minutes. You cough up blood. You vomit blood or what looks like coffee grounds. You pass maroon or very bloody stools. Call your doctor now or seek immediate medical care if:  You have new bruises or blood spots under your skin. You have a nosebleed. Your gums bleed when you brush your teeth. You have blood in your urine. Your stools are black and tarlike or have streaks of blood. You have vaginal bleeding when you are not having your period, or heavy period bleeding. You have new symptoms that may be related to your stroke, such as falls or trouble swallowing. Watch closely for changes in your health, and be sure to contact your doctor if you have any problems. Where can you learn more? Go to [employer]    Enter C294  in the search box to learn more about \"Stroke: After Your Visit\". © 1114-2868 Healthwise, Incorporated. Care instructions adapted under license by [facility] (which disclaims liability or warranty for this information). This care instruction is for use with your licensed healthcare professional. If you have questions about a medical condition or this instruction, always ask your healthcare professional. [facility] any warranty or liability for your use of this information.

## 2022-06-30 NOTE — PROGRESS NOTES
Reason for Admission:  Scheduled Pipeline Embolization of prenidal aneurysm. RUR Score:   6%                Plan for utilizing home health:          PCP: First and Last name:  None     Name of Practice:    Are you a current patient: Yes/No:    Approximate date of last visit:    Can you participate in a virtual visit with your PCP:                     Current Advanced Directive/Advance Care Plan: Full Code      Healthcare Decision Maker:   Click here to complete [address] including selection of the [address] Relationship (ie \"Primary\")             Primary Decision Maker: Uriah [address] - [phone]                  Transition of Care Plan:                      Care manager met with patient to introduce self and explain role. Patient lives independently with her mother and her 2 children ages 3 and 6. Per patient her grandmother [place]  [phone] is her MPOA and will be taking her home at discharge. Patient confirmed that she does not have a PCP, CM specialist notified to obtain an appointment for patient. Confirmed demographic information. No discharge needs identified at this time.

## 2022-06-30 NOTE — PROGRESS NOTES
1930: Bedside shift change report given to Navya Castillo RN (oncoming nurse) by Kathy Mcgowan RN (offgoing nurse). Report included the following information SBAR, Kardex, Intake/Output, MAR, Recent Results, Cardiac Rhythm NSR with PVCs, Alarm Parameters  and Dual Neuro Assessment. 1946: Pt to CT.    2006: Returned from [employer]. Tolerated well. 2015Dariefra Wilson NP at bedside for assessment. Dual neuro assessment performed and CT results discussed. Order to increase Reglan dose to 10mg and keep q6 hr PRN schedule. 2200: Arterial line dampened and inaccurate despite repositioning with arm board and flushing. OK to take out per Giovanna Yoo NP.     7092: Arterial line removed with bacitracin. Hemostasis achieved. 0445: K+ 3.4 reported to Giovanna Yoo NP. Orders received. 0730: Bedside shift change report given to Kathy Mcgowan RN (oncoming nurse) by Navya Castillo RN (offgoing nurse). Report included the following information SBAR, Kardex, Intake/Output, MAR, Recent Results, Cardiac Rhythm NSR w PVCs, Alarm Parameters  and Dual Neuro Assessment.

## 2022-07-01 NOTE — BRIEF OP NOTE
NEUROINTERVENTIONAL SURGERY POST-PROCEDURE NOTE    PROCEDURE:  Cerebral angiogram  Transarterial embolization, CNS: Pipeline embolization of right PICA pre-nidal aneurysms  Ultrasound guided vascular access  Control angiograms  Placement of arteriotomy closure device    VESSEL(S) STUDIED:  1. Right vertebral artery VESSEL(S) TREATED:  1. Right PICA      PRELIMINARY REPORT & DISPOSITION:   Right cerebellar AVM again noted, with stable pre-nidal aneurysms. Status post Pipeline embolization of pre-nidal aneurysms using three Pipeline Shield embolization devices. Good stagnation of flow in largest aneurysm sac. COMPLICATIONS:  None    FOLLOW-UP:  Head CT  Admit to ICU  SBP  initially  Continue Plavix 75 mg daily  Q1 hr neurochecks DATE OF SERVICE:  7/1/2022 2:52 PM     ATTENDING SURGEON(S):  Blanca Mendiola MD      ANESTHESIA:   GA    EBL: 15 cc    MEDICATIONS:   See nursing record    PUNCTURE SITE:  Right radial artery. Arteriotomy closed with VascBand. Ella Gupta M.D.    Gely Zamora    , Department of Radiology  [facility]

## 2022-07-05 ENCOUNTER — OFFICE VISIT (OUTPATIENT)
Dept: NEUROSURGERY | Age: 37
End: 2022-07-05
Payer: MEDICAID

## 2022-07-05 ENCOUNTER — APPOINTMENT (OUTPATIENT)
Dept: CT IMAGING | Age: 37
End: 2022-07-05
Attending: EMERGENCY MEDICINE
Payer: MEDICAID

## 2022-07-05 ENCOUNTER — HOSPITAL ENCOUNTER (EMERGENCY)
Age: 37
Discharge: HOME | End: 2022-07-05
Attending: EMERGENCY MEDICINE
Payer: MEDICAID

## 2022-07-05 VITALS
DIASTOLIC BLOOD PRESSURE: 70 MMHG | OXYGEN SATURATION: 97 % | HEART RATE: 65 BPM | SYSTOLIC BLOOD PRESSURE: 103 MMHG | RESPIRATION RATE: 20 BRPM | TEMPERATURE: 98.4 F

## 2022-07-05 VITALS
WEIGHT: 100 LBS | OXYGEN SATURATION: 98 % | SYSTOLIC BLOOD PRESSURE: 126 MMHG | RESPIRATION RATE: 19 BRPM | HEART RATE: 82 BPM | BODY MASS INDEX: 19.53 KG/M2 | DIASTOLIC BLOOD PRESSURE: 88 MMHG | TEMPERATURE: 98.5 F

## 2022-07-05 DIAGNOSIS — R51.9 NONINTRACTABLE HEADACHE, UNSPECIFIED CHRONICITY PATTERN, UNSPECIFIED HEADACHE TYPE: Primary | ICD-10-CM

## 2022-07-05 DIAGNOSIS — Q28.2 AVM (ARTERIOVENOUS MALFORMATION) BRAIN: ICD-10-CM

## 2022-07-05 DIAGNOSIS — I67.1 CEREBRAL ANEURYSM: Primary | ICD-10-CM

## 2022-07-05 LAB
ALBUMIN SERPL-MCNC: 4.2 G/DL (ref 3.5–5)
ALBUMIN/GLOB SERPL: 1.1 {RATIO} (ref 1.1–2.2)
ALP SERPL-CCNC: 38 U/L (ref 45–117)
ALT SERPL-CCNC: 15 U/L (ref 12–78)
ANION GAP SERPL CALC-SCNC: 4 MMOL/L (ref 5–15)
AST SERPL-CCNC: 39 U/L (ref 15–37)
BASOPHILS # BLD: 0 K/UL (ref 0–0.1)
BASOPHILS NFR BLD: 0 % (ref 0–1)
BILIRUB SERPL-MCNC: 0.4 MG/DL (ref 0.2–1)
BUN SERPL-MCNC: 13 MG/DL (ref 6–20)
BUN/CREAT SERPL: 19 (ref 12–20)
CALCIUM SERPL-MCNC: 9.8 MG/DL (ref 8.5–10.1)
CHLORIDE SERPL-SCNC: 104 MMOL/L (ref 97–108)
CO2 SERPL-SCNC: 28 MMOL/L (ref 21–32)
COMMENT, HOLDF: NORMAL
CREAT SERPL-MCNC: 0.69 MG/DL (ref 0.55–1.02)
CREAT SERPL-MCNC: 0.7 MG/DL (ref 0.55–1.02)
DIFFERENTIAL METHOD BLD: ABNORMAL
EOSINOPHIL # BLD: 0.1 K/UL (ref 0–0.4)
EOSINOPHIL NFR BLD: 2 % (ref 0–7)
ERYTHROCYTE [DISTWIDTH] IN BLOOD BY AUTOMATED COUNT: 11.9 % (ref 11.5–14.5)
GLOBULIN SER CALC-MCNC: 4 G/DL (ref 2–4)
GLUCOSE SERPL-MCNC: 96 MG/DL (ref 65–100)
HCG UR QL: NEGATIVE
HCT VFR BLD AUTO: 48 % (ref 35–47)
HGB BLD-MCNC: 16.4 G/DL (ref 11.5–16)
IMM GRANULOCYTES # BLD AUTO: 0 K/UL (ref 0–0.04)
IMM GRANULOCYTES NFR BLD AUTO: 0 % (ref 0–0.5)
LYMPHOCYTES # BLD: 2 K/UL (ref 0.8–3.5)
LYMPHOCYTES NFR BLD: 29 % (ref 12–49)
MCH RBC QN AUTO: 31.8 PG (ref 26–34)
MCHC RBC AUTO-ENTMCNC: 34.2 G/DL (ref 30–36.5)
MCV RBC AUTO: 93 FL (ref 80–99)
MONOCYTES # BLD: 0.4 K/UL (ref 0–1)
MONOCYTES NFR BLD: 6 % (ref 5–13)
NEUTS SEG # BLD: 4.3 K/UL (ref 1.8–8)
NEUTS SEG NFR BLD: 63 % (ref 32–75)
NRBC # BLD: 0 K/UL (ref 0–0.01)
NRBC BLD-RTO: 0 PER 100 WBC
PLATELET # BLD AUTO: 385 K/UL (ref 150–400)
PMV BLD AUTO: 10.9 FL (ref 8.9–12.9)
POTASSIUM SERPL-SCNC: 4.7 MMOL/L (ref 3.5–5.1)
PROT SERPL-MCNC: 8.2 G/DL (ref 6.4–8.2)
RBC # BLD AUTO: 5.16 M/UL (ref 3.8–5.2)
SAMPLES BEING HELD,HOLD: NORMAL
SODIUM SERPL-SCNC: 136 MMOL/L (ref 136–145)
WBC # BLD AUTO: 6.9 K/UL (ref 3.6–11)

## 2022-07-05 PROCEDURE — 96375 TX/PRO/DX INJ NEW DRUG ADDON: CPT

## 2022-07-05 PROCEDURE — 74011250636 HC RX REV CODE- 250/636: Performed by: EMERGENCY MEDICINE

## 2022-07-05 PROCEDURE — 96374 THER/PROPH/DIAG INJ IV PUSH: CPT

## 2022-07-05 PROCEDURE — 99285 EMERGENCY DEPT VISIT HI MDM: CPT

## 2022-07-05 PROCEDURE — 99212 OFFICE O/P EST SF 10 MIN: CPT | Performed by: RADIOLOGY

## 2022-07-05 PROCEDURE — 70450 CT HEAD/BRAIN W/O DYE: CPT

## 2022-07-05 PROCEDURE — 70496 CT ANGIOGRAPHY HEAD: CPT

## 2022-07-05 PROCEDURE — 81025 URINE PREGNANCY TEST: CPT

## 2022-07-05 PROCEDURE — 80053 COMPREHEN METABOLIC PANEL: CPT

## 2022-07-05 PROCEDURE — 74011000636 HC RX REV CODE- 636: Performed by: RADIOLOGY

## 2022-07-05 PROCEDURE — 99213 OFFICE O/P EST LOW 20 MIN: CPT | Performed by: PSYCHIATRY & NEUROLOGY

## 2022-07-05 PROCEDURE — 85025 COMPLETE CBC W/AUTO DIFF WBC: CPT

## 2022-07-05 PROCEDURE — 36415 COLL VENOUS BLD VENIPUNCTURE: CPT

## 2022-07-05 RX ORDER — DIVALPROEX SODIUM 500 MG/1
500 TABLET, DELAYED RELEASE ORAL 2 TIMES DAILY
Qty: 10 TABLET | Refills: 0 | Status: SHIPPED | OUTPATIENT
Start: 2022-07-05 | End: 2022-07-10

## 2022-07-05 RX ORDER — DIPHENHYDRAMINE HYDROCHLORIDE 50 MG/ML
25 INJECTION, SOLUTION INTRAMUSCULAR; INTRAVENOUS
Status: COMPLETED | OUTPATIENT
Start: 2022-07-05 | End: 2022-07-05

## 2022-07-05 RX ORDER — METOCLOPRAMIDE HYDROCHLORIDE 5 MG/ML
10 INJECTION INTRAMUSCULAR; INTRAVENOUS
Status: COMPLETED | OUTPATIENT
Start: 2022-07-05 | End: 2022-07-05

## 2022-07-05 RX ORDER — AMITRIPTYLINE HYDROCHLORIDE 25 MG/1
25 TABLET, FILM COATED ORAL
Qty: 7 TABLET | Refills: 0 | Status: SHIPPED | OUTPATIENT
Start: 2022-07-05 | End: 2022-07-12

## 2022-07-05 RX ORDER — DEXAMETHASONE SODIUM PHOSPHATE 10 MG/ML
10 INJECTION INTRAMUSCULAR; INTRAVENOUS ONCE
Status: COMPLETED | OUTPATIENT
Start: 2022-07-05 | End: 2022-07-05

## 2022-07-05 RX ADMIN — IOPAMIDOL 100 ML: 755 INJECTION, SOLUTION INTRAVENOUS at 15:01

## 2022-07-05 RX ADMIN — DIPHENHYDRAMINE HYDROCHLORIDE 25 MG: 50 INJECTION INTRAMUSCULAR; INTRAVENOUS at 14:25

## 2022-07-05 RX ADMIN — DEXAMETHASONE SODIUM PHOSPHATE 10 MG: 10 INJECTION, SOLUTION INTRAMUSCULAR; INTRAVENOUS at 14:27

## 2022-07-05 RX ADMIN — METOCLOPRAMIDE HYDROCHLORIDE 10 MG: 5 INJECTION INTRAMUSCULAR; INTRAVENOUS at 14:27

## 2022-07-05 NOTE — ED TRIAGE NOTES
Pt reports HA and nausea after brain surgery on 6/29/22. Pt was sent by Dr. Bhavana Raymundo for eval of LEE.

## 2022-07-05 NOTE — ED PROVIDER NOTES
Date of Service:  2022    Patient:  Mayda Black    Chief Complaint:  Headache       HPI:  Mayda Black is a 40 y.o.  female who presents for evaluation of headache. Patient had neuro interventional procedures done about a week ago with Dr. Chris Izaguirre. Patient has had headaches ever since this procedure but over the last 3 days the headaches have increased in intensity, right posteriorly located 8 out of 10 headache with associated nausea and light sensitivity and she denies other acute complaints. She spoke with Dr. Chris Izaguirre and was sent to the emergency department for imaging neuro consult headache cocktail and additional intervention.            Past Medical History:   Diagnosis Date    Aneurysm ([address]     AVM    Anxiety     Arthritis     Calculus of kidney     Cerebellar hemorrhage, acute ([address] 2022    secondary to AVM (IR angiography 3/22)    Chronic pain     Current smoker     Dyspepsia and other specified disorders of function of stomach     GERD (gastroesophageal reflux disease)     Irregular heart beat     Kidney disease     hx of kidney stone    Other ill-defined conditions(799.89)     kidney stone in pass,passed    Postpartum hemorrhage     ALSO HAD HEMORRHAGE DURING MISCARRIAGE     Psychiatric disorder     ADHD    Stroke ([address] 2022       Past Surgical History:   Procedure Laterality Date    HX APPENDECTOMY  2013    HX  SECTION      HX CHOLECYSTECTOMY  10/30/2012    HX DILATION AND CURETTAGE      HX GI      COLONOSCOPY    HX GYN      miscarriage x2    HX GYN      ENDOMETRIOSIS, LAPROSCOPY X2    HX KNEE ARTHROSCOPY Left     torn catiledge repair left knee    HX WISDOM TEETH EXTRACTION      LA ANESTH,KNEE AREA SURGERY      LA  DELIVERY ONLY      2014    UPPER GI ENDOSCOPY,BIOPSY  2015              Family History:   Problem Relation Age of Onset    Emphysema Mother     No Known Problems Father     No Known Problems Son     No Known Problems Son     No Known Problems Brother     No Known Problems Sister     No Known Problems Sister     No Known Problems Sister     No Known Problems Sister     No Known Problems Sister     Anesth Problems Neg Hx        Social History     Socioeconomic History    Marital status:      Spouse name: Not on file    Number of children: Not on file    Years of education: Not on file    Highest education level: Not on file   Occupational History    Not on file   Tobacco Use    Smoking status: Former Smoker     Packs/day: 0.50     Years: 15.00     Pack years: 7.50     Quit date: 2022     Years since quittin.3    Smokeless tobacco: Never Used    Tobacco comment: about 6 cigarettes per day at present   Vaping Use    Vaping Use: Former   Substance and Sexual Activity    Alcohol use: Not Currently    Drug use: Yes     Types: Marijuana     Comment: 22    Sexual activity: Yes     Partners: Male     Birth control/protection: None   Other Topics Concern    Not on file   Social History Narrative    Not on file     Social Determinants of Health     Financial Resource Strain:     Difficulty of Paying Living Expenses: Not on file   Food Insecurity:     Worried About Running Out of Food in the Last Year: Not on file    Bhupendra of Food in the Last Year: Not on file   Transportation Needs:     Lack of Transportation (Medical): Not on file    Lack of Transportation (Non-Medical):  Not on file   Physical Activity:     Days of Exercise per Week: Not on file    Minutes of Exercise per Session: Not on file   Stress:     Feeling of Stress : Not on file   Social Connections:     Frequency of Communication with Friends and Family: Not on file    Frequency of Social Gatherings with Friends and Family: Not on file    Attends Zoroastrian Services: Not on file    Active Member of Clubs or Organizations: Not on file    Attends Club or Organization Meetings: Not on file    Marital Status: Not on file   Intimate Partner Violence:     Fear of Current or Ex-Partner: Not on file    Emotionally Abused: Not on file    Physically Abused: Not on file    Sexually Abused: Not on file   Housing Stability:     Unable to Pay for Housing in the Last Year: Not on file    Number of [place] in the Last Year: Not on file    Unstable Housing in the Last Year: Not on file         ALLERGIES: Morphine and Zolpidem    Review of Systems   Eyes: Positive for visual disturbance. Gastrointestinal: Positive for nausea. Negative for vomiting. Neurological: Positive for headaches. All other systems reviewed and are negative. Vitals:    07/05/22 1320   BP: (!) 143/87   Pulse: 75   Resp: 18   Temp: 98 °F (36.7 °C)   SpO2: 98%            Physical Exam  Vitals and nursing note reviewed. Constitutional:       Appearance: Normal appearance. HENT:      Head: Normocephalic and atraumatic. Nose: Nose normal.      Mouth/Throat:      Mouth: Mucous membranes are moist.   Eyes:      General: No scleral icterus. Cardiovascular:      Rate and Rhythm: Normal rate. Pulmonary:      Effort: Pulmonary effort is normal.   Abdominal:      General: Abdomen is flat. Musculoskeletal:         General: No deformity. Skin:     General: Skin is warm. Neurological:      Mental Status: She is alert and oriented to person, place, and time. Sensory: No sensory deficit. Motor: No weakness.       Gait: Gait normal.   Psychiatric:         Mood and Affect: Mood normal.          MDM     VITAL SIGNS:  Patient Vitals for the past 4 hrs:   Temp Pulse Resp BP SpO2   07/05/22 1440 -- -- -- 95/84 99 %   07/05/22 1400 98.4 °F (36.9 °C) 65 20 122/84 100 %   07/05/22 1345 -- -- -- -- 100 %   07/05/22 1320 98 °F (36.7 °C) 75 18 (!) 143/87 98 %         LABS:  Recent Results (from the past 6 hour(s))   CREATININE    Collection Time: 07/05/22  1:39 PM   Result Value Ref Range    Creatinine 0.70 0.55 - 1.02 MG/DL    GFR est AA >60 >60 ml/min/1.73m2    GFR est non-AA >60 >60 ml/min/1.73m2   CBC WITH AUTOMATED DIFF    Collection Time: 07/05/22  1:39 PM   Result Value Ref Range    WBC 6.9 3.6 - 11.0 K/uL    RBC 5.16 3.80 - 5.20 M/uL    HGB 16.4 (H) 11.5 - 16.0 g/dL    HCT 48.0 (H) 35.0 - 47.0 %    MCV 93.0 80.0 - 99.0 FL    MCH 31.8 26.0 - 34.0 PG    MCHC 34.2 30.0 - 36.5 g/dL    RDW 11.9 11.5 - 14.5 %    PLATELET 880 986 - 592 K/uL    MPV 10.9 8.9 - 12.9 FL    NRBC 0.0 0  WBC    ABSOLUTE NRBC 0.00 0.00 - 0.01 K/uL    NEUTROPHILS 63 32 - 75 %    LYMPHOCYTES 29 12 - 49 %    MONOCYTES 6 5 - 13 %    EOSINOPHILS 2 0 - 7 %    BASOPHILS 0 0 - 1 %    IMMATURE GRANULOCYTES 0 0.0 - 0.5 %    ABS. NEUTROPHILS 4.3 1.8 - 8.0 K/UL    ABS. LYMPHOCYTES 2.0 0.8 - 3.5 K/UL    ABS. MONOCYTES 0.4 0.0 - 1.0 K/UL    ABS. EOSINOPHILS 0.1 0.0 - 0.4 K/UL    ABS. BASOPHILS 0.0 0.0 - 0.1 K/UL    ABS. IMM. GRANS. 0.0 0.00 - 0.04 K/UL    DF AUTOMATED     METABOLIC PANEL, COMPREHENSIVE    Collection Time: 07/05/22  1:39 PM   Result Value Ref Range    Sodium 136 136 - 145 mmol/L    Potassium 4.7 3.5 - 5.1 mmol/L    Chloride 104 97 - 108 mmol/L    CO2 28 21 - 32 mmol/L    Anion gap 4 (L) 5 - 15 mmol/L    Glucose 96 65 - 100 mg/dL    BUN 13 6 - 20 MG/DL    Creatinine 0.69 0.55 - 1.02 MG/DL    BUN/Creatinine ratio 19 12 - 20      GFR est AA >60 >60 ml/min/1.73m2    GFR est non-AA >60 >60 ml/min/1.73m2    Calcium 9.8 8.5 - 10.1 MG/DL    Bilirubin, total 0.4 0.2 - 1.0 MG/DL    ALT (SGPT) 15 12 - 78 U/L    AST (SGOT) 39 (H) 15 - 37 U/L    Alk. phosphatase 38 (L) 45 - 117 U/L    Protein, total 8.2 6.4 - 8.2 g/dL    Albumin 4.2 3.5 - 5.0 g/dL    Globulin 4.0 2.0 - 4.0 g/dL    A-G Ratio 1.1 1.1 - 2.2     SAMPLES BEING HELD    Collection Time: 07/05/22  1:39 PM   Result Value Ref Range    SAMPLES BEING HELD 1RED 1BLU     COMMENT        Add-on orders for these samples will be processed based on acceptable specimen integrity and analyte stability, which may vary by analyte.    HCG URINE, QL. - POC    Collection Time: 07/05/22  1:45 PM   Result Value Ref Range    Pregnancy test,urine (POC) Negative NEG          IMAGING:  CT HEAD WO CONT    (Results Pending)   CTA HEAD    (Results Pending)         Medications During Visit:  Medications   iopamidoL (ISOVUE-370) 76 % injection 100 mL (has no administration in time range)   dexamethasone (PF) (DECADRON) 10 mg/mL injection 10 mg (10 mg IntraVENous Given 7/5/22 1427)   metoclopramide HCl (REGLAN) injection 10 mg (10 mg IntraVENous Given 7/5/22 1427)   diphenhydrAMINE (BENADRYL) injection 25 mg (25 mg IntraVENous Given 7/5/22 1425)         DECISION MAKING:  Nicola Barker is a 40 y.o. female who comes in as above. Here, patient appears uncomfortable, headache with light sensitivity. CT imaging ordered. Patient provided with headache cocktail. At this time patient will be signed out to the oncoming doctor pending imaging    3:01 PM  Change of shift. Care of patient signed over to Dr. Sadie Larson. Bedside handoff complete. Awaiting CT and additional intervention as needed. IMPRESSION:  1.  Nonintractable headache, unspecified chronicity pattern, unspecified headache type        DISPOSITION:  Pending    Procedures

## 2022-07-05 NOTE — PROGRESS NOTES
Neurointerventional Surgery Clinic Note    Patient: Sana Mack MRN: [id number]  SSN: [id number]    YOB: 1985  Age: 40 y.o. Sex: female      Subjective:      Sana Mack is a 40 y.o. 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 Patient presents in person with her grandmother. Patient tolerated the procedure well and was discharged from the hospital on 2022. Since that time, her headaches have been steadily increasing in severity. The headaches are now approximately 8 out of 10 in severity and mostly located in the right posterior aspect of her head. She has light sensitivity and nausea but no focal neurological deficits. She continues to take Plavix 75 mg daily. Her Medrol Dosepak is nearly completed.     Past Medical History:   Diagnosis Date    Aneurysm ([address]     AVM    Anxiety     Arthritis     Calculus of kidney     Cerebellar hemorrhage, acute ([address] 2022    secondary to AVM (IR angiography 3/22)    Chronic pain     Current smoker     Dyspepsia and other specified disorders of function of stomach     GERD (gastroesophageal reflux disease)     Irregular heart beat     Kidney disease     hx of kidney stone    Other ill-defined conditions(799.89)     kidney stone in pass,passed    Postpartum hemorrhage 2017    ALSO HAD HEMORRHAGE DURING MISCARRIAGE     Psychiatric disorder     ADHD    Stroke ([facility] 2022     Past Surgical History:   Procedure Laterality Date    HX APPENDECTOMY  2013    HX  SECTION      HX CHOLECYSTECTOMY  10/30/2012    HX DILATION AND CURETTAGE      HX GI      COLONOSCOPY    HX GYN      miscarriage x2    HX GYN      ENDOMETRIOSIS, LAPROSCOPY X2    HX KNEE ARTHROSCOPY Left     torn catiledge repair left knee    HX WISDOM TEETH EXTRACTION      MA ANESTH,KNEE AREA SURGERY      MA  DELIVERY ONLY      2014    UPPER GI ENDOSCOPY,BIOPSY  2015           Family History   Problem Relation Age of Onset    Emphysema Mother     No Known Problems Father     No Known Problems Son     No Known Problems Son     No Known Problems Brother     No Known Problems Sister     No Known Problems Sister     No Known Problems Sister     No Known Problems Sister     No Known Problems Sister     Anesth Problems Neg Hx      Social History     Tobacco Use    Smoking status: Former Smoker     Packs/day: 0.50     Years: 15.00     Pack years: 7.50     Quit date: 2022     Years since quittin.3    Smokeless tobacco: Never Used    Tobacco comment: about 6 cigarettes per day at present   Substance Use Topics    Alcohol use: Not Currently      Current Outpatient Medications   Medication Sig Dispense Refill    methylPREDNISolone (MEDROL DOSEPACK) 4 mg tablet As directed. 1 Dose Pack 0    clopidogreL (Plavix) 75 mg tab Take 1 Tablet by mouth daily. 30 Tablet 5    acetaminophen (TYLENOL) 325 mg tablet Take 2 Tablets by mouth every six (6) hours as needed for Pain or Fever (headache). 30 Tablet 0    butalbital-acetaminophen-caffeine (FIORICET, ESGIC) -40 mg per tablet Take 1 Tablet by mouth every four (4) hours as needed for Migraine. 20 Tablet 0     Facility-Administered Medications Ordered in Other Visits   Medication Dose Route Frequency Provider Last Rate Last Admin    iopamidoL (ISOVUE-370) 76 % injection 100 mL  100 mL IntraVENous RAD Seth Lofton MD            Allergies   Allergen Reactions    Morphine Rash and Hives    Zolpidem Other (comments)     \"Hallucinations and black outs\"       Review of Systems:  Pertinent items are noted in the History of Present Illness.     Objective:     Vitals:    22 1233   BP: 126/88   Pulse: 82   Resp: 19   Temp: 98.5 °F (36.9 °C)   TempSrc: Temporal   SpO2: 98% Weight: 100 lb (45.4 kg)        Physical Exam:  GENERAL: alert, cooperative, no distress, appears stated age  EYE: negative  NECK: no carotid bruit  LUNG: clear to auscultation bilaterally  HEART: regular rate and rhythm, S1, S2 normal, no murmur, click, rub or gallop  EXTREMITIES:  extremities normal, atraumatic, no cyanosis or edema    Neurologic Exam:  Mental Status:  Alert and oriented x 4. Appropriate affect, mood and behavior. Language:    Normal fluency, repetition, comprehension and naming. Cranial Nerves:   Pupils equal, round and reactive to light. Visual fields full to confrontation. Extraocular movements intact. Facial sensation intact V1 - V3. Full facial strength, no asymmetry. Hearing intact bilaterally. No dysarthria. Tongue protrudes to midline, palate elevates symmetrically. Shoulder shrug 5/5 bilaterally. Motor:    No pronator drift. Bulk and tone normal.      5/5 power in all extremities proximally and distally. No involuntary movements. Sensation:    Sensation intact throughout to light touch    Reflexes:    Deferred    Coordination & Gait: FTN intact. No dysdiadochokinesis. Normal gait. Normal heel-to-toe walking. Imaging:  Cerebral angiogram and pipeline embolization of pre-nidal aneurysms 6/29/2022:  Right cerebellar AVM again noted, with stable pre-nidal aneurysms. Status post Pipeline embolization of pre-nidal aneurysms using three Pipeline Shield embolization devices. Good stagnation of flow in largest aneurysm sac. Assessment:   40 y.o. 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 Patient presents in person with her grandmother. Patient tolerated the procedure well and was discharged from the hospital on 6/30/2022. Since that time, her headaches have been steadily increasing in severity. The headaches are now approximately 8 out of 10 in severity and mostly located in the right posterior aspect of her head. She has light sensitivity and nausea but no focal neurological deficits. She continues to take Plavix 75 mg daily. Her Medrol Dosepak is nearly completed. Patient's neurologic exam is normal.  There are no cerebellar signs. I suspect that the patient's headaches are related to migraine headaches, expected thrombosis of the treated aneurysms, adjustment to indwelling Pipeline embolization devices, or some combination of these entities. I have a low suspicion for recurrent hemorrhage or device occlusion given her normal neurologic exam.    I advised the patient to go to the emergency department for further evaluation, to include a noncontrast CT of the head, CTA head, migraine cocktail, and potentially a Neurology consultation. Patient expressed understanding and is in agreement with this plan. Plan:     -Emergency department evaluation, as described above  -Continue Plavix 75 mg daily  -CTA head in 3 months    Thank you for allowing me to participate in the care of this patient. Greater than 10 minutes were spent in patient management, greater than half of which was spent in counseling and coordination of care.         Signed By: Jovanny Del Real MD     July 5, 2022

## 2022-07-05 NOTE — ED TRIAGE NOTES
3PM  Change of shift. Care of patient taken over from Dr. Precious Shields; H&P reviewed, bedside handoff complete. Awaiting CT/CTA. CT CTA looks good per Dr. Daniel George. He came and saw pt. Also seen by neuro--recommending Depakote 500mg BID x 5 days and Nortriptyline qhs. Pt feeling much better here in the ED. Agrees with plan home. [id number] [address] for [employer].     Matt Gutierrez,

## 2022-07-05 NOTE — CONSULTS
NEUROLOGY   INPATIENT EVALUATION/CONSULTATION       PATIENT NAME: Cee Sena    MRN: [id number]    REASON FOR CONSULTATION: Worsening headache since procedure on June 29th, 2022 07/05/22      HISTORY OF PRESENT ILLNESS:  Cee Sena is a 40 y.o. right-hand-dominant female with a history significant for right cerebral hemorrhage in March 2022 secondary to an AVM in the posterior fossa. Patient was noted to have prenatal aneurysms that time and was brought back to the hospital last week for pipeline stenting of them which went well without any complication. She was discharged on June 30, 2022 and unfortunately has had worsening headaches since that time. Endorses headaches which have been present since her hemorrhagic stroke and even before then but admits to them worsening as time is gone on. Multiple remedies were tried at home including a Medrol Dosepak, Fioricet and she found only thing that help would be sleep. Headaches are described as being posterior, associated with photophobia and nausea. No atypical features however no focal neurologic deficits with the headaches. Was seen in the office today and neurological services and sent to the ER for a repeat scan as well as neurology evaluation. At the time of evaluation, she was pending CT scan as well as a headache cocktail.     PAST MEDICAL HISTORY:  Past Medical History:   Diagnosis Date    Aneurysm ([address]     AVM    Anxiety     Arthritis     Calculus of kidney     Cerebellar hemorrhage, acute ([address] 03/2022    secondary to AVM (IR angiography 3/22)    Chronic pain     Current smoker     Dyspepsia and other specified disorders of function of stomach     GERD (gastroesophageal reflux disease)     Irregular heart beat     Kidney disease     hx of kidney stone    Other ill-defined conditions(799.89)     kidney stone in [place],passed    Postpartum hemorrhage 2017    ALSO HAD HEMORRHAGE DURING MISCARRIAGE 2012    Psychiatric disorder     ADHD    [facility]       PAST SURGICAL HISTORY:  Past Surgical History:   Procedure Laterality Date    HX APPENDECTOMY  2013    HX  SECTION      HX CHOLECYSTECTOMY  10/30/2012    HX DILATION AND CURETTAGE      HX GI      COLONOSCOPY    HX GYN      miscarriage x2    HX GYN      ENDOMETRIOSIS, LAPROSCOPY X2    HX KNEE ARTHROSCOPY Left     torn catiledge repair left knee    HX WISDOM TEETH EXTRACTION      [place] ANESTH,KNEE AREA SURGERY      MT  DELIVERY ONLY          UPPER GI ENDOSCOPY,BIOPSY  2015            FAMILY HISTORY:   Family History   Problem Relation Age of Onset    Emphysema Mother     No Known Problems Father     No Known Problems Son     No Known Problems Son     No Known Problems Brother     No Known Problems Sister     No Known Problems Sister     No Known Problems Sister     No Known Problems Sister     No Known Problems Sister     Anesth Problems Neg Hx          SOCIAL HISTORY:  Social History     Socioeconomic History    Marital status:    Tobacco Use    Smoking status: Former Smoker     Packs/day: 0.50     Years: 15.00     Pack years: 7.50     Quit date: 2022     Years since quittin.3    Smokeless tobacco: Never Used    Tobacco comment: about 6 cigarettes per day at present   Vaping Use    Vaping Use: Former   Substance and Sexual Activity    Alcohol use: Not Currently    Drug use: Yes     Types: Marijuana     Comment: 22    Sexual activity: Yes     Partners: Male     Birth control/protection: None         MEDICATIONS:   Current Outpatient Medications   Medication Sig Dispense Refill    methylPREDNISolone (MEDROL DOSEPACK) 4 mg tablet As directed. 1 Dose Pack 0    clopidogreL (Plavix) 75 mg tab Take 1 Tablet by mouth daily. 30 Tablet 5    acetaminophen (TYLENOL) 325 mg tablet Take 2 Tablets by mouth every six (6) hours as needed for Pain or Fever (headache).  30 Tablet 0    butalbital-acetaminophen-caffeine (FIORICET, ESGIC) -40 mg per tablet Take 1 Tablet by mouth every four (4) hours as needed for Migraine. 20 Tablet 0         ALLERGIES:  Allergies   Allergen Reactions    Morphine Rash and Hives    Zolpidem Other (comments)     \"Hallucinations and black outs\"         REVIEW OF SYSTEMS:  10 point ROS reviewed with patient and negative except for those listed above. PHYSICAL EXAM:  Vital Signs:   Visit Vitals  BP 95/84 (BP 1 Location: Left upper arm, BP Patient Position: Lying right side)   Pulse 65   Temp 98.4 °F (36.9 °C)   Resp 20   LMP 06/06/2022 (Exact Date)   SpO2 99%     Pleasant female resting underbelly in exam room in no clear distress. Appears comfortable despite complaints of severe headache. HEENT is grossly unrevealing patient is wearing sunglasses. Neck appears supple. Cardiopulmonary exams are unrevealing. Abdomen appears nontender/nondistended. Extremities are warm/dry. Neurologically, patient appears alert and oriented attention appears intact. Speech is clear, language fluent. Cranial nerves II through XII are grossly unrevealing. Motorically patient has normal bulk and tone, 5-5 strength in upper and lower extremities. Sensation is grossly intact to fine touch. Coordination is intact in upper extremities. Remainder of examination is deferred. PERTINENT DATA:  CT Results (maximum last 3): Results from [place] encounter on 06/29/22    CT HEAD WO CONT    Narrative  EXAM: CT HEAD WO CONT    INDICATION: Headache. Posterior fossa AVM embolization. COMPARISON: CT head on earlier today and 4/20/2022    TECHNIQUE: Noncontrast head CT. Coronal and sagittal reformats. CT dose  reduction was achieved through the use of a standardized protocol tailored for  this examination and automatic exposure control for dose modulation. FINDINGS: The ventricles and sulci are age-appropriate without hydrocephalus.   There is no mass effect or midline shift. There is no intracranial hemorrhage or  extra-axial fluid collection. There is no abnormal area of decreased density to  suggest infarct. Embolization of right posterior fossa AVM is still visible. The calvarium is intact. The visualized paranasal sinuses and mastoid air cells  are clear. Impression  No acute intracranial abnormality on this noncontrast head CT. Right posterior fossa embolization. CT HEAD WO CONT    Narrative  INDICATION: post pipeline embo    EXAM:  HEAD CT WITHOUT CONTRAST    COMPARISON: April 20, 2022    TECHNIQUE:  Routine noncontrast axial head CT was performed. Sagittal and  coronal reconstructions were generated. CT dose reduction was achieved through use of a standardized protocol tailored  for this examination and automatic exposure control for dose modulation. FINDINGS:    Ventricles: Midline, no hydrocephalus. Intracranial Hemorrhage: None. Brain Parenchyma/Brainstem: Normal for age. Basal Cisterns: Patent. Vascular stent in the right posterior fossa with  adjacent hyperdensity posteriorly (series 700 image 30) likely portion of known  AVM/aneurysms. Paranasal Sinuses: Visualized sinuses are clear. Additional Comments: Vascular contrast present from angiogram earlier today. Impression  Status post vascular stent placement right posterior fossa as above. No acute  hemorrhage or infarction. Results from [place] encounter on 04/20/22    CT HEAD WO CONT    Narrative  EXAM: CT HEAD WO CONT    INDICATION: possible re-bleed    COMPARISON: CT head 3/20/2022. CONTRAST: None. TECHNIQUE: Unenhanced CT of the head was performed using 5 mm images. Brain and  bone windows were generated. Coronal and sagittal reformats. CT dose reduction  was achieved through use of a standardized protocol tailored for this  examination and automatic exposure control for dose modulation.     FINDINGS:  Redemonstrated right cerebellar hemisphere intraparenchymal hemorrhage measuring  approximately 3.3 cm x 1.9 cm x 2.0 cm overall dimension, slightly decreased in  size compared to prior (approximately 3.7 cm x 2.1 cm x 2.9 cm, respectively). Additionally, the hemorrhage appears overall lower in density, especially  centrally. However, there is slight increase in density of the anterior most  aspect of the hemorrhage (2-8), concerning for new/worsening acute component. Persistent mass effect upon the fourth ventricle and quadrigeminal cistern, but  without overt hydrocephalus no herniation. There is no significant white matter  disease. There is no extra-axial hemorrhage. The basilar cisterns are open. No  CT evidence of acute infarct. The bone windows demonstrate no abnormalities. The visualized portions of the  paranasal sinuses and mastoid air cells are clear. Impression  Interval slight decrease in size and overall significant decrease in density of  right cerebral intraparenchymal hemorrhage, but the anterior most aspect of the  hemorrhage appears slightly more dense concerning for new/worsening acute  component. Persistent subtle mass effect upon the fourth ventricle, without  overt hydrocephalus no herniation    I discussed this impression with Elie Ramirez MD at 1537 hours on  4/20/2022. MRI Results (maximum last 3):     ASSESSMENT:      Zaid Collins is a 62-year-old right-hand-dominant female with a history of right cerebral hemorrhage in the past secondary to posterior fossa AVM who recently had prenidal aneurysms treated with pipeline stent on June 29, 2022 who presents with worsening headaches post-procedure    RECOMMENDATIONS:  Intractable headache:  Patient endorses history of intermittent headaches over the past, worsened since tremor cerebellar bleed and worsening since recent intervention on June 29, 2022  CT CTA are pending at the time of my evaluation had not yet been done    Agree with Dr. Carisa Edgar there really are no focal neurologic deficits with patient not providing any by history    At this juncture unless CT demonstrates otherwise with suspect headache is indicative of ongoing headache worsened by recent stenting    Agree with headache cocktail patient was getting Benadryl, Reglan and steroid could consider adding a fluid bolus as well as to grams of magnesium avoiding ketorolac given recent stenting and hemorrhage    If imaging unremarkable would likely plan to discharge patient home would recommend Depakote 500 mg twice daily for 5 days as well as prescription for amitriptyline 25 mg to be taken at night for a week and increase to 50 mg after that as tolerated as headache prophylaxis    Will continue to use Fioricet sparingly as abortive therapy being cautious to avoid potentiating chronic headache    Can be seen in neurology clinic with first available provider to facilitate ongoing management and mitigate against further ER visits      Imani Padron MD

## 2022-07-11 ENCOUNTER — TELEPHONE (OUTPATIENT)
Dept: NEUROSURGERY | Age: 37
End: 2022-07-11

## 2022-10-05 ENCOUNTER — HOSPITAL ENCOUNTER (OUTPATIENT)
Dept: CT IMAGING | Age: 37
Discharge: HOME | End: 2022-10-05
Attending: RADIOLOGY
Payer: MEDICAID

## 2022-10-05 DIAGNOSIS — Q28.2 AVM (ARTERIOVENOUS MALFORMATION) BRAIN: ICD-10-CM

## 2022-10-05 DIAGNOSIS — I67.1 CEREBRAL ANEURYSM: ICD-10-CM

## 2022-10-05 PROCEDURE — 70496 CT ANGIOGRAPHY HEAD: CPT

## 2022-10-05 PROCEDURE — 74011000636 HC RX REV CODE- 636: Performed by: RADIOLOGY

## 2022-10-05 RX ADMIN — IOPAMIDOL 100 ML: 755 INJECTION, SOLUTION INTRAVENOUS at 10:41

## 2022-10-14 ENCOUNTER — OFFICE VISIT (OUTPATIENT)
Dept: NEUROSURGERY | Age: 37
End: 2022-10-14
Payer: MEDICAID

## 2022-10-14 VITALS
HEIGHT: 60 IN | HEART RATE: 78 BPM | SYSTOLIC BLOOD PRESSURE: 102 MMHG | WEIGHT: 95.4 LBS | DIASTOLIC BLOOD PRESSURE: 60 MMHG | TEMPERATURE: 97.6 F | OXYGEN SATURATION: 99 % | BODY MASS INDEX: 18.73 KG/M2

## 2022-10-14 DIAGNOSIS — Q28.2 AVM (ARTERIOVENOUS MALFORMATION) BRAIN: Primary | ICD-10-CM

## 2022-10-14 DIAGNOSIS — I67.1 CEREBRAL ANEURYSM: ICD-10-CM

## 2022-10-14 PROCEDURE — 99215 OFFICE O/P EST HI 40 MIN: CPT | Performed by: RADIOLOGY

## 2022-10-14 NOTE — PROGRESS NOTES
Neurointerventional Surgery Clinic Note    Patient: Christal Pretty MRN: [id number]  SSN: [id number]    YOB: 1985  Age: 40 y.o. Sex: female      Subjective:      Christal Pretty is a 40 y.o. 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 Patient has been doing well. Her headaches have resolved. She denies any focal neurological symptoms. She expressed difficulties with her family situation at home but has support. She continues to take Plavix 75 mg daily.       Past Medical History:   Diagnosis Date    Aneurysm ([address]     AVM    Anxiety     Arthritis     Calculus of kidney     Cerebellar hemorrhage, acute ([address] 2022    secondary to AVM (IR angiography 3/22)    Chronic pain     Current smoker     Dyspepsia and other specified disorders of function of stomach     GERD (gastroesophageal reflux disease)     Irregular heart beat     Kidney disease     hx of kidney stone    Other ill-defined conditions(799.89)     kidney stone in pass,passed    Postpartum hemorrhage 2017    ALSO HAD HEMORRHAGE DURING MISCARRIAGE     Psychiatric disorder     ADHD    Stroke ([facility] [address] 2022     Past Surgical History:   Procedure Laterality Date    HX APPENDECTOMY      HX  SECTION      HX CHOLECYSTECTOMY  10/30/2012    HX DILATION AND CURETTAGE      HX GI      COLONOSCOPY    HX GYN      miscarriage x2    HX GYN      ENDOMETRIOSIS, LAPROSCOPY X2    HX KNEE ARTHROSCOPY Left     torn catiledge repair left knee    HX WISDOM TEETH EXTRACTION      [place] ANESTH,KNEE AREA SURGERY      [place]  DELIVERY ONLY      2014    UPPER GI ENDOSCOPY,BIOPSY  2015           Family History   Problem Relation Age of Onset    Emphysema Mother     No Known Problems Father     No Known Problems Son     No Known Problems Son     No Known Problems Brother     No Known Problems Sister     No Known Problems Sister     No Known Problems Sister     No Known Problems Sister     No Known Problems Sister     Anesth Problems Neg Hx      Social History     Tobacco Use    Smoking status: Former     Packs/day: 0.50     Years: 15.00     Pack years: 7.50     Types: Cigarettes     Quit date: 2022     Years since quittin.6    Smokeless tobacco: Never    Tobacco comments:     about 6 cigarettes per day at present   Substance Use Topics    Alcohol use: Not Currently      Current Outpatient Medications   Medication Sig Dispense Refill    clopidogreL (Plavix) 75 mg tab Take 1 Tablet by mouth daily. 30 Tablet 5    acetaminophen (TYLENOL) 325 mg tablet Take 2 Tablets by mouth every six (6) hours as needed for Pain or Fever (headache). 30 Tablet 0    methylPREDNISolone (MEDROL DOSEPACK) 4 mg tablet As directed. 1 Dose Pack 0    butalbital-acetaminophen-caffeine (FIORICET, ESGIC) -40 mg per tablet Take 1 Tablet by mouth every four (4) hours as needed for Migraine. (Patient not taking: Reported on 10/14/2022) 20 Tablet 0        Allergies   Allergen Reactions    Morphine Rash and Hives    Zolpidem Other (comments)     \"Hallucinations and black outs\"       Review of Systems:  Pertinent items are noted in the History of Present Illness. Objective:     Vitals:    10/14/22 1129   BP: 102/60   Pulse: 78   Temp: 97.6 °F (36.4 °C)   TempSrc: Temporal   SpO2: 99%   Weight: 95 lb 6.4 oz (43.3 kg)   Height: 5' (1.524 m)        Physical Exam:  GENERAL: alert, cooperative, no distress, appears stated age  EYE: negative  NECK: no carotid bruit  LUNG: clear to auscultation bilaterally  HEART: regular rate and rhythm, S1, S2 normal, no murmur, click, rub or gallop  EXTREMITIES:  extremities normal, atraumatic, no cyanosis or edema    Neurologic Exam:  Mental Status:  Alert and oriented x 4. Appropriate affect, mood and behavior.        Language:    Normal fluency, repetition, comprehension and naming. Cranial Nerves:   Pupils equal, round and reactive to light. Visual fields full to confrontation. Extraocular movements intact. Facial sensation intact V1 - V3. Full facial strength, no asymmetry. Hearing intact bilaterally. No dysarthria. Tongue protrudes to midline, palate elevates symmetrically. Shoulder shrug 5/5 bilaterally. Motor:    No pronator drift. Bulk and tone normal.      5/5 power in all extremities proximally and distally. No involuntary movements. Sensation:    Sensation intact throughout to light touch    Reflexes:    Deferred    Coordination & Gait: Normal      Imaging:  I personally reviewed the following imaging studies. The impressions listed below are those of the interpreting radiologist(s). My comments are in bold. CTA Head 10/5/2022:  1. Stable right cerebellar arteriovenous malformation. Patent right PICA stent. 2. No acute findings, no large vessel occlusion. Patent Pipeline construct. No evidence of residual aneurysms. Assessment:   40 y.o. 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 Patient has been doing well. Her headaches have resolved. She denies any focal neurological symptoms. She expressed difficulties with her family situation at home but has support. She continues to take Plavix 75 mg daily. Neurologic exam is normal.    CTA Head performed 10/5/2022 showed a patent Pipeline construct and no evidence of residual aneurysms. Otherwise, the AVM is stable. We reviewed the imaging. The aneurysms are secured. We then discussed the natural history of ruptured AVMs and possible treatment strategies, including gamma knife treatment, embolization, and a combination of both. We discussed the risks, benefits, and alternatives of these treatment strategies. I advised proceeding with cerebral angiography with microcatheter interrogation of the AVM and transarterial particle embolization using silk suture & PVA in the same setting. I explained that she may need multiple treatments. I expect that she would eventually need gamma knife treatment after embolization(s). There is a possibility of embolization for a cure, but this is not the goal of embolization at this moment. We discussed the possibility of subsequent transarterial and/or transvenous embolizations, as needed. We discussed the specific risks of stroke, AVM re-bleed, need for posterior surgical decompression, and death. I answered all of her questions. Based on these discussions, patient would like to proceed with cerebral angiography and transarterial embolization. She understands the risks, benefits, and alternatives of this management strategy. We will also place a referral to behavioral health to make sure that she has the support that she needs. Plan:     -Proceed with cerebral angiography with microcatheter interrogation of the AVM and transarterial particle embolization using silk suture & PVA    Thank you for allowing me to participate in the care of this patient. Greater than 40 minutes were spent in patient management, greater than half of which was spent in counseling and coordination of care.         Signed By: Bertha Adams MD     October 14, 2022

## 2022-10-14 NOTE — PROGRESS NOTES
Follow up for Cerebral aneurysm and imaging results. Patient reports headaches have stopped. Denies blurred or double vision, numbness or tingling, dizziness, n/v.  No acute problems reported.

## 2022-10-17 ENCOUNTER — TELEPHONE (OUTPATIENT)
Dept: NEUROSURGERY | Age: 37
End: 2022-10-17

## 2022-10-17 DIAGNOSIS — I67.1 CEREBRAL ANEURYSM: Primary | ICD-10-CM
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
## 2022-10-18 NOTE — TELEPHONE ENCOUNTER
Spoke to patient and confirmed cerebral angiography with microcatheter interrogation of the AVM and transarterial particle embolization using silk suture & PVA on  11/29/2022 at [facility] 1540 time is 7:00 am at Patient Registration. Informed patient:  -You will be contacted by Preadmission Testing to schedule an appointment for labs, chest xray, ekg.  -No eating or drinking after midnight the day prior to procedure and take morning medications the morning of procedure with sips of water.   -Do not stop taking Blood Thinners unless notified by this office.   -You must have a  to drive you home upon discharge. Recommend you have someone with you for at least 24-48 hours post procedure.  -No metal or glue in hair. Patient stated understanding.

## 2022-10-19 DIAGNOSIS — I67.1 CEREBRAL ANEURYSM: ICD-10-CM

## 2022-10-19 DIAGNOSIS — Q28.2 AVM (ARTERIOVENOUS MALFORMATION) BRAIN: Primary | ICD-10-CM

## 2022-11-16 ENCOUNTER — NURSE TRIAGE (OUTPATIENT)
Dept: OTHER | Facility: CLINIC | Age: 37
End: 2022-11-16
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
## 2022-11-22 ENCOUNTER — HOSPITAL ENCOUNTER (OUTPATIENT)
Dept: PREADMISSION TESTING | Age: 37
Discharge: HOME | End: 2022-11-22
Payer: MEDICAID

## 2022-11-22 VITALS — BODY MASS INDEX: 18.3 KG/M2 | HEART RATE: 59 BPM | WEIGHT: 93.2 LBS | RESPIRATION RATE: 12 BRPM | HEIGHT: 60 IN

## 2022-11-22 LAB
ABO + RH BLD: NORMAL
ALBUMIN SERPL-MCNC: 4.1 G/DL (ref 3.5–5)
ALBUMIN/GLOB SERPL: 1.2 {RATIO} (ref 1.1–2.2)
ALP SERPL-CCNC: 44 U/L (ref 45–117)
ALT SERPL-CCNC: 16 U/L (ref 12–78)
ANION GAP SERPL CALC-SCNC: 5 MMOL/L (ref 5–15)
ASPIRIN TEST, ASPIRN: 620 ARU
AST SERPL-CCNC: 11 U/L (ref 15–37)
ATRIAL RATE: 55 BPM
BILIRUB SERPL-MCNC: 0.4 MG/DL (ref 0.2–1)
BLOOD GROUP ANTIBODIES SERPL: NORMAL
BUN SERPL-MCNC: 10 MG/DL (ref 6–20)
BUN/CREAT SERPL: 16 (ref 12–20)
CALCIUM SERPL-MCNC: 8.9 MG/DL (ref 8.5–10.1)
CALCULATED P AXIS, ECG09: 65 DEGREES
CALCULATED R AXIS, ECG10: 56 DEGREES
CALCULATED T AXIS, ECG11: 66 DEGREES
CHLORIDE SERPL-SCNC: 109 MMOL/L (ref 97–108)
CO2 SERPL-SCNC: 27 MMOL/L (ref 21–32)
CREAT SERPL-MCNC: 0.62 MG/DL (ref 0.55–1.02)
DIAGNOSIS, 93000: NORMAL
ERYTHROCYTE [DISTWIDTH] IN BLOOD BY AUTOMATED COUNT: 12.7 % (ref 11.5–14.5)
GLOBULIN SER CALC-MCNC: 3.3 G/DL (ref 2–4)
GLUCOSE SERPL-MCNC: 89 MG/DL (ref 65–100)
HCG SERPL QL: NEGATIVE
HCT VFR BLD AUTO: 43 % (ref 35–47)
HGB BLD-MCNC: 14.3 G/DL (ref 11.5–16)
MCH RBC QN AUTO: 31.1 PG (ref 26–34)
MCHC RBC AUTO-ENTMCNC: 33.3 G/DL (ref 30–36.5)
MCV RBC AUTO: 93.5 FL (ref 80–99)
NRBC # BLD: 0 K/UL (ref 0–0.01)
NRBC BLD-RTO: 0 PER 100 WBC
P-R INTERVAL, ECG05: 132 MS
P2Y12 PLT RESPONSE,PPPR: 9 PRU (ref 194–418)
PLATELET # BLD AUTO: 253 K/UL (ref 150–400)
PMV BLD AUTO: 11.2 FL (ref 8.9–12.9)
POTASSIUM SERPL-SCNC: 3.8 MMOL/L (ref 3.5–5.1)
PROT SERPL-MCNC: 7.4 G/DL (ref 6.4–8.2)
Q-T INTERVAL, ECG07: 426 MS
QRS DURATION, ECG06: 72 MS
QTC CALCULATION (BEZET), ECG08: 407 MS
RBC # BLD AUTO: 4.6 M/UL (ref 3.8–5.2)
SODIUM SERPL-SCNC: 141 MMOL/L (ref 136–145)
SPECIMEN EXP DATE BLD: NORMAL
VENTRICULAR RATE, ECG03: 55 BPM
WBC # BLD AUTO: 7.2 K/UL (ref 3.6–11)

## 2022-11-22 PROCEDURE — 93005 ELECTROCARDIOGRAM TRACING: CPT

## 2022-11-22 PROCEDURE — 85576 BLOOD PLATELET AGGREGATION: CPT

## 2022-11-22 PROCEDURE — 84703 CHORIONIC GONADOTROPIN ASSAY: CPT

## 2022-11-22 PROCEDURE — 36415 COLL VENOUS BLD VENIPUNCTURE: CPT

## 2022-11-22 PROCEDURE — 80053 COMPREHEN METABOLIC PANEL: CPT

## 2022-11-22 PROCEDURE — 86900 BLOOD TYPING SEROLOGIC ABO: CPT

## 2022-11-22 PROCEDURE — 85027 COMPLETE CBC AUTOMATED: CPT

## 2022-11-22 NOTE — PERIOP NOTES
[facility]  PREOPERATIVE INSTRUCTIONS  Neuro Interventional Surgery    Surgery Date:   11/29/22     Your surgeon's office will call you to confirm day of surgery and arrival time. If you do not hear from them, please call [phone] to confirm day of surgery and arrival time. Please report to [facility] Patient Access/Admitting on the 1st floor. Bring your insurance card, photo identification, and any copayment ( if applicable). If you are going home the same day of your surgery, you must have a responsible adult to drive you home. You need to have a responsible adult to stay with you the first 24 hours after surgery and you should not drive a car for 24 hours following your surgery. Nothing to eat or drink after midnight the night before surgery. This includes no water, gum, mints, coffee, juice, etc.  Please note special instructions, if applicable, below for medications. Do NOT drink alcohol or smoke 24 hours before surgery. STOP smoking for 14 days prior as it helps with breathing and healing after surgery. If you are being admitted to the hospital, please leave personal belongings/luggage in your car until you have an assigned hospital room number. Please wear comfortable clothes. Wear your glasses instead of contacts. We ask that all money, jewelry and valuables be left at home. Wear no make up, particularly mascara, the day of surgery. All body piercings, rings, and jewelry need to be removed and left at home. Please remove any nail polish or artifical nails from your fingernails. Please wear your hair loose or down. Please no pony-tails, buns, or any metal hair accessories. If you shower the morning of surgery, please do not apply any lotions or powders afterwards. You may wear deodorant. Do not shave any body area within 24 hours of your surgery. Please follow all instructions to avoid any potential surgical cancellation.   Should your physical condition change, (i.e. fever, cold, flu, etc.) please notify your surgeon as soon as possible. It is important to be on time. If a situation occurs where you may be delayed, please call:  [phone] on the day of surgery. The Preadmission Testing staff can be reached at [phone]. Special instructions: NONE    Current Outpatient Medications   Medication Sig    clopidogreL (Plavix) 75 mg tab Take 1 Tablet by mouth daily. acetaminophen (TYLENOL) 325 mg tablet Take 2 Tablets by mouth every six (6) hours as needed for Pain or Fever (headache). butalbital-acetaminophen-caffeine (FIORICET, ESGIC) -40 mg per tablet Take 1 Tablet by mouth every four (4) hours as needed for Migraine. (Patient not taking: No sig reported)     No current facility-administered medications for this encounter. YOU MUST ONLY TAKE THESE MEDICATIONS THE MORNING OF SURGERY WITH A SIP OF WATER: N/A  MEDICATIONS TO TAKE THE MORNING OF SURGERY ONLY IF NEEDED: N/A  HOLD THESE PRESCRIPTION MEDICATIONS BEFORE SURGERY: N/A  Ask your surgeon/prescribing physician about when/if to STOP taking these medications: PLAVIX  Stop all vitamins, herbal medicines and any non-steroidal anti-inflammatory drugs (i.e. Ibuprofen, Naproxen, Advil, Aleve) 7 days prior to surgery. You may take Tylenol. If you are currently taking Aspirin, Plavix, Brilinta, Coumadin or any other blood-thinning/anticoagulant medication contact your surgeon for instructions. Patient Information Regarding COVID Restrictions    Day of Procedure    Please park in the parking deck or any designated visitor parking lot. Enter the facility through the Main Entrance of the hospital.  On the day of surgery, please provide the cell phone number of the person who will be waiting for you to the Patient Access representative at the time of registration. Masks are highly recommended in the hospital, but not required.   Once your procedure and the immediate recovery period is completed, a nurse in the recovery area will contact your designated visitor to inform them of your room number or to otherwise review other pertinent information regarding your care. Social distancing practices are strongly encouraged in waiting areas and the cafeteria. The patient was contacted  in person. She verbalized understanding of all instructions does not  need reinforcement.

## 2022-11-28 ENCOUNTER — TELEPHONE (OUTPATIENT)
Dept: NEUROSURGERY | Age: 37
End: 2022-11-28

## 2022-11-28 ENCOUNTER — PATIENT MESSAGE (OUTPATIENT)
Dept: NEUROSURGERY | Age: 37
End: 2022-11-28

## 2022-11-28 NOTE — TELEPHONE ENCOUNTER
Called to confirm procedure on 1/29/2022 at [facility] [address] 1540 time is  7:00 am at Patient Registration. Unable to leave a message as mail box is full. Message via [employer]. No eating or drinking after midnight the day prior to procedure and take morning medications the morning of procedure with sips of water. Do not stop taking Blood Thinners if applicable unless notified by this office. You must have a  to drive you home upon discharge. Recommend you have someone with you for at least 24-48 hours post procedure. No metal of glue in hair.

## 2022-11-29 ENCOUNTER — HOSPITAL ENCOUNTER (INPATIENT)
Dept: INTERVENTIONAL RADIOLOGY/VASCULAR | Age: 37
LOS: 1 days | Discharge: HOME | DRG: 058 | End: 2022-11-30
Attending: RADIOLOGY | Admitting: RADIOLOGY
Payer: MEDICAID

## 2022-11-29 ENCOUNTER — ANESTHESIA EVENT (OUTPATIENT)
Dept: INTERVENTIONAL RADIOLOGY/VASCULAR | Age: 37
DRG: 058 | End: 2022-11-29
Payer: MEDICAID

## 2022-11-29 ENCOUNTER — ANESTHESIA (OUTPATIENT)
Dept: INTERVENTIONAL RADIOLOGY/VASCULAR | Age: 37
DRG: 058 | End: 2022-11-29
Payer: MEDICAID

## 2022-11-29 DIAGNOSIS — I67.1 CEREBRAL ANEURYSM: ICD-10-CM

## 2022-11-29 PROBLEM — Q27.30 AVM (ARTERIOVENOUS MALFORMATION): Status: ACTIVE | Noted: 2022-11-29

## 2022-11-29 LAB
ACT BLD: 137 SECS (ref 79–138)
ACT BLD: 275 SECS (ref 79–138)
ANION GAP SERPL CALC-SCNC: 7 MMOL/L (ref 5–15)
BUN SERPL-MCNC: 12 MG/DL (ref 6–20)
BUN/CREAT SERPL: 23 (ref 12–20)
CALCIUM SERPL-MCNC: 8.6 MG/DL (ref 8.5–10.1)
CHLORIDE SERPL-SCNC: 113 MMOL/L (ref 97–108)
CO2 SERPL-SCNC: 21 MMOL/L (ref 21–32)
CREAT SERPL-MCNC: 0.53 MG/DL (ref 0.55–1.02)
GLUCOSE SERPL-MCNC: 132 MG/DL (ref 65–100)
HCG UR QL: NEGATIVE
P2Y12 PLT RESPONSE,PPPR: 25 PRU (ref 194–418)
POTASSIUM SERPL-SCNC: 4.1 MMOL/L (ref 3.5–5.1)
SODIUM SERPL-SCNC: 141 MMOL/L (ref 136–145)

## 2022-11-29 PROCEDURE — 74011000636 HC RX REV CODE- 636

## 2022-11-29 PROCEDURE — 77030014021 HC SYR ANGI FIX LOK MRTM -A

## 2022-11-29 PROCEDURE — 74011250636 HC RX REV CODE- 250/636: Performed by: RADIOLOGY

## 2022-11-29 PROCEDURE — 03LG3DZ OCCLUSION OF INTRACRANIAL ARTERY WITH INTRALUMINAL DEVICE, PERCUTANEOUS APPROACH: ICD-10-PCS | Performed by: PSYCHIATRY & NEUROLOGY

## 2022-11-29 PROCEDURE — 77030003394 HC NDL ART COOK -A

## 2022-11-29 PROCEDURE — 74011000250 HC RX REV CODE- 250: Performed by: RADIOLOGY

## 2022-11-29 PROCEDURE — 74011000250 HC RX REV CODE- 250: Performed by: NURSE ANESTHETIST, CERTIFIED REGISTERED

## 2022-11-29 PROCEDURE — 77030012468 HC VLV BLEEDBK CNTRL ABBT -B

## 2022-11-29 PROCEDURE — 74011250637 HC RX REV CODE- 250/637: Performed by: NURSE PRACTITIONER

## 2022-11-29 PROCEDURE — 77030005401 HC CATH RAD ARRO -A: Performed by: ANESTHESIOLOGY

## 2022-11-29 PROCEDURE — 36415 COLL VENOUS BLD VENIPUNCTURE: CPT

## 2022-11-29 PROCEDURE — 2709999900 HC NON-CHARGEABLE SUPPLY

## 2022-11-29 PROCEDURE — C1769 GUIDE WIRE: HCPCS

## 2022-11-29 PROCEDURE — 36224 PLACE CATH CAROTD ART: CPT

## 2022-11-29 PROCEDURE — 74011250637 HC RX REV CODE- 250/637: Performed by: RADIOLOGY

## 2022-11-29 PROCEDURE — 77030008684 HC TU ET CUF COVD -B: Performed by: ANESTHESIOLOGY

## 2022-11-29 PROCEDURE — 77030008584 HC TOOL GDWRE DEV TERU -A

## 2022-11-29 PROCEDURE — 76060000038 HC ANESTHESIA 3.5 TO 4 HR

## 2022-11-29 PROCEDURE — 77030026438 HC STYL ET INTUB CARD -A: Performed by: ANESTHESIOLOGY

## 2022-11-29 PROCEDURE — C1887 CATHETER, GUIDING: HCPCS

## 2022-11-29 PROCEDURE — 65610000006 HC RM INTENSIVE CARE

## 2022-11-29 PROCEDURE — 74011250636 HC RX REV CODE- 250/636: Performed by: NURSE PRACTITIONER

## 2022-11-29 PROCEDURE — 85576 BLOOD PLATELET AGGREGATION: CPT

## 2022-11-29 PROCEDURE — C1760 CLOSURE DEV, VASC: HCPCS

## 2022-11-29 PROCEDURE — 77030019940 HC BLNKT HYPOTHRM STRY -B

## 2022-11-29 PROCEDURE — 81025 URINE PREGNANCY TEST: CPT

## 2022-11-29 PROCEDURE — 80048 BASIC METABOLIC PNL TOTAL CA: CPT

## 2022-11-29 PROCEDURE — 77030008638 HC TU CONN COOK -A

## 2022-11-29 PROCEDURE — C1894 INTRO/SHEATH, NON-LASER: HCPCS

## 2022-11-29 PROCEDURE — 77030035304 HC CATH ANGI BENCHMARK PENU -G

## 2022-11-29 PROCEDURE — 74011250636 HC RX REV CODE- 250/636: Performed by: NURSE ANESTHETIST, CERTIFIED REGISTERED

## 2022-11-29 PROCEDURE — 85347 COAGULATION TIME ACTIVATED: CPT

## 2022-11-29 RX ORDER — HEPARIN SODIUM 1000 [USP'U]/ML
1000 INJECTION, SOLUTION INTRAVENOUS; SUBCUTANEOUS
Status: COMPLETED | OUTPATIENT
Start: 2022-11-29 | End: 2022-11-29

## 2022-11-29 RX ORDER — DIPHENHYDRAMINE HYDROCHLORIDE 50 MG/ML
INJECTION, SOLUTION INTRAMUSCULAR; INTRAVENOUS AS NEEDED
Status: DISCONTINUED | OUTPATIENT
Start: 2022-11-29 | End: 2022-11-29 | Stop reason: HOSPADM

## 2022-11-29 RX ORDER — DEXAMETHASONE SODIUM PHOSPHATE 4 MG/ML
4 INJECTION, SOLUTION INTRA-ARTICULAR; INTRALESIONAL; INTRAMUSCULAR; INTRAVENOUS; SOFT TISSUE ONCE
Status: COMPLETED | OUTPATIENT
Start: 2022-11-29 | End: 2022-11-29

## 2022-11-29 RX ORDER — FENTANYL CITRATE 50 UG/ML
INJECTION, SOLUTION INTRAMUSCULAR; INTRAVENOUS AS NEEDED
Status: DISCONTINUED | OUTPATIENT
Start: 2022-11-29 | End: 2022-11-29 | Stop reason: HOSPADM

## 2022-11-29 RX ORDER — SODIUM CHLORIDE, SODIUM LACTATE, POTASSIUM CHLORIDE, CALCIUM CHLORIDE 600; 310; 30; 20 MG/100ML; MG/100ML; MG/100ML; MG/100ML
100 INJECTION, SOLUTION INTRAVENOUS CONTINUOUS
Status: DISCONTINUED | OUTPATIENT
Start: 2022-11-29 | End: 2022-11-30

## 2022-11-29 RX ORDER — SCOLOPAMINE TRANSDERMAL SYSTEM 1 MG/1
1 PATCH, EXTENDED RELEASE TRANSDERMAL
Status: DISCONTINUED | OUTPATIENT
Start: 2022-11-29 | End: 2022-11-30 | Stop reason: HOSPADM

## 2022-11-29 RX ORDER — LIDOCAINE HYDROCHLORIDE 10 MG/ML
10 INJECTION INFILTRATION; PERINEURAL
Status: COMPLETED | OUTPATIENT
Start: 2022-11-29 | End: 2022-11-29

## 2022-11-29 RX ORDER — ACETAMINOPHEN 325 MG/1
650 TABLET ORAL
Status: DISCONTINUED | OUTPATIENT
Start: 2022-11-29 | End: 2022-11-30 | Stop reason: HOSPADM

## 2022-11-29 RX ORDER — LIDOCAINE HYDROCHLORIDE 20 MG/ML
INJECTION, SOLUTION EPIDURAL; INFILTRATION; INTRACAUDAL; PERINEURAL AS NEEDED
Status: DISCONTINUED | OUTPATIENT
Start: 2022-11-29 | End: 2022-11-29 | Stop reason: HOSPADM

## 2022-11-29 RX ORDER — VERAPAMIL HYDROCHLORIDE 2.5 MG/ML
5 INJECTION, SOLUTION INTRAVENOUS
Status: COMPLETED | OUTPATIENT
Start: 2022-11-29 | End: 2022-11-29

## 2022-11-29 RX ORDER — ROCURONIUM BROMIDE 10 MG/ML
INJECTION, SOLUTION INTRAVENOUS AS NEEDED
Status: DISCONTINUED | OUTPATIENT
Start: 2022-11-29 | End: 2022-11-29 | Stop reason: HOSPADM

## 2022-11-29 RX ORDER — CLOPIDOGREL BISULFATE 75 MG/1
37.5 TABLET ORAL DAILY
Status: DISCONTINUED | OUTPATIENT
Start: 2022-11-30 | End: 2022-11-30 | Stop reason: HOSPADM

## 2022-11-29 RX ORDER — PROPOFOL 10 MG/ML
INJECTION, EMULSION INTRAVENOUS AS NEEDED
Status: DISCONTINUED | OUTPATIENT
Start: 2022-11-29 | End: 2022-11-29 | Stop reason: HOSPADM

## 2022-11-29 RX ORDER — ONDANSETRON 2 MG/ML
INJECTION INTRAMUSCULAR; INTRAVENOUS AS NEEDED
Status: DISCONTINUED | OUTPATIENT
Start: 2022-11-29 | End: 2022-11-29 | Stop reason: HOSPADM

## 2022-11-29 RX ORDER — HEPARIN SODIUM 1000 [USP'U]/ML
INJECTION, SOLUTION INTRAVENOUS; SUBCUTANEOUS AS NEEDED
Status: DISCONTINUED | OUTPATIENT
Start: 2022-11-29 | End: 2022-11-29 | Stop reason: HOSPADM

## 2022-11-29 RX ORDER — DEXAMETHASONE SODIUM PHOSPHATE 4 MG/ML
2 INJECTION, SOLUTION INTRA-ARTICULAR; INTRALESIONAL; INTRAMUSCULAR; INTRAVENOUS; SOFT TISSUE ONCE
Status: COMPLETED | OUTPATIENT
Start: 2022-11-29 | End: 2022-11-29

## 2022-11-29 RX ORDER — CLOPIDOGREL BISULFATE 75 MG/1
75 TABLET ORAL DAILY
Status: DISCONTINUED | OUTPATIENT
Start: 2022-11-29 | End: 2022-11-29

## 2022-11-29 RX ORDER — SODIUM CHLORIDE 9 MG/ML
INJECTION, SOLUTION INTRAVENOUS
Status: DISCONTINUED | OUTPATIENT
Start: 2022-11-29 | End: 2022-11-29 | Stop reason: HOSPADM

## 2022-11-29 RX ORDER — BUTALBITAL, ACETAMINOPHEN AND CAFFEINE 50; 325; 40 MG/1; MG/1; MG/1
1 TABLET ORAL
Status: DISCONTINUED | OUTPATIENT
Start: 2022-11-29 | End: 2022-11-30 | Stop reason: HOSPADM

## 2022-11-29 RX ORDER — VERAPAMIL HYDROCHLORIDE 2.5 MG/ML
2.5 INJECTION, SOLUTION INTRAVENOUS
Status: DISCONTINUED | OUTPATIENT
Start: 2022-11-29 | End: 2022-11-29

## 2022-11-29 RX ORDER — GLYCOPYRROLATE 0.2 MG/ML
INJECTION INTRAMUSCULAR; INTRAVENOUS AS NEEDED
Status: DISCONTINUED | OUTPATIENT
Start: 2022-11-29 | End: 2022-11-29 | Stop reason: HOSPADM

## 2022-11-29 RX ORDER — LABETALOL HYDROCHLORIDE 5 MG/ML
10 INJECTION, SOLUTION INTRAVENOUS
Status: DISCONTINUED | OUTPATIENT
Start: 2022-11-29 | End: 2022-11-30 | Stop reason: HOSPADM

## 2022-11-29 RX ORDER — ONDANSETRON 2 MG/ML
4 INJECTION INTRAMUSCULAR; INTRAVENOUS
Status: DISCONTINUED | OUTPATIENT
Start: 2022-11-29 | End: 2022-11-30 | Stop reason: HOSPADM

## 2022-11-29 RX ORDER — SUCCINYLCHOLINE CHLORIDE 20 MG/ML
INJECTION INTRAMUSCULAR; INTRAVENOUS AS NEEDED
Status: DISCONTINUED | OUTPATIENT
Start: 2022-11-29 | End: 2022-11-29 | Stop reason: HOSPADM

## 2022-11-29 RX ORDER — LIDOCAINE 40 MG/G
CREAM TOPICAL
Status: COMPLETED | OUTPATIENT
Start: 2022-11-29 | End: 2022-11-29

## 2022-11-29 RX ORDER — DEXAMETHASONE SODIUM PHOSPHATE 4 MG/ML
INJECTION, SOLUTION INTRA-ARTICULAR; INTRALESIONAL; INTRAMUSCULAR; INTRAVENOUS; SOFT TISSUE AS NEEDED
Status: DISCONTINUED | OUTPATIENT
Start: 2022-11-29 | End: 2022-11-29 | Stop reason: HOSPADM

## 2022-11-29 RX ORDER — FENTANYL CITRATE 50 UG/ML
INJECTION, SOLUTION INTRAMUSCULAR; INTRAVENOUS
Status: COMPLETED
Start: 2022-11-29 | End: 2022-11-29

## 2022-11-29 RX ORDER — HEPARIN SODIUM 1000 [USP'U]/ML
2000 INJECTION, SOLUTION INTRAVENOUS; SUBCUTANEOUS
Status: DISCONTINUED | OUTPATIENT
Start: 2022-11-29 | End: 2022-11-29

## 2022-11-29 RX ADMIN — ONDANSETRON HYDROCHLORIDE 4 MG: 2 INJECTION, SOLUTION INTRAMUSCULAR; INTRAVENOUS at 12:02

## 2022-11-29 RX ADMIN — PROPOFOL 50 MG: 10 INJECTION, EMULSION INTRAVENOUS at 12:06

## 2022-11-29 RX ADMIN — Medication 4000 UNITS: at 09:32

## 2022-11-29 RX ADMIN — FENTANYL CITRATE 50 MCG: 50 INJECTION, SOLUTION INTRAMUSCULAR; INTRAVENOUS at 12:08

## 2022-11-29 RX ADMIN — DEXAMETHASONE SODIUM PHOSPHATE 2 MG: 4 INJECTION, SOLUTION INTRAMUSCULAR; INTRAVENOUS at 20:00

## 2022-11-29 RX ADMIN — Medication 4000 UNITS: at 09:35

## 2022-11-29 RX ADMIN — SODIUM CHLORIDE: 900 INJECTION, SOLUTION INTRAVENOUS at 09:00

## 2022-11-29 RX ADMIN — DEXAMETHASONE SODIUM PHOSPHATE 4 MG: 4 INJECTION, SOLUTION INTRAMUSCULAR; INTRAVENOUS at 15:47

## 2022-11-29 RX ADMIN — LIDOCAINE 4%: 4 CREAM TOPICAL at 07:51

## 2022-11-29 RX ADMIN — PROPOFOL 50 MG: 10 INJECTION, EMULSION INTRAVENOUS at 12:08

## 2022-11-29 RX ADMIN — NITROGLYCERIN 1 INCH: 20 OINTMENT TOPICAL at 07:51

## 2022-11-29 RX ADMIN — PROPOFOL 50 MG: 10 INJECTION, EMULSION INTRAVENOUS at 11:57

## 2022-11-29 RX ADMIN — DIPHENHYDRAMINE HYDROCHLORIDE 50 MG: 50 INJECTION, SOLUTION INTRAMUSCULAR; INTRAVENOUS at 09:36

## 2022-11-29 RX ADMIN — Medication 4000 UNITS: at 09:36

## 2022-11-29 RX ADMIN — Medication 4000 UNITS: at 09:33

## 2022-11-29 RX ADMIN — HEPARIN SODIUM 1000 UNITS: 1000 INJECTION, SOLUTION INTRAVENOUS; SUBCUTANEOUS at 10:04

## 2022-11-29 RX ADMIN — ROCURONIUM BROMIDE 10 MG: 10 SOLUTION INTRAVENOUS at 09:10

## 2022-11-29 RX ADMIN — PROPOFOL 50 MG: 10 INJECTION, EMULSION INTRAVENOUS at 12:02

## 2022-11-29 RX ADMIN — SODIUM CHLORIDE 40 MCG/MIN: 9 INJECTION, SOLUTION INTRAVENOUS at 09:09

## 2022-11-29 RX ADMIN — SODIUM CHLORIDE: 900 INJECTION, SOLUTION INTRAVENOUS at 12:03

## 2022-11-29 RX ADMIN — SUCCINYLCHOLINE CHLORIDE 100 MG: 20 INJECTION, SOLUTION INTRAMUSCULAR; INTRAVENOUS at 09:10

## 2022-11-29 RX ADMIN — VERAPAMIL HYDROCHLORIDE 2.5 MG: 2.5 INJECTION, SOLUTION INTRAVENOUS at 10:16

## 2022-11-29 RX ADMIN — VERAPAMIL HYDROCHLORIDE 2.5 MG: 2.5 INJECTION, SOLUTION INTRAVENOUS at 10:08

## 2022-11-29 RX ADMIN — DEXAMETHASONE SODIUM PHOSPHATE 8 MG: 4 INJECTION, SOLUTION INTRAMUSCULAR; INTRAVENOUS at 09:36

## 2022-11-29 RX ADMIN — Medication 4000 UNITS: at 09:29

## 2022-11-29 RX ADMIN — SODIUM CHLORIDE, POTASSIUM CHLORIDE, SODIUM LACTATE AND CALCIUM CHLORIDE 100 ML/HR: 600; 310; 30; 20 INJECTION, SOLUTION INTRAVENOUS at 13:31

## 2022-11-29 RX ADMIN — PROPOFOL 200 MG: 10 INJECTION, EMULSION INTRAVENOUS at 09:10

## 2022-11-29 RX ADMIN — IOPAMIDOL 134 ML: 612 INJECTION, SOLUTION INTRAVENOUS at 11:59

## 2022-11-29 RX ADMIN — LIDOCAINE HYDROCHLORIDE 3 ML: 10 INJECTION, SOLUTION INFILTRATION; PERINEURAL at 11:58

## 2022-11-29 RX ADMIN — HEPARIN SODIUM 4000 UNITS: 1000 INJECTION, SOLUTION INTRAVENOUS; SUBCUTANEOUS at 10:18

## 2022-11-29 RX ADMIN — HEPARIN SODIUM 1000 UNITS: 1000 INJECTION INTRAVENOUS; SUBCUTANEOUS at 10:06

## 2022-11-29 RX ADMIN — LIDOCAINE HYDROCHLORIDE 60 MG: 20 INJECTION, SOLUTION EPIDURAL; INFILTRATION; INTRACAUDAL; PERINEURAL at 09:10

## 2022-11-29 RX ADMIN — GLYCOPYRROLATE 0.2 MG: 0.2 INJECTION INTRAMUSCULAR; INTRAVENOUS at 09:40

## 2022-11-29 NOTE — BRIEF OP NOTE
NEUROINTERVENTIONAL SURGERY BRIEF POSTOPERATIVE NOTE    Pre-Op Diagnosis: Cerebellar AVM    Post-Op Diagnosis: Same as preoperative diagnosis. PROCEDURE:  Cerebral angiography  Transarterial embolization, CNS: Particle embolization of AAVM pedicle  Control angiography  Ultrasound guided vascular access  Placement of arteriotomy closure device    VESSEL(S) STUDIED:  Right vertebral artery VESSEL(S) TREATED:  Right PICA        PRELIMINARY REPORT & DISPOSITION:   AVM. Patent Pipeline construct. Aneurysms are occluded. Status post silk and PVA embolization of one arterial pedicle supplying AVM, resulting in decreased AVM shunting. COMPLICATIONS:  None    SPECIMENS:   * No specimens in log *     IMPLANTS:   * No implants in log *    DRAINS:   StarClose at Right CFA    FOLLOW-UP:  Admit to ICU  SBP   Q 1 hr neurochecks  Head CT in AM   DATE OF SERVICE:  11/29/2022 12:36 PM     ATTENDING SURGEON(S):  Chirag Reeder MD      ANESTHESIA:   GA    EBL: 5 cc    MEDICATIONS:   See nursing record    PUNCTURE SITE:  Right common femoral artery. Arteriotomy closed with Star[employer]. Flat with leg straight x 2 hours. Humberto Rodriguez M.D.    Martínez Nevarez    , Department of Radiology  [facility]

## 2022-11-29 NOTE — ROUTINE PROCESS
0725: Pt arrives ambulatory to angio department accompanied by grandmother Donnie Gordon for cerebral angiogram and possible particle embolization of AVM, arteriotomy closure device procedure. All assessments completed and consent was reviewed. Education given was regarding procedure, general sedation, post-procedure care and  management/follow-up. Opportunity for questions was provided and all questions and concerns were addressed. 5881: Patient placed on angio table without assistance. IR Staff and anesthesia present at bedside. Anesthesia to remain at bedside to manage pt airway, medications, VS and pt status. 1245: TRANSFER - OUT REPORT:    Verbal report given to Kamaljit RN(name) on Fran Sitter  being transferred to ICU 20(unit) for routine post - op       Report consisted of patients Situation, Background, Assessment and   Recommendations(SBAR). Information from the following report(s) SBAR, Procedure Summary, Intake/Output, MAR, Recent Results, Cardiac Rhythm NSR, and Alarm Parameters  was reviewed with the receiving nurse. Lines:   Peripheral IV 11/29/22 Left Antecubital (Active)        Opportunity for questions and clarification was provided.       Patient transported with:   Monitor  O2 @ 4 liters  Registered Nurse  CRNA

## 2022-11-29 NOTE — H&P
NeuroInterventional Surgery H&P  Bandar Lopez NP    Patient: Juan C Jeong MRN: [id number]  SSN: [id number]    YOB: 1985  Age: 40 y.o. Sex: female      Subjective:       Juan C Jeong is a 40 y.o. 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 She continues to take Plavix 75 mg daily, confirms that she only took a half dose this am as directed. CTA Head performed 10/5/2022 showed a patent Pipeline construct and no evidence of residual aneurysms. Otherwise, the AVM is stable. She presents today for cerebral angiography with microcatheter interrogation of the AVM and transarterial particle embolization using silk suture & PVA. She feels well overall and is ready to proceed. She reports hx of PONV, reports PAT recommended a scopolamine patch for today's procedure.      Past Medical History:   Diagnosis Date    Aneurysm ([address]     AVM    Anxiety     Arrhythmia     Arthritis     Calculus of kidney     Cerebellar hemorrhage, acute ([address] 03/2022    secondary to AVM (IR angiography 3/22)    Chronic pain     BACK AND PELVIS    Current smoker     Dyspepsia and other specified disorders of function of stomach     GERD (gastroesophageal reflux disease)     Ill-defined condition     KIDNEY STONES    Irregular heart beat     Kidney disease     hx of kidney stone    Nausea & vomiting     Other ill-defined conditions(799.89)     kidney stone in pass,passed    Postpartum hemorrhage 2017    ALSO HAD HEMORRHAGE DURING MISCARRIAGE 2012    Psychiatric disorder     ADHD    Stroke ([facility] [address] 03/17/2022     Family History   Problem Relation Age of Onset    Emphysema Mother     No Known Problems Father     No Known Problems Sister     No Known Problems Sister     No Known Problems Sister     No Known Problems Sister     No Known Problems Sister     No Known Problems Sister     No Known Problems Brother     No Known Problems Son     No Known Problems Son     Anesth Problems Neg Hx      Social History     Tobacco Use    Smoking status: Former     Packs/day: 0.50     Years: 15.00     Pack years: 7.50     Types: Cigarettes     Quit date: 2022     Years since quittin.7    Smokeless tobacco: Never    Tobacco comments:     about 6 cigarettes per day at present   Substance Use Topics    Alcohol use: Not Currently      Prior to Admission Medications   Prescriptions Last Dose Informant Patient Reported? Taking?   acetaminophen (TYLENOL) 325 mg tablet 2022  No Yes   Sig: Take 2 Tablets by mouth every six (6) hours as needed for Pain or Fever (headache). butalbital-acetaminophen-caffeine (FIORICET, ESGIC) -40 mg per tablet 10/29/2022  No Yes   Sig: Take 1 Tablet by mouth every four (4) hours as needed for Migraine. clopidogreL (Plavix) 75 mg tab 2022  No Yes   Sig: Take 1 Tablet by mouth daily. Facility-Administered Medications: None       Allergies   Allergen Reactions    Morphine Rash and Hives    Zolpidem Other (comments)     \"Hallucinations and black outs\"     Review of Systems:  A comprehensive review of systems was negative. Denies numbness, tingling, nausea, vomiting, difficulty swallowing or speaking, headache, blurred vision or dizziness. Objective:     Vitals:    22 0729   BP: 110/67   Pulse: 65   Resp: 10   Temp: 97.9 °F (36.6 °C)   SpO2: 98%   Weight: 93 lb (42.2 kg)   Height: 5' (1.524 m)      Physical Exam:  GENERAL: Calm, cooperative, NAD  SKIN: Warm, dry, color appropriate for ethnicity. Neurologic Exam:  Mental Status:  Alert and oriented x 4. Appropriate affect, mood and behavior. Language:    Normal fluency, repetition, comprehension and naming. Cranial Nerves:   Pupils 3 mm, equal, round and reactive to light. Visual fields full to confrontation. Extraocular movements intact.         Facial sensation intact. Full facial strength, no asymmetry. Hearing grossly intact bilaterally. No dysarthria. Tongue protrudes to midline, palate elevates symmetrically. Shoulder shrug 5/5 bilaterally. Motor:    No pronator drift. Bulk and tone normal.      5/5 power in all extremities proximally and distally. No involuntary movements. Sensation:    Sensation intact throughout to light touch. Coordination & Gait: Normal. FTN and HTS intact with no ataxia present. Labs:  Lab Results   Component Value Date/Time    WBC 7.2 11/22/2022 11:30 AM    Hemoglobin (POC) 13.9 10/24/2018 11:40 AM    HGB 14.3 11/22/2022 11:30 AM    HCT 43.0 11/22/2022 11:30 AM    PLATELET 234 89/29/0955 11:30 AM    MCV 93.5 11/22/2022 11:30 AM      Lab Results   Component Value Date/Time    Sodium 141 11/22/2022 11:30 AM    Potassium 3.8 11/22/2022 11:30 AM    Chloride 109 (H) 11/22/2022 11:30 AM    CO2 27 11/22/2022 11:30 AM    Anion gap 5 11/22/2022 11:30 AM    Glucose 89 11/22/2022 11:30 AM    BUN 10 11/22/2022 11:30 AM    Creatinine 0.62 11/22/2022 11:30 AM    BUN/Creatinine ratio 16 11/22/2022 11:30 AM    GFR est AA >60 07/05/2022 01:39 PM    GFR est AA >60 07/05/2022 01:39 PM    GFR est non-AA >60 07/05/2022 01:39 PM    GFR est non-AA >60 07/05/2022 01:39 PM    Calcium 8.9 11/22/2022 11:30 AM     Imaging:  CTA Head performed 10/5/2022 showed a patent Pipeline construct and no evidence of residual aneurysms. Otherwise, the AVM is stable. Assessment:     Hospital Problems  Date Reviewed: 10/14/2022            Codes Class Noted POA    AVM (arteriovenous malformation) ICD-10-CM: Q27.30  ICD-9-CM: 747.60  11/29/2022 Unknown         Plan:      Proceed with diagnostic cerebral angiogram today with Dr. Shirin Burns.     - Admit to ICU post procedure              - Check P2Y12 post to procedure              - q1 hour neuro checks               - SBP goal , cardene/labetalol PRN              - Cont Plavix 75 mg daily    Diet: Regular  Activity: Up with assist after bedrest is completed  DVT prophylaxis: SCDs  Isolation precautions: None  Anticipated disposition: Home with office follow up    Risk, benefits and alternatives of procedure were reviewed prior to this procedure by myself and Dr. Sis Nielsen with patient. The patient verbalized understanding and would like to proceed with procedure as planned. All questions were answered, and written informed consent has been obtained.     Signed By: Marcio Chavira NP     November 29, 2022

## 2022-11-29 NOTE — ANESTHESIA PREPROCEDURE EVALUATION
Anesthetic History   No history of anesthetic complications            Review of Systems / Medical History  Patient summary reviewed, nursing notes reviewed and pertinent labs reviewed    Pulmonary  Within defined limits                 Neuro/Psych             Comments: Cerebellar AVM with cerebellar hemorrhage     Cerebral edema with brain compression on hypertonic saline Cardiovascular  Within defined limits                Exercise tolerance: >4 METS     GI/Hepatic/Renal     GERD           Endo/Other        Arthritis     Other Findings   Comments: Current workup for nic danlos due to hypermobile joints and increase skin elasticity            Physical Exam    Airway  Mallampati: I  TM Distance: > 6 cm  Neck ROM: normal range of motion   Mouth opening: Normal     Cardiovascular  Regular rate and rhythm,  S1 and S2 normal,  no murmur, click, rub, or gallop             Dental  No notable dental hx       Pulmonary  Breath sounds clear to auscultation               Abdominal  GI exam deferred       Other Findings            Anesthetic Plan    ASA: 3  Anesthesia type: general          Induction: Intravenous  Anesthetic plan and risks discussed with: Patient

## 2022-11-29 NOTE — ANESTHESIA POSTPROCEDURE EVALUATION
* No procedures listed *.    general    Anesthesia Post Evaluation        Patient location during evaluation: PACU  Patient participation: complete - patient participated  Level of consciousness: awake and alert  Pain management: adequate  Airway patency: patent  Anesthetic complications: no  Cardiovascular status: acceptable  Respiratory status: acceptable  Hydration status: acceptable  Comments: I have seen and evaluated the patient and is ready for discharge. Promise Jennings MD    Post anesthesia nausea and vomiting:  none      INITIAL Post-op Vital signs:   Vitals Value Taken Time   /64 11/29/22 1338   Temp     Pulse 72 11/29/22 1353   Resp 12 11/29/22 1353   SpO2 100 % 11/29/22 1353   Vitals shown include unvalidated device data.

## 2022-11-29 NOTE — ROUTINE PROCESS
Bedside shift change report given to this RN (oncoming nurse) by Kinjal Washington RN (offgoing nurse). Report included the following information SBAR, Kardex, Procedure Summary, Intake/Output, MAR, Accordion, and Recent Results. 1310: Pt arrived to ICU 20. NP notified. Pt A&Ox4, obeying commands, no c/o pain. PERRLA, 2s. All extremities equal in strength, VSS, afebrile. Only maintenance IVF running. Pt's mother and grandmother at bedside visiting and updated to pt situation. Pt's R groin site bleeding, NP Lack aware. 1430: Able to sit pt up. Upon doing so, pt began bleeding more at R groin site. Laid pt flat and held pressure and notified NP Lack. PTY12 sent to lab.    1500: Right femstop applied to procedural site alongside NP Lack. Instructions to follow protocol for neurovascular checks - see charting for documentation. Verbal order to keep pt flat for an additional 2 hours until 1700.    1700: BMP sent to lab. Bedside shift change report given to Mana Franco RN (oncoming nurse) by this RN (offgoing nurse). Report included the following information SBAR, Kardex, Intake/Output, MAR, Accordion, and Recent Results.

## 2022-11-30 ENCOUNTER — APPOINTMENT (OUTPATIENT)
Dept: CT IMAGING | Age: 37
DRG: 058 | End: 2022-11-30
Attending: NURSE PRACTITIONER
Payer: MEDICAID

## 2022-11-30 VITALS
SYSTOLIC BLOOD PRESSURE: 104 MMHG | HEART RATE: 72 BPM | RESPIRATION RATE: 18 BRPM | HEIGHT: 60 IN | DIASTOLIC BLOOD PRESSURE: 63 MMHG | BODY MASS INDEX: 18.26 KG/M2 | WEIGHT: 93 LBS | OXYGEN SATURATION: 100 % | TEMPERATURE: 97.8 F

## 2022-11-30 DIAGNOSIS — Q27.30 AVM (ARTERIOVENOUS MALFORMATION): Primary | ICD-10-CM

## 2022-11-30 LAB
ANION GAP SERPL CALC-SCNC: 6 MMOL/L (ref 5–15)
APPEARANCE UR: CLEAR
BACTERIA URNS QL MICRO: NEGATIVE /HPF
BILIRUB UR QL: NEGATIVE
BUN SERPL-MCNC: 12 MG/DL (ref 6–20)
BUN/CREAT SERPL: 21 (ref 12–20)
CALCIUM SERPL-MCNC: 8.6 MG/DL (ref 8.5–10.1)
CHLORIDE SERPL-SCNC: 112 MMOL/L (ref 97–108)
CO2 SERPL-SCNC: 22 MMOL/L (ref 21–32)
COLOR UR: ABNORMAL
CREAT SERPL-MCNC: 0.58 MG/DL (ref 0.55–1.02)
EPITH CASTS URNS QL MICRO: ABNORMAL /LPF
ERYTHROCYTE [DISTWIDTH] IN BLOOD BY AUTOMATED COUNT: 12.5 % (ref 11.5–14.5)
GLUCOSE SERPL-MCNC: 127 MG/DL (ref 65–100)
GLUCOSE UR STRIP.AUTO-MCNC: NEGATIVE MG/DL
HCT VFR BLD AUTO: 41.1 % (ref 35–47)
HGB BLD-MCNC: 13.8 G/DL (ref 11.5–16)
HGB UR QL STRIP: ABNORMAL
HYALINE CASTS URNS QL MICRO: ABNORMAL /LPF (ref 0–5)
KETONES UR QL STRIP.AUTO: ABNORMAL MG/DL
LEUKOCYTE ESTERASE UR QL STRIP.AUTO: NEGATIVE
MAGNESIUM SERPL-MCNC: 2.3 MG/DL (ref 1.6–2.4)
MCH RBC QN AUTO: 31.1 PG (ref 26–34)
MCHC RBC AUTO-ENTMCNC: 33.6 G/DL (ref 30–36.5)
MCV RBC AUTO: 92.6 FL (ref 80–99)
NITRITE UR QL STRIP.AUTO: NEGATIVE
NRBC # BLD: 0 K/UL (ref 0–0.01)
NRBC BLD-RTO: 0 PER 100 WBC
P2Y12 PLT RESPONSE,PPPR: 39 PRU (ref 194–418)
PH UR STRIP: 7 [PH] (ref 5–8)
PHOSPHATE SERPL-MCNC: 3.6 MG/DL (ref 2.6–4.7)
PLATELET # BLD AUTO: 270 K/UL (ref 150–400)
PMV BLD AUTO: 11.1 FL (ref 8.9–12.9)
POTASSIUM SERPL-SCNC: 3.9 MMOL/L (ref 3.5–5.1)
PROT UR STRIP-MCNC: NEGATIVE MG/DL
RBC # BLD AUTO: 4.44 M/UL (ref 3.8–5.2)
RBC #/AREA URNS HPF: ABNORMAL /HPF (ref 0–5)
SODIUM SERPL-SCNC: 140 MMOL/L (ref 136–145)
SP GR UR REFRACTOMETRY: 1.02 (ref 1–1.03)
UA: UC IF INDICATED,UAUC: ABNORMAL
UROBILINOGEN UR QL STRIP.AUTO: 0.2 EU/DL (ref 0.2–1)
WBC # BLD AUTO: 13.2 K/UL (ref 3.6–11)
WBC URNS QL MICRO: ABNORMAL /HPF (ref 0–4)

## 2022-11-30 PROCEDURE — 85576 BLOOD PLATELET AGGREGATION: CPT

## 2022-11-30 PROCEDURE — 36415 COLL VENOUS BLD VENIPUNCTURE: CPT

## 2022-11-30 PROCEDURE — 74011250637 HC RX REV CODE- 250/637: Performed by: NURSE PRACTITIONER

## 2022-11-30 PROCEDURE — 80048 BASIC METABOLIC PNL TOTAL CA: CPT

## 2022-11-30 PROCEDURE — 81001 URINALYSIS AUTO W/SCOPE: CPT

## 2022-11-30 PROCEDURE — 74011250636 HC RX REV CODE- 250/636: Performed by: NURSE PRACTITIONER

## 2022-11-30 PROCEDURE — 51798 US URINE CAPACITY MEASURE: CPT

## 2022-11-30 PROCEDURE — 84100 ASSAY OF PHOSPHORUS: CPT

## 2022-11-30 PROCEDURE — 85027 COMPLETE CBC AUTOMATED: CPT

## 2022-11-30 PROCEDURE — 83735 ASSAY OF MAGNESIUM: CPT

## 2022-11-30 PROCEDURE — 70450 CT HEAD/BRAIN W/O DYE: CPT

## 2022-11-30 RX ORDER — CLOPIDOGREL BISULFATE 75 MG/1
37.2 TABLET ORAL EVERY OTHER DAY
Qty: 30 TABLET | Refills: 5 | Status: SHIPPED | OUTPATIENT
Start: 2022-11-30

## 2022-11-30 RX ORDER — SIMETHICONE 80 MG
80 TABLET,CHEWABLE ORAL
Status: DISCONTINUED | OUTPATIENT
Start: 2022-11-30 | End: 2022-11-30 | Stop reason: HOSPADM

## 2022-11-30 RX ADMIN — SIMETHICONE 80 MG: 80 TABLET, CHEWABLE ORAL at 04:15

## 2022-11-30 RX ADMIN — SODIUM CHLORIDE, POTASSIUM CHLORIDE, SODIUM LACTATE AND CALCIUM CHLORIDE 100 ML/HR: 600; 310; 30; 20 INJECTION, SOLUTION INTRAVENOUS at 01:17

## 2022-11-30 RX ADMIN — ACETAMINOPHEN 650 MG: 325 TABLET ORAL at 08:08

## 2022-11-30 RX ADMIN — Medication 1 LOZENGE: at 01:19

## 2022-11-30 NOTE — PROGRESS NOTES
1930: Bedside and Verbal shift change report given to Kidizen (oncoming nurse) by Rae Garcia RN (offgoing nurse). Report included the following information SBAR, Kardex, Procedure Summary, Intake/Output, MAR, Accordion, Recent Results, Cardiac Rhythm NSR w PVCs, Alarm Parameters , and Dual Neuro Assessment. 0400: Patient complaining of gas pain. Orders received from Ara Leong NP for simethicone. 6688: Patient complaining of pain in L hand from A- line. BP obtained with cuff in right arm has map of 84 corresponding to A-line map of 86. Orders received to discontinue A-line    0600: Patient complaining gas pain worsening, mostly on R side. Muriel Cain NP at bedside. Bladder scan completed showed 413 mL. Patient then voided 100 mL. Orders received for UA from Muriel Cain NP.     0800: Bedside and Verbal shift change report given to [address] [place] [address] (oncoming nurse) by Khadra Metz RN (offgoing nurse). Report included the following information SBAR, Kardex, Procedure Summary, Intake/Output, MAR, Cardiac Rhythm NSR w PVCs, Alarm Parameters , and Dual Neuro Assessment.

## 2022-11-30 NOTE — PROGRESS NOTES
Neurocritical Care Brief Progress Note:  Patient is resting comfortable in bed with HOB currently at 30deg. Denied headaches, n/v, or any other neurological issues. Remained neuro intact. No more oozing noted at R groin site. Dressing c/d/I. No hematoma, bruises, or tenderness. + pulses. Pending CTH and morning labs: cbc, bmp, and p2y12. Plavix currently on-hold. SBP . Current SBP is 134. On LR at 100ml/hr. Addendum [id number]  Patient c/o A-line site pain. No swelling noted. BP obtained with cuff in right arm has map of 84 corresponding to A-line map of 86. Morning labs already obtained with results pending. Will dc A-line. Patient denied any other issues. Addendum [id number]   Patient reported to nurse she's being endorsing RLQ abd pain 5/10 for the past couple of hours which she attributed to gas pain and was given gasX with no relief. Patient reported tenderness to touch. No hematoma or bruises noted around the site. No more oozing at R groin site overnight. Patient encouraged to void and bladder scan showed pvr >400. patient stated she's endorsing dysuria and urinary urgency/frequencies. Wbc with mild leukocytosis. No fever. Will obtain UA. P2y12 is 39. Physical Exam:   Blood pressure (!) 115/59, pulse 63, temperature 98.7 °F (37.1 °C), resp. rate 20, height 5' (1.524 m), weight 42.2 kg (93 lb), SpO2 99 %, not currently breastfeeding. GEN: Cooperative, Calm, Well developed, well nourished patient in NAD  HEENT: Normocephalic. Non-icter, no congestion  Lungs:  diminished in R lobes Ant; non-labored breathing  Cardiac: S1,S2, normal rate and rhythm, with no murmurs. no carotid bruits, no gallops  Abdomen: Normal bowel sounds, no distention, soft, non-tender  Extremities: 2+ Radial pulses, no clubbing, cyanosis, or edema  Skin: no rashes or lesions noted; R groin arteriotomy site is c/d/i      NEURO:  Mental status: Oriented to time, situation, place and person.  Able to follow commands appropriately  Cranial Nerves:  II-XII intact; Speech and language intact. Attention and fund of knowledge appropriate. Normal recent and remote memory. EOMI, PERRL, no nystagmus, no ptosis. No dysarthria or aphasia. Full facial strength, no asymmetry. Hearing intact bilaterally. Tongue protrudes to midline, palate elevates symmetrically. Shrug Shoulders B/L  Motor:  Normal bulk and tone, 5/5 strength x 4 extremities proximally and distally; No involuntary movements.   Coordination:  Intact FTN and HTS testing  Reflexes: +2 throughout, down going toes bilaterally   Sensation: Intact x 4 extremities to light touch  Gait:  Deferred        Continue current plan per [facility].     >25% time spent in counseling or coordination of care of the above in the assessment and plan       Heather Soni [facility]-NP  Neurocritical Care Nurse Practitioner  [phone]

## 2022-11-30 NOTE — DISCHARGE SUMMARY
Neurointerventional Surgery Discharge Summary  [address], [facility] [address]    Patient: Lul Jaramillo MRN: [id number]  SSN: [id number]    YOB: 1985  Age: 40 y.o. Sex: female      DATE OF ADMISSION: 11/29/2022    DATE OF DISCHARGE: 11/30/22     ADMITTING PROVIDER: Lulu Schmitz MD    DISCHARGING PROVIDER:Helen Burch NP    PROCEDURES/SURGERIES: Procedure(s) with comments: cerebral angiogram and PVA and silk particle embolization of AVM pedicle    OFFICE NUMBER: [phone] , you can reach the on call physician 24/7 even during non-business hours     [address]:     ICD-10-CM ICD-9-CM    1. Cerebral aneurysm  I67.1 437.3 IR ANGIO CAROTID CRBRL BI INTNL      IR ANGIO CAROTID CRBRL BI INTNL      clopidogreL (Plavix) 75 mg tab        Pt was admitted for scheduled elective cerebral angiogram and silk and PVA embolization of one arterial pedicle supplying AVM, resulting in decreased AVM shunting by Dr. Dali Freire 11/29/22. The procedure was performed in the Interventional Radiology suite under general anesthesia. The patient tolerated the procedure well without complications. Following extubation, patient was transferred to ICU, fully recovered and returned to neurological baseline. The patient was admitted overnight for continued monitoring due to potential risks of stroke and thrombosis. She experienced some oozing at her arteriotomy site, hemostasis was achieved after application of FemStop yesterday evening. No further bleeding to groin site after it was removed yesterday. There were no neurological events overnight. The patient was experiencing urinary retention post procedure, which has improved with ambulation/mobilization. The patient was appropriate for discharge home today in stable condition with planned office follow up.     Patient Vitals for the past 12 hrs:   Temp Pulse Resp BP SpO2   11/30/22 1100 -- 61 19 98/67 97 % 11/30/22 1000 -- (!) 102 15 110/68 99 %   11/30/22 0900 -- (!) 59 18 99/69 97 %   11/30/22 0800 98 °F (36.7 °C) (!) 52 13 98/66 97 %   11/30/22 0700 -- 63 17 114/65 99 %   11/30/22 0600 -- 62 14 99/66 95 %   11/30/22 0500 -- 64 17 101/63 98 %   11/30/22 0400 98.4 °F (36.9 °C) (!) 54 21 (!) 107/59 98 %   11/30/22 0300 -- (!) 48 13 -- 99 %       Physical Exam:  GENERAL: NAD, calm, cooperative  SKIN: Warm, dry, color appropriate for ethnicity. Groin arteriotomy site soft and non-tender on palpation, dressing is C/D/I, with no active bleeding or drainage noted. Neurologic Exam:  Mental Status:  Alert and oriented x 4. Appropriate affect, mood and behavior. Language:    Normal fluency, repetition, comprehension and naming. Cranial Nerves:   Pupils 3 mm bilaterally, equal, round and reactive to light. Visual fields full to confrontation. Extraocular movements intact. Facial sensation intact. Full facial strength, no asymmetry. Hearing intact bilaterally. No dysarthria. Tongue protrudes to midline, palate elevates symmetrically. Shoulder shrug 5/5 bilaterally. Motor:    No pronator drift. Bulk and tone normal.      5/5 power in all extremities proximally and distally. No involuntary movements. Sensation:    Sensation intact throughout to light touch. Coordination & Gait: Gait deferred. FTN and HTS intact with no ataxia present.     Labs:  Lab Results   Component Value Date/Time    Sodium 140 11/30/2022 04:36 AM    Potassium 3.9 11/30/2022 04:36 AM    Chloride 112 (H) 11/30/2022 04:36 AM    CO2 22 11/30/2022 04:36 AM    Anion gap 6 11/30/2022 04:36 AM    Glucose 127 (H) 11/30/2022 04:36 AM    BUN 12 11/30/2022 04:36 AM    Creatinine 0.58 11/30/2022 04:36 AM    BUN/Creatinine ratio 21 (H) 11/30/2022 04:36 AM    GFR est AA >60 07/05/2022 01:39 PM    GFR est AA >60 07/05/2022 01:39 PM    GFR est non-AA >60 07/05/2022 01:39 PM    GFR est non-AA >60 07/05/2022 01:39 PM    Calcium 8.6 11/30/2022 04:36 AM     Lab Results   Component Value Date/Time    WBC 13.2 (H) 11/30/2022 04:36 AM    Hemoglobin (POC) 13.9 10/24/2018 11:40 AM    HGB 13.8 11/30/2022 04:36 AM    HCT 41.1 11/30/2022 04:36 AM    PLATELET 971 35/80/9046 04:36 AM    MCV 92.6 11/30/2022 04:36 AM     Lab Results   Component Value Date/Time    MCH 31.1 11/30/2022 04:36 AM    MCHC 33.6 11/30/2022 04:36 AM    BASOPHILS 0 07/05/2022 01:39 PM    ABS. LYMPHOCYTES 2.0 07/05/2022 01:39 PM    ABS. MONOCYTES 0.4 07/05/2022 01:39 PM    ABS. EOSINOPHILS 0.1 07/05/2022 01:39 PM    ABS. BASOPHILS 0.0 07/05/2022 01:39 PM    Phosphorus 3.6 11/30/2022 04:36 AM    Magnesium 2.3 11/30/2022 04:36 AM     DIET:   Normal diet    PLAN:  Discharge home with self care. FOLLOW UP APPOINTMENTS:   1. Follow up in the [facility] clinic with Dr. Emely Ramirez on 12/8/22. 2.   Please make an appointment to follow up with PCP as needed. 3. We will repeat your P2Y12 at your follow up visit    ACTIVITY:   Do not lift anything over 10 lbs for two weeks. Try to limit going up and down stairs as much as possible. Rest and hydrate. May resume all physical activities as tolerated after 2 weeks. WOUND CARE:   Montior for signs and sx of infection including fever, expanding inflammation and discolored drainage. Report any changes in temperature in affected extremity, numbness, weakness or hematoma. If you experience any increased bruising or bleeding at your groin puncture site either outside or under the skin please apply direct, firm pressure for 20 minutes. Keep track of the bruising at the groin site by marking it with a pen or marker. If the bruising continues to be dark purple or blue in color, OR is expanding, please call our office to let the on call doctor know. We have someone on call 24/7. You may shower normally and cleanse the site with gentle soap. Do not soak in the bathtub.     OTHER RECOMMENDATIONS:  BEFAST reviewed to educate for stroke symptoms. Please call 911 and proceed to emergency department immediately if you develop any of the following:  - Acute changes in your balance or vision  - Weakness in your face, arm or leg   - Speech changes or difficulties. DISCHARGE MEDICATIONS:   1. Resume all home meds  2. Continue Plavix 37.5 mg every other day     Avoid NSAIDS including (Advil, Motrin, Ibuprofen, Celebrex etc) while you are taking Plavix. You may take Tylenol for pain. Current Discharge Medication List        CONTINUE these medications which have CHANGED    Details   clopidogreL (Plavix) 75 mg tab Take 0.5 Tablets by mouth every other day.   Qty: 30 Tablet, Refills: 5  Start date: 11/30/2022    Associated Diagnoses: Cerebral aneurysm           STOP taking these medications       acetaminophen (TYLENOL) 325 mg tablet Comments:   Reason for Stopping:         butalbital-acetaminophen-caffeine (FIORICET, ESGIC) -40 mg per tablet Comments:   Reason for Stopping:               Chace Beckett NP  Neurointerventional Surgery

## 2022-11-30 NOTE — PROGRESS NOTES
Reason for Admission:  Scheduled Cerebral angiogram                     RUR Score:  8%              Plan for utilizing home health:      No    PCP: First and Last name:  Dr Isela Rascon     Name of Practice:    Are you a current patient: Yes/No: New paitent appointment first week of December    Approximate date of last visit:    Can you participate in a virtual visit with your PCP: Yes                    Current Advanced Directive/Advance Care Plan: Full Code      Healthcare Decision Maker:   Click here to complete [address] including selection of the Healthcare Decision Maker Relationship (ie \"Primary\")             Primary Decision Maker: Mariocorine [facility] - [phone]                  Transition of Care Plan:      Care manager met with patient to introduce self and explain role. Patient lives independently with her mother and children in a single level home with  [address]. Per patient she has no previous HH or equipment needs, she has been to [facility] on the past for rehab. Patient has a new patient appointment with Dr Isela Rascon the first of December. She uses [facility] on [address]. CM confirmed demographic information. Family contact: Tayo Rodríguez [phone]. Segr Norwood RN,Care Management  Care Management Interventions  PCP Verified by CM:  Yes (Dr Isela Rascon)  [address]: Yes  Discharge Durable Medical Equipment: No  Physical Therapy Consult: No  Speech Therapy Consult: No  Support Systems: Parent(s) (Grandmother Matthias Park [phone])  Confirm Follow Up Transport: Family  Discharge Location  Patient Expects to be Discharged to[de-identified] Home with family assistance

## 2022-11-30 NOTE — PROGRESS NOTES
I have reviewed discharge instructions with the patient. The patient verbalized understanding. Stroke and MI education provided, pt verbalized understanding.

## 2022-11-30 NOTE — DISCHARGE INSTRUCTIONS
DIET:   Normal diet    PLAN:  Discharge home with self care. FOLLOW UP APPOINTMENTS:   1. Follow up in the [facility] clinic with Dr. Myrna Andrews on 12/8/22. 2.   Please make an appointment to follow up with PCP as needed. 3. We will repeat your P2Y12 at your follow up visit    ACTIVITY:   Do not lift anything over 10 lbs for two weeks. Try to limit going up and down stairs as much as possible. Rest and hydrate. May resume all physical activities as tolerated after 2 weeks. WOUND CARE:   Montior for signs and sx of infection including fever, expanding inflammation and discolored drainage. Report any changes in temperature in affected extremity, numbness, weakness or hematoma. If you experience any increased bruising or bleeding at your groin puncture site either outside or under the skin please apply direct, firm pressure for 20 minutes. Keep track of the bruising at the groin site by marking it with a pen or marker. If the bruising continues to be dark purple or blue in color, OR is expanding, please call our office to let the on call doctor know. We have someone on call 24/7. You may shower normally and cleanse the site with gentle soap. Do not soak in the bathtub. OTHER RECOMMENDATIONS:  BEFAST reviewed to educate for stroke symptoms. Please call 911 and proceed to emergency department immediately if you develop any of the following:  - Acute changes in your balance or vision  - Weakness in your face, arm or leg   - Speech changes or difficulties. DISCHARGE MEDICATIONS:   1. Resume all home meds  2. Continue Plavix 37.5 mg every other day     Avoid NSAIDS including (Advil, Motrin, Ibuprofen, Celebrex etc) while you are taking Aspirin and Brilinta/Plavix. You may take Tylenol for pain.

## 2022-12-01 RX ORDER — METHYLPREDNISOLONE 4 MG/1
TABLET ORAL
Qty: 1 DOSE PACK | Refills: 0 | Status: SHIPPED | OUTPATIENT
Start: 2022-12-01

## 2022-12-01 NOTE — PROGRESS NOTES
Physician Progress Note      PATIENT:               Alicia Enriquez  CSN #:                  [id number]  :                       1985  ADMIT DATE:       2022 6:47 AM  DISCH DATE:        2022 4:46 PM  RESPONDING  PROVIDER #:        Zully SKINNER NP          QUERY TEXT:    Patient admitted with BMI 18.16. If possible, please document in progress notes and discharge summary if you are evaluating and or treating any of the following: The medical record reflects the following:  Risk Factors: Gerd, dyspepsia, smoker  Clinical Indicators: admitted for Cerebral angiography. Noted to have BMI 18. 16. Treatment: Regular diet    ASPEN Criteria:  https://aspenjournals. onlinelibrary. farias. com/doi/full/10.1177/4815003339311506    Thank you,  Arlyn Patel RN  Clinical Documentation  [phone], or via Perfect Serve  Options provided:  -- Underweight with BMI 18.16  -- BMI is clinically insignificant  -- Other - I will add my own diagnosis  -- Disagree - Not applicable / Not valid  -- Disagree - Clinically unable to determine / Unknown  -- Refer to Clinical Documentation Reviewer    PROVIDER RESPONSE TEXT:    The pts BMI is clinically insignificant.     Query created by: Aminata Guillen on 2022 10:22 AM      Electronically signed by:  Zully SKINNER NP 2022 7:27 AM

## 2022-12-05 ENCOUNTER — TELEPHONE (OUTPATIENT)
Dept: NEUROSURGERY | Age: 37
End: 2022-12-05

## 2022-12-07 ENCOUNTER — OFFICE VISIT (OUTPATIENT)
Dept: FAMILY MEDICINE CLINIC | Age: 37
End: 2022-12-07
Payer: MEDICAID

## 2022-12-07 VITALS
HEART RATE: 72 BPM | SYSTOLIC BLOOD PRESSURE: 107 MMHG | OXYGEN SATURATION: 98 % | TEMPERATURE: 98.5 F | BODY MASS INDEX: 18.65 KG/M2 | RESPIRATION RATE: 20 BRPM | DIASTOLIC BLOOD PRESSURE: 76 MMHG | HEIGHT: 60 IN | WEIGHT: 95 LBS

## 2022-12-07 DIAGNOSIS — E55.9 VITAMIN D INSUFFICIENCY: ICD-10-CM

## 2022-12-07 DIAGNOSIS — Z13.220 SCREENING CHOLESTEROL LEVEL: ICD-10-CM

## 2022-12-07 DIAGNOSIS — E03.9 HYPOTHYROIDISM, UNSPECIFIED TYPE: ICD-10-CM

## 2022-12-07 DIAGNOSIS — Z13.1 SCREENING FOR DIABETES MELLITUS (DM): ICD-10-CM

## 2022-12-07 DIAGNOSIS — Z51.81 MEDICATION MONITORING ENCOUNTER: ICD-10-CM

## 2022-12-07 DIAGNOSIS — Z86.79 HISTORY OF CEREBRAL ANEURYSM: ICD-10-CM

## 2022-12-07 DIAGNOSIS — Z76.89 ENCOUNTER TO ESTABLISH CARE WITH NEW DOCTOR: Primary | ICD-10-CM

## 2022-12-07 DIAGNOSIS — R35.0 FREQUENCY OF URINATION: ICD-10-CM

## 2022-12-07 DIAGNOSIS — Z11.59 NEED FOR HEPATITIS C SCREENING TEST: ICD-10-CM

## 2022-12-07 PROCEDURE — 99205 OFFICE O/P NEW HI 60 MIN: CPT | Performed by: INTERNAL MEDICINE

## 2022-12-07 NOTE — PROGRESS NOTES
1. \"Have you been to the ER, urgent care clinic since your last visit? Hospitalized since your last visit? \" No    2. \"Have you seen or consulted any other health care providers outside of the [address] [place] [address] since your last visit? \" No     3. For patients aged 39-70: Has the patient had a colonoscopy / FIT/ Cologuard? No      If the patient is female:    4. For patients aged 41-77: Has the patient had a mammogram within the past 2 years? No      5. For patients aged 21-65: Has the patient had a pap smear?  No

## 2022-12-07 NOTE — PROGRESS NOTES
Chief Complaint   Patient presents with    New Patient     Had surgeries on the brain 2022 and 2022      HPI:  Sharlene Rivas is a 40 y.o. female with h/o cerebral aneurysm s/p brain surgery 2022 and 2022 presents to establish care. Patient is currently on Plavix. She has no complaints.     Review of Systems  Constitutional: negative for fevers/chills  Eyes:   negative for visual disturbance and irritation  Respiratory:  negative for cough,dyspnea,wheezing  CV:   negative for chest pain, lower extremity edema  GI:   negative for abdominal pain  Genitourinary: positive for frequency  Integument:  negative for rash and pruritus  Hematologic:  negative for easy bruising and gum/nose bleeding  Musculoskel: negative for myalgias, arthralgias, back pain joint pain  Neurological:  negative for headaches, dizziness  Behavl/Psych: negative for feelings of anxiety, depression, mood changes    Past Medical History:   Diagnosis Date    Aneurysm ([address]     AVM    Anxiety     Arrhythmia     Arthritis     Calculus of kidney     Cerebellar hemorrhage, acute ([address] 2022    secondary to AVM (IR angiography 3/22)    Chronic pain     BACK AND PELVIS    Current smoker     Dyspepsia and other specified disorders of function of stomach     GERD (gastroesophageal reflux disease)     Ill-defined condition     KIDNEY STONES    Irregular heart beat     Kidney disease     hx of kidney stone    Nausea & vomiting     Other ill-defined conditions(799.89)     kidney stone in pass,passed    Postpartum hemorrhage 2017    ALSO HAD HEMORRHAGE DURING MISCARRIAGE     Psychiatric disorder     ADHD    Stroke ([facility] 2022     Past Surgical History:   Procedure Laterality Date    HX APPENDECTOMY      HX  SECTION      HX CHOLECYSTECTOMY  10/30/2012    HX COLONOSCOPY      HX DILATION AND CURETTAGE      HX GI      COLONOSCOPY    HX GYN      miscarriage x2    HX GYN      ENDOMETRIOSIS, LAPROSCOPY X2    HX KNEE ARTHROSCOPY Left     torn catiledge repair left knee    HX WISDOM TEETH EXTRACTION      NEUROLOGICAL PROCEDURE UNLISTED  2022    PIPELINE FLOW DIVERSION    UPPER GI ENDOSCOPY,BIOPSY  2015          Social History     Socioeconomic History    Marital status:    Tobacco Use    Smoking status: Former     Packs/day: 0.50     Years: 15.00     Pack years: 7.50     Types: Cigarettes     Quit date: 2022     Years since quittin.7    Smokeless tobacco: Never    Tobacco comments:     about 6 cigarettes per day at present   Vaping Use    Vaping Use: Former   Substance and Sexual Activity    Alcohol use: Not Currently    Drug use: Yes     Types: Marijuana     Comment: DAILY    Sexual activity: Yes     Partners: Male     Birth control/protection: None     Social Determinants of Health     Physical Activity: Inactive    Days of Exercise per Week: 0 days    Minutes of Exercise per Session: 0 min     Family History   Problem Relation Age of Onset    Emphysema Mother     No Known Problems Father     No Known Problems Sister     No Known Problems Sister     No Known Problems Sister     No Known Problems Sister     No Known Problems Sister     No Known Problems Sister     No Known Problems Brother     No Known Problems Son     No Known Problems Son     Anesth Problems Neg Hx      Current Outpatient Medications   Medication Sig Dispense Refill    clopidogreL (Plavix) 75 mg tab Take 0.5 Tablets by mouth every other day.  30 Tablet 5     Allergies   Allergen Reactions    Morphine Rash and Hives    Zolpidem Other (comments)     \"Hallucinations and black outs\"     Objective:  Visit Vitals  /76   Pulse 72   Temp 98.5 °F (36.9 °C) (Oral)   Resp 20   Ht 5' (1.524 m)   Wt 95 lb (43.1 kg)   LMP 2022   SpO2 98%   BMI 18.55 kg/m²     Physical Exam:   General appearance - alert, well appearing in no distress  Mental status - alert, oriented to person, place, and time  EYE-PERRL, EOMI  ENT-ENT exam normal, no neck nodes or sinus tenderness  Neck - supple, no significant adenopathy   Chest - clear to auscultation, no wheezes, rales or rhonchi   Heart - normal rate, regular rhythm, no murmurs  Abdomen - soft, nontender, nondistended, no organomegaly  Ext-peripheral pulses normal, no pedal edema  Skin-Warm and dry.  no hyperpigmentation or suspicious lesions  Neuro -no focal findings   Back-full range of motion, no tenderness, palpable spasm or pain on motion     Results for orders placed or performed during the hospital encounter of 11/29/22   PLATELET FUNCTION, VERIFY NOW P2Y12   Result Value Ref Range    P2Y12 Plt response 25 (L) 194 - 225 PRU   METABOLIC PANEL, BASIC   Result Value Ref Range    Sodium 141 136 - 145 mmol/L    Potassium 4.1 3.5 - 5.1 mmol/L    Chloride 113 (H) 97 - 108 mmol/L    CO2 21 21 - 32 mmol/L    Anion gap 7 5 - 15 mmol/L    Glucose 132 (H) 65 - 100 mg/dL    BUN 12 6 - 20 MG/DL    Creatinine 0.53 (L) 0.55 - 1.02 MG/DL    BUN/Creatinine ratio 23 (H) 12 - 20      eGFR >60 >60 ml/min/1.73m2    Calcium 8.6 8.5 - 06.1 MG/DL   METABOLIC PANEL, BASIC   Result Value Ref Range    Sodium 140 136 - 145 mmol/L    Potassium 3.9 3.5 - 5.1 mmol/L    Chloride 112 (H) 97 - 108 mmol/L    CO2 22 21 - 32 mmol/L    Anion gap 6 5 - 15 mmol/L    Glucose 127 (H) 65 - 100 mg/dL    BUN 12 6 - 20 MG/DL    Creatinine 0.58 0.55 - 1.02 MG/DL    BUN/Creatinine ratio 21 (H) 12 - 20      eGFR >60 >60 ml/min/1.73m2    Calcium 8.6 8.5 - 10.1 MG/DL   CBC W/O DIFF   Result Value Ref Range    WBC 13.2 (H) 3.6 - 11.0 K/uL    RBC 4.44 3.80 - 5.20 M/uL    HGB 13.8 11.5 - 16.0 g/dL    HCT 41.1 35.0 - 47.0 %    MCV 92.6 80.0 - 99.0 FL    MCH 31.1 26.0 - 34.0 PG    MCHC 33.6 30.0 - 36.5 g/dL    RDW 12.5 11.5 - 14.5 %    PLATELET 688 217 - 277 K/uL    MPV 11.1 8.9 - 12.9 FL    NRBC 0.0 0  WBC    ABSOLUTE NRBC 0.00 0.00 - 0.01 K/uL   PLATELET FUNCTION, VERIFY NOW P2Y12   Result Value Ref Range    P2Y12 Plt response 39 (L) 194 - 418 PRU MAGNESIUM   Result Value Ref Range    Magnesium 2.3 1.6 - 2.4 mg/dL   PHOSPHORUS   Result Value Ref Range    Phosphorus 3.6 2.6 - 4.7 MG/DL   URINALYSIS W/ REFLEX CULTURE    Specimen: Urine   Result Value Ref Range    Color YELLOW/STRAW      Appearance CLEAR CLEAR      Specific gravity 1.016 1.003 - 1.030      pH (UA) 7.0 5.0 - 8.0      Protein Negative NEG mg/dL    Glucose Negative NEG mg/dL    Ketone TRACE (A) NEG mg/dL    Bilirubin Negative NEG      Blood SMALL (A) NEG      Urobilinogen 0.2 0.2 - 1.0 EU/dL    Nitrites Negative NEG      Leukocyte Esterase Negative NEG      UA:UC IF INDICATED CULTURE NOT INDICATED BY UA RESULT CNI      WBC 0-4 0 - 4 /hpf    RBC 0-5 0 - 5 /hpf    Epithelial cells FEW FEW /lpf    Bacteria Negative NEG /hpf    Hyaline cast 0-2 0 - 5 /lpf   HCG URINE, QL. - POC   Result Value Ref Range    Pregnancy test,urine (POC) Negative NEG     POC ACTIVATED CLOTTING TIME   Result Value Ref Range    Activated Clotting Time (POC) 137 79 - 138 SECS   POC ACTIVATED CLOTTING TIME   Result Value Ref Range    Activated Clotting Time (POC) 275 (H) 79 - 138 SECS     Assessment/Plan:  Diagnoses and all orders for this visit:    Encounter to establish care with new doctor    Screening cholesterol level  -     LIPID PANEL; Future  -     LIPID PANEL    Screening for diabetes mellitus (DM)  -     HEMOGLOBIN A1C WITH EAG; Future  -     HEMOGLOBIN A1C WITH EAG    Need for hepatitis C screening test  -     HEPATITIS C AB; Future  -     HEPATITIS C AB    Vitamin D insufficiency  -     VITAMIN D, 25 HYDROXY; Future  -     VITAMIN D, 25 HYDROXY    Hypothyroidism, unspecified type  -     TSH 3RD GENERATION; Future  -     TSH 3RD GENERATION    Frequency of urination  -     URINALYSIS W/ RFLX MICROSCOPIC; Future  -     URINALYSIS W/ RFLX MICROSCOPIC    History of cerebral aneurysm  -     METABOLIC PANEL, COMPREHENSIVE; Future  -     CBC WITH AUTOMATED DIFF;  Future  -     METABOLIC PANEL, COMPREHENSIVE  -     CBC WITH AUTOMATED DIFF    Medication monitoring encounter  -     METABOLIC PANEL, COMPREHENSIVE; Future  -     CBC WITH AUTOMATED DIFF; Future  -     METABOLIC PANEL, COMPREHENSIVE  -     CBC WITH AUTOMATED DIFF      Follow-up and Dispositions    Return 2-3 weeks, for f/u results.

## 2022-12-08 ENCOUNTER — HOSPITAL ENCOUNTER (OUTPATIENT)
Dept: LAB | Age: 37
Discharge: HOME | End: 2022-12-08

## 2022-12-08 ENCOUNTER — OFFICE VISIT (OUTPATIENT)
Dept: NEUROSURGERY | Age: 37
End: 2022-12-08
Payer: MEDICAID

## 2022-12-08 VITALS
SYSTOLIC BLOOD PRESSURE: 118 MMHG | DIASTOLIC BLOOD PRESSURE: 70 MMHG | HEART RATE: 77 BPM | TEMPERATURE: 97.1 F | HEIGHT: 60 IN | OXYGEN SATURATION: 95 % | WEIGHT: 91.6 LBS | BODY MASS INDEX: 17.98 KG/M2

## 2022-12-08 DIAGNOSIS — Q27.30 AVM (ARTERIOVENOUS MALFORMATION): ICD-10-CM

## 2022-12-08 DIAGNOSIS — R58 ECCHYMOSIS: ICD-10-CM

## 2022-12-08 DIAGNOSIS — Q28.2 AVM (ARTERIOVENOUS MALFORMATION) BRAIN: Primary | ICD-10-CM

## 2022-12-08 LAB
COMMENT, HOLDF: NORMAL
P2Y12 PLT RESPONSE,PPPR: 8 PRU (ref 194–418)
SAMPLES BEING HELD,HOLD: NORMAL

## 2022-12-08 NOTE — PROGRESS NOTES
Procedure follow up for AVM and Cerebral aneurysm. Mother at visit with patient. Patient reports headache for the past 4 days. Headaches will wake patient up during the night. Reports minor bruising and a knot at puncture site right groin. No acute problems reported. Samples of Vazalore 81 mg given to patient. Disp 30 day supply and patient will call the office if she tolerates medication for refills. Lot# [id number]. Exp. 07/2023. Total 32.

## 2022-12-08 NOTE — LETTER
12/8/2022    Patient: Juan C Jeong   YOB: 1985   Date of Visit: 12/8/2022     Audie Gaines MD  [facility]  [address]  [phone]  Via In Basket    Dear Audie Gaines MD,      Thank you for referring Ms. Janet Cohen to [address] [place] [address] for evaluation. My notes for this consultation are attached. If you have questions, please do not hesitate to call me. I look forward to following your patient along with you.       Sincerely,    Carson Fowler MD

## 2022-12-08 NOTE — PROGRESS NOTES
Neurointerventional Surgery Clinic Note    Patient: Clara Burns MRN: [id number]  SSN: [id number]    YOB: 1985  Age: 40 y.o. Sex: female      Subjective:      Clara Burns is a 40 y.o. 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 Overall, patient has been doing fairly well since discharge from the hospital.  She has headaches that range from 4-7 out of 10 in severity. These headaches keep her up at night and they are located in the right back part of her head, where her previous headaches were located. She denies any associated neurological symptoms and focal neurological deficits. In particular, she denies any changes in vision, weakness, numbness and tingling, dizziness, nausea and vomiting, and problems with balance and coordination. She is currently taking Plavix 37.5 mg every other day.     Past Medical History:   Diagnosis Date    Aneurysm ([address]     AVM    Anxiety     Arrhythmia     Arthritis     Calculus of kidney     Cerebellar hemorrhage, acute ([address] 2022    secondary to AVM (IR angiography 3/22)    Chronic pain     BACK AND PELVIS    Current smoker     Dyspepsia and other specified disorders of function of stomach     GERD (gastroesophageal reflux disease)     Ill-defined condition     KIDNEY STONES    Irregular heart beat     Kidney disease     hx of kidney stone    Nausea & vomiting     Other ill-defined conditions(799.89)     kidney stone in pass,passed    Postpartum hemorrhage 2017    ALSO HAD HEMORRHAGE DURING MISCARRIAGE     Psychiatric disorder     ADHD    Stroke ([address] 2022     Past Surgical History:   Procedure Laterality Date    HX APPENDECTOMY  2013    HX  SECTION      HX CHOLECYSTECTOMY  10/30/2012    HX COLONOSCOPY      HX DILATION AND CURETTAGE      HX GI      COLONOSCOPY    HX GYN      miscarriage x2    HX GYN      ENDOMETRIOSIS, LAPROSCOPY X2    HX KNEE ARTHROSCOPY Left     torn catiledge repair left knee    HX WISDOM TEETH EXTRACTION      NEUROLOGICAL PROCEDURE UNLISTED  2022    PIPELINE FLOW DIVERSION    UPPER GI ENDOSCOPY,BIOPSY  2015           Family History   Problem Relation Age of Onset    Emphysema Mother     No Known Problems Father     No Known Problems Sister     No Known Problems Sister     No Known Problems Sister     No Known Problems Sister     No Known Problems Sister     No Known Problems Sister     No Known Problems Brother     No Known Problems Son     No Known Problems Son     Anesth Problems Neg Hx      Social History     Tobacco Use    Smoking status: Former     Packs/day: 0.50     Years: 15.00     Pack years: 7.50     Types: Cigarettes     Quit date: 2022     Years since quittin.7    Smokeless tobacco: Never    Tobacco comments:     about 6 cigarettes per day at present   Substance Use Topics    Alcohol use: Not Currently      Current Outpatient Medications   Medication Sig Dispense Refill    clopidogreL (Plavix) 75 mg tab Take 0.5 Tablets by mouth every other day. 30 Tablet 5        Allergies   Allergen Reactions    Morphine Rash and Hives    Zolpidem Other (comments)     \"Hallucinations and black outs\"       Review of Systems:  Pertinent items are noted in the History of Present Illness.     Objective:     Vitals:    22 1209   BP: 118/70   Pulse: 77   Temp: 97.1 °F (36.2 °C)   TempSrc: Temporal   SpO2: 95%   Weight: 91 lb 9.6 oz (41.5 kg)   Height: 5' (1.524 m)        Physical Exam:  GENERAL: alert, cooperative, no distress, appears stated age  EYE: negative  NECK: no carotid bruit  LUNG: clear to auscultation bilaterally  HEART: regular rate and rhythm, S1, S2 normal, no murmur, click, rub or gallop  EXTREMITIES:  extremities normal, atraumatic, no cyanosis or edema; a focus of ecchymosis is seen along the inner aspect of her left upper arm. There is an associated small focal nodular component. Neurologic Exam:  Mental Status:  Alert and oriented x 4. Appropriate affect, mood and behavior. Language:    Normal fluency, repetition, comprehension and naming. Cranial Nerves:   Pupils equal, round and reactive to light. Visual fields full to confrontation. Extraocular movements intact. Facial sensation intact V1 - V3. Full facial strength, no asymmetry. Hearing intact bilaterally. No dysarthria. Tongue protrudes to midline, palate elevates symmetrically. Shoulder shrug 5/5 bilaterally. Motor:    No pronator drift. Bulk and tone normal.      5/5 power in all extremities proximally and distally. No involuntary movements. Sensation:    Sensation intact throughout to light touch    Reflexes:    Deferred    Coordination & Gait: Normal      Imaging:  I personally reviewed the following imaging studies. The impressions listed below are those of the interpreting radiologist(s). Cerebral angiogram and particle embolization of arteriovenous malformation pedicle 11/29/2022:  1. Initial angiography demonstrated a stable right cerebellar arteriovenous malformation with patent Pipeline construct in the right posterior inferior cerebellar artery and no evidence of residual aneurysms. 2. Status post silk and PVA embolization of one arterial pedicle supplying the superior medial compartment of the right cerebellar arteriovenous malformation, resulting in slightly decreased arteriovenous shunting to a few posterior cerebellar veins.     CT head 11/30/2022:  No hemorrhage or infarct post AVM embolization    Assessment:   40 y.o. 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 Overall, patient has been doing fairly well since discharge from the hospital.  She has headaches that range from 4-7 out of 10 in severity. These headaches keep her up at night and they are located in the right back part of her head, where her previous headaches were located. She denies any associated neurological symptoms and focal neurological deficits. In particular, she denies any changes in vision, weakness, numbness and tingling, dizziness, nausea and vomiting, and problems with balance and coordination. She is currently taking Plavix 37.5 mg every other day. Neurologic exam is normal.    We reviewed her imaging. The prenidal aneurysms that were previously treated with Pipeline embolization are no longer present. A compartment of the arteriovenous malformation was occluded with the arterial embolization. The plan is to bring the patient back in 2 months for repeat cerebral angiography and transarterial embolization. We discussed the purpose for staging the treatments and the possibility of additional transarterial and/or transvenous embolizations as well as gamma knife therapy. If the arteriovenous malformation cannot be completely occluded by embolization, this would occur in a staged fashion over time. The likelihood is that there will be some residual that will need to be treated with gamma knife therapy, but that the embolization procedures will decrease the nidus and therefore decrease the amount of dose needed for the gamma knife therapy, thereby decreasing its risks. Since this arteriovenous malformation is in the posterior fossa, staged embolizations of compartments of the arteriovenous malformation are preferred to minimize associated risks of swelling and hemorrhage. Patient is currently on Plavix 37.5 mg every other day.   Her P2Y12 performed today resulted 8 PRU. She was placed on Plavix monotherapy rather than aspirin monotherapy because she stated that she does not tolerate aspirin well. I advised that we try a different form of aspirin: Vazalore. We provided her with a 30-day sample. She may take the Vazalore 81 mg daily and stop the Plavix. If she tolerates the [employer] well, we will provide a prescription for it. If not, we will resume the Plavix. In regards to the focal area of ecchymosis and associated palpable nodule on the inner aspect of her left upper arm, will obtain an ultrasound for further evaluation. This is likely bruising related to her being supratherapeutic on the Plavix. She will be scheduled for cerebral angiogram and repeat transarterial embolization of the cerebellar arteriovenous malformation in 2 months. Patient expressed understanding and is in agreement with this plan. Plan:     -Trial Vazalore 81 mg daily; stop Plavix  -Cerebral angiogram and repeat transarterial embolization of the cerebellar arteriovenous malformation in 2 months  -Soft tissue ultrasound of left upper extremity    Thank you for allowing me to participate in the care of this patient. Greater than 30 minutes were spent in patient management, greater than half of which was spent in counseling and coordination of care.         Signed By: Kim Hull MD     December 8, 2022

## 2022-12-09 LAB
25(OH)D3+25(OH)D2 SERPL-MCNC: 35.4 NG/ML (ref 30–100)
ALBUMIN SERPL-MCNC: 5 G/DL (ref 3.8–4.8)
ALBUMIN/GLOB SERPL: 1.9 {RATIO} (ref 1.2–2.2)
ALP SERPL-CCNC: 46 IU/L (ref 44–121)
ALT SERPL-CCNC: 13 IU/L (ref 0–32)
APPEARANCE UR: CLEAR
AST SERPL-CCNC: 18 IU/L (ref 0–40)
BASOPHILS # BLD AUTO: 0 X10E3/UL (ref 0–0.2)
BASOPHILS NFR BLD AUTO: 1 %
BILIRUB SERPL-MCNC: 0.2 MG/DL (ref 0–1.2)
BILIRUB UR QL STRIP: NEGATIVE
BUN SERPL-MCNC: 10 MG/DL (ref 6–20)
BUN/CREAT SERPL: 14 (ref 9–23)
CALCIUM SERPL-MCNC: 9.9 MG/DL (ref 8.7–10.2)
CHLORIDE SERPL-SCNC: 101 MMOL/L (ref 96–106)
CHOLEST SERPL-MCNC: 158 MG/DL (ref 100–199)
CO2 SERPL-SCNC: 25 MMOL/L (ref 20–29)
COLOR UR: YELLOW
CREAT SERPL-MCNC: 0.73 MG/DL (ref 0.57–1)
EGFR: 109 ML/MIN/1.73
EOSINOPHIL # BLD AUTO: 0.1 X10E3/UL (ref 0–0.4)
EOSINOPHIL NFR BLD AUTO: 2 %
ERYTHROCYTE [DISTWIDTH] IN BLOOD BY AUTOMATED COUNT: 12.8 % (ref 11.7–15.4)
EST. AVERAGE GLUCOSE BLD GHB EST-MCNC: 100 MG/DL
GLOBULIN SER CALC-MCNC: 2.6 G/DL (ref 1.5–4.5)
GLUCOSE SERPL-MCNC: 83 MG/DL (ref 70–99)
GLUCOSE UR QL STRIP: NEGATIVE
HBA1C MFR BLD: 5.1 % (ref 4.8–5.6)
HCT VFR BLD AUTO: 44.8 % (ref 34–46.6)
HCV AB S/CO SERPL IA: <0.1 S/CO RATIO (ref 0–0.9)
HDLC SERPL-MCNC: 62 MG/DL
HGB BLD-MCNC: 14.7 G/DL (ref 11.1–15.9)
HGB UR QL STRIP: NEGATIVE
IMM GRANULOCYTES # BLD AUTO: 0 X10E3/UL (ref 0–0.1)
IMM GRANULOCYTES NFR BLD AUTO: 0 %
KETONES UR QL STRIP: NEGATIVE
LDLC SERPL CALC-MCNC: 81 MG/DL (ref 0–99)
LEUKOCYTE ESTERASE UR QL STRIP: NEGATIVE
LYMPHOCYTES # BLD AUTO: 2.6 X10E3/UL (ref 0.7–3.1)
LYMPHOCYTES NFR BLD AUTO: 38 %
MCH RBC QN AUTO: 30.4 PG (ref 26.6–33)
MCHC RBC AUTO-ENTMCNC: 32.8 G/DL (ref 31.5–35.7)
MCV RBC AUTO: 93 FL (ref 79–97)
MICRO URNS: NORMAL
MONOCYTES # BLD AUTO: 0.5 X10E3/UL (ref 0.1–0.9)
MONOCYTES NFR BLD AUTO: 7 %
NEUTROPHILS # BLD AUTO: 3.6 X10E3/UL (ref 1.4–7)
NEUTROPHILS NFR BLD AUTO: 52 %
NITRITE UR QL STRIP: NEGATIVE
PH UR STRIP: 6.5 [PH] (ref 5–7.5)
PLATELET # BLD AUTO: 247 X10E3/UL (ref 150–450)
POTASSIUM SERPL-SCNC: 4.4 MMOL/L (ref 3.5–5.2)
PROT SERPL-MCNC: 7.6 G/DL (ref 6–8.5)
PROT UR QL STRIP: NEGATIVE
RBC # BLD AUTO: 4.83 X10E6/UL (ref 3.77–5.28)
SODIUM SERPL-SCNC: 138 MMOL/L (ref 134–144)
SP GR UR STRIP: 1.02 (ref 1–1.03)
TRIGL SERPL-MCNC: 82 MG/DL (ref 0–149)
TSH SERPL DL<=0.005 MIU/L-ACNC: 2.13 UIU/ML (ref 0.45–4.5)
UROBILINOGEN UR STRIP-MCNC: 0.2 MG/DL (ref 0.2–1)
VLDLC SERPL CALC-MCNC: 15 MG/DL (ref 5–40)
WBC # BLD AUTO: 6.7 X10E3/UL (ref 3.4–10.8)

## 2022-12-14 ENCOUNTER — TELEPHONE (OUTPATIENT)
Dept: NEUROSURGERY | Age: 37
End: 2022-12-14

## 2022-12-14 ENCOUNTER — PATIENT MESSAGE (OUTPATIENT)
Dept: NEUROSURGERY | Age: 37
End: 2022-12-14

## 2022-12-14 NOTE — TELEPHONE ENCOUNTER
Mailbox full. Unable to leave a message.   Message sent via [facility]

## 2022-12-21 ENCOUNTER — TELEPHONE (OUTPATIENT)
Dept: NEUROSURGERY | Age: 37
End: 2022-12-21

## 2022-12-21 ENCOUNTER — PATIENT MESSAGE (OUTPATIENT)
Dept: NEUROSURGERY | Age: 37
End: 2022-12-21

## 2022-12-21 DIAGNOSIS — Q28.2 AVM (ARTERIOVENOUS MALFORMATION) BRAIN: Primary | ICD-10-CM

## 2022-12-21 NOTE — TELEPHONE ENCOUNTER
Spoke to patient and confirmed Embolization on 1/17/2022 at [facility] 1540 time is 7:00 am at Patient Registration. You will be contacted by [facility] to schedule an appointment for labs, chest xray, ekg. No eating or drinking after midnight the day prior to procedure and take morning medications the morning of procedure with sips of water. Do not stop taking Blood Thinners if applicable unless notified by this office. You must have a  to drive you home upon discharge. Recommend you have someone with you for at least 24-48 hours post procedure. No metal of glue in hair. Patient stated understanding.

## 2023-01-04 ENCOUNTER — OFFICE VISIT (OUTPATIENT)
Dept: FAMILY MEDICINE CLINIC | Age: 38
End: 2023-01-04
Payer: MEDICAID

## 2023-01-04 VITALS
SYSTOLIC BLOOD PRESSURE: 99 MMHG | HEIGHT: 60 IN | DIASTOLIC BLOOD PRESSURE: 57 MMHG | HEART RATE: 67 BPM | TEMPERATURE: 98.5 F | RESPIRATION RATE: 20 BRPM | OXYGEN SATURATION: 98 % | BODY MASS INDEX: 18.94 KG/M2 | WEIGHT: 96.5 LBS

## 2023-01-04 DIAGNOSIS — I67.1 CEREBRAL ANEURYSM: ICD-10-CM

## 2023-01-04 DIAGNOSIS — Z02.89 ENCOUNTER FOR COMPLETION OF FORM WITH PATIENT: Primary | ICD-10-CM

## 2023-01-04 DIAGNOSIS — Q28.2 AVM (ARTERIOVENOUS MALFORMATION) BRAIN: Primary | ICD-10-CM

## 2023-01-04 PROCEDURE — 99213 OFFICE O/P EST LOW 20 MIN: CPT | Performed by: INTERNAL MEDICINE

## 2023-01-04 RX ORDER — ASPIRIN 81 MG/81MG
CAPSULE ORAL
COMMUNITY
End: 2023-01-04 | Stop reason: SDUPTHER

## 2023-01-04 RX ORDER — ASPIRIN 81 MG/81MG
81 CAPSULE ORAL DAILY
Qty: 30 CAPSULE | Refills: 5 | Status: SHIPPED | OUTPATIENT
Start: 2023-01-04

## 2023-01-04 NOTE — PROGRESS NOTES
Chief Complaint   Patient presents with    Results     Note      HPI:  Cirilo Beth is a 40 y.o.  female presents accompanied by  and child for lab results. Results compared to last is well within normal limits. Also, patient is requesting a clearance to drive since she had brain surgery in 2022.     Review of Systems  As per hpi    Past Medical History:   Diagnosis Date    Aneurysm ([address]     AVM    Anxiety     Arrhythmia     Arthritis     Calculus of kidney     Cerebellar hemorrhage, acute ([address] 2022    secondary to AVM (IR angiography 3/22)    Chronic pain     BACK AND PELVIS    Current smoker     Dyspepsia and other specified disorders of function of stomach     GERD (gastroesophageal reflux disease)     Ill-defined condition     KIDNEY STONES    Irregular heart beat     Kidney disease     hx of kidney stone    Nausea & vomiting     Other ill-defined conditions(799.89)     kidney stone in pass,passed    Postpartum hemorrhage     ALSO HAD HEMORRHAGE DURING MISCARRIAGE     Psychiatric disorder     ADHD    Stroke ([facility] [address] 2022     Past Surgical History:   Procedure Laterality Date    HX APPENDECTOMY      HX  SECTION      HX CHOLECYSTECTOMY  10/30/2012    HX COLONOSCOPY      HX DILATION AND CURETTAGE      HX GI      COLONOSCOPY    HX GYN      miscarriage x2    HX GYN      ENDOMETRIOSIS, LAPROSCOPY X2    HX KNEE ARTHROSCOPY Left     torn catiledge repair left knee    HX WISDOM TEETH EXTRACTION      MD UNLISTED NEUROLOGICAL/NEUROMUSCULAR DX PX  2022    PIPELINE FLOW DIVERSION    UPPER GI ENDOSCOPY,BIOPSY  2015          Social History     Socioeconomic History    Marital status:    Tobacco Use    Smoking status: Former     Packs/day: 0.50     Years: 15.00     Pack years: 7.50     Types: Cigarettes     Quit date: 2022     Years since quittin.8    Smokeless tobacco: Never    Tobacco comments:     about 6 cigarettes per day at present   Vaping Use Vaping Use: Former   Substance and Sexual Activity    Alcohol use: Not Currently    Drug use: Yes     Types: Marijuana     Comment: DAILY    Sexual activity: Yes     Partners: Male     Birth control/protection: None     Social Determinants of Health     Physical Activity: Inactive    Days of Exercise per Week: 0 days    Minutes of Exercise per Session: 0 min     Family History   Problem Relation Age of Onset    Emphysema Mother     No Known Problems Father     No Known Problems Sister     No Known Problems Sister     No Known Problems Sister     No Known Problems Sister     No Known Problems Sister     No Known Problems Sister     No Known Problems Brother     No Known Problems Son     No Known Problems Son     Anesth Problems Neg Hx      Current Outpatient Medications   Medication Sig Dispense Refill    aspirin (Vazalore) 81 mg cap Take  by mouth.        Allergies   Allergen Reactions    Morphine Rash and Hives    Zolpidem Other (comments)     \"Hallucinations and black outs\"       Objective:  Visit Vitals  BP (!) 99/57   Pulse 67   Temp 98.5 °F (36.9 °C) (Oral)   Resp 20   Ht 5' (1.524 m)   Wt 96 lb 8 oz (43.8 kg)   LMP 12/22/2022   SpO2 98%   BMI 18.85 kg/m²     Physical Exam:   General appearance - alert, well appearing in no distress  Mental status - alert, oriented to person, place, and time  Neck - supple, no significant adenopathy   Chest - clear to auscultation, no wheezes, rales or rhonchi  Heart - normal rate, regular rhythm, no murmurs,  Abdomen - soft, nontender, nondistended, no organomegaly  Ext-peripheral pulses normal, no pedal edema  Neuro -no focal findings     Results for orders placed or performed during the hospital encounter of 12/08/22   PLATELET FUNCTION, VERIFY NOW P2Y12   Result Value Ref Range    P2Y12 Plt response 8 (L) 194 - 418 PRU   SAMPLES BEING HELD   Result Value Ref Range    SAMPLES BEING HELD 1LAV     COMMENT        Add-on orders for these samples will be processed based on acceptable specimen integrity and analyte stability, which may vary by analyte. Assessment/Plan:  Diagnoses and all orders for this visit:    Encounter for completion of form with patient    Follow-up and Dispositions    Return in about 6 months (around 7/4/2023) for routine follow up.

## 2023-01-04 NOTE — LETTER
NOTIFICATION     1/4/2023 10:26 AM    Ms. Nohemy Cornell  [address]  [address] [phone]  . To Whom It May Concern:    Nohemy Cornell was under the care of [facility]. Base on recent evaluation, physical exam and blood work results, Ms. Elvie Tong'  health is stable. She may resume regular Activity of Daily Living including operating a vehicle. If there are questions or concerns please have the patient contact our office.       Sincerely,        Lucie Bentley MD

## 2023-01-04 NOTE — PROGRESS NOTES
1. \"Have you been to the ER, urgent care clinic since your last visit? Hospitalized since your last visit? \" No    2. \"Have you seen or consulted any other health care providers outside of the [address] [place] [address] since your last visit? \" No     3. For patients aged 39-70: Has the patient had a colonoscopy / FIT/ Cologuard? No      If the patient is female:    4. For patients aged 41-77: Has the patient had a mammogram within the past 2 years? No      5. For patients aged 21-65: Has the patient had a pap smear?  Yes - no Care Gap present

## 2023-01-06 ENCOUNTER — HOSPITAL ENCOUNTER (OUTPATIENT)
Dept: PREADMISSION TESTING | Age: 38
End: 2023-01-06
Payer: MEDICAID

## 2023-01-06 VITALS
WEIGHT: 93.7 LBS | HEIGHT: 60 IN | DIASTOLIC BLOOD PRESSURE: 65 MMHG | RESPIRATION RATE: 18 BRPM | BODY MASS INDEX: 18.4 KG/M2 | HEART RATE: 60 BPM | SYSTOLIC BLOOD PRESSURE: 99 MMHG | TEMPERATURE: 98.4 F

## 2023-01-06 LAB
ABO + RH BLD: NORMAL
ALBUMIN SERPL-MCNC: 3.5 G/DL (ref 3.5–5)
ALBUMIN/GLOB SERPL: 1.3 (ref 1.1–2.2)
ALP SERPL-CCNC: 39 U/L (ref 45–117)
ALT SERPL-CCNC: 18 U/L (ref 12–78)
ANION GAP SERPL CALC-SCNC: 2 MMOL/L (ref 5–15)
ASPIRIN TEST, ASPIRN: 417 ARU
AST SERPL-CCNC: 11 U/L (ref 15–37)
BILIRUB SERPL-MCNC: 0.2 MG/DL (ref 0.2–1)
BLOOD GROUP ANTIBODIES SERPL: NORMAL
BUN SERPL-MCNC: 15 MG/DL (ref 6–20)
BUN/CREAT SERPL: 27 (ref 12–20)
CALCIUM SERPL-MCNC: 8.9 MG/DL (ref 8.5–10.1)
CHLORIDE SERPL-SCNC: 107 MMOL/L (ref 97–108)
CO2 SERPL-SCNC: 31 MMOL/L (ref 21–32)
CREAT SERPL-MCNC: 0.56 MG/DL (ref 0.55–1.02)
ERYTHROCYTE [DISTWIDTH] IN BLOOD BY AUTOMATED COUNT: 13.4 % (ref 11.5–14.5)
GLOBULIN SER CALC-MCNC: 2.8 G/DL (ref 2–4)
GLUCOSE SERPL-MCNC: 81 MG/DL (ref 65–100)
HCG SERPL QL: NEGATIVE
HCT VFR BLD AUTO: 38.8 % (ref 35–47)
HGB BLD-MCNC: 12.5 G/DL (ref 11.5–16)
MCH RBC QN AUTO: 30.8 PG (ref 26–34)
MCHC RBC AUTO-ENTMCNC: 32.2 G/DL (ref 30–36.5)
MCV RBC AUTO: 95.6 FL (ref 80–99)
NRBC # BLD: 0 K/UL (ref 0–0.01)
NRBC BLD-RTO: 0 PER 100 WBC
PLATELET # BLD AUTO: 231 K/UL (ref 150–400)
PMV BLD AUTO: 11.2 FL (ref 8.9–12.9)
POTASSIUM SERPL-SCNC: 3.9 MMOL/L (ref 3.5–5.1)
PROT SERPL-MCNC: 6.3 G/DL (ref 6.4–8.2)
RBC # BLD AUTO: 4.06 M/UL (ref 3.8–5.2)
SODIUM SERPL-SCNC: 140 MMOL/L (ref 136–145)
SPECIMEN EXP DATE BLD: NORMAL
WBC # BLD AUTO: 6 K/UL (ref 3.6–11)

## 2023-01-06 PROCEDURE — 80053 COMPREHEN METABOLIC PANEL: CPT

## 2023-01-06 PROCEDURE — 85576 BLOOD PLATELET AGGREGATION: CPT

## 2023-01-06 PROCEDURE — 86900 BLOOD TYPING SEROLOGIC ABO: CPT

## 2023-01-06 PROCEDURE — 84703 CHORIONIC GONADOTROPIN ASSAY: CPT

## 2023-01-06 PROCEDURE — 36415 COLL VENOUS BLD VENIPUNCTURE: CPT

## 2023-01-06 PROCEDURE — 85027 COMPLETE CBC AUTOMATED: CPT

## 2023-01-06 NOTE — PERIOP NOTES
SPOKE [address] [place] [address] [address]ob [phone], NP REGARDING PT'S ORDERS. SHE STATES PT'S ORDERS ARE CORRECT IN CC AND PT DOES NOT REQUIRE ANY FURTHER TESTING. PT STATES SHE HAS NOT RECEIVED COVID VACCINE. PT HAS HISTORY OF PONV. PT STATES SHE HAS A SCOP PATCH AT HOME THAT SHE DID NOT APPLY PRIOR TO HER LAST SURGERY. SPOKE WITH IZABELA HERNANDEZ NP AND SHE STATED TO HAVE PT APPLY PATCH 1 HOUR PRIOR TO ARRIVAL.

## 2023-01-06 NOTE — PERIOP NOTES
[facility]  PREOPERATIVE INSTRUCTIONS  Neuro Interventional Surgery    Surgery Date:   1/17/23     Your surgeon's office will call you to confirm day of surgery and arrival time. If you do not hear from them, please call [phone] to confirm day of surgery and arrival time. Please report to [facility] Patient Access/Admitting on the 1st floor. Bring your insurance card, photo identification, and any copayment ( if applicable). If you are going home the same day of your surgery, you must have a responsible adult to drive you home. You need to have a responsible adult to stay with you the first 24 hours after surgery and you should not drive a car for 24 hours following your surgery. Nothing to eat or drink after midnight the night before surgery. This includes no water, gum, mints, coffee, juice, etc.  Please note special instructions, if applicable, below for medications. Do NOT drink alcohol or smoke 24 hours before surgery. STOP smoking for 14 days prior as it helps with breathing and healing after surgery. If you are being admitted to the hospital, please leave personal belongings/luggage in your car until you have an assigned hospital room number. Please wear comfortable clothes. Wear your glasses instead of contacts. We ask that all money, jewelry and valuables be left at home. Wear no make up, particularly mascara, the day of surgery. All body piercings, rings, and jewelry need to be removed and left at home. Please remove any nail polish or artifical nails from your fingernails. Please wear your hair loose or down. Please no pony-tails, buns, or any metal hair accessories. If you shower the morning of surgery, please do not apply any lotions or powders afterwards. You may wear deodorant. Do not shave any body area within 24 hours of your surgery. Please follow all instructions to avoid any potential surgical cancellation.   Should your physical condition change, (i.e. fever, cold, flu, etc.) please notify your surgeon as soon as possible. It is important to be on time. If a situation occurs where you may be delayed, please call:  [phone] on the day of surgery. The Preadmission Testing staff can be reached at [phone]. Special instructions: APPLY SCOP PATCH 1 HOUR PRIOR TO ARRIVAL. Current Outpatient Medications   Medication Sig    aspirin (Vazalore) 81 mg cap Take 81 mg by mouth daily. No current facility-administered medications for this encounter. YOU MUST ONLY TAKE THESE MEDICATIONS THE MORNING OF SURGERY WITH A SIP OF WATER: ASPIRIN  MEDICATIONS TO TAKE THE MORNING OF SURGERY ONLY IF NEEDED: NONE  HOLD THESE PRESCRIPTION MEDICATIONS BEFORE SURGERY: NONE  Ask your surgeon/prescribing physician about when/if to STOP taking these medications: NONE  Stop all vitamins, herbal medicines and any non-steroidal anti-inflammatory drugs (i.e. Ibuprofen, Naproxen, Advil, Aleve) 7 days prior to surgery. You may take Tylenol. If you are currently taking Aspirin, Plavix, Brilinta, Coumadin or any other blood-thinning/anticoagulant medication contact your surgeon for instructions. Patient Information Regarding COVID Restrictions    Day of Procedure    Please park in the parking deck or any designated visitor parking lot. Enter the facility through the Main Entrance of the hospital.  On the day of surgery, please provide the cell phone number of the person who will be waiting for you to the Patient Access representative at the time of registration. Masks are highly recommended in the hospital, but not required. Once your procedure and the immediate recovery period is completed, a nurse in the recovery area will contact your designated visitor to inform them of your room number or to otherwise review other pertinent information regarding your care. Social distancing practices are strongly encouraged in waiting areas and the cafeteria.        The patient was contacted  in person. She verbalized understanding of all instructions does not  need reinforcement.

## 2023-01-17 ENCOUNTER — ANESTHESIA (OUTPATIENT)
Dept: INTERVENTIONAL RADIOLOGY/VASCULAR | Age: 38
DRG: 058 | End: 2023-01-17
Payer: MEDICAID

## 2023-01-17 ENCOUNTER — APPOINTMENT (OUTPATIENT)
Dept: CT IMAGING | Age: 38
DRG: 058 | End: 2023-01-17
Attending: RADIOLOGY
Payer: MEDICAID

## 2023-01-17 ENCOUNTER — HOSPITAL ENCOUNTER (INPATIENT)
Dept: INTERVENTIONAL RADIOLOGY/VASCULAR | Age: 38
LOS: 1 days | Discharge: HOME | DRG: 058 | End: 2023-01-18
Attending: RADIOLOGY | Admitting: RADIOLOGY
Payer: MEDICAID

## 2023-01-17 ENCOUNTER — ANESTHESIA EVENT (OUTPATIENT)
Dept: INTERVENTIONAL RADIOLOGY/VASCULAR | Age: 38
DRG: 058 | End: 2023-01-17
Payer: MEDICAID

## 2023-01-17 DIAGNOSIS — Q28.2 AVM (ARTERIOVENOUS MALFORMATION) BRAIN: ICD-10-CM

## 2023-01-17 LAB
ACT BLD: 143 SECS (ref 79–138)
ACT BLD: 257 SECS (ref 79–138)
ACT BLD: 335 SECS (ref 79–138)
ANION GAP SERPL CALC-SCNC: 4 MMOL/L (ref 5–15)
ASPIRIN TEST, ASPIRN: 443 ARU
ATRIAL RATE: 66 BPM
BUN SERPL-MCNC: 14 MG/DL (ref 6–20)
BUN/CREAT SERPL: 24 (ref 12–20)
CALCIUM SERPL-MCNC: 9 MG/DL (ref 8.5–10.1)
CALCULATED P AXIS, ECG09: 72 DEGREES
CALCULATED R AXIS, ECG10: 56 DEGREES
CALCULATED T AXIS, ECG11: 82 DEGREES
CHLORIDE SERPL-SCNC: 109 MMOL/L (ref 97–108)
CO2 SERPL-SCNC: 27 MMOL/L (ref 21–32)
COMMENT, HOLDF: NORMAL
CREAT SERPL-MCNC: 0.58 MG/DL (ref 0.55–1.02)
DIAGNOSIS, 93000: NORMAL
GLUCOSE SERPL-MCNC: 89 MG/DL (ref 65–100)
HCG UR QL: NEGATIVE
MAGNESIUM SERPL-MCNC: 2.5 MG/DL (ref 1.6–2.4)
P-R INTERVAL, ECG05: 144 MS
POTASSIUM SERPL-SCNC: 4 MMOL/L (ref 3.5–5.1)
Q-T INTERVAL, ECG07: 456 MS
QRS DURATION, ECG06: 74 MS
QTC CALCULATION (BEZET), ECG08: 478 MS
SAMPLES BEING HELD,HOLD: NORMAL
SODIUM SERPL-SCNC: 140 MMOL/L (ref 136–145)
VENTRICULAR RATE, ECG03: 66 BPM

## 2023-01-17 PROCEDURE — C1769 GUIDE WIRE: HCPCS

## 2023-01-17 PROCEDURE — 77030012468 HC VLV BLEEDBK CNTRL ABBT -B

## 2023-01-17 PROCEDURE — 74011250637 HC RX REV CODE- 250/637: Performed by: NURSE PRACTITIONER

## 2023-01-17 PROCEDURE — C1760 CLOSURE DEV, VASC: HCPCS

## 2023-01-17 PROCEDURE — 77030019940 HC BLNKT HYPOTHRM STRY -B

## 2023-01-17 PROCEDURE — 77030008684 HC TU ET CUF COVD -B: Performed by: STUDENT IN AN ORGANIZED HEALTH CARE EDUCATION/TRAINING PROGRAM

## 2023-01-17 PROCEDURE — 74011250636 HC RX REV CODE- 250/636: Performed by: NURSE PRACTITIONER

## 2023-01-17 PROCEDURE — 93005 ELECTROCARDIOGRAM TRACING: CPT

## 2023-01-17 PROCEDURE — 83735 ASSAY OF MAGNESIUM: CPT

## 2023-01-17 PROCEDURE — 77030008638 HC TU CONN COOK -A

## 2023-01-17 PROCEDURE — 77030038563 HC TU ART PRESSR ICUM -Z

## 2023-01-17 PROCEDURE — 77030003394 HC NDL ART COOK -A

## 2023-01-17 PROCEDURE — 76060000040 HC ANESTHESIA 4.5 TO 5 HR

## 2023-01-17 PROCEDURE — 74011000250 HC RX REV CODE- 250: Performed by: NURSE ANESTHETIST, CERTIFIED REGISTERED

## 2023-01-17 PROCEDURE — 77030035304 HC CATH ANGI BENCHMARK PENU -G

## 2023-01-17 PROCEDURE — 74011000250 HC RX REV CODE- 250: Performed by: RADIOLOGY

## 2023-01-17 PROCEDURE — 74011250636 HC RX REV CODE- 250/636: Performed by: NURSE ANESTHETIST, CERTIFIED REGISTERED

## 2023-01-17 PROCEDURE — B31R1ZZ FLUOROSCOPY OF INTRACRANIAL ARTERIES USING LOW OSMOLAR CONTRAST: ICD-10-PCS | Performed by: RADIOLOGY

## 2023-01-17 PROCEDURE — 65610000006 HC RM INTENSIVE CARE

## 2023-01-17 PROCEDURE — 77030005402 HC CATH RAD ART LN KT TELE -B

## 2023-01-17 PROCEDURE — 74011250637 HC RX REV CODE- 250/637: Performed by: RADIOLOGY

## 2023-01-17 PROCEDURE — 77030013797 HC KT TRNSDUC PRSSR EDWD -A

## 2023-01-17 PROCEDURE — 80048 BASIC METABOLIC PNL TOTAL CA: CPT

## 2023-01-17 PROCEDURE — 77030005401 HC CATH RAD ARRO -A

## 2023-01-17 PROCEDURE — C1887 CATHETER, GUIDING: HCPCS

## 2023-01-17 PROCEDURE — 85576 BLOOD PLATELET AGGREGATION: CPT

## 2023-01-17 PROCEDURE — 70450 CT HEAD/BRAIN W/O DYE: CPT

## 2023-01-17 PROCEDURE — 74011250636 HC RX REV CODE- 250/636: Performed by: RADIOLOGY

## 2023-01-17 PROCEDURE — 74011000636 HC RX REV CODE- 636: Performed by: RADIOLOGY

## 2023-01-17 PROCEDURE — C1894 INTRO/SHEATH, NON-LASER: HCPCS

## 2023-01-17 PROCEDURE — 2709999900 HC NON-CHARGEABLE SUPPLY

## 2023-01-17 PROCEDURE — 36415 COLL VENOUS BLD VENIPUNCTURE: CPT

## 2023-01-17 PROCEDURE — 36224 PLACE CATH CAROTD ART: CPT

## 2023-01-17 PROCEDURE — 77030008584 HC TOOL GDWRE DEV TERU -A

## 2023-01-17 PROCEDURE — 77030026438 HC STYL ET INTUB CARD -A: Performed by: STUDENT IN AN ORGANIZED HEALTH CARE EDUCATION/TRAINING PROGRAM

## 2023-01-17 PROCEDURE — 85347 COAGULATION TIME ACTIVATED: CPT

## 2023-01-17 PROCEDURE — 81025 URINE PREGNANCY TEST: CPT

## 2023-01-17 RX ORDER — SODIUM CHLORIDE, SODIUM LACTATE, POTASSIUM CHLORIDE, CALCIUM CHLORIDE 600; 310; 30; 20 MG/100ML; MG/100ML; MG/100ML; MG/100ML
75 INJECTION, SOLUTION INTRAVENOUS CONTINUOUS
Status: DISCONTINUED | OUTPATIENT
Start: 2023-01-17 | End: 2023-01-18

## 2023-01-17 RX ORDER — ACETAMINOPHEN 325 MG/1
650 TABLET ORAL
Status: DISCONTINUED | OUTPATIENT
Start: 2023-01-17 | End: 2023-01-18 | Stop reason: HOSPADM

## 2023-01-17 RX ORDER — SUCCINYLCHOLINE CHLORIDE 20 MG/ML
INJECTION INTRAMUSCULAR; INTRAVENOUS AS NEEDED
Status: DISCONTINUED | OUTPATIENT
Start: 2023-01-17 | End: 2023-01-17 | Stop reason: HOSPADM

## 2023-01-17 RX ORDER — ASPIRIN 81 MG/1
81 TABLET ORAL EVERY EVENING
Status: DISCONTINUED | OUTPATIENT
Start: 2023-01-17 | End: 2023-01-18 | Stop reason: HOSPADM

## 2023-01-17 RX ORDER — SCOLOPAMINE TRANSDERMAL SYSTEM 1 MG/1
1 PATCH, EXTENDED RELEASE TRANSDERMAL
COMMUNITY
End: 2023-01-18

## 2023-01-17 RX ORDER — FENTANYL CITRATE 50 UG/ML
INJECTION, SOLUTION INTRAMUSCULAR; INTRAVENOUS AS NEEDED
Status: DISCONTINUED | OUTPATIENT
Start: 2023-01-17 | End: 2023-01-17 | Stop reason: HOSPADM

## 2023-01-17 RX ORDER — HEPARIN SODIUM 1000 [USP'U]/ML
10000 INJECTION, SOLUTION INTRAVENOUS; SUBCUTANEOUS
Status: DISCONTINUED | OUTPATIENT
Start: 2023-01-17 | End: 2023-01-17

## 2023-01-17 RX ORDER — DIPHENHYDRAMINE HYDROCHLORIDE 50 MG/ML
INJECTION, SOLUTION INTRAMUSCULAR; INTRAVENOUS AS NEEDED
Status: DISCONTINUED | OUTPATIENT
Start: 2023-01-17 | End: 2023-01-17 | Stop reason: HOSPADM

## 2023-01-17 RX ORDER — ROCURONIUM BROMIDE 10 MG/ML
INJECTION, SOLUTION INTRAVENOUS AS NEEDED
Status: DISCONTINUED | OUTPATIENT
Start: 2023-01-17 | End: 2023-01-17 | Stop reason: HOSPADM

## 2023-01-17 RX ORDER — ONDANSETRON 2 MG/ML
INJECTION INTRAMUSCULAR; INTRAVENOUS AS NEEDED
Status: DISCONTINUED | OUTPATIENT
Start: 2023-01-17 | End: 2023-01-17 | Stop reason: HOSPADM

## 2023-01-17 RX ORDER — PROPOFOL 10 MG/ML
INJECTION, EMULSION INTRAVENOUS AS NEEDED
Status: DISCONTINUED | OUTPATIENT
Start: 2023-01-17 | End: 2023-01-17 | Stop reason: HOSPADM

## 2023-01-17 RX ORDER — HEPARIN SODIUM 1000 [USP'U]/ML
INJECTION, SOLUTION INTRAVENOUS; SUBCUTANEOUS AS NEEDED
Status: DISCONTINUED | OUTPATIENT
Start: 2023-01-17 | End: 2023-01-17 | Stop reason: HOSPADM

## 2023-01-17 RX ORDER — LIDOCAINE 40 MG/G
CREAM TOPICAL
Status: DISCONTINUED | OUTPATIENT
Start: 2023-01-17 | End: 2023-01-17 | Stop reason: HOSPADM

## 2023-01-17 RX ORDER — ONDANSETRON 2 MG/ML
4 INJECTION INTRAMUSCULAR; INTRAVENOUS
Status: DISCONTINUED | OUTPATIENT
Start: 2023-01-17 | End: 2023-01-18 | Stop reason: HOSPADM

## 2023-01-17 RX ORDER — VERAPAMIL HYDROCHLORIDE 2.5 MG/ML
2.5-1 INJECTION, SOLUTION INTRAVENOUS
Status: DISCONTINUED | OUTPATIENT
Start: 2023-01-17 | End: 2023-01-17 | Stop reason: HOSPADM

## 2023-01-17 RX ORDER — SODIUM CHLORIDE 9 MG/ML
INJECTION, SOLUTION INTRAVENOUS
Status: DISCONTINUED | OUTPATIENT
Start: 2023-01-17 | End: 2023-01-17 | Stop reason: HOSPADM

## 2023-01-17 RX ORDER — DEXMEDETOMIDINE HYDROCHLORIDE 100 UG/ML
INJECTION, SOLUTION INTRAVENOUS AS NEEDED
Status: DISCONTINUED | OUTPATIENT
Start: 2023-01-17 | End: 2023-01-17 | Stop reason: HOSPADM

## 2023-01-17 RX ORDER — LIDOCAINE HYDROCHLORIDE 20 MG/ML
INJECTION, SOLUTION EPIDURAL; INFILTRATION; INTRACAUDAL; PERINEURAL AS NEEDED
Status: DISCONTINUED | OUTPATIENT
Start: 2023-01-17 | End: 2023-01-17 | Stop reason: HOSPADM

## 2023-01-17 RX ORDER — GUAIFENESIN 100 MG/5ML
81 LIQUID (ML) ORAL DAILY
Status: DISCONTINUED | OUTPATIENT
Start: 2023-01-17 | End: 2023-01-17

## 2023-01-17 RX ORDER — PROTAMINE SULFATE 10 MG/ML
INJECTION, SOLUTION INTRAVENOUS AS NEEDED
Status: DISCONTINUED | OUTPATIENT
Start: 2023-01-17 | End: 2023-01-17 | Stop reason: HOSPADM

## 2023-01-17 RX ORDER — PHENYLEPHRINE HCL IN 0.9% NACL 0.4MG/10ML
SYRINGE (ML) INTRAVENOUS AS NEEDED
Status: DISCONTINUED | OUTPATIENT
Start: 2023-01-17 | End: 2023-01-17 | Stop reason: HOSPADM

## 2023-01-17 RX ORDER — LIDOCAINE HYDROCHLORIDE 10 MG/ML
10 INJECTION INFILTRATION; PERINEURAL
Status: COMPLETED | OUTPATIENT
Start: 2023-01-17 | End: 2023-01-17

## 2023-01-17 RX ORDER — VERAPAMIL HYDROCHLORIDE 2.5 MG/ML
2.5 INJECTION, SOLUTION INTRAVENOUS
Status: DISCONTINUED | OUTPATIENT
Start: 2023-01-17 | End: 2023-01-17

## 2023-01-17 RX ORDER — DEXAMETHASONE SODIUM PHOSPHATE 4 MG/ML
INJECTION, SOLUTION INTRA-ARTICULAR; INTRALESIONAL; INTRAMUSCULAR; INTRAVENOUS; SOFT TISSUE AS NEEDED
Status: DISCONTINUED | OUTPATIENT
Start: 2023-01-17 | End: 2023-01-17 | Stop reason: HOSPADM

## 2023-01-17 RX ADMIN — PROPOFOL 150 MG: 10 INJECTION, EMULSION INTRAVENOUS at 08:58

## 2023-01-17 RX ADMIN — ONDANSETRON HYDROCHLORIDE 4 MG: 2 INJECTION, SOLUTION INTRAMUSCULAR; INTRAVENOUS at 12:47

## 2023-01-17 RX ADMIN — HEPARIN SODIUM 2000 UNITS: 1000 INJECTION, SOLUTION INTRAVENOUS; SUBCUTANEOUS at 10:57

## 2023-01-17 RX ADMIN — SODIUM CHLORIDE: 900 INJECTION, SOLUTION INTRAVENOUS at 08:58

## 2023-01-17 RX ADMIN — LIDOCAINE 4%: 4 CREAM TOPICAL at 09:47

## 2023-01-17 RX ADMIN — PHENOL 1 SPRAY: 1.4 SPRAY ORAL at 17:54

## 2023-01-17 RX ADMIN — ACETAMINOPHEN 650 MG: 325 TABLET ORAL at 17:32

## 2023-01-17 RX ADMIN — Medication 4000 UNITS: at 10:22

## 2023-01-17 RX ADMIN — Medication 1 LOZENGE: at 23:22

## 2023-01-17 RX ADMIN — SUCCINYLCHOLINE CHLORIDE 80 MG: 20 INJECTION, SOLUTION INTRAMUSCULAR; INTRAVENOUS at 08:58

## 2023-01-17 RX ADMIN — Medication 80 MCG: at 09:03

## 2023-01-17 RX ADMIN — DEXAMETHASONE SODIUM PHOSPHATE 4 MG: 4 INJECTION, SOLUTION INTRAMUSCULAR; INTRAVENOUS at 09:10

## 2023-01-17 RX ADMIN — LIDOCAINE HYDROCHLORIDE 40 MG: 20 INJECTION, SOLUTION EPIDURAL; INFILTRATION; INTRACAUDAL; PERINEURAL at 08:58

## 2023-01-17 RX ADMIN — SODIUM CHLORIDE, POTASSIUM CHLORIDE, SODIUM LACTATE AND CALCIUM CHLORIDE 75 ML/HR: 600; 310; 30; 20 INJECTION, SOLUTION INTRAVENOUS at 14:00

## 2023-01-17 RX ADMIN — ONDANSETRON 4 MG: 2 INJECTION INTRAMUSCULAR; INTRAVENOUS at 19:23

## 2023-01-17 RX ADMIN — Medication 4000 UNITS: at 10:28

## 2023-01-17 RX ADMIN — ROCURONIUM BROMIDE 5 MG: 10 SOLUTION INTRAVENOUS at 08:58

## 2023-01-17 RX ADMIN — ONDANSETRON HYDROCHLORIDE 4 MG: 2 INJECTION, SOLUTION INTRAMUSCULAR; INTRAVENOUS at 09:10

## 2023-01-17 RX ADMIN — ASPIRIN 81 MG: 81 TABLET, COATED ORAL at 19:23

## 2023-01-17 RX ADMIN — SODIUM CHLORIDE 40 MCG/MIN: 9 INJECTION, SOLUTION INTRAVENOUS at 09:03

## 2023-01-17 RX ADMIN — Medication 4000 UNITS: at 10:23

## 2023-01-17 RX ADMIN — Medication 1 LOZENGE: at 16:25

## 2023-01-17 RX ADMIN — IOPAMIDOL 204 ML: 612 INJECTION, SOLUTION INTRAVENOUS at 12:57

## 2023-01-17 RX ADMIN — PROTAMINE SULFATE 5 MG: 10 INJECTION, SOLUTION INTRAVENOUS at 12:50

## 2023-01-17 RX ADMIN — VERAPAMIL HYDROCHLORIDE 5 MG: 2.5 INJECTION, SOLUTION INTRAVENOUS at 10:33

## 2023-01-17 RX ADMIN — NITROGLYCERIN 1 INCH: 20 OINTMENT TOPICAL at 09:48

## 2023-01-17 RX ADMIN — SODIUM CHLORIDE, POTASSIUM CHLORIDE, SODIUM LACTATE AND CALCIUM CHLORIDE 500 ML: 600; 310; 30; 20 INJECTION, SOLUTION INTRAVENOUS at 23:45

## 2023-01-17 RX ADMIN — FENTANYL CITRATE 25 MCG: 50 INJECTION, SOLUTION INTRAMUSCULAR; INTRAVENOUS at 12:01

## 2023-01-17 RX ADMIN — PROTAMINE SULFATE 15 MG: 10 INJECTION, SOLUTION INTRAVENOUS at 12:52

## 2023-01-17 RX ADMIN — Medication 4000 UNITS: at 10:27

## 2023-01-17 RX ADMIN — Medication 4000 UNITS: at 10:25

## 2023-01-17 RX ADMIN — Medication 80 MCG: at 08:58

## 2023-01-17 RX ADMIN — HEPARIN SODIUM 5000 UNITS: 1000 INJECTION, SOLUTION INTRAVENOUS; SUBCUTANEOUS at 10:31

## 2023-01-17 RX ADMIN — Medication 80 MCG: at 10:35

## 2023-01-17 RX ADMIN — PROTAMINE SULFATE 20 MG: 10 INJECTION, SOLUTION INTRAVENOUS at 12:54

## 2023-01-17 RX ADMIN — DIPHENHYDRAMINE HYDROCHLORIDE 50 MG: 50 INJECTION, SOLUTION INTRAMUSCULAR; INTRAVENOUS at 09:10

## 2023-01-17 RX ADMIN — ONDANSETRON 4 MG: 2 INJECTION INTRAMUSCULAR; INTRAVENOUS at 14:00

## 2023-01-17 RX ADMIN — PROPOFOL 50 MG: 10 INJECTION, EMULSION INTRAVENOUS at 09:10

## 2023-01-17 RX ADMIN — LIDOCAINE HYDROCHLORIDE 5 ML: 10 INJECTION, SOLUTION INFILTRATION; PERINEURAL at 11:40

## 2023-01-17 RX ADMIN — Medication 1 LOZENGE: at 14:44

## 2023-01-17 RX ADMIN — FENTANYL CITRATE 25 MCG: 50 INJECTION, SOLUTION INTRAMUSCULAR; INTRAVENOUS at 13:04

## 2023-01-17 RX ADMIN — DEXMEDETOMIDINE HYDROCHLORIDE 8 MCG: 100 INJECTION, SOLUTION, CONCENTRATE INTRAVENOUS at 09:10

## 2023-01-17 NOTE — H&P
NeuroInterventional Surgery H&P  Alesia Dan NP    Patient: Beka Suero MRN: [id number]  SSN: [id number]    YOB: 1985  Age: 40 y.o. Sex: female      Subjective:      Beka Suero is a 40 y.o. 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 CTA Head performed 10/5/2022 showed a patent Pipeline construct and no evidence of residual aneurysms. Patient presented for angiography on 11/29/22 with microcatheter interrogation of the AVM and transarterial particle embolization using silk suture & PVA. Clopidogrel was discontinued r/t supratherapeutic assays despite 37.5mg every other day dosing and has been maintained on ASA alone. She presents today for cerebral angiography with microcatheter interrogation of the AVM and transarterial particle embolization using silk suture & PVA. She feels well overall and is ready to proceed.       Past Medical History:   Diagnosis Date    Aneurysm ([address]     AVM X5    Anxiety     Arrhythmia     BIGEMENY PVC'S    Arthritis     Calculus of kidney     Cerebellar hemorrhage, acute ([address] 03/2022    secondary to AVM (IR angiography 3/22)    Chronic pain     BACK AND PELVIS    Current smoker     Dyspepsia and other specified disorders of function of stomach     GERD (gastroesophageal reflux disease)     Ill-defined condition     KIDNEY STONES    Irregular heart beat     Kidney disease     hx of kidney stone    Nausea & vomiting     Other ill-defined conditions(799.89)     kidney stone in pass,passed    Postpartum hemorrhage 2017    ALSO HAD HEMORRHAGE DURING MISCARRIAGE 2012    Psychiatric disorder     ADHD    Stroke ([address] 03/17/2022     Family History   Problem Relation Age of Onset    Emphysema Mother     Alcohol abuse Mother     Cancer Mother         Skin    Psychiatric Disorder Mother No Known Problems Father     No Known Problems Sister     No Known Problems Sister     No Known Problems Sister     No Known Problems Sister     No Known Problems Sister     No Known Problems Sister     No Known Problems Brother     No Known Problems Son     No Known Problems Son     OSTEOARTHRITIS Maternal Grandmother     Cancer Maternal Grandmother         Colon, breast & skin    Anesth Problems Neg Hx      Social History     Tobacco Use    Smoking status: Former     Packs/day: 0.50     Years: 15.00     Pack years: 7.50     Types: Cigarettes     Quit date: 3/17/2022     Years since quittin.8    Smokeless tobacco: Never    Tobacco comments:     about 6 cigarettes per day at present   Substance Use Topics    Alcohol use: Not Currently      Prior to Admission Medications   Prescriptions Last Dose Informant Patient Reported? Taking?   aspirin (Vazalore) 81 mg cap 2023  No Yes   Sig: Take 81 mg by mouth daily. scopolamine (TRANSDERM-SCOP) 1 mg over 3 days pt3d 2023  Yes Yes   Si Patch by TransDERmal route every seventy-two (72) hours. Facility-Administered Medications: None       Allergies   Allergen Reactions    Morphine Rash and Hives    Zolpidem Other (comments)     \"Hallucinations and black outs\"     Review of Systems:  A comprehensive review of systems was negative. Objective:     Vitals:    23 0736   BP: 102/66   Pulse: 75   Resp: 18   Temp: 97.7 °F (36.5 °C)   SpO2: 96%   Weight: 93 lb (42.2 kg)   Height: 5' (1.524 m)      Physical Exam:  GENERAL: Calm, cooperative, NAD  SKIN: Warm, dry, color appropriate for ethnicity. HEART: RRR  LUNGS: CTAB    Neurologic Exam:  Mental Status:  Alert and oriented x 4. Appropriate affect, mood and behavior. Language:    Normal fluency, repetition, comprehension and naming. Cranial Nerves:   Pupils 3 mm, equal, round and reactive to light. Visual fields full to confrontation. Extraocular movements intact.         Facial sensation intact. Full facial strength, no asymmetry. Hearing grossly intact bilaterally. No dysarthria. Tongue protrudes to midline, palate elevates symmetrically. Shoulder shrug 5/5 bilaterally. Motor:    No pronator drift. Bulk and tone normal.      5/5 power in all extremities proximally and distally. No involuntary movements. Sensation:    Sensation intact throughout to light touch, temperature, pinprick, vibration, proprioception     Coordination & Gait: FTN & HTS intact. Gait deferred.     Labs:  Lab Results   Component Value Date/Time    WBC 6.0 01/06/2023 02:06 PM    Hemoglobin (POC) 13.9 10/24/2018 11:40 AM    HGB 12.5 01/06/2023 02:06 PM    HCT 38.8 01/06/2023 02:06 PM    PLATELET 056 02/63/4925 02:06 PM    MCV 95.6 01/06/2023 02:06 PM      Lab Results   Component Value Date/Time    Sodium 140 01/17/2023 08:01 AM    Potassium 4.0 01/17/2023 08:01 AM    Chloride 109 (H) 01/17/2023 08:01 AM    CO2 27 01/17/2023 08:01 AM    Anion gap 4 (L) 01/17/2023 08:01 AM    Glucose 89 01/17/2023 08:01 AM    BUN 14 01/17/2023 08:01 AM    Creatinine 0.58 01/17/2023 08:01 AM    BUN/Creatinine ratio 24 (H) 01/17/2023 08:01 AM    GFR est AA >60 07/05/2022 01:39 PM    GFR est AA >60 07/05/2022 01:39 PM    GFR est non-AA >60 07/05/2022 01:39 PM    GFR est non-AA >60 07/05/2022 01:39 PM    Calcium 9.0 01/17/2023 08:01 AM       Imaging:  CTA - Right cerebellar AVM s/p pipeline embolization    Assessment:     Hospital Problems  Date Reviewed: 1/4/2023            Codes Class Noted POA    AVM (arteriovenous malformation) ICD-10-CM: Q27.30  ICD-9-CM: 747.60  11/29/2022 Unknown         Plan:     - Admit to ICU post procedure              - Check ASA assay post procedure              - q1 hour neuro checks               - SBP goal , cardene/labetalol PRN              - Cont with  mg    Diet: Regular  Activity: Up with assist after bedrest is completed  DVT prophylaxis: SCDs  Isolation precautions: None  Anticipated disposition: Home with office follow up     Risk, benefits and alternatives of procedure were reviewed prior to this procedure by myself and Dr. Joe Home with patient. The patient verbalized understanding and would like to proceed with procedure as planned. All questions were answered, and written informed consent has been obtained.     Signed By: Yolie Oglesby NP     January 17, 2023

## 2023-01-17 NOTE — BRIEF OP NOTE
NEUROINTERVENTIONAL SURGERY BRIEF POSTOPERATIVE NOTE    Pre-Op Diagnosis: Cerebellar AVM    Post-Op Diagnosis: Same as preoperative diagnosis. PROCEDURE:  Cerebral angiogram  Transarterial embolization, CNS: Particle embolization of cerebellar AVM pedicles  Control angiography  Sophie CT Head with contrast  Ultrasound guided vascular access    VESSEL(S) STUDIED:  Right vertebral artery  Right PICA VESSEL(S) TREATED:  Right PICA        PRELIMINARY REPORT & DISPOSITION:   Previously embolized pedicle is now occluded. Status post embolization of two pedicles using silk and particles, resulting in slow flow in the inferior and lateral aspects of the AVM. Spasm in one of the pedicles prohibited complete embolization of that pedicle. Final angiography demonstrated improved appearance of AVM with stagnation of flow in the central nidus. COMPLICATIONS:  None    SPECIMENS:   * No specimens in log *     IMPLANTS:   4-0 silk fragments and -150 & 250-350 um in AVM pedicle branches    DRAINS:   * No LDAs found *    FOLLOW-UP:  Head CT now  Admit to ICU  SBP   Continue Vazalore 81 mg daily  Q1 hr neurochecks DATE OF SERVICE:  1/17/2023 12:57 PM     ATTENDING SURGEON(S):  Ziyad Rosado MD      ANESTHESIA:   GA    EBL: 5 cc    MEDICATIONS:   See nursing record    PUNCTURE SITE:  Left common femoral artery. Arteriotomy closed with manual compression. Flat with leg straight x 2 hours. Ange Hayes M.D.    Mckenna Munoz    , Department of Radiology  [facility]

## 2023-01-17 NOTE — ANESTHESIA PREPROCEDURE EVALUATION
Anesthetic History   No history of anesthetic complications            Review of Systems / Medical History  Patient summary reviewed, nursing notes reviewed and pertinent labs reviewed    Pulmonary  Within defined limits                 Neuro/Psych             Comments: Cerebellar AVM Cardiovascular  Within defined limits                Exercise tolerance: >4 METS     GI/Hepatic/Renal     GERD           Endo/Other        Arthritis     Other Findings   Comments: Current workup for nic danlos due to hypermobile joints and increase skin elasticity            Physical Exam    Airway  Mallampati: I  TM Distance: > 6 cm  Neck ROM: normal range of motion   Mouth opening: Normal     Cardiovascular  Regular rate and rhythm,  S1 and S2 normal,  no murmur, click, rub, or gallop             Dental  No notable dental hx       Pulmonary  Breath sounds clear to auscultation               Abdominal  GI exam deferred       Other Findings            Anesthetic Plan    ASA: 3  Anesthesia type: general    Monitoring Plan: Arterial line      Induction: Intravenous  Anesthetic plan and risks discussed with: Patient

## 2023-01-17 NOTE — PROGRESS NOTES
Upon hooking pt up to CM, pt noted to be in consistent vent bigeminy. Pt reports hx of the same but has not seen a cardiologist. Per Anesthesia MD, Dr Mario Mackay, will obtain baseline EKG. BMP and Mag ordered by NIS NP Helen Burch. 8:17 AM  EKG tech at bedside. 8:53 AM  Pt moved to angio suite table with no assistance. Anesthesia at bedside to manage pt airway, pt medications, pt VS and pt status. Provider, anesthesia, IR staff in room for timeout, pt awake when possible and participating. Pt VSS.

## 2023-01-17 NOTE — ANESTHESIA POSTPROCEDURE EVALUATION
* No procedures listed *.    general    Anesthesia Post Evaluation      Multimodal analgesia: multimodal analgesia used between 6 hours prior to anesthesia start to PACU discharge  Patient location during evaluation: PACU  Level of consciousness: awake  Pain management: adequate  Airway patency: patent  Anesthetic complications: no  Cardiovascular status: acceptable  Respiratory status: acceptable  Hydration status: acceptable  Post anesthesia nausea and vomiting:  none  Final Post Anesthesia Temperature Assessment:  Normothermia (36.0-37.5 degrees C)      INITIAL Post-op Vital signs:   Vitals Value Taken Time   BP     Temp     Pulse 57 01/17/23 1542   Resp 14 01/17/23 1542   SpO2 92 % 01/17/23 1542   Vitals shown include unvalidated device data.

## 2023-01-17 NOTE — PROGRESS NOTES
NIS Brief Progress Note:  Rounded on pt post angio. Right groin arteriotomy site c/d/I, no bleeding noted at this time. ARU: 443 post procedure, therapeutic. CT head post procedure reviewed, is stable.     Hussein Alejo NP  Neurocritical Care Nurse Practitioner  [phone]

## 2023-01-17 NOTE — ROUTINE PROCESS
TRANSFER - OUT REPORT:    Verbal report given to Mariela ELKINS(name) on Christiana Rice  being transferred to ICU 18(unit) for routine post - op       Report consisted of patients Situation, Background, Assessment and   Recommendations(SBAR). Information from the following report(s) SBAR, Kardex, Procedure Summary, MAR, and Quality Measures was reviewed with the receiving nurse. Lines:   Peripheral IV 01/17/23 Right Antecubital (Active)       Peripheral IV 01/17/23 Right Antecubital (Active)   Site Assessment Clean, dry, & intact 01/17/23 0914   Phlebitis Assessment 0 01/17/23 0914   Infiltration Assessment 0 01/17/23 0914   Dressing Status Clean, dry, & intact 01/17/23 0914   Hub Color/Line Status Pink 01/17/23 0914   Action Taken Catheter retaped 01/17/23 0914       Arterial Line 01/17/23 Left Radial artery (Active)        Opportunity for questions and clarification was provided.       Patient transported with:   Monitor  O2 @ 4 liters  Registered Nurse  CRNA

## 2023-01-17 NOTE — ROUTINE PROCESS
Pt arrives ambulatory to angio department accompanied by grandmother-  for cerebral angiogram with embolization procedure. All assessments completed and consent was reviewed. Education given was regarding procedure, anesthesia sedation, post-procedure care and  management/follow-up. Opportunity for questions was provided and all questions and concerns were addressed.

## 2023-01-18 VITALS
HEIGHT: 60 IN | RESPIRATION RATE: 16 BRPM | BODY MASS INDEX: 18.26 KG/M2 | DIASTOLIC BLOOD PRESSURE: 69 MMHG | HEART RATE: 79 BPM | SYSTOLIC BLOOD PRESSURE: 102 MMHG | WEIGHT: 93 LBS | TEMPERATURE: 98.1 F | OXYGEN SATURATION: 98 %

## 2023-01-18 LAB
ANION GAP SERPL CALC-SCNC: 6 MMOL/L (ref 5–15)
BUN SERPL-MCNC: 9 MG/DL (ref 6–20)
BUN/CREAT SERPL: 18 (ref 12–20)
CALCIUM SERPL-MCNC: 8.2 MG/DL (ref 8.5–10.1)
CHLORIDE SERPL-SCNC: 112 MMOL/L (ref 97–108)
CO2 SERPL-SCNC: 22 MMOL/L (ref 21–32)
CREAT SERPL-MCNC: 0.5 MG/DL (ref 0.55–1.02)
ERYTHROCYTE [DISTWIDTH] IN BLOOD BY AUTOMATED COUNT: 13.1 % (ref 11.5–14.5)
GLUCOSE SERPL-MCNC: 93 MG/DL (ref 65–100)
HCT VFR BLD AUTO: 34.7 % (ref 35–47)
HGB BLD-MCNC: 11.2 G/DL (ref 11.5–16)
MCH RBC QN AUTO: 30.7 PG (ref 26–34)
MCHC RBC AUTO-ENTMCNC: 32.3 G/DL (ref 30–36.5)
MCV RBC AUTO: 95.1 FL (ref 80–99)
NRBC # BLD: 0 K/UL (ref 0–0.01)
NRBC BLD-RTO: 0 PER 100 WBC
PLATELET # BLD AUTO: 217 K/UL (ref 150–400)
PMV BLD AUTO: 10.9 FL (ref 8.9–12.9)
POTASSIUM SERPL-SCNC: 3.7 MMOL/L (ref 3.5–5.1)
RBC # BLD AUTO: 3.65 M/UL (ref 3.8–5.2)
SODIUM SERPL-SCNC: 140 MMOL/L (ref 136–145)
WBC # BLD AUTO: 9.8 K/UL (ref 3.6–11)

## 2023-01-18 PROCEDURE — 74011250637 HC RX REV CODE- 250/637: Performed by: NURSE PRACTITIONER

## 2023-01-18 PROCEDURE — 36415 COLL VENOUS BLD VENIPUNCTURE: CPT

## 2023-01-18 PROCEDURE — 85027 COMPLETE CBC AUTOMATED: CPT

## 2023-01-18 PROCEDURE — 80048 BASIC METABOLIC PNL TOTAL CA: CPT

## 2023-01-18 RX ORDER — METHYLPREDNISOLONE 4 MG/1
TABLET ORAL
Qty: 1 DOSE PACK | Refills: 0 | Status: SHIPPED | OUTPATIENT
Start: 2023-01-18

## 2023-01-18 RX ADMIN — Medication 1 LOZENGE: at 03:05

## 2023-01-18 NOTE — DISCHARGE INSTRUCTIONS
NIS Discharge Instructions    DIET:   Normal diet     PLAN:  Discharge home with self care. FOLLOW UP APPOINTMENTS:   1. Follow up in the [facility] clinic with Dr. Heath Sanchez on 2/16/23.  2   Please make an appointment to follow up with PCP as needed. ACTIVITY:   Do not lift anything over 10 lbs for two weeks. Try to limit going up and down stairs as much as possible. Rest and hydrate. May resume all physical activities as tolerated after 2 weeks. WOUND CARE:   Montior for signs and sx of infection including fever, expanding inflammation and discolored drainage. Report any changes in temperature in affected extremity, numbness, weakness or hematoma. If you experience any increased bruising or bleeding at your groin puncture site either outside or under the skin please apply direct, firm pressure for 20 minutes. Keep track of the bruising at the groin site by marking it with a pen or marker. If the bruising continues to be dark purple or blue in color, OR is expanding, please call our office to let the on call doctor know. We have someone on call 24/7. You may shower normally and cleanse the site with gentle soap. Do not soak in the bathtub. OTHER RECOMMENDATIONS:  BEFAST reviewed to educate for stroke symptoms. Please call 911 and proceed to emergency department immediately if you develop any of the following:  - Acute changes in your balance or vision  - Weakness in your face, arm or leg   - Speech changes or difficulties. DISCHARGE MEDICATIONS:   1. Resume all home meds  2. Continue Vazalore 81 mg daily   3. Start Medrol dose pack     Avoid NSAIDS including (Advil, Motrin, Ibuprofen, Celebrex etc) while you are taking Aspirin and Brilinta/Plavix. You may take Tylenol for pain.

## 2023-01-18 NOTE — PROGRESS NOTES
Neurocritical Care Brief Progress Note:    71-year-old with female s/p transarterial embolization of cerebellar AVM pedicles by Dr. Juma Chowdhury. Physical Exam:  Gen: NAD, calm, cooperative  Neuro: A&Ox4. Follows commands. Speech clear. Affect normal. PERRL, 3 mm bilaterally. Blinks to threat. No disconjugate gaze present. EOMI. Face symmetric. Palate symmetric. Tongue midline. Valentine spontaneously. Strength 5/5 in UE and LE BL. Negative drift. Bulk and tone normal. No involuntary movements. Gait deferred. Skin: Warm, dry, color appropriate for ethnicity. Left groin arteriotomy site soft and non-tender on palpation, dressing is C/D/I, with no active bleeding or drainage noted. NIHSS: 0    PLAN;  -Q hourly neuro/vascular checks  -SBP   -Continue Vazalore 81 mg po q day. Addendum: 23:45. Nursing stated SBP in 80's. Patient neuro intact, not symptomatic. Will give LR bolus 500 ml IV now.        Ashia Cummings NP  Neurocritical Care Nurse Practitioner  [phone]

## 2023-01-18 NOTE — DISCHARGE SUMMARY
Neurointerventional Surgery Discharge Summary  [address], [facility] [address]    Patient: Vesna Fang MRN: [id number]  SSN: [id number]    YOB: 1985  Age: 40 y.o. Sex: female      DATE OF ADMISSION: 1/17/2023    DATE OF DISCHARGE: 01/18/23     ADMITTING PROVIDER: Tabitha Aguirre MD    DISCHARGING PROVIDER: Tamiko Feliciano NP    PROCEDURES/SURGERIES: Procedure(s) with comments: cerebral angiogram and particle embolization of cerebellar AVM pedicles    OFFICE NUMBER: [phone] , you can reach the on call physician 24/7 even during non-business hours     [address]:     ICD-10-CM ICD-9-CM    1. AVM (arteriovenous malformation) brain  Q28.2 747.81 IR ANGIO CAROTID CRBRL BI INTNL      IR ANGIO CAROTID CRBRL BI INTNL        Pt was admitted for scheduled elective cerebral angiogram and particle embolization of cerebellar AVM pedicles by Dr. Khang Summers. The procedure was performed in the Interventional Radiology suite under general anesthesia. The patient tolerated the procedure well without complications. Following extubation, patient was transferred to ICU, fully recovered and returned to neurological baseline. The patient was admitted overnight for continued monitoring due to potential risks of stroke and thrombosis. There were no neurological events overnight. The hospital course was uneventful and the patient was appropriate for discharge home today in stable condition.     Patient Vitals for the past 12 hrs:   Temp Pulse Resp BP SpO2   01/18/23 0800 98.1 °F (36.7 °C) -- -- -- --   01/18/23 0700 -- (!) 58 17 -- --   01/18/23 0600 -- (!) 58 16 99/65 --   01/18/23 0500 -- 71 17 98/65 --   01/18/23 0400 97.9 °F (36.6 °C) 69 19 100/72 98 %   01/18/23 0300 -- 68 14 99/76 --   01/18/23 0200 -- 76 19 104/67 --   01/18/23 0100 -- 65 19 (!) 96/53 --   01/18/23 0000 97.7 °F (36.5 °C) 67 18 (!) 85/46 100 %   01/17/23 2300 -- 70 17 -- --   01/17/23 2200 -- 77 19 -- --   01/17/23 2100 -- 67 19 -- --       Physical Exam:  GENERAL: NAD, calm, cooperative  HEART: RRR  LUNGS: CTAB  SKIN: Warm, dry, color appropriate for ethnicity. Groin arteriotomy site soft and non-tender on palpation, dressing is C/D/I, with no active bleeding or drainage noted. Neurologic Exam:  Mental Status:  Alert and oriented x 4. Appropriate affect, mood and behavior. Language:    Normal fluency, repetition, comprehension and naming. Cranial Nerves:   Pupils 3 mm bilaterally, equal, round and reactive to light. Visual fields full to confrontation. Extraocular movements intact. Facial sensation intact. Full facial strength, no asymmetry. Hearing intact bilaterally. No dysarthria. Tongue protrudes to midline, palate elevates symmetrically. Shoulder shrug 5/5 bilaterally. Motor:    No pronator drift. Bulk and tone normal.      5/5 power in all extremities proximally and distally. No involuntary movements. Sensation:    Sensation intact throughout to light touch. Coordination & Gait: Gait deferred. FTN and HTS intact with no ataxia present. DIET:   Normal diet    PLAN:  Discharge home with self care. FOLLOW UP APPOINTMENTS:   1. Follow up in the [facility] clinic with Dr. Quoc Enamorado on 2/16/23.  2   Please make an appointment to follow up with PCP as needed. ACTIVITY:   Do not lift anything over 10 lbs for two weeks. Try to limit going up and down stairs as much as possible. Rest and hydrate. May resume all physical activities as tolerated after 2 weeks. WOUND CARE:   Montior for signs and sx of infection including fever, expanding inflammation and discolored drainage. Report any changes in temperature in affected extremity, numbness, weakness or hematoma. If you experience any increased bruising or bleeding at your groin puncture site either outside or under the skin please apply direct, firm pressure for 20 minutes.  Keep track of the bruising at the groin site by marking it with a pen or marker. If the bruising continues to be dark purple or blue in color, OR is expanding, please call our office to let the on call doctor know. We have someone on call 24/7. You may shower normally and cleanse the site with gentle soap. Do not soak in the bathtub. OTHER RECOMMENDATIONS:  BEFAST reviewed to educate for stroke symptoms. Please call 911 and proceed to emergency department immediately if you develop any of the following:  - Acute changes in your balance or vision  - Weakness in your face, arm or leg   - Speech changes or difficulties. DISCHARGE MEDICATIONS:   1. Resume all home meds  2. Continue Vazalore 81 mg daily   3. Start Medrol dose pack    Avoid NSAIDS including (Advil, Motrin, Ibuprofen, Celebrex etc) while you are taking Aspirin. You may take Tylenol for pain. Labs:  Lab Results   Component Value Date/Time    Sodium 140 01/18/2023 02:59 AM    Potassium 3.7 01/18/2023 02:59 AM    Chloride 112 (H) 01/18/2023 02:59 AM    CO2 22 01/18/2023 02:59 AM    Anion gap 6 01/18/2023 02:59 AM    Glucose 93 01/18/2023 02:59 AM    BUN 9 01/18/2023 02:59 AM    Creatinine 0.50 (L) 01/18/2023 02:59 AM    BUN/Creatinine ratio 18 01/18/2023 02:59 AM    GFR est AA >60 07/05/2022 01:39 PM    GFR est AA >60 07/05/2022 01:39 PM    GFR est non-AA >60 07/05/2022 01:39 PM    GFR est non-AA >60 07/05/2022 01:39 PM    Calcium 8.2 (L) 01/18/2023 02:59 AM     Lab Results   Component Value Date/Time    WBC 9.8 01/18/2023 02:59 AM    Hemoglobin (POC) 13.9 10/24/2018 11:40 AM    HGB 11.2 (L) 01/18/2023 02:59 AM    HCT 34.7 (L) 01/18/2023 02:59 AM    PLATELET 907 82/44/6202 02:59 AM    MCV 95.1 01/18/2023 02:59 AM     Lab Results   Component Value Date/Time    MCH 30.7 01/18/2023 02:59 AM    MCHC 32.3 01/18/2023 02:59 AM    BASOPHILS 1 12/08/2022 11:40 AM    ABS. LYMPHOCYTES 2.0 07/05/2022 01:39 PM    ABS. MONOCYTES 0.5 12/08/2022 11:40 AM    ABS. EOSINOPHILS 0.1 12/08/2022 11:40 AM    ABS. BASOPHILS 0.0 12/08/2022 11:40 AM    Phosphorus 3.6 11/30/2022 04:36 AM    Magnesium 2.5 (H) 01/17/2023 08:01 AM       Current Discharge Medication List        START taking these medications    Details   methylPREDNISolone (MEDROL DOSEPACK) 4 mg tablet Follow taper instructions on pack  Qty: 1 Dose Pack, Refills: 0  Start date: 1/18/2023           CONTINUE these medications which have NOT CHANGED    Details   aspirin (Vazalore) 81 mg cap Take 81 mg by mouth daily.   Qty: 30 Capsule, Refills: 5    Associated Diagnoses: AVM (arteriovenous malformation) brain; Cerebral aneurysm           STOP taking these medications       scopolamine (TRANSDERM-SCOP) 1 mg over 3 days pt3d Comments:   Reason for Stopping:             Boni Berrios NP  Neurointerventional Surgery

## 2023-01-18 NOTE — PROGRESS NOTES
Bedside shift change report given to Laura Sumner RN (oncoming nurse) by Daniela Sharma RN (offgoing nurse). Report included the following information SBAR and Procedure Summary. 0830 - patient walked per discharge order by Neuro NP - no bigeminy noted on monitor, no bleeding/hematoma in groin site on return, no complaints of pain. VSS. 1000 - discharge education provided and opportunity for questions provided.

## 2023-01-18 NOTE — PROGRESS NOTES
Reason for Admission:  cerebral angiogram and particle embolization of cerebellar AVM pedicles                        RUR Score:    10%                 Plan for utilizing home health:    NA      PCP: First and Last name:  Pati Connell MD     Name of Practice:    Are you a current patient: Yes/No:    Approximate date of last visit:    Can you participate in a virtual visit with your PCP:                     Current Advanced Directive/Advance Care Plan: Full Code      Healthcare Decision Maker:   Click here to complete [address] including selection of the Healthcare Decision Maker Relationship (ie \"Primary\")             Primary Decision Maker: Nathanael Charles - [phone]                  Transition of Care Plan:      Patient admitted for scheduled  cerebral angiogram and particle embolization of cerebellar AVM pedicles  For discharge today, no needs identified.    Melyssa Stephenson RN,Care Management

## 2023-01-18 NOTE — PROGRESS NOTES
1930: Bedside and Verbal shift change report given to Santiago Michele RN (oncoming nurse) by Mary Henderson RN (offgoing nurse). Report included the following information SBAR, Kardex, OR Summary, Procedure Summary, Intake/Output, MAR, Accordion, Recent Results, Cardiac Rhythm NSR, Alarm Parameters , and Dual Neuro Assessment. 2300: SBP 80s. Notified CHRIS Catalan and received order for 500 LR bolus. 0300: Patient complaining of pain around ART line. Notified CHRIS Catalan and received verbal order to remove ART line. 0350: Removed ART line per verbal order.

## 2023-02-16 ENCOUNTER — OFFICE VISIT (OUTPATIENT)
Dept: NEUROSURGERY | Age: 38
End: 2023-02-16
Payer: MEDICAID

## 2023-02-16 VITALS
WEIGHT: 92.2 LBS | SYSTOLIC BLOOD PRESSURE: 100 MMHG | BODY MASS INDEX: 18.1 KG/M2 | HEART RATE: 70 BPM | DIASTOLIC BLOOD PRESSURE: 60 MMHG | OXYGEN SATURATION: 99 % | HEIGHT: 60 IN | TEMPERATURE: 96.6 F

## 2023-02-16 DIAGNOSIS — Q28.2 AVM (ARTERIOVENOUS MALFORMATION) BRAIN: Primary | ICD-10-CM

## 2023-02-16 PROCEDURE — 99214 OFFICE O/P EST MOD 30 MIN: CPT | Performed by: RADIOLOGY

## 2023-02-16 NOTE — LETTER
2/16/2023    Patient: Elder Diaz   YOB: 1985   Date of Visit: 2/16/2023     Eneida Osborn MD  [address] [place] [address] Ave  [address]  [address] [address]  Via In Basket    Dear Eneida Osborn MD,      Thank you for referring Ms. Helena Qureshi to [address] [place] [address] for evaluation. My notes for this consultation are attached. If you have questions, please do not hesitate to call me. I look forward to following your patient along with you.       Sincerely,    Sukhdeep Warren MD

## 2023-02-16 NOTE — PROGRESS NOTES
Procedure follow up for AVM. Patient reports increased fatigue and daily headaches. Denies dizziness, numbness or tingling, blurred or double vision. Right groin puncture site healed without difficulty.   No acute problems reported

## 2023-02-16 NOTE — PROGRESS NOTES
Neurointerventional Surgery Clinic Note    Patient: Guero Gilmore MRN: [id number]  SSN: [id number]    YOB: 1985  Age: 40 y.o. Sex: female      Subjective:      Guero Gilmore is a 40 y.o. 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 Patient has been doing well since discharge from the hospital.  She continues to have regular headaches but denies any severe headaches or new neurological symptoms. She continues to take Vazalore 81 mg daily.     Past Medical History:   Diagnosis Date    Aneurysm ([address]     AVM X5    Anxiety     Arrhythmia     BIGEMENY PVC'S    Arthritis     Calculus of kidney     Cerebellar hemorrhage, acute ([address] 2022    secondary to AVM (IR angiography 3/22)    Chronic pain     BACK AND PELVIS    Current smoker     Dyspepsia and other specified disorders of function of stomach     GERD (gastroesophageal reflux disease)     Ill-defined condition     KIDNEY STONES    Irregular heart beat     Kidney disease     hx of kidney stone    Nausea & vomiting     Other ill-defined conditions(799.89)     kidney stone in pass,passed    Postpartum hemorrhage 2017    ALSO HAD HEMORRHAGE DURING MISCARRIAGE     Psychiatric disorder     ADHD    Stroke ([facility] [address] [facility] 2022     Past Surgical History:   Procedure Laterality Date    HX APPENDECTOMY      HX  SECTION      HX CHOLECYSTECTOMY  10/30/2012    HX COLONOSCOPY      HX DILATION AND CURETTAGE      X2    HX GI      COLONOSCOPY    HX GYN      miscarriage x2    HX GYN      ENDOMETRIOSIS, LAPROSCOPY X2    HX KNEE ARTHROSCOPY Left     torn catiledge repair left knee    HX WISDOM TEETH EXTRACTION      [place] UNLISTED NEUROLOGICAL/NEUROMUSCULAR DX PX  2022 PIPELINE FLOW DIVERSION    [place] UNLISTED NEUROLOGICAL/NEUROMUSCULAR DX [address]  2022    PARTIAL AVM EMBOLISM    [place] UNLISTED PROCEDURE VASCULAR SURGERY  2022    UPPER GI ENDOSCOPY,BIOPSY  2015           Family History   Problem Relation Age of Onset    Emphysema Mother     Alcohol abuse Mother     Cancer Mother         Skin    Psychiatric Disorder Mother     No Known Problems Father     No Known Problems Sister     No Known Problems Sister     No Known Problems Sister     No Known Problems Sister     No Known Problems Sister     No Known Problems Sister     No Known Problems Brother     No Known Problems Son     No Known Problems Son     OSTEOARTHRITIS Maternal Grandmother     Cancer Maternal Grandmother         Colon, breast & skin    Anesth Problems Neg Hx      Social History     Tobacco Use    Smoking status: Former     Packs/day: 0.50     Years: 15.00     Pack years: 7.50     Types: Cigarettes     Quit date: 3/17/2022     Years since quittin.9    Smokeless tobacco: Never    Tobacco comments:     about 6 cigarettes per day at present   Substance Use Topics    Alcohol use: Not Currently      Current Outpatient Medications   Medication Sig Dispense Refill    aspirin (Vazalore) 81 mg cap Take 81 mg by mouth daily. 30 Capsule 5    methylPREDNISolone (MEDROL DOSEPACK) 4 mg tablet Follow taper instructions on pack 1 Dose Pack 0        Allergies   Allergen Reactions    Morphine Rash and Hives    Zolpidem Other (comments)     \"Hallucinations and black outs\"       Review of Systems:  Pertinent items are noted in the History of Present Illness.     Objective:     Vitals:    23 0939   BP: 100/60   Pulse: 70   Temp: (!) 96.6 °F (35.9 °C)   TempSrc: Temporal   SpO2: 99%   Weight: 92 lb 3.2 oz (41.8 kg)   Height: 5' (1.524 m)        Physical Exam:  GENERAL: alert, cooperative, no distress, appears stated age  EYE: negative  NECK: no carotid bruit  LUNG: clear to auscultation bilaterally  HEART: regular rate and rhythm, S1, S2 normal, no murmur, click, rub or gallop  EXTREMITIES:  extremities normal, atraumatic, no cyanosis or edema    Neurologic Exam:  Mental Status:  Alert and oriented x 4. Appropriate affect, mood and behavior. Language:    Normal fluency, repetition, comprehension and naming. Cranial Nerves:   Pupils equal, round and reactive to light. Visual fields full to confrontation. Extraocular movements intact. Facial sensation intact V1 - V3. Full facial strength, no asymmetry. Hearing intact bilaterally. No dysarthria. Tongue protrudes to midline, palate elevates symmetrically. Shoulder shrug 5/5 bilaterally. Motor:    No pronator drift. Bulk and tone normal.      5/5 power in all extremities proximally and distally. No involuntary movements. Sensation:    Sensation intact throughout to light touch    Reflexes:    Deferred    Coordination & Gait: Normal      Imaging:  I personally reviewed the following imaging studies. The impressions listed below are those of the interpreting radiologist(s). CT head 1/17/2023:  No acute intracranial abnormality. Contrast staining/persistent contrast in the AVM in the inferior right cerebellum. Cerebral angiogram and AVM embolization 1/17/2023:  1. Initial angiography demonstrated the right cerebellar arteriovenous malformation with interval occlusion of the previously embolized pedicle, and stable, patent Pipeline construct in the right posterior inferior cerebellar artery without evidence of residual or recurrent aneurysm.   2. Status post silk and PVA embolization of one arterial pedicle supplying the inferior lateral compartment of the nidus and partial embolization of another arterial pedicle supplying the inferior medial nidus, resulting in elimination of supply to the inferolateral aspect of the nidus, robust stagnation of flow in the medial inferior nidus, and stagnation of flow in right posterior cerebellar draining veins. There is persistent supply to the medial inferior nidus from a branch of the distal PICA that was not embolized, and there is persistent supply to the superior aspect of the nidus from right superior cerebellar artery branches with robust shunting to large superior cerebellar veins. Assessment:   40 y.o. 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 Patient has been doing well since discharge from the hospital.  She continues to have regular headaches but denies any severe headaches or new neurological symptoms. She continues to take Vazalore 81 mg daily. Neurologic exam is normal.    We reviewed the imaging from her recent cerebral angiogram and AVM embolization. A couple of additional pedicles were embolized. Detailed description is provided above. At this point, the options are to proceed with gamma knife therapy or additional embolization. We discussed the risks and benefits of both options. A follow-up diagnostic cerebral angiogram would be helpful to further characterize the updated steady state of the AVM after the most recent embolization to better advise on these options. However, based on the final control angiography of the most recent embolization, there is residual nidus that is supplied by branches of the right superior cerebellar artery that are potential targets for additional embolization. Gamma knife therapy at this point is also certainly a reasonable option. We can schedule a diagnostic cerebral angiogram with possible additional AVM embolization. In the meantime, patient can consider her options.   On the day of the procedure, we can decide to perform only a diagnostic cerebral angiogram under moderate sedation, a diagnostic cerebral angiogram with possible conversion to general anesthesia for additional AVM embolization, or diagnostic cerebral angiogram under general anesthesia with intent to perform additional AVM embolization. Patient expressed understanding and is in agreement with this plan. She is to continue taking the Vazalore 81 mg daily. She understands to seek emergent medical care if she experiences the worst headache of her life or new/recurrent neurological symptoms. Plan:     -Continue Vazalore 81 mg daily.  -Proceed with diagnostic cerebral angiogram with possible additional AVM embolization    Thank you for allowing me to participate in the care of this patient. Greater than 30 minutes were spent in patient management, greater than half of which was spent in counseling and coordination of care.         Signed By: Nancy Sesay MD     February 16, 2023

## 2023-03-16 ENCOUNTER — PATIENT MESSAGE (OUTPATIENT)
Dept: NEUROSURGERY | Age: 38
End: 2023-03-16

## 2023-03-17 DIAGNOSIS — Q28.2 AVM (ARTERIOVENOUS MALFORMATION) BRAIN: Primary | ICD-10-CM

## 2023-04-18 ENCOUNTER — HOSPITAL ENCOUNTER (OUTPATIENT)
Dept: PREADMISSION TESTING | Age: 38
Discharge: HOME | End: 2023-04-18
Payer: MEDICAID

## 2023-04-18 LAB
ABO + RH BLD: NORMAL
ALBUMIN SERPL-MCNC: 3.9 G/DL (ref 3.5–5)
ALBUMIN/GLOB SERPL: 1.3 (ref 1.1–2.2)
ALP SERPL-CCNC: 40 U/L (ref 45–117)
ALT SERPL-CCNC: 17 U/L (ref 12–78)
ANION GAP SERPL CALC-SCNC: 4 MMOL/L (ref 5–15)
ASPIRIN TEST, ASPIRN: 400 ARU
AST SERPL-CCNC: 14 U/L (ref 15–37)
BASOPHILS # BLD: 0 K/UL (ref 0–0.1)
BASOPHILS NFR BLD: 1 % (ref 0–1)
BILIRUB SERPL-MCNC: 0.3 MG/DL (ref 0.2–1)
BLOOD GROUP ANTIBODIES SERPL: NORMAL
BUN SERPL-MCNC: 11 MG/DL (ref 6–20)
BUN/CREAT SERPL: 19 (ref 12–20)
CALCIUM SERPL-MCNC: 9.3 MG/DL (ref 8.5–10.1)
CHLORIDE SERPL-SCNC: 107 MMOL/L (ref 97–108)
CO2 SERPL-SCNC: 27 MMOL/L (ref 21–32)
CREAT SERPL-MCNC: 0.59 MG/DL (ref 0.55–1.02)
DIFFERENTIAL METHOD BLD: NORMAL
EOSINOPHIL # BLD: 0.1 K/UL (ref 0–0.4)
EOSINOPHIL NFR BLD: 1 % (ref 0–7)
ERYTHROCYTE [DISTWIDTH] IN BLOOD BY AUTOMATED COUNT: 12.6 % (ref 11.5–14.5)
GLOBULIN SER CALC-MCNC: 3 G/DL (ref 2–4)
GLUCOSE SERPL-MCNC: 62 MG/DL (ref 65–100)
HCG SERPL QL: NEGATIVE
HCT VFR BLD AUTO: 42.9 % (ref 35–47)
HGB BLD-MCNC: 13.7 G/DL (ref 11.5–16)
IMM GRANULOCYTES # BLD AUTO: 0 K/UL (ref 0–0.04)
IMM GRANULOCYTES NFR BLD AUTO: 0 % (ref 0–0.5)
LYMPHOCYTES # BLD: 1.5 K/UL (ref 0.8–3.5)
LYMPHOCYTES NFR BLD: 23 % (ref 12–49)
MCH RBC QN AUTO: 30.7 PG (ref 26–34)
MCHC RBC AUTO-ENTMCNC: 31.9 G/DL (ref 30–36.5)
MCV RBC AUTO: 96.2 FL (ref 80–99)
MONOCYTES # BLD: 0.5 K/UL (ref 0–1)
MONOCYTES NFR BLD: 7 % (ref 5–13)
NEUTS SEG # BLD: 4.5 K/UL (ref 1.8–8)
NEUTS SEG NFR BLD: 68 % (ref 32–75)
NRBC # BLD: 0 K/UL (ref 0–0.01)
NRBC BLD-RTO: 0 PER 100 WBC
PLATELET # BLD AUTO: 231 K/UL (ref 150–400)
PMV BLD AUTO: 11.8 FL (ref 8.9–12.9)
POTASSIUM SERPL-SCNC: 4.1 MMOL/L (ref 3.5–5.1)
PROT SERPL-MCNC: 6.9 G/DL (ref 6.4–8.2)
RBC # BLD AUTO: 4.46 M/UL (ref 3.8–5.2)
SODIUM SERPL-SCNC: 138 MMOL/L (ref 136–145)
SPECIMEN EXP DATE BLD: NORMAL
WBC # BLD AUTO: 6.6 K/UL (ref 3.6–11)

## 2023-04-18 PROCEDURE — 84703 CHORIONIC GONADOTROPIN ASSAY: CPT

## 2023-04-18 PROCEDURE — 80053 COMPREHEN METABOLIC PANEL: CPT

## 2023-04-18 PROCEDURE — 85025 COMPLETE CBC W/AUTO DIFF WBC: CPT

## 2023-04-18 PROCEDURE — 85576 BLOOD PLATELET AGGREGATION: CPT

## 2023-04-18 PROCEDURE — 86900 BLOOD TYPING SEROLOGIC ABO: CPT

## 2023-04-18 PROCEDURE — 36415 COLL VENOUS BLD VENIPUNCTURE: CPT

## 2023-04-23 DIAGNOSIS — Q28.2 AVM (ARTERIOVENOUS MALFORMATION) BRAIN: Primary | ICD-10-CM

## 2023-04-24 ENCOUNTER — ANESTHESIA EVENT (OUTPATIENT)
Dept: INTERVENTIONAL RADIOLOGY/VASCULAR | Age: 38
DRG: 058 | End: 2023-04-24
Payer: MEDICAID

## 2023-04-24 ENCOUNTER — PATIENT MESSAGE (OUTPATIENT)
Dept: NEUROSURGERY | Age: 38
End: 2023-04-24

## 2023-04-24 ENCOUNTER — TELEPHONE (OUTPATIENT)
Dept: NEUROSURGERY | Age: 38
End: 2023-04-24

## 2023-04-24 NOTE — TELEPHONE ENCOUNTER
Patient called to confirm that she received Antonio Hall's message about her procedure tomorrow and she will be there at 7am.

## 2023-04-24 NOTE — TELEPHONE ENCOUNTER
Message left to confirmed date and time of Angiogram with patient. 4/25/2023 at [facility]. Arrival time 7:00 am.  Message left for patient:  -No eating or drinking after midnight the day prior to procedure.  -Take morning medications the morning of procedure with sips of water.   -You must have a  to drive you home upon discharge. -Recommend you have someone with you for at least 24-48 hours post procedure.  -No metal of glue in hair. Call office to confirm receipt of message. My chart message also sent.

## 2023-04-25 ENCOUNTER — APPOINTMENT (OUTPATIENT)
Dept: CT IMAGING | Age: 38
DRG: 058 | End: 2023-04-25
Attending: RADIOLOGY
Payer: MEDICAID

## 2023-04-25 ENCOUNTER — APPOINTMENT (OUTPATIENT)
Dept: CT IMAGING | Age: 38
DRG: 058 | End: 2023-04-25
Payer: MEDICAID

## 2023-04-25 ENCOUNTER — ANESTHESIA (OUTPATIENT)
Dept: INTERVENTIONAL RADIOLOGY/VASCULAR | Age: 38
DRG: 058 | End: 2023-04-25
Payer: MEDICAID

## 2023-04-25 ENCOUNTER — HOSPITAL ENCOUNTER (INPATIENT)
Dept: INTERVENTIONAL RADIOLOGY/VASCULAR | Age: 38
LOS: 2 days | Discharge: HOME | DRG: 058 | End: 2023-04-27
Attending: RADIOLOGY | Admitting: RADIOLOGY
Payer: MEDICAID

## 2023-04-25 DIAGNOSIS — Q28.2 AVM (ARTERIOVENOUS MALFORMATION) BRAIN: ICD-10-CM

## 2023-04-25 DIAGNOSIS — R11.2 NAUSEA AND VOMITING, UNSPECIFIED VOMITING TYPE: Primary | ICD-10-CM

## 2023-04-25 LAB
APTT PPP: 66.8 SEC (ref 22.1–31)
BASOPHILS # BLD: 0 K/UL (ref 0–0.1)
BASOPHILS NFR BLD: 0 % (ref 0–1)
DIFFERENTIAL METHOD BLD: ABNORMAL
EOSINOPHIL # BLD: 0 K/UL (ref 0–0.4)
EOSINOPHIL NFR BLD: 0 % (ref 0–7)
ERYTHROCYTE [DISTWIDTH] IN BLOOD BY AUTOMATED COUNT: 12.6 % (ref 11.5–14.5)
HCG UR QL: NEGATIVE
HCT VFR BLD AUTO: 39.7 % (ref 35–47)
HGB BLD-MCNC: 12.7 G/DL (ref 11.5–16)
IMM GRANULOCYTES # BLD AUTO: 0 K/UL (ref 0–0.04)
IMM GRANULOCYTES NFR BLD AUTO: 0 % (ref 0–0.5)
LYMPHOCYTES # BLD: 1 K/UL (ref 0.8–3.5)
LYMPHOCYTES NFR BLD: 12 % (ref 12–49)
MCH RBC QN AUTO: 31.3 PG (ref 26–34)
MCHC RBC AUTO-ENTMCNC: 32 G/DL (ref 30–36.5)
MCV RBC AUTO: 97.8 FL (ref 80–99)
MONOCYTES # BLD: 0.1 K/UL (ref 0–1)
MONOCYTES NFR BLD: 1 % (ref 5–13)
NEUTS SEG # BLD: 7.1 K/UL (ref 1.8–8)
NEUTS SEG NFR BLD: 87 % (ref 32–75)
NRBC # BLD: 0 K/UL (ref 0–0.01)
NRBC BLD-RTO: 0 PER 100 WBC
PLATELET # BLD AUTO: 211 K/UL (ref 150–400)
PMV BLD AUTO: 11.4 FL (ref 8.9–12.9)
RBC # BLD AUTO: 4.06 M/UL (ref 3.8–5.2)
THERAPEUTIC RANGE,PTTT: ABNORMAL SECS (ref 58–77)
UFH PPP CHRO-ACNC: 0.78 IU/ML
UFH PPP CHRO-ACNC: <0.1 IU/ML
WBC # BLD AUTO: 8.2 K/UL (ref 3.6–11)

## 2023-04-25 PROCEDURE — 74011250636 HC RX REV CODE- 250/636

## 2023-04-25 PROCEDURE — 70450 CT HEAD/BRAIN W/O DYE: CPT

## 2023-04-25 PROCEDURE — 65610000006 HC RM INTENSIVE CARE

## 2023-04-25 PROCEDURE — C1887 CATHETER, GUIDING: HCPCS

## 2023-04-25 PROCEDURE — 61624 TCAT PERM OCCLS/EMBOLJ CNS: CPT | Performed by: RADIOLOGY

## 2023-04-25 PROCEDURE — 85025 COMPLETE CBC W/AUTO DIFF WBC: CPT

## 2023-04-25 PROCEDURE — 75898 FOLLOW-UP ANGIOGRAPHY: CPT | Performed by: RADIOLOGY

## 2023-04-25 PROCEDURE — C1760 CLOSURE DEV, VASC: HCPCS

## 2023-04-25 PROCEDURE — B31R1ZZ FLUOROSCOPY OF INTRACRANIAL ARTERIES USING LOW OSMOLAR CONTRAST: ICD-10-PCS | Performed by: RADIOLOGY

## 2023-04-25 PROCEDURE — 77030014021 HC SYR ANGI FIX LOK MRTM -A

## 2023-04-25 PROCEDURE — 74011250636 HC RX REV CODE- 250/636: Performed by: NURSE ANESTHETIST, CERTIFIED REGISTERED

## 2023-04-25 PROCEDURE — 74011250636 HC RX REV CODE- 250/636: Performed by: RADIOLOGY

## 2023-04-25 PROCEDURE — 77030005402 HC CATH RAD ART LN KT TELE -B

## 2023-04-25 PROCEDURE — 74011000250 HC RX REV CODE- 250

## 2023-04-25 PROCEDURE — 77030008684 HC TU ET CUF COVD -B: Performed by: ANESTHESIOLOGY

## 2023-04-25 PROCEDURE — 81025 URINE PREGNANCY TEST: CPT

## 2023-04-25 PROCEDURE — 03LG3DZ OCCLUSION OF INTRACRANIAL ARTERY WITH INTRALUMINAL DEVICE, PERCUTANEOUS APPROACH: ICD-10-PCS | Performed by: RADIOLOGY

## 2023-04-25 PROCEDURE — 77030008477 HC STYL SATN SLP COVD -A: Performed by: ANESTHESIOLOGY

## 2023-04-25 PROCEDURE — C1894 INTRO/SHEATH, NON-LASER: HCPCS

## 2023-04-25 PROCEDURE — 85520 HEPARIN ASSAY: CPT

## 2023-04-25 PROCEDURE — 74011250637 HC RX REV CODE- 250/637

## 2023-04-25 PROCEDURE — 77030008584 HC TOOL GDWRE DEV TERU -A

## 2023-04-25 PROCEDURE — 85730 THROMBOPLASTIN TIME PARTIAL: CPT

## 2023-04-25 PROCEDURE — 77030012468 HC VLV BLEEDBK CNTRL ABBT -B

## 2023-04-25 PROCEDURE — 74011000636 HC RX REV CODE- 636: Performed by: RADIOLOGY

## 2023-04-25 PROCEDURE — 77030038563 HC TU ART PRESSR ICUM -Z

## 2023-04-25 PROCEDURE — 76060000037 HC ANESTHESIA 3 TO 3.5 HR

## 2023-04-25 PROCEDURE — C1769 GUIDE WIRE: HCPCS

## 2023-04-25 PROCEDURE — 36226 PLACE CATH VERTEBRAL ART: CPT | Performed by: RADIOLOGY

## 2023-04-25 PROCEDURE — 36216 PLACE CATHETER IN ARTERY: CPT

## 2023-04-25 PROCEDURE — 74011000250 HC RX REV CODE- 250: Performed by: RADIOLOGY

## 2023-04-25 PROCEDURE — 74011250637 HC RX REV CODE- 250/637: Performed by: RADIOLOGY

## 2023-04-25 PROCEDURE — 74011000250 HC RX REV CODE- 250: Performed by: NURSE ANESTHETIST, CERTIFIED REGISTERED

## 2023-04-25 PROCEDURE — 77030016715 HC CATH ANGI DX TMPO2 CARD -B

## 2023-04-25 PROCEDURE — APPNB45 APP NON BILLABLE 31-45 MINUTES: Performed by: NURSE PRACTITIONER

## 2023-04-25 PROCEDURE — APPNB60 APP NON BILLABLE TIME 46-60 MINS

## 2023-04-25 PROCEDURE — 2709999900 HC NON-CHARGEABLE SUPPLY

## 2023-04-25 PROCEDURE — 36224 PLACE CATH CAROTD ART: CPT

## 2023-04-25 PROCEDURE — 75894 X-RAYS TRANSCATH THERAPY: CPT | Performed by: RADIOLOGY

## 2023-04-25 PROCEDURE — 74011250636 HC RX REV CODE- 250/636: Performed by: NURSE PRACTITIONER

## 2023-04-25 PROCEDURE — 36415 COLL VENOUS BLD VENIPUNCTURE: CPT

## 2023-04-25 PROCEDURE — 99152 MOD SED SAME PHYS/QHP 5/>YRS: CPT | Performed by: RADIOLOGY

## 2023-04-25 PROCEDURE — 76937 US GUIDE VASCULAR ACCESS: CPT | Performed by: RADIOLOGY

## 2023-04-25 RX ORDER — SODIUM CHLORIDE, SODIUM LACTATE, POTASSIUM CHLORIDE, CALCIUM CHLORIDE 600; 310; 30; 20 MG/100ML; MG/100ML; MG/100ML; MG/100ML
INJECTION, SOLUTION INTRAVENOUS
Status: DISCONTINUED | OUTPATIENT
Start: 2023-04-25 | End: 2023-04-25 | Stop reason: HOSPADM

## 2023-04-25 RX ORDER — FENTANYL CITRATE 50 UG/ML
INJECTION, SOLUTION INTRAMUSCULAR; INTRAVENOUS AS NEEDED
Status: DISCONTINUED | OUTPATIENT
Start: 2023-04-25 | End: 2023-04-25 | Stop reason: HOSPADM

## 2023-04-25 RX ORDER — SODIUM CHLORIDE 0.9 % (FLUSH) 0.9 %
5-40 SYRINGE (ML) INJECTION EVERY 8 HOURS
Status: DISCONTINUED | OUTPATIENT
Start: 2023-04-25 | End: 2023-04-27 | Stop reason: HOSPADM

## 2023-04-25 RX ORDER — DIPHENHYDRAMINE HYDROCHLORIDE 50 MG/ML
12.5 INJECTION, SOLUTION INTRAMUSCULAR; INTRAVENOUS
Status: DISCONTINUED | OUTPATIENT
Start: 2023-04-25 | End: 2023-04-27 | Stop reason: HOSPADM

## 2023-04-25 RX ORDER — ONDANSETRON 4 MG/1
4 TABLET, ORALLY DISINTEGRATING ORAL
Status: DISCONTINUED | OUTPATIENT
Start: 2023-04-25 | End: 2023-04-26

## 2023-04-25 RX ORDER — SODIUM CHLORIDE, SODIUM LACTATE, POTASSIUM CHLORIDE, CALCIUM CHLORIDE 600; 310; 30; 20 MG/100ML; MG/100ML; MG/100ML; MG/100ML
50 INJECTION, SOLUTION INTRAVENOUS CONTINUOUS
Status: DISPENSED | OUTPATIENT
Start: 2023-04-25 | End: 2023-04-26

## 2023-04-25 RX ORDER — HEPARIN SODIUM 1000 [USP'U]/ML
2000 INJECTION, SOLUTION INTRAVENOUS; SUBCUTANEOUS
Status: COMPLETED | OUTPATIENT
Start: 2023-04-25 | End: 2023-04-25

## 2023-04-25 RX ORDER — DEXAMETHASONE SODIUM PHOSPHATE 4 MG/ML
4 INJECTION, SOLUTION INTRA-ARTICULAR; INTRALESIONAL; INTRAMUSCULAR; INTRAVENOUS; SOFT TISSUE ONCE
Status: COMPLETED | OUTPATIENT
Start: 2023-04-25 | End: 2023-04-25

## 2023-04-25 RX ORDER — SUCCINYLCHOLINE CHLORIDE 20 MG/ML
INJECTION INTRAMUSCULAR; INTRAVENOUS AS NEEDED
Status: DISCONTINUED | OUTPATIENT
Start: 2023-04-25 | End: 2023-04-25 | Stop reason: HOSPADM

## 2023-04-25 RX ORDER — MIDAZOLAM HYDROCHLORIDE 1 MG/ML
INJECTION, SOLUTION INTRAMUSCULAR; INTRAVENOUS AS NEEDED
Status: DISCONTINUED | OUTPATIENT
Start: 2023-04-25 | End: 2023-04-25 | Stop reason: HOSPADM

## 2023-04-25 RX ORDER — BUTALBITAL, ACETAMINOPHEN AND CAFFEINE 50; 325; 40 MG/1; MG/1; MG/1
1 TABLET ORAL
Status: DISCONTINUED | OUTPATIENT
Start: 2023-04-25 | End: 2023-04-27 | Stop reason: HOSPADM

## 2023-04-25 RX ORDER — ACETAMINOPHEN 325 MG/1
650 TABLET ORAL
Status: DISCONTINUED | OUTPATIENT
Start: 2023-04-25 | End: 2023-04-27 | Stop reason: HOSPADM

## 2023-04-25 RX ORDER — ACETAMINOPHEN 650 MG/1
650 SUPPOSITORY RECTAL
Status: DISCONTINUED | OUTPATIENT
Start: 2023-04-25 | End: 2023-04-27 | Stop reason: HOSPADM

## 2023-04-25 RX ORDER — DIPHENHYDRAMINE HYDROCHLORIDE 50 MG/ML
INJECTION, SOLUTION INTRAMUSCULAR; INTRAVENOUS AS NEEDED
Status: DISCONTINUED | OUTPATIENT
Start: 2023-04-25 | End: 2023-04-25 | Stop reason: HOSPADM

## 2023-04-25 RX ORDER — PHENYLEPHRINE HCL IN 0.9% NACL 0.4MG/10ML
SYRINGE (ML) INTRAVENOUS AS NEEDED
Status: DISCONTINUED | OUTPATIENT
Start: 2023-04-25 | End: 2023-04-25 | Stop reason: HOSPADM

## 2023-04-25 RX ORDER — LIDOCAINE HYDROCHLORIDE 20 MG/ML
INJECTION, SOLUTION EPIDURAL; INFILTRATION; INTRACAUDAL; PERINEURAL AS NEEDED
Status: DISCONTINUED | OUTPATIENT
Start: 2023-04-25 | End: 2023-04-25 | Stop reason: HOSPADM

## 2023-04-25 RX ORDER — LIDOCAINE HYDROCHLORIDE 10 MG/ML
INJECTION INFILTRATION; PERINEURAL
Status: COMPLETED
Start: 2023-04-25 | End: 2023-04-25

## 2023-04-25 RX ORDER — HEPARIN SODIUM 10000 [USP'U]/100ML
12-25 INJECTION, SOLUTION INTRAVENOUS
Status: DISCONTINUED | OUTPATIENT
Start: 2023-04-25 | End: 2023-04-26

## 2023-04-25 RX ORDER — METOCLOPRAMIDE HYDROCHLORIDE 5 MG/ML
5 INJECTION INTRAMUSCULAR; INTRAVENOUS
Status: DISCONTINUED | OUTPATIENT
Start: 2023-04-25 | End: 2023-04-27 | Stop reason: HOSPADM

## 2023-04-25 RX ORDER — ONDANSETRON 2 MG/ML
4 INJECTION INTRAMUSCULAR; INTRAVENOUS
Status: DISCONTINUED | OUTPATIENT
Start: 2023-04-25 | End: 2023-04-26

## 2023-04-25 RX ORDER — SODIUM CHLORIDE 0.9 % (FLUSH) 0.9 %
5-40 SYRINGE (ML) INJECTION AS NEEDED
Status: DISCONTINUED | OUTPATIENT
Start: 2023-04-25 | End: 2023-04-27 | Stop reason: HOSPADM

## 2023-04-25 RX ORDER — DEXAMETHASONE SODIUM PHOSPHATE 4 MG/ML
2 INJECTION, SOLUTION INTRA-ARTICULAR; INTRALESIONAL; INTRAMUSCULAR; INTRAVENOUS; SOFT TISSUE EVERY 8 HOURS
Status: COMPLETED | OUTPATIENT
Start: 2023-04-25 | End: 2023-04-26

## 2023-04-25 RX ORDER — ROCURONIUM BROMIDE 10 MG/ML
INJECTION, SOLUTION INTRAVENOUS AS NEEDED
Status: DISCONTINUED | OUTPATIENT
Start: 2023-04-25 | End: 2023-04-25 | Stop reason: HOSPADM

## 2023-04-25 RX ORDER — DEXAMETHASONE SODIUM PHOSPHATE 4 MG/ML
INJECTION, SOLUTION INTRA-ARTICULAR; INTRALESIONAL; INTRAMUSCULAR; INTRAVENOUS; SOFT TISSUE AS NEEDED
Status: DISCONTINUED | OUTPATIENT
Start: 2023-04-25 | End: 2023-04-25 | Stop reason: HOSPADM

## 2023-04-25 RX ORDER — PROPOFOL 10 MG/ML
INJECTION, EMULSION INTRAVENOUS AS NEEDED
Status: DISCONTINUED | OUTPATIENT
Start: 2023-04-25 | End: 2023-04-25 | Stop reason: HOSPADM

## 2023-04-25 RX ORDER — VERAPAMIL HYDROCHLORIDE 2.5 MG/ML
2.5 INJECTION, SOLUTION INTRAVENOUS
Status: DISCONTINUED | OUTPATIENT
Start: 2023-04-25 | End: 2023-04-25 | Stop reason: HOSPADM

## 2023-04-25 RX ORDER — POLYETHYLENE GLYCOL 3350 17 G/17G
17 POWDER, FOR SOLUTION ORAL DAILY PRN
Status: DISCONTINUED | OUTPATIENT
Start: 2023-04-25 | End: 2023-04-27 | Stop reason: HOSPADM

## 2023-04-25 RX ORDER — SCOLOPAMINE TRANSDERMAL SYSTEM 1 MG/1
1 PATCH, EXTENDED RELEASE TRANSDERMAL
Status: DISCONTINUED | OUTPATIENT
Start: 2023-04-25 | End: 2023-04-26

## 2023-04-25 RX ORDER — ONDANSETRON 2 MG/ML
INJECTION INTRAMUSCULAR; INTRAVENOUS AS NEEDED
Status: DISCONTINUED | OUTPATIENT
Start: 2023-04-25 | End: 2023-04-25 | Stop reason: HOSPADM

## 2023-04-25 RX ORDER — LIDOCAINE HYDROCHLORIDE 10 MG/ML
20 INJECTION INFILTRATION; PERINEURAL
Status: COMPLETED | OUTPATIENT
Start: 2023-04-25 | End: 2023-04-25

## 2023-04-25 RX ORDER — LIDOCAINE 40 MG/G
CREAM TOPICAL
Status: COMPLETED | OUTPATIENT
Start: 2023-04-25 | End: 2023-04-25

## 2023-04-25 RX ORDER — GLYCOPYRROLATE 0.2 MG/ML
INJECTION INTRAMUSCULAR; INTRAVENOUS AS NEEDED
Status: DISCONTINUED | OUTPATIENT
Start: 2023-04-25 | End: 2023-04-25 | Stop reason: HOSPADM

## 2023-04-25 RX ADMIN — FENTANYL CITRATE 50 MCG: 50 INJECTION, SOLUTION INTRAMUSCULAR; INTRAVENOUS at 08:24

## 2023-04-25 RX ADMIN — ONDANSETRON HYDROCHLORIDE 4 MG: 2 INJECTION, SOLUTION INTRAMUSCULAR; INTRAVENOUS at 11:15

## 2023-04-25 RX ADMIN — Medication 4000 UNITS: at 08:45

## 2023-04-25 RX ADMIN — LIDOCAINE HYDROCHLORIDE 6 ML: 10 INJECTION, SOLUTION INFILTRATION; PERINEURAL at 08:47

## 2023-04-25 RX ADMIN — METOCLOPRAMIDE 5 MG: 5 INJECTION, SOLUTION INTRAMUSCULAR; INTRAVENOUS at 21:28

## 2023-04-25 RX ADMIN — SODIUM CHLORIDE, PRESERVATIVE FREE 10 ML: 5 INJECTION INTRAVENOUS at 21:29

## 2023-04-25 RX ADMIN — LIDOCAINE 4%: 4 CREAM TOPICAL at 07:47

## 2023-04-25 RX ADMIN — LIDOCAINE HYDROCHLORIDE 40 MG: 20 INJECTION, SOLUTION EPIDURAL; INFILTRATION; INTRACAUDAL; PERINEURAL at 08:15

## 2023-04-25 RX ADMIN — LIDOCAINE HYDROCHLORIDE 6 ML: 10 INJECTION INFILTRATION; PERINEURAL at 08:47

## 2023-04-25 RX ADMIN — DEXAMETHASONE SODIUM PHOSPHATE 4 MG: 4 INJECTION, SOLUTION INTRAMUSCULAR; INTRAVENOUS at 17:25

## 2023-04-25 RX ADMIN — GLYCOPYRROLATE 0.4 MG: 0.2 INJECTION INTRAMUSCULAR; INTRAVENOUS at 08:56

## 2023-04-25 RX ADMIN — SODIUM CHLORIDE, POTASSIUM CHLORIDE, SODIUM LACTATE AND CALCIUM CHLORIDE 50 ML/HR: 600; 310; 30; 20 INJECTION, SOLUTION INTRAVENOUS at 14:05

## 2023-04-25 RX ADMIN — ACETAMINOPHEN 650 MG: 325 TABLET ORAL at 14:12

## 2023-04-25 RX ADMIN — SUCCINYLCHOLINE CHLORIDE 100 MG: 20 INJECTION, SOLUTION INTRAMUSCULAR; INTRAVENOUS at 08:15

## 2023-04-25 RX ADMIN — MIDAZOLAM 2 MG: 1 INJECTION INTRAMUSCULAR; INTRAVENOUS at 08:13

## 2023-04-25 RX ADMIN — ACETAMINOPHEN 650 MG: 325 TABLET ORAL at 20:22

## 2023-04-25 RX ADMIN — HEPARIN SODIUM 2000 UNITS: 1000 INJECTION, SOLUTION INTRAVENOUS; SUBCUTANEOUS at 10:22

## 2023-04-25 RX ADMIN — NITROGLYCERIN 1 INCH: 20 OINTMENT TOPICAL at 07:47

## 2023-04-25 RX ADMIN — Medication 4000 UNITS: at 08:44

## 2023-04-25 RX ADMIN — FENTANYL CITRATE 50 MCG: 50 INJECTION, SOLUTION INTRAMUSCULAR; INTRAVENOUS at 08:15

## 2023-04-25 RX ADMIN — Medication 80 MCG: at 08:54

## 2023-04-25 RX ADMIN — PROPOFOL 50 MG: 10 INJECTION, EMULSION INTRAVENOUS at 09:01

## 2023-04-25 RX ADMIN — PROPOFOL 100 MG: 10 INJECTION, EMULSION INTRAVENOUS at 08:28

## 2023-04-25 RX ADMIN — PROPOFOL 100 MG: 10 INJECTION, EMULSION INTRAVENOUS at 08:15

## 2023-04-25 RX ADMIN — Medication 4000 UNITS: at 08:39

## 2023-04-25 RX ADMIN — DEXAMETHASONE SODIUM PHOSPHATE 8 MG: 4 INJECTION, SOLUTION INTRAMUSCULAR; INTRAVENOUS at 08:32

## 2023-04-25 RX ADMIN — PROPOFOL 30 MG: 10 INJECTION, EMULSION INTRAVENOUS at 09:52

## 2023-04-25 RX ADMIN — HEPARIN SODIUM AND DEXTROSE 12 UNITS/KG/HR: 10000; 5 INJECTION INTRAVENOUS at 15:26

## 2023-04-25 RX ADMIN — SODIUM CHLORIDE, POTASSIUM CHLORIDE, SODIUM LACTATE AND CALCIUM CHLORIDE: 600; 310; 30; 20 INJECTION, SOLUTION INTRAVENOUS at 07:57

## 2023-04-25 RX ADMIN — Medication 4000 UNITS: at 08:46

## 2023-04-25 RX ADMIN — IOHEXOL 91 ML: 300 INJECTION, SOLUTION INTRAVENOUS at 10:55

## 2023-04-25 RX ADMIN — HEPARIN SODIUM 2000 UNITS: 1000 INJECTION, SOLUTION INTRAVENOUS; SUBCUTANEOUS at 09:25

## 2023-04-25 RX ADMIN — Medication 4000 UNITS: at 08:47

## 2023-04-25 RX ADMIN — ONDANSETRON HYDROCHLORIDE 4 MG: 2 INJECTION, SOLUTION INTRAMUSCULAR; INTRAVENOUS at 08:32

## 2023-04-25 RX ADMIN — DIPHENHYDRAMINE HYDROCHLORIDE 12.5 MG: 50 INJECTION, SOLUTION INTRAMUSCULAR; INTRAVENOUS at 21:28

## 2023-04-25 RX ADMIN — Medication 40 MCG: at 08:51

## 2023-04-25 RX ADMIN — ROCURONIUM BROMIDE 5 MG: 10 SOLUTION INTRAVENOUS at 08:15

## 2023-04-25 RX ADMIN — DEXAMETHASONE SODIUM PHOSPHATE 2 MG: 4 INJECTION, SOLUTION INTRAMUSCULAR; INTRAVENOUS at 23:01

## 2023-04-25 RX ADMIN — DIPHENHYDRAMINE HYDROCHLORIDE 12.5 MG: 50 INJECTION, SOLUTION INTRAMUSCULAR; INTRAVENOUS at 08:33

## 2023-04-25 RX ADMIN — BUTALBITAL, ACETAMINOPHEN, AND CAFFEINE 1 TABLET: 50; 325; 40 TABLET ORAL at 16:42

## 2023-04-25 NOTE — PROGRESS NOTES
NeuroInterventional Surgery H&P  KIRSTEN Marin    Patient: Anam Aguayo MRN: [id number]  SSN: [id number]    YOB: 1985  Age: 41 y.o. Sex: female      Subjective:      Anam Aguayo is a 41 y.o. 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   She presents to [facility] today for scheduled diagnostic cerebral angiogram.      Past Medical History:   Diagnosis Date    Aneurysm ([address]     AVM X5    Anxiety     Arrhythmia     BIGEMENY PVC'S    Arthritis     Calculus of kidney     Cerebellar hemorrhage, acute ([address] 03/2022    secondary to AVM (IR angiography 3/22)    Chronic pain     BACK AND PELVIS    Current smoker     Dyspepsia and other specified disorders of function of stomach     GERD (gastroesophageal reflux disease)     Ill-defined condition     KIDNEY STONES    Irregular heart beat     Kidney disease     hx of kidney stone    Nausea & vomiting     Other ill-defined conditions(799.89)     kidney stone in pass,passed    Postpartum hemorrhage 2017    ALSO HAD HEMORRHAGE DURING MISCARRIAGE 2012    Psychiatric disorder     ADHD    Stroke ([address] 03/17/2022     Family History   Problem Relation Age of Onset    Emphysema Mother     Alcohol abuse Mother     Cancer Mother         Skin    Psychiatric Disorder Mother     No Known Problems Father     No Known Problems Sister     No Known Problems Sister     No Known Problems Sister     No Known Problems Sister     No Known Problems Sister     No Known Problems Sister     No Known Problems Brother     No Known Problems Son     No Known Problems Son     OSTEOARTHRITIS Maternal Grandmother     Cancer Maternal Grandmother         Colon, breast & skin    Anesth Problems Neg Hx      Social History     Tobacco Use    Smoking status: Former     Packs/day: 0.50     Years: 15.00     Pack years: 7.50     Types: Cigarettes     Quit date: 3/17/2022     Years since quittin.1    Smokeless tobacco: Never    Tobacco comments:     about 6 cigarettes per day at present   Substance Use Topics    Alcohol use: Not Currently      Prior to Admission Medications   Prescriptions Last Dose Informant Patient Reported? Taking?   aspirin ([place]) 81 mg cap 2023  No Yes   Sig: Take 81 mg by mouth daily. methylPREDNISolone (MEDROL DOSEPACK) 4 mg tablet 3/25/2023  No Yes   Sig: Follow taper instructions on pack      Facility-Administered Medications: None       Allergies   Allergen Reactions    Morphine Rash and Hives    Zolpidem Other (comments)     \"Hallucinations and black outs\"     Review of Systems:  A comprehensive review of systems was negative except for that written in the History of Present Illness. Pt endorses mild generalized headache. Denies numbness, tingling, nausea, vomiting, difficulty swallowing or speaking, blurred vision or dizziness. Objective:     Vitals:    23 0734   BP: (!) 101/47   Pulse: 79   Resp: 15   Temp: 98.2 °F (36.8 °C)   SpO2: 96%   Weight: 41.7 kg (92 lb)   Height: 5' (1.524 m)      Physical Exam:  GENERAL: Calm, cooperative, NAD  SKIN: Warm, dry, color appropriate for ethnicity. HEART: RRR  LUNGS: CTAB    Neurologic Exam:  Mental Status:  Alert and oriented x 4. Appropriate affect, mood and behavior. Language:    Normal fluency, repetition, comprehension and naming. Cranial Nerves:   Pupils 3 mm, equal, round and reactive to light. Visual fields full to confrontation. Extraocular movements intact. Facial sensation intact. Full facial strength, no asymmetry. Hearing grossly intact bilaterally. No dysarthria. Tongue protrudes to midline, palate elevates symmetrically. Shoulder shrug 5/5 bilaterally. Motor:    No pronator drift.       Bulk and tone normal. 5/5 power in all extremities proximally and distally. No involuntary movements. Sensation:    Sensation intact throughout to light touch, temperature, pinprick, vibration, proprioception     Reflexes:    Deferred    Coordination & Gait: Normal. FTN intact with no ataxia present. Labs:  Lab Results   Component Value Date/Time    WBC 6.6 04/18/2023 11:19 AM    Hemoglobin (POC) 13.9 10/24/2018 11:40 AM    HGB 13.7 04/18/2023 11:19 AM    HCT 42.9 04/18/2023 11:19 AM    PLATELET 693 49/75/7155 11:19 AM    MCV 96.2 04/18/2023 11:19 AM      Lab Results   Component Value Date/Time    Sodium 138 04/18/2023 11:19 AM    Potassium 4.1 04/18/2023 11:19 AM    Chloride 107 04/18/2023 11:19 AM    CO2 27 04/18/2023 11:19 AM    Anion gap 4 (L) 04/18/2023 11:19 AM    Glucose 62 (L) 04/18/2023 11:19 AM    BUN 11 04/18/2023 11:19 AM    Creatinine 0.59 04/18/2023 11:19 AM    BUN/Creatinine ratio 19 04/18/2023 11:19 AM    GFR est AA >60 07/05/2022 01:39 PM    GFR est AA >60 07/05/2022 01:39 PM    GFR est non-AA >60 07/05/2022 01:39 PM    GFR est non-AA >60 07/05/2022 01:39 PM    Calcium 9.3 04/18/2023 11:19 AM       Assessment & Plan   Cerebral aneurysm. S/p pipeline embolization of pre-nidal aneurysm on 6/29/22, transarterial embolization of AVM pedicles on 11/29/22 and 1/17/23. Arteriovenous Malformation    - Options of diagnostic approach under moderate sedation vs diagnostic angiogram under general anesthesia w/ possible AVM embolization discussed w/ pt.  - Risks and benefits explained, questions answered, consent signed. - Proceed with diagnostic cerebral angiogram today with Dr. Josie Solis . Risk, benefits and alternatives of procedure were reviewed prior to this procedure by myself and Dr. Josie Solis with patient. The patient verbalized understanding and would like to proceed with procedure as planned. All questions were answered, and written informed consent has been obtained.     Signed By: KIRSTEN Mascorro April 25, 2023

## 2023-04-25 NOTE — PROGRESS NOTES
Neurocritical Care Brief Progress Note:    Pt is s/p silk and PVA embolization of distal right SCA resulting in complete obliteration of AVM supply from that pedicle by Dr. Floresita Schwartz. Pt is seen sitting in bed, no acute distress. She reports intermittent 5/10 generalized headaches. She denies vision changes, dizziness, weakness, numbness or tingling, shortness of breath, and nausea. Post procedure CTH showed evidence of contrast staining in right cerebellum vs bleeding. Repeat CTH ordered      Plan  - SBP goal   - Q1 hour neuro checks  - Neuro-dose IV Heparin overnight  - Fioricet & dexamethasone added  - Repeat CTH results pending    Physical Exam:  Gen: NAD, calm, cooperative  Neuro: A&Ox4. Follows commands. Speech clear. Affect normal. PERRL, 3 mm bilaterally. Blinks to threat. No disconjugate gaze present. EOMI. Face symmetric. Palate symmetric. Tongue midline. Valentine spontaneously. Strength 5/5 in UE and LE BL. Negative drift. Bulk and tone normal. No involuntary movements. Gait deferred. Skin: Warm, dry, color appropriate for ethnicity. Groin arteriotomy site soft and non-tender on palpation. Dressing is intact w/ bleeding noted - Tegaderm marked to monitor.        Lam Pinedo, [address] [facility]  Neurocritical Care Nurse Practitioner  [phone]

## 2023-04-25 NOTE — BRIEF OP NOTE
NEUROINTERVENTIONAL SURGERY BRIEF POSTOPERATIVE NOTE    Pre-Op Diagnosis: Cerebellar AVM    Post-Op Diagnosis: Same as preoperative diagnosis. PROCEDURE:  Cerebral angiogram  Transarterial embolization, CNS: Silk and PVA particle embolization of distal right SCA feeder  Control angiography  Ultrasound guided vascular access  Placement of arteriotomy closure device    VESSEL(S) STUDIED:  Right vertebral artery  Left vertebral artery  Right SCA VESSEL(S) TREATED:  Right SCA      PRELIMINARY REPORT & DISPOSITION:   Persistent supply to ACM from right PICA branch, distal right AICA, and distal right SCA. Status post silk and PVA embo of distal right SCA resulting in complete obliteration of AVM supply from this pedicle. COMPLICATIONS:  None    SPECIMENS:   * No specimens in log *     IMPLANTS:   4-0 silk suture fragments  150-250 um and 255-355 um PVA  [employer] at right CFA    DRAINS:   * No LDAs found *    FOLLOW-UP:  Admit to ICU  SBP   Q1 hr neurochecks  Heparin gtt overnight DATE OF SERVICE:  4/25/2023 11:06 AM     ATTENDING SURGEON(S):  Estephania Nguyễn MD      ANESTHESIA:   GA    EBL: 5 cc    MEDICATIONS:   See nursing record    PUNCTURE SITE:  Right common femoral artery. Arteriotomy closed with [employer]. Flat with leg straight x 2 hours. Armand Ly M.D.    Taisha Daniels    , Department of Radiology  [facility]

## 2023-04-25 NOTE — H&P
History and Physical        NeuroInterventional Surgery H&P  KIRSTEN Henderson    Patient: Rocky Quan MRN: [id number]  SSN: [id number]    YOB: 1985  Age: 41 y.o. Sex: female      Subjective:      Rocky Quan is a 41 y.o. 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   She presents to [facility] today for scheduled diagnostic cerebral angiogram.         Past Medical History:   Diagnosis Date    Aneurysm ([facility]     AVM X5    Anxiety     Arrhythmia     BIGEMENY PVC'S    Arthritis     Calculus of kidney     Cerebellar hemorrhage, acute ([address] 03/2022    secondary to AVM (IR angiography 3/22)    Chronic pain     BACK AND PELVIS    Current smoker     Dyspepsia and other specified disorders of function of stomach     GERD (gastroesophageal reflux disease)     Ill-defined condition     KIDNEY STONES    Irregular heart beat     Kidney disease     hx of kidney stone    Nausea & vomiting     Other ill-defined conditions(799.89)     kidney stone in pass,passed    Postpartum hemorrhage 2017    ALSO HAD HEMORRHAGE DURING MISCARRIAGE 2012    Psychiatric disorder     ADHD    Stroke ([facility] [address] 03/17/2022     Family History   Problem Relation Age of Onset    Emphysema Mother     Alcohol abuse Mother     Cancer Mother         Skin    Psychiatric Disorder Mother     No Known Problems Father     No Known Problems Sister     No Known Problems Sister     No Known Problems Sister     No Known Problems Sister     No Known Problems Sister     No Known Problems Sister     No Known Problems Brother     No Known Problems Son     No Known Problems Son     OSTEOARTHRITIS Maternal Grandmother     Cancer Maternal Grandmother         Colon, breast & skin    Anesth Problems Neg Hx      Social History Tobacco Use    Smoking status: Former     Packs/day: 0.50     Years: 15.00     Pack years: 7.50     Types: Cigarettes     Quit date: 3/17/2022     Years since quittin.1    Smokeless tobacco: Never    Tobacco comments:     about 6 cigarettes per day at present   Substance Use Topics    Alcohol use: Not Currently      Prior to Admission Medications   Prescriptions Last Dose Informant Patient Reported? Taking?   aspirin ([place]) 81 mg cap 2023  No Yes   Sig: Take 81 mg by mouth daily. methylPREDNISolone (MEDROL DOSEPACK) 4 mg tablet 3/25/2023  No Yes   Sig: Follow taper instructions on pack      Facility-Administered Medications: None       Allergies   Allergen Reactions    Morphine Rash and Hives    Zolpidem Other (comments)     \"Hallucinations and black outs\"       Review of Systems:  A comprehensive review of systems was negative except for that written in the History of Present Illness. Denies numbness, tingling, nausea, vomiting, difficulty swallowing or speaking, headache, blurred vision or dizziness. Objective:     Vitals:    23 1415 23 1430 23 1500 23 1530   BP:  99/68 (!) 92/56 (!) 85/56   Pulse: 81 (!) 53 79 74   Resp: 13 13 17 14   Temp:       SpO2: 97% 99% 99% 97%   Weight:       Height:          Physical Exam:  GENERAL: Calm, cooperative, NAD  SKIN: Warm, dry, color appropriate for ethnicity. HEART: RRR  LUNGS: CTAB    Neurologic Exam:  Mental Status:  Alert and oriented x 4. Appropriate affect, mood and behavior. Language:    Normal fluency, repetition, comprehension and naming. Cranial Nerves:   Pupils 3 mm, equal, round and reactive to light. Visual fields full to confrontation. Extraocular movements intact. Facial sensation intact. Full facial strength, no asymmetry. Hearing grossly intact bilaterally. No dysarthria. Tongue protrudes to midline, palate elevates symmetrically.       Shoulder shrug 5/5 bilaterally. Motor:    No pronator drift. Bulk and tone normal.      5/5 power in all extremities proximally and distally. No involuntary movements. Sensation:    Sensation intact throughout to light touch, temperature, pinprick, vibration, proprioception     Reflexes:    Reflexes are 2+ at the biceps, triceps, patella and achilles bilaterally. Negative Babinskis    Coordination & Gait: Normal. FTN and HTS intact with no ataxia present. Labs:  Lab Results   Component Value Date/Time    WBC 8.2 04/25/2023 01:59 PM    Hemoglobin (POC) 13.9 10/24/2018 11:40 AM    HGB 12.7 04/25/2023 01:59 PM    HCT 39.7 04/25/2023 01:59 PM    PLATELET 448 40/73/6532 01:59 PM    MCV 97.8 04/25/2023 01:59 PM      Lab Results   Component Value Date/Time    Sodium 138 04/18/2023 11:19 AM    Potassium 4.1 04/18/2023 11:19 AM    Chloride 107 04/18/2023 11:19 AM    CO2 27 04/18/2023 11:19 AM    Anion gap 4 (L) 04/18/2023 11:19 AM    Glucose 62 (L) 04/18/2023 11:19 AM    BUN 11 04/18/2023 11:19 AM    Creatinine 0.59 04/18/2023 11:19 AM    BUN/Creatinine ratio 19 04/18/2023 11:19 AM    GFR est AA >60 07/05/2022 01:39 PM    GFR est AA >60 07/05/2022 01:39 PM    GFR est non-AA >60 07/05/2022 01:39 PM    GFR est non-AA >60 07/05/2022 01:39 PM    Calcium 9.3 04/18/2023 11:19 AM       Assessment & Plan     Hospital Problems  Date Reviewed: 1/4/2023            Codes Class Noted POA    Cerebral aneurysm ICD-10-CM: I67.1  ICD-9-CM: 437.3  6/9/2022 Unknown       Cerebral aneurysm. S/p pipeline embolization of pre-nidal aneurysm on 6/29/22, transarterial embolization of AVM pedicles on 11/29/22 and 1/17/23. Arteriovenous Malformation     - Options of diagnostic approach under moderate sedation vs diagnostic angiogram under general anesthesia w/ possible AVM embolization discussed w/ pt.  - Risks and benefits explained, questions answered, consent signed. - Proceed with diagnostic cerebral angiogram today with Dr. Delores Marte . Proceed with diagnostic cerebral angiogram today with Dr. Etheleen Burkitt, benefits and alternatives of procedure were reviewed prior to this procedure by myself and Dr. Sanjiv Velazquez with patient. The patient verbalized understanding and would like to proceed with procedure as planned. All questions were answered, and written informed consent has been obtained.     Signed By: KIRSTEN Henderson     April 25, 2023

## 2023-04-25 NOTE — PROGRESS NOTES
2034: Pt arrives ambulatory to angio department accompanied by grandmother Ying Reyes for diagnotic cerebral angiogram with possible embolization of AVM and possible placement of arterial closure device procedure. All assessments completed and consent was reviewed. Education given was regarding procedure, anesthesia, post-procedure care and  management/follow-up. Opportunity for questions was provided and all questions and concerns were addressed.      -Neuro assessment WNL. Pt c/o generalized headache rating 5/10.    0800: Scopolamine patch applied behind left ear. 5585: Patient placed on angio table without assistance. IR Staff and anesthesia present at bedside. Anesthesia to remain at bedside to manage pt airway, medications, VS and pt status. 1115: Knee immobilizer placed on pt right leg. Small amount of blood seen through dressing of right groin, marked, manual pressure applied. 1200: Albert Ly MD at bedside. 1219: Pt in CT - see results. 1240: Grandmother and mother at bedside. 1330: Noted swelling on right side of pt face, mainly of right eye and right upper lip. Pt c/o decrease sensation of right side of face. Notified Kian Gregg MD at bedside. 1340: TRANSFER - OUT REPORT:    Verbal report given to Jessica Tamez RN(name) on Verdene Cutter  being transferred to ICU 11(unit) for routine post - op       Report consisted of patients Situation, Background, Assessment and   Recommendations(SBAR). Information from the following report(s) SBAR, OR Summary, Procedure Summary, Intake/Output, MAR, Recent Results, Cardiac Rhythm NSR, and Alarm Parameters  was reviewed with the receiving nurse.     Lines:   Peripheral IV 04/25/23 Right Antecubital (Active)   Site Assessment Clean, dry, & intact 04/25/23 1345   Phlebitis Assessment 0 04/25/23 1345   Infiltration Assessment 0 04/25/23 1345   Dressing Status Clean, dry, & intact 04/25/23 1345   Dressing Type Transparent 04/25/23 1345   Hub Color/Line Status Pink;Flushed;Capped 04/25/23 1345   Action Taken Open ports on tubing capped 04/25/23 1345   Alcohol Cap Used Yes 04/25/23 1345        Opportunity for questions and clarification was provided.       Patient transported with:   Monitor  Registered Nurse

## 2023-04-25 NOTE — ANESTHESIA POSTPROCEDURE EVALUATION
* No procedures listed *.    general    Anesthesia Post Evaluation        Patient location during evaluation: PACU  Patient participation: complete - patient participated  Level of consciousness: awake and alert  Pain management: adequate  Airway patency: patent  Anesthetic complications: no  Cardiovascular status: acceptable  Respiratory status: acceptable  Hydration status: acceptable  Comments: I have seen and evaluated the patient and is ready for discharge. De Montaño MD    Post anesthesia nausea and vomiting:  none      INITIAL Post-op Vital signs:   Vitals Value Taken Time   /84 04/25/23 1116   Temp 36.7 °C (98 °F) 04/25/23 1116   Pulse 78 04/25/23 1120   Resp 20 04/25/23 1120   SpO2 100 % 04/25/23 1120   Vitals shown include unvalidated device data.

## 2023-04-26 ENCOUNTER — APPOINTMENT (OUTPATIENT)
Dept: CT IMAGING | Age: 38
DRG: 058 | End: 2023-04-26
Attending: RADIOLOGY
Payer: MEDICAID

## 2023-04-26 ENCOUNTER — APPOINTMENT (OUTPATIENT)
Dept: CT IMAGING | Age: 38
DRG: 058 | End: 2023-04-26
Attending: NURSE PRACTITIONER
Payer: MEDICAID

## 2023-04-26 ENCOUNTER — APPOINTMENT (OUTPATIENT)
Dept: CT IMAGING | Age: 38
DRG: 058 | End: 2023-04-26
Payer: MEDICAID

## 2023-04-26 LAB
ANION GAP SERPL CALC-SCNC: 3 MMOL/L (ref 5–15)
BUN SERPL-MCNC: 10 MG/DL (ref 6–20)
BUN/CREAT SERPL: 16 (ref 12–20)
CALCIUM SERPL-MCNC: 8.8 MG/DL (ref 8.5–10.1)
CHLORIDE SERPL-SCNC: 112 MMOL/L (ref 97–108)
CO2 SERPL-SCNC: 23 MMOL/L (ref 21–32)
CREAT SERPL-MCNC: 0.62 MG/DL (ref 0.55–1.02)
ERYTHROCYTE [DISTWIDTH] IN BLOOD BY AUTOMATED COUNT: 12.7 % (ref 11.5–14.5)
GLUCOSE BLD STRIP.AUTO-MCNC: 106 MG/DL (ref 65–117)
GLUCOSE SERPL-MCNC: 143 MG/DL (ref 65–100)
HCT VFR BLD AUTO: 38.4 % (ref 35–47)
HGB BLD-MCNC: 12.2 G/DL (ref 11.5–16)
MAGNESIUM SERPL-MCNC: 2.3 MG/DL (ref 1.6–2.4)
MCH RBC QN AUTO: 30.6 PG (ref 26–34)
MCHC RBC AUTO-ENTMCNC: 31.8 G/DL (ref 30–36.5)
MCV RBC AUTO: 96.2 FL (ref 80–99)
NRBC # BLD: 0 K/UL (ref 0–0.01)
NRBC BLD-RTO: 0 PER 100 WBC
PHOSPHATE SERPL-MCNC: 2.9 MG/DL (ref 2.6–4.7)
PLATELET # BLD AUTO: 231 K/UL (ref 150–400)
PMV BLD AUTO: 11.5 FL (ref 8.9–12.9)
POTASSIUM SERPL-SCNC: 3.7 MMOL/L (ref 3.5–5.1)
RBC # BLD AUTO: 3.99 M/UL (ref 3.8–5.2)
SERVICE CMNT-IMP: NORMAL
SODIUM SERPL-SCNC: 138 MMOL/L (ref 136–145)
UFH PPP CHRO-ACNC: 0.3 IU/ML
WBC # BLD AUTO: 11.5 K/UL (ref 3.6–11)

## 2023-04-26 PROCEDURE — 70450 CT HEAD/BRAIN W/O DYE: CPT

## 2023-04-26 PROCEDURE — 65610000006 HC RM INTENSIVE CARE

## 2023-04-26 PROCEDURE — APPNB60 APP NON BILLABLE TIME 46-60 MINS

## 2023-04-26 PROCEDURE — 74011250637 HC RX REV CODE- 250/637

## 2023-04-26 PROCEDURE — 85027 COMPLETE CBC AUTOMATED: CPT

## 2023-04-26 PROCEDURE — 85520 HEPARIN ASSAY: CPT

## 2023-04-26 PROCEDURE — APPNB180 APP NON BILLABLE TIME > 60 MINS

## 2023-04-26 PROCEDURE — 82962 GLUCOSE BLOOD TEST: CPT

## 2023-04-26 PROCEDURE — 80048 BASIC METABOLIC PNL TOTAL CA: CPT

## 2023-04-26 PROCEDURE — APPNB45 APP NON BILLABLE 31-45 MINUTES: Performed by: NURSE PRACTITIONER

## 2023-04-26 PROCEDURE — 74011250636 HC RX REV CODE- 250/636: Performed by: NURSE PRACTITIONER

## 2023-04-26 PROCEDURE — 74011000250 HC RX REV CODE- 250

## 2023-04-26 PROCEDURE — 74011250636 HC RX REV CODE- 250/636

## 2023-04-26 PROCEDURE — 83735 ASSAY OF MAGNESIUM: CPT

## 2023-04-26 PROCEDURE — 36415 COLL VENOUS BLD VENIPUNCTURE: CPT

## 2023-04-26 PROCEDURE — 84100 ASSAY OF PHOSPHORUS: CPT

## 2023-04-26 RX ORDER — DEXAMETHASONE SODIUM PHOSPHATE 4 MG/ML
2 INJECTION, SOLUTION INTRA-ARTICULAR; INTRALESIONAL; INTRAMUSCULAR; INTRAVENOUS; SOFT TISSUE EVERY 6 HOURS
Status: COMPLETED | OUTPATIENT
Start: 2023-04-27 | End: 2023-04-27

## 2023-04-26 RX ORDER — ONDANSETRON 4 MG/1
4 TABLET, ORALLY DISINTEGRATING ORAL
Status: DISCONTINUED | OUTPATIENT
Start: 2023-04-26 | End: 2023-04-27 | Stop reason: HOSPADM

## 2023-04-26 RX ORDER — ONDANSETRON 2 MG/ML
4 INJECTION INTRAMUSCULAR; INTRAVENOUS
Status: DISCONTINUED | OUTPATIENT
Start: 2023-04-26 | End: 2023-04-27 | Stop reason: HOSPADM

## 2023-04-26 RX ORDER — SODIUM CHLORIDE, SODIUM LACTATE, POTASSIUM CHLORIDE, CALCIUM CHLORIDE 600; 310; 30; 20 MG/100ML; MG/100ML; MG/100ML; MG/100ML
100 INJECTION, SOLUTION INTRAVENOUS CONTINUOUS
Status: DISCONTINUED | OUTPATIENT
Start: 2023-04-26 | End: 2023-04-27

## 2023-04-26 RX ORDER — GUAIFENESIN 100 MG/5ML
81 LIQUID (ML) ORAL DAILY
Status: DISCONTINUED | OUTPATIENT
Start: 2023-04-27 | End: 2023-04-27 | Stop reason: HOSPADM

## 2023-04-26 RX ORDER — PEPPERMINT OIL
SPIRIT ORAL ONCE
Status: COMPLETED | OUTPATIENT
Start: 2023-04-26 | End: 2023-04-26

## 2023-04-26 RX ORDER — SCOLOPAMINE TRANSDERMAL SYSTEM 1 MG/1
1 PATCH, EXTENDED RELEASE TRANSDERMAL
Status: DISCONTINUED | OUTPATIENT
Start: 2023-04-26 | End: 2023-04-27 | Stop reason: HOSPADM

## 2023-04-26 RX ORDER — DEXAMETHASONE SODIUM PHOSPHATE 4 MG/ML
4 INJECTION, SOLUTION INTRA-ARTICULAR; INTRALESIONAL; INTRAMUSCULAR; INTRAVENOUS; SOFT TISSUE ONCE
Status: COMPLETED | OUTPATIENT
Start: 2023-04-26 | End: 2023-04-26

## 2023-04-26 RX ADMIN — SODIUM CHLORIDE 500 ML: 9 INJECTION, SOLUTION INTRAVENOUS at 14:44

## 2023-04-26 RX ADMIN — DIPHENHYDRAMINE HYDROCHLORIDE 12.5 MG: 50 INJECTION, SOLUTION INTRAMUSCULAR; INTRAVENOUS at 09:41

## 2023-04-26 RX ADMIN — DIPHENHYDRAMINE HYDROCHLORIDE 12.5 MG: 50 INJECTION, SOLUTION INTRAMUSCULAR; INTRAVENOUS at 15:57

## 2023-04-26 RX ADMIN — METOCLOPRAMIDE 5 MG: 5 INJECTION, SOLUTION INTRAMUSCULAR; INTRAVENOUS at 15:57

## 2023-04-26 RX ADMIN — SODIUM CHLORIDE, POTASSIUM CHLORIDE, SODIUM LACTATE AND CALCIUM CHLORIDE 100 ML/HR: 600; 310; 30; 20 INJECTION, SOLUTION INTRAVENOUS at 15:39

## 2023-04-26 RX ADMIN — DEXAMETHASONE SODIUM PHOSPHATE 4 MG: 4 INJECTION, SOLUTION INTRAMUSCULAR; INTRAVENOUS at 20:47

## 2023-04-26 RX ADMIN — SODIUM CHLORIDE, PRESERVATIVE FREE 10 ML: 5 INJECTION INTRAVENOUS at 22:06

## 2023-04-26 RX ADMIN — METOCLOPRAMIDE 5 MG: 5 INJECTION, SOLUTION INTRAMUSCULAR; INTRAVENOUS at 22:05

## 2023-04-26 RX ADMIN — ONDANSETRON 4 MG: 4 TABLET, ORALLY DISINTEGRATING ORAL at 14:22

## 2023-04-26 RX ADMIN — METOCLOPRAMIDE 5 MG: 5 INJECTION, SOLUTION INTRAMUSCULAR; INTRAVENOUS at 09:41

## 2023-04-26 RX ADMIN — ACETAMINOPHEN 650 MG: 325 TABLET ORAL at 09:41

## 2023-04-26 RX ADMIN — DIPHENHYDRAMINE HYDROCHLORIDE 12.5 MG: 50 INJECTION, SOLUTION INTRAMUSCULAR; INTRAVENOUS at 03:30

## 2023-04-26 RX ADMIN — METOCLOPRAMIDE 5 MG: 5 INJECTION, SOLUTION INTRAMUSCULAR; INTRAVENOUS at 03:30

## 2023-04-26 RX ADMIN — SODIUM CHLORIDE, PRESERVATIVE FREE 10 ML: 5 INJECTION INTRAVENOUS at 06:06

## 2023-04-26 RX ADMIN — Medication: at 20:03

## 2023-04-26 RX ADMIN — SODIUM CHLORIDE, PRESERVATIVE FREE 10 ML: 5 INJECTION INTRAVENOUS at 16:23

## 2023-04-26 RX ADMIN — DIPHENHYDRAMINE HYDROCHLORIDE 12.5 MG: 50 INJECTION, SOLUTION INTRAMUSCULAR; INTRAVENOUS at 22:05

## 2023-04-26 RX ADMIN — ONDANSETRON 4 MG: 2 INJECTION INTRAMUSCULAR; INTRAVENOUS at 20:37

## 2023-04-26 RX ADMIN — DEXAMETHASONE SODIUM PHOSPHATE 2 MG: 4 INJECTION, SOLUTION INTRAMUSCULAR; INTRAVENOUS at 06:06

## 2023-04-26 NOTE — PROGRESS NOTES
Neurocritical Care Brief Progress Note:  50-year-old female  s/p transarterial embolization, CNS: Silk and PVA particle embolization of distal right SCA feeder by Dr. Karina Palma today. Has headache 6/10. CTH performed 18:50 is negative for acute process. Physical Exam:  Gen: NAD, calm, cooperative  Neuro: A&Ox4. Follows commands. Speech clear. Affect normal. PERRL, 3 mm bilaterally. Blinks to threat. No disconjugate gaze present. EOMI. Face symmetric. Palate symmetric. Tongue midline. Valentine spontaneously. Strength 5/5 in UE and LE BL. Negative drift. Bulk and tone normal. No involuntary movements. Gait deferred. Skin: Warm, dry, color appropriate for ethnicity. Right groin arteriotomy site soft and non-tender on palpation, dressing is C/D/I, with no active bleeding or drainage noted.      PLAN:   - SBP goal   - Q1 hour neuro checks  -LR 50 ml/hr  - Neuro-dose IV Heparin overnight  - Fioricet & dexamethasone added  -Reglan 5 mg/benadryl 12.5 mg IV for persistent HA  -Plan for d/c in am    Yasmany Catherine NP  Neurocritical Care Nurse Practitioner  [phone]

## 2023-04-26 NOTE — PROGRESS NOTES
Neurocritical Care Brief Progress Note:    Called to round on pt by RN for complaints of dizziness and nausea. She reports that symptoms began shortly after stopping IV heparin; the pt ambulated to assess for discharge readiness w/ no mobility issues, but dizziness returned (or worsened) after walking. Neuro exam otherwise stable. Pt was given 500mL NS IVF bolus, prn ondansetron. Pt still endorsing waxing and waning headaches, currently at 3/10. Symptoms not unexpected given the nature of her AVM w/ small stroke progression. Plan for [facility]. Physical Exam:  Gen: Dizziness, calm, cooperative  Neuro: A&Ox4. Follows commands. Speech clear. Affect normal. PERRL, 3 mm bilaterally. Blinks to threat. No disconjugate gaze present. EOMI. Face symmetric. Palate symmetric. Tongue midline. Valentine spontaneously. Strength 5/5 in UE and LE BL. Negative drift. Bulk and tone normal. No involuntary movements. Gait deferred. Skin: Warm, dry, color appropriate for ethnicity. Groin arteriotomy site soft and non-tender on palpation, dressing is C/D/I, with no active bleeding or drainage noted.        Opal Hodges, [address] [facility]  Neurocritical Care Nurse Practitioner  [phone]

## 2023-04-26 NOTE — PROGRESS NOTES
1930: Bedside and Verbal shift change report given to Bridger RN (oncoming nurse) by Archana Malhotra RN (offgoing nurse). Report included the following information SBAR, Kardex, Intake/Output, MAR, Recent Results, Cardiac Rhythm SB/NSR, Alarm Parameters , and Dual Neuro Assessment. 2100: Pt complaining of a headache of 6/10 and requesting benadryl. [facility], NP made aware and orders received to give pt 5 mg of Reglan and 12.5 mg of Benadryl. 9720: Off unit to CT.    0456: Returned from [address] [place] [address] Road: Bedside and Verbal shift change report given to Serenity Sanders [place] (oncoming nurse) by Jazmin Galeas RN (offgoing nurse). Report included the following information SBAR, Kardex, Intake/Output, MAR, Recent Results, Cardiac Rhythm SB/NSR, Alarm Parameters , and Dual Neuro Assessment.

## 2023-04-26 NOTE — PROGRESS NOTES
Neurointerventional Surgery Progress Note  Neurocritical Care NP      Admit Date: 4/25/2023   LOS: 1 day      Daily Progress Note: 4/26/2023    Assessment & Plan   Arterial Venous Malformation. S/p transarterial embolization, CNS: Silk and PVA particle embolization of distal right SCA feeder by Dr. Shanel Bennett on 4/25/23     Migraine. Acute on chronic. Fioricet, metoclopramide, dexamethasone, diphenhydramine added prn.    - SBP goal   - Q1 hour neuro checks  - May DC IVF when order expires  - IV Heparin overnight, plan to DC later this morning  - Plan to monitor pt after stopping Heparin, anticipate discharge later today      Activity: Up w/ assistance  DVT ppx: SCDs  Dispo: TBD    Plan d/w Dr. Shanel Bennett, primary RN, pt.     HPI:  Malka Alfaro is a 41 y.o. 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 She presented to [facility] yesterday for scheduled diagnostic cerebral angiogram.      Subjective:   No acute neuro events overnight. Pt seen sitting in bed. She reports 3/10 generalized headache, improved overnight w/ addition of dexamethasone and diphenhydramine. Denies dizziness, new vision changes, nausea, vomiting, numbness, tingling, speech difficulty, and weakness.      Current Facility-Administered Medications   Medication Dose Route Frequency Provider Last Rate Last Admin    scopolamine (TRANSDERM-SCOP) 1 mg over 3 days 1 Patch  1 Patch TransDERmal Q72H Angelina Hughes MD   1 Patch at 04/25/23 0800    sodium chloride (NS) flush 5-40 mL  5-40 mL IntraVENous Q8H Lobdell, Aracelis, ARNP   10 mL at 04/26/23 0606    sodium chloride (NS) flush 5-40 mL  5-40 mL IntraVENous PRN Ese Curling, ARNP        acetaminophen (TYLENOL) tablet 650 mg  650 mg Oral Q6H PRN Ese Curling, ARNP   650 mg at 23    Or    acetaminophen (TYLENOL) suppository 650 mg  650 mg Rectal Q6H PRN Ese Curling, ARNP        polyethylene glycol (MIRALAX) packet 17 g  17 g Oral DAILY PRN Ese Curling, ARNP        ondansetron (ZOFRAN ODT) tablet 4 mg  4 mg Oral Q8H PRN Ese Curling, ARNP        Or    ondansetron (ZOFRAN) injection 4 mg  4 mg IntraVENous Q6H PRN Lobdell, Aracelis, ARNP        lactated Ringers infusion  50 mL/hr IntraVENous CONTINUOUS Ese Curling, ARNP 50 mL/hr at 23 1405 50 mL/hr at 23 1405    heparin 25,000 units in D5W 250 ml infusion  12-25 Units/kg/hr IntraVENous TITRATE Ese Curling, ARNP 6.7 mL/hr at 23 0605 16 Units/kg/hr at 23 0605    butalbital-acetaminophen-caffeine (FIORICET, ESGIC) -40 mg per tablet 1 Tablet  1 Tablet Oral Q6H PRN Ese Curling, ARNP   1 Tablet at 23 1642    metoclopramide HCl (REGLAN) injection 5 mg  5 mg IntraVENous Q6H PRN Romilda Nissen, NP   5 mg at 23 0330    diphenhydrAMINE (BENADRYL) injection 12.5 mg  12.5 mg IntraVENous Q6H PRN Romilda Nissen, NP   12.5 mg at 23 0330        Allergies   Allergen Reactions    Morphine Rash and Hives    Zolpidem Other (comments)     \"Hallucinations and black outs\"     Review of Systems:  A comprehensive review of systems was negative. Objective:     Vital signs  Temp (24hrs), Av.5 °F (36.9 °C), Min:98 °F (36.7 °C), Max:98.8 °F (37.1 °C)   No intake/output data recorded.    190 -  0700  In: 1938.3 [I.V.:1938.3]  Out: 450 [Urine:425]    Visit Vitals  BP 90/60   Pulse (!) 59   Temp 98.8 °F (37.1 °C)   Resp 18   Ht 5' (1.524 m)   Wt 41.8 kg (92 lb 2.4 oz)   SpO2 97%   Breastfeeding No   BMI 18.00 kg/m²      O2 Device: None (Room air)       Intake/Output Summary (Last 24 hours) at 2023 0812  Last data filed at 2023 0700  Gross per 24 hour   Intake 1938.34 ml   Output 450 ml   Net 1488.34 ml      Physical Exam:  Gen: NAD, calm, cooperative  CV: nml s1, s2  Pulm: CTAB  Ext: puncture site, pulses intact  Skin: Warm, dry, color appropriate for ethnicity    Neurologic Exam:  Mental Status:    Alert and oriented x 4. Appropriate affect, mood and behavior. Speech and Language:      Clear, Normal fluency, repetition, comprehension and naming. Follows commands. Cranial Nerves:     Pupils 3 mm, equal, round and briskly reactive to light. Visual fields full to confrontation. Extraocular movements intact. Facial sensation intact. Full facial strength, no asymmetry. Hearing intact bilaterally. No dysarthria. Tongue protrudes to midline, palate elevates symmetrically. Shoulder shrug 5/5 bilaterally. Motor:      No pronator drift. 5/5 power in all extremities proximally and distally. Bulk and tone normal.   No involuntary movements. Sensation:      Sensation intact throughout to light touch  Parietal function intact    Reflexes:      Deferred    Coordination:  FTN intact     Gait:   Deferred.     24 hour results:  Recent Results (from the past 12 hour(s))   ANTI-XA UNFRACTIONATED AND LMW HEPARIN    Collection Time: 04/25/23  9:15 PM   Result Value Ref Range    Heparin Xa,Unfrac. and LMW <0.10 IU/mL   ANTI-XA UNFRACTIONATED AND LMW HEPARIN    Collection Time: 04/26/23  4:37 AM   Result Value Ref Range    Heparin Xa,Unfrac. and LMW 8.27 IU/mL   METABOLIC PANEL, BASIC    Collection Time: 04/26/23  4:37 AM   Result Value Ref Range    Sodium 138 136 - 145 mmol/L    Potassium 3.7 3.5 - 5.1 mmol/L    Chloride 112 (H) 97 - 108 mmol/L    CO2 23 21 - 32 mmol/L    Anion gap 3 (L) 5 - 15 mmol/L    Glucose 143 (H) 65 - 100 mg/dL    BUN 10 6 - 20 MG/DL    Creatinine 0.62 0.55 - 1.02 MG/DL    BUN/Creatinine ratio 16 12 - 20      eGFR >60 >60 ml/min/1.73m2    Calcium 8.8 8.5 - 10.1 MG/DL   CBC W/O DIFF    Collection Time: 04/26/23  4:37 AM   Result Value Ref Range    WBC 11.5 (H) 3.6 - 11.0 K/uL    RBC 3.99 3.80 - 5.20 M/uL    HGB 12.2 11.5 - 16.0 g/dL    HCT 38.4 35.0 - 47.0 %    MCV 96.2 80.0 - 99.0 FL    MCH 30.6 26.0 - 34.0 PG    MCHC 31.8 30.0 - 36.5 g/dL    RDW 12.7 11.5 - 14.5 %    PLATELET 816 051 - 696 K/uL    MPV 11.5 8.9 - 12.9 FL    NRBC 0.0 0  WBC    ABSOLUTE NRBC 0.00 0.00 - 0.01 K/uL   MAGNESIUM    Collection Time: 04/26/23  4:37 AM   Result Value Ref Range    Magnesium 2.3 1.6 - 2.4 mg/dL   PHOSPHORUS    Collection Time: 04/26/23  4:37 AM   Result Value Ref Range    Phosphorus 2.9 2.6 - 4.7 MG/DL     Imaging:  [facility] 4/25/23: No acute process, stent seen in right PICA  [facility] 4/26/23: Stable      Macianali Ibarra, [address] [facility]  Neurocritical Care Nurse Practitioner

## 2023-04-26 NOTE — PROGRESS NOTES
1930: Bedside and Verbal shift change report given to BRITTNI Sparks (oncoming nurse) by Nevin Leon RN (offgoing nurse). Report included the following information SBAR, Kardex, Intake/Output, MAR, Recent Results, Cardiac Rhythm SB/NSR with PVC's, Alarm Parameters , and Dual Neuro Assessment. 2000: Pt remains neurologically intact with complaints of nausea and dizziness when opening eyes but no other visual defects. Plan to obtain CT when pt is able to tolerate being flat, Niya Trevino, Neuro NP aware. 2047: Off unit to CT.    2100: Returned from CT. Orders received for q2 neuro checks     Shift summary: Pt remained dizzy throughout shift with one incident of emesis this morning following a bath. Pt is neurologically intact, max headache 5/10 and controlled with PRN Reglan and Benadryl. 0730: Bedside and Verbal shift change report given to Nevin Leon, [facility] (oncoming nurse) by Naresh Owusu RN (offgoing nurse). Report included the following information SBAR, Kardex, Intake/Output, MAR, Recent Results, Cardiac Rhythm SB/NSR with PVC's, Alarm Parameters , and Dual Neuro Assessment.

## 2023-04-26 NOTE — PROGRESS NOTES
Reason for Admission:  Cerebral angiogram for Cerebellar AVM                     RUR Score:   10%                  Plan for utilizing home health: NA         PCP: First and Last name:  Richard Bryant MD     Name of Practice:    Are you a current patient: Yes/No: Yes   Approximate date of last visit: 4/20/23   Can you participate in a virtual visit with your PCP: Yes                    Current Advanced Directive/Advance Care Plan: Full Code      Healthcare Decision Maker:   Click here to complete [address] including selection of the Healthcare Decision Maker Relationship (ie \"Primary\")             Primary Decision Maker: Sukhwinder Sanchez - [phone]                  Transition of Care Plan:           Care manager met with patient to introduce self and explain role. Patient and her 2 children live with her mother eryn single level home with 5 [address]. Patient is independent with ADL's, no previous HH or DME. Patient has had rehab at [facility] in the past. She confirmed her PCP to be Dr Renee Carolina and she uses the [facility] on [address] as her pharmacy. Care management will follow for transitions of care. Grace Andrade RN,Care Mangement  Care Management Interventions  PCP Verified by CM:  Yes (Dr Renee Carolina)  Last Visit to PCP: 04/20/23  MyChart Signup: Yes  Discharge Durable Medical Equipment: No  Physical Therapy Consult: No  Occupational Therapy Consult: No  Speech Therapy Consult: No  Support Systems: Other Family Member(s) (Grandmother Aidee Valadez [phone], Mother Casey Smith [phone])  Confirm Follow Up Transport: Family  Discharge Location  Patient Expects to be Discharged to[de-identified] Home with family assistance

## 2023-04-27 VITALS
HEART RATE: 65 BPM | OXYGEN SATURATION: 100 % | WEIGHT: 92.81 LBS | HEIGHT: 60 IN | BODY MASS INDEX: 18.22 KG/M2 | RESPIRATION RATE: 19 BRPM | SYSTOLIC BLOOD PRESSURE: 114 MMHG | DIASTOLIC BLOOD PRESSURE: 68 MMHG | TEMPERATURE: 98.4 F

## 2023-04-27 LAB
ANION GAP SERPL CALC-SCNC: 3 MMOL/L (ref 5–15)
BUN SERPL-MCNC: 10 MG/DL (ref 6–20)
BUN/CREAT SERPL: 23 (ref 12–20)
CALCIUM SERPL-MCNC: 8.5 MG/DL (ref 8.5–10.1)
CHLORIDE SERPL-SCNC: 113 MMOL/L (ref 97–108)
CO2 SERPL-SCNC: 25 MMOL/L (ref 21–32)
CREAT SERPL-MCNC: 0.44 MG/DL (ref 0.55–1.02)
ERYTHROCYTE [DISTWIDTH] IN BLOOD BY AUTOMATED COUNT: 12.9 % (ref 11.5–14.5)
GLUCOSE SERPL-MCNC: 124 MG/DL (ref 65–100)
HCT VFR BLD AUTO: 34 % (ref 35–47)
HGB BLD-MCNC: 11.1 G/DL (ref 11.5–16)
MAGNESIUM SERPL-MCNC: 2.2 MG/DL (ref 1.6–2.4)
MCH RBC QN AUTO: 31 PG (ref 26–34)
MCHC RBC AUTO-ENTMCNC: 32.6 G/DL (ref 30–36.5)
MCV RBC AUTO: 95 FL (ref 80–99)
NRBC # BLD: 0 K/UL (ref 0–0.01)
NRBC BLD-RTO: 0 PER 100 WBC
PLATELET # BLD AUTO: 180 K/UL (ref 150–400)
PMV BLD AUTO: 11.2 FL (ref 8.9–12.9)
POTASSIUM SERPL-SCNC: 3.8 MMOL/L (ref 3.5–5.1)
RBC # BLD AUTO: 3.58 M/UL (ref 3.8–5.2)
SODIUM SERPL-SCNC: 141 MMOL/L (ref 136–145)
WBC # BLD AUTO: 12.4 K/UL (ref 3.6–11)

## 2023-04-27 PROCEDURE — 74011250636 HC RX REV CODE- 250/636: Performed by: NURSE PRACTITIONER

## 2023-04-27 PROCEDURE — 85027 COMPLETE CBC AUTOMATED: CPT

## 2023-04-27 PROCEDURE — APPNB45 APP NON BILLABLE 31-45 MINUTES

## 2023-04-27 PROCEDURE — 80048 BASIC METABOLIC PNL TOTAL CA: CPT

## 2023-04-27 PROCEDURE — 74011250636 HC RX REV CODE- 250/636

## 2023-04-27 PROCEDURE — 36415 COLL VENOUS BLD VENIPUNCTURE: CPT

## 2023-04-27 PROCEDURE — 83735 ASSAY OF MAGNESIUM: CPT

## 2023-04-27 PROCEDURE — 74011250637 HC RX REV CODE- 250/637

## 2023-04-27 PROCEDURE — 99222 1ST HOSP IP/OBS MODERATE 55: CPT | Performed by: RADIOLOGY

## 2023-04-27 PROCEDURE — 74011000250 HC RX REV CODE- 250

## 2023-04-27 RX ORDER — METHYLPREDNISOLONE 4 MG/1
4 TABLET ORAL ONCE
Qty: 1 TABLET | Refills: 0 | Status: SHIPPED | OUTPATIENT
Start: 2023-04-27 | End: 2023-04-27

## 2023-04-27 RX ORDER — METHYLPREDNISOLONE 8 MG/1
8 TABLET ORAL ONCE
Qty: 1 TABLET | Refills: 0 | Status: SHIPPED | OUTPATIENT
Start: 2023-04-27 | End: 2023-04-27

## 2023-04-27 RX ORDER — METHYLPREDNISOLONE 4 MG/1
4 TABLET ORAL
Qty: 4 TABLET | Refills: 0 | Status: SHIPPED | OUTPATIENT
Start: 2023-04-29 | End: 2023-04-30

## 2023-04-27 RX ORDER — METHYLPREDNISOLONE 4 MG/1
4 TABLET ORAL ONCE
Status: DISCONTINUED | OUTPATIENT
Start: 2023-04-27 | End: 2023-04-27

## 2023-04-27 RX ORDER — METHYLPREDNISOLONE 4 MG/1
4 TABLET ORAL 2 TIMES DAILY
Status: DISCONTINUED | OUTPATIENT
Start: 2023-05-01 | End: 2023-04-27

## 2023-04-27 RX ORDER — METHYLPREDNISOLONE 8 MG/1
8 TABLET ORAL ONCE
Qty: 1 TABLET | Refills: 0 | Status: SHIPPED | OUTPATIENT
Start: 2023-04-28 | End: 2023-04-28

## 2023-04-27 RX ORDER — METHYLPREDNISOLONE 4 MG/1
4 TABLET ORAL 3 TIMES DAILY
Qty: 3 TABLET | Refills: 0 | Status: SHIPPED | OUTPATIENT
Start: 2023-04-30 | End: 2023-05-01

## 2023-04-27 RX ORDER — METHYLPREDNISOLONE 4 MG/1
8 TABLET ORAL ONCE
Status: DISCONTINUED | OUTPATIENT
Start: 2023-04-27 | End: 2023-04-27

## 2023-04-27 RX ORDER — METHYLPREDNISOLONE 4 MG/1
4 TABLET ORAL
Status: DISCONTINUED | OUTPATIENT
Start: 2023-05-02 | End: 2023-04-27

## 2023-04-27 RX ORDER — METHYLPREDNISOLONE 4 MG/1
4 TABLET ORAL
Qty: 1 TABLET | Refills: 0 | Status: SHIPPED | OUTPATIENT
Start: 2023-05-02 | End: 2023-05-03

## 2023-04-27 RX ORDER — METHYLPREDNISOLONE 4 MG/1
4 TABLET ORAL
Status: DISCONTINUED | OUTPATIENT
Start: 2023-04-28 | End: 2023-04-27

## 2023-04-27 RX ORDER — METHYLPREDNISOLONE 4 MG/1
4 TABLET ORAL
Status: DISCONTINUED | OUTPATIENT
Start: 2023-04-29 | End: 2023-04-27

## 2023-04-27 RX ORDER — METHYLPREDNISOLONE 4 MG/1
4 TABLET ORAL
Qty: 3 TABLET | Refills: 0 | Status: SHIPPED | OUTPATIENT
Start: 2023-04-28 | End: 2023-04-29

## 2023-04-27 RX ORDER — METHYLPREDNISOLONE 4 MG/1
4 TABLET ORAL 3 TIMES DAILY
Status: DISCONTINUED | OUTPATIENT
Start: 2023-04-30 | End: 2023-04-27

## 2023-04-27 RX ORDER — METHYLPREDNISOLONE 4 MG/1
4 TABLET ORAL 2 TIMES DAILY
Qty: 2 TABLET | Refills: 0 | Status: SHIPPED | OUTPATIENT
Start: 2023-05-01 | End: 2023-05-02

## 2023-04-27 RX ORDER — METHYLPREDNISOLONE 4 MG/1
8 TABLET ORAL ONCE
Status: DISCONTINUED | OUTPATIENT
Start: 2023-04-28 | End: 2023-04-27

## 2023-04-27 RX ADMIN — DEXAMETHASONE SODIUM PHOSPHATE 2 MG: 4 INJECTION, SOLUTION INTRAMUSCULAR; INTRAVENOUS at 08:49

## 2023-04-27 RX ADMIN — ONDANSETRON 4 MG: 2 INJECTION INTRAMUSCULAR; INTRAVENOUS at 08:49

## 2023-04-27 RX ADMIN — DIPHENHYDRAMINE HYDROCHLORIDE 12.5 MG: 50 INJECTION, SOLUTION INTRAMUSCULAR; INTRAVENOUS at 17:51

## 2023-04-27 RX ADMIN — SODIUM CHLORIDE, POTASSIUM CHLORIDE, SODIUM LACTATE AND CALCIUM CHLORIDE 100 ML/HR: 600; 310; 30; 20 INJECTION, SOLUTION INTRAVENOUS at 01:33

## 2023-04-27 RX ADMIN — SODIUM CHLORIDE, PRESERVATIVE FREE 10 ML: 5 INJECTION INTRAVENOUS at 12:26

## 2023-04-27 RX ADMIN — METOCLOPRAMIDE 5 MG: 5 INJECTION, SOLUTION INTRAMUSCULAR; INTRAVENOUS at 04:41

## 2023-04-27 RX ADMIN — SODIUM CHLORIDE, PRESERVATIVE FREE 10 ML: 5 INJECTION INTRAVENOUS at 06:02

## 2023-04-27 RX ADMIN — DIPHENHYDRAMINE HYDROCHLORIDE 12.5 MG: 50 INJECTION, SOLUTION INTRAMUSCULAR; INTRAVENOUS at 12:25

## 2023-04-27 RX ADMIN — METOCLOPRAMIDE 5 MG: 5 INJECTION, SOLUTION INTRAMUSCULAR; INTRAVENOUS at 12:25

## 2023-04-27 RX ADMIN — DIPHENHYDRAMINE HYDROCHLORIDE 12.5 MG: 50 INJECTION, SOLUTION INTRAMUSCULAR; INTRAVENOUS at 04:41

## 2023-04-27 RX ADMIN — METOCLOPRAMIDE 5 MG: 5 INJECTION, SOLUTION INTRAMUSCULAR; INTRAVENOUS at 17:52

## 2023-04-27 RX ADMIN — DEXAMETHASONE SODIUM PHOSPHATE 2 MG: 4 INJECTION, SOLUTION INTRAMUSCULAR; INTRAVENOUS at 02:50

## 2023-04-27 RX ADMIN — ACETAMINOPHEN 650 MG: 325 TABLET ORAL at 13:56

## 2023-04-27 RX ADMIN — ONDANSETRON 4 MG: 2 INJECTION INTRAMUSCULAR; INTRAVENOUS at 02:50

## 2023-04-27 NOTE — PROGRESS NOTES
Neurointerventional Surgery Progress Note  Neurocritical Care NP      Admit Date: 4/25/2023   LOS: 2 days      Daily Progress Note: 4/27/2023    Assessment & Plan   Arterial Venous Malformation. S/p transarterial embolization, CNS: Silk and PVA particle embolization of distal right SCA feeder by Dr. Angel Arriaga on 4/25/23     Migraine. Acute on chronic. Fioricet, metoclopramide, dexamethasone, diphenhydramine prn.    - SBP goal   - Q 4 hour neuro checks  - Pt transferred to [facility]  - Pt ambulated in gabriel w/mild dizziness, steady gait, no distress. Pt feels safe for home discharge. If no worsening symptoms, anticipate discharge this evening.  - Follow-up w/ Dr. Angel Arriaga in [facility]    Activity: Up w/ assistance  DVT ppx: SCDs  Dispo: NSTU    Plan d/w Dr. Angel Arriaga, primary RN, pt.     HPI:  Clementine Calabrese is a 41 y.o. 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 She presented to [facility] yesterday for scheduled diagnostic cerebral angiogram.      Subjective:   No acute neuro events overnight. Pt seen sitting in bed. She reports feeling unwell, persistent dizziness w/ activity, nausea improved overnight, but she did vomit this morning. Aggravating factor is activity; Relieving factor is lying on right side. Denies new vision changes, numbness, tingling, speech difficulty, and weakness.      Current Facility-Administered Medications   Medication Dose Route Frequency Provider Last Rate Last Admin    lactated Ringers infusion  100 mL/hr IntraVENous CONTINUOUS KIRSTEN Singer 100 mL/hr at 04/27/23 0133 100 mL/hr at 04/27/23 0133    scopolamine (TRANSDERM-SCOP) 1 mg over 3 days 1 Patch  1 Patch TransDERmal Q72H KIRSTEN Singer   1 Patch at 04/26/23 1622    aspirin chewable tablet 81 mg  81 mg Oral DAILY Lobdell, Aracelis, ARNP        ondansetron (ZOFRAN ODT) tablet 4 mg  4 mg Oral Q6H PRN Nolvia Chris, ARNP        Or    ondansetron (ZOFRAN) injection 4 mg  4 mg IntraVENous Q6H PRN Nolvia Chris, ARNP   4 mg at 23 0849    sodium chloride (NS) flush 5-40 mL  5-40 mL IntraVENous Q8H Lobdell, Aracelis, ARNP   10 mL at 23 1226    sodium chloride (NS) flush 5-40 mL  5-40 mL IntraVENous PRN Nolvia Chris, ARNP        acetaminophen (TYLENOL) tablet 650 mg  650 mg Oral Q6H PRN Nolvia Chris, ARNP   650 mg at 23 1356    Or    acetaminophen (TYLENOL) suppository 650 mg  650 mg Rectal Q6H PRN Nolvia Chris, ARNP        polyethylene glycol (MIRALAX) packet 17 g  17 g Oral DAILY PRN Nolvia Chris, ARNP        butalbital-acetaminophen-caffeine (FIORICET, ESGIC) -40 mg per tablet 1 Tablet  1 Tablet Oral Q6H PRN Nolvia Chris, ARNP   1 Tablet at 23 1642    metoclopramide HCl (REGLAN) injection 5 mg  5 mg IntraVENous Q6H PRN Mimi Dye, NP   5 mg at 23 1225    diphenhydrAMINE (BENADRYL) injection 12.5 mg  12.5 mg IntraVENous Q6H PRN Mimi Dye, NP   12.5 mg at 23 1225        Allergies   Allergen Reactions    Morphine Rash and Hives    Zolpidem Other (comments)     \"Hallucinations and black outs\"     Review of Systems:  A comprehensive review of systems was negative. Objective:     Vital signs  Temp (24hrs), Av.1 °F (36.7 °C), Min:97.1 °F (36.2 °C), Max:98.5 °F (36.9 °C)   701 - 1900  In: 320 [P.O.:220;  I.V.:100]  Out: 800 [Urine:800]  1901 -  0700  In: 2344.6 [I.V.:2344.6]  Out: 625 [Urine:625]    Visit Vitals  /76 (BP 1 Location: Left upper arm, BP Patient Position: Semi fowlers)   Pulse (!) 55   Temp 98.4 °F (36.9 °C)   Resp 12   Ht 5' (1.524 m)   Wt 42.1 kg (92 lb 13 oz)   SpO2 99%   Breastfeeding No   BMI 18.13 kg/m²      O2 Device: None (Room air)       Intake/Output Summary (Last 24 hours) at 4/27/2023 1635  Last data filed at 4/27/2023 1215  Gross per 24 hour   Intake 1855 ml   Output 1000 ml   Net 855 ml        Physical Exam:  Gen: NAD, calm, cooperative  CV: nml s1, s2  Pulm: CTAB  Ext: puncture site, pulses intact  Skin: Warm, dry, color appropriate for ethnicity    Neurologic Exam:  Mental Status:    Alert and oriented x 4. Appropriate affect, mood and behavior. Speech and Language:      Clear, Normal fluency, repetition, comprehension and naming. Follows commands. Cranial Nerves:     Pupils 3 mm, equal, round and briskly reactive to light. Visual fields full to confrontation. Extraocular movements intact. Facial sensation intact. Full facial strength, no asymmetry. Hearing intact bilaterally. No dysarthria. Tongue protrudes to midline, palate elevates symmetrically. Shoulder shrug 5/5 bilaterally. Motor:      No pronator drift. 5/5 power in all extremities proximally and distally. Bulk and tone normal.   No involuntary movements. Sensation:      Sensation intact throughout to light touch  Parietal function intact    Reflexes:      Deferred    Coordination:  FTN intact     Gait:   Steady, no assistive device. 24 hour results:  No results found for this or any previous visit (from the past 12 hour(s)).     Imaging:  [facility] 4/25/23: No acute process, stent seen in right PICA  [facility] 4/26/23: Stable      Peace Garcia, [facility]  Neurocritical Care Nurse Practitioner

## 2023-04-27 NOTE — ROUTINE PROCESS
Bedside RN performed patient education and medication education. Discharge concerns initiated and discussed with patient and pt partner, including clarification on \"who\" assists the patient at their home and instructions for when the home going patient should call their provider after discharge. Opportunity for questions and clarification was provided. Patient receptive to education: YES  Patient stated: \"Do I continue with my vasalore? \"  Barriers to Education: none  Diagnosis Education given:  YES    Length of stay: 2  Expected Day of Discharge: 2  Ask if they have \"Help at Home\" & add to white board?   YES    Education Day #: 2    Medication Education Given:  YES  M in the box Medication name: aspirin    Pt aware of HCAHPS survey: YES          Stroke Education documented in Patient Education: YES  Core Measures Documented in Connect Care:  Risk Factors: YES  Warning signs of stroke: YES  When to Activate 911: YES  Medication Education for Risk Factors: YES  Smoking cessation if applicable: YES  Written Education Given:  YES    Discharge NIH Completed: YES  Score: 0    BRAINS: NO    Follow Up Appointment Made: NO  Date/Time if applicable: na

## 2023-04-27 NOTE — DISCHARGE SUMMARY
Neurointerventional Surgery Discharge Summary  [address], [facility] [address]    Patient: Mann Brown MRN: [id number]  SSN: [id number]    YOB: 1985  Age: 41 y.o. Sex: female      DATE OF ADMISSION: 2023    DATE OF DISCHARGE: 23     ADMITTING PROVIDER: Harika Rojas MD    DISCHARGING PROVIDER: KIRSTEN Martin    PROCEDURES/SURGERIES: Cerebral angiogram with transarterial embolization, CNS: Silk and PVA particle embolization of distal right SCA feeder    OFFICE NUMBER: [phone] , you can reach the on call physician  even during non-business hours     [address]:     ICD-10-CM ICD-9-CM    1. Nausea and vomiting, unspecified vomiting type  R11.2 787.01       2. AVM (arteriovenous malformation) brain  Q28.2 747.81 IR ANGIO VERT CERV IC RT SI      IR ANGIO VERT CERV IC RT SI      CANCELED: IR ANGIO CAROTID CRBRL BI INTNL      CANCELED: IR ANGIO CAROTID CRBRL BI INTNL        Pt was admitted for scheduled elective cerebral angiogram and transarterial embolization, CNS: Silk and PVA particle embolization of distal right SCA feeder by Dr. Elizabeth Denis. The procedure was performed in the Interventional Radiology suite under general anesthesia. The patient tolerated the procedure well without complications. Following extubation, patient was transferred to ICU, fully recovered and returned to neurological baseline. The patient was admitted overnight for continued monitoring due to potential risks of stroke and thrombosis. Post-procedure complications included nausea & vomiting and dizziness. The patient was monitored closely spending an additional night in the ICU. Symptoms improved and the patient was transferred to the stepdown unit where she was able to ambulate safely without acute distress or dizziness There were no neurological events overnight.  The hospital course was uneventful and the patient was appropriate for discharge home today in stable condition. Patient Vitals for the past 12 hrs:   Temp Pulse Resp BP SpO2   04/27/23 1806 98.4 °F (36.9 °C) 65 19 114/68 100 %   04/27/23 1800 -- (!) 45 -- -- --   04/27/23 1442 98.4 °F (36.9 °C) (!) 55 12 120/76 99 %   04/27/23 1400 -- 61 -- -- --   04/27/23 1215 98.3 °F (36.8 °C) 63 13 110/64 96 %   04/27/23 1100 -- (!) 56 9 -- 99 %   04/27/23 1000 -- (!) 50 17 126/75 99 %   04/27/23 0900 -- 67 14 120/75 98 %   04/27/23 0800 98 °F (36.7 °C) (!) 47 16 95/66 98 %   04/27/23 0700 -- 72 15 105/61 98 %       Physical Exam:  GENERAL: NAD, calm, cooperative  HEART: RRR  LUNGS: CTAB  SKIN: Warm, dry, color appropriate for ethnicity. Arteriotomy access site soft and non-tender on palpation, with no active bleeding or drainage noted. Neurologic Exam:  Mental Status:  Alert and oriented x 4. Appropriate affect, mood and behavior. Language:    Normal fluency, repetition, comprehension and naming. Follows commands    Cranial Nerves:   Pupils 3 mm bilaterally, equal, round and briskly reactive to light. Visual fields full to confrontation. Extraocular movements intact. Facial sensation intact. Full facial strength, no asymmetry. Hearing intact bilaterally. No dysarthria. Tongue protrudes to midline, palate elevates symmetrically. Shoulder shrug 5/5 bilaterally. Motor:    No pronator drift. Bulk and tone normal.      5/5 power in all extremities proximally and distally. No involuntary movements. Sensation:    Sensation intact throughout to light touch. Coordination & Gait: Gait steady. FTN and HTS intact with no ataxia present. DIET:   Normal diet    PLAN:  Discharge home with self care. FOLLOW UP APPOINTMENTS:   1. [facility] up in the [facility] clinic with Dr. Jono Martines. 2   Please make an appointment to follow up with PCP as needed. ACTIVITY:   Do not lift anything over 10 lbs for two weeks.  Try to limit going up and down stairs as much as possible. Rest and hydrate. May resume all physical activities as tolerated after 2 weeks. WOUND CARE:   Monitor for signs and sx of infection including fever, expanding inflammation and discolored drainage. Report any changes in temperature in affected extremity, numbness, weakness or hematoma. If you experience any increased bruising or bleeding at your puncture site either outside or under the skin please apply direct, firm pressure for 20 minutes. Keep track of the bruising at the site by marking it with a pen or marker. If the bruising continues to be dark purple or blue in color, OR is expanding, please call our office to let the on call doctor know. We have someone on call 24/7. You may shower normally and cleanse the site with gentle soap. Do not soak in the bathtub. Do not apply any lotions, creams, powders, or other cosmetic products to the site. OTHER RECOMMENDATIONS:  BEFAST reviewed to educate for stroke symptoms. Please call 911 and proceed to emergency department immediately if you develop any of the following:  - Acute changes in your balance or vision  - Weakness in your face, arm or leg   - Speech changes or difficulties. DISCHARGE MEDICATIONS:   1. Resume all home meds  2. Continue Aspirin (Vasalore) 81 mg daily       Avoid NSAIDS including (Advil, Motrin, Ibuprofen, Celebrex etc) while you are taking Aspirin and Brilinta/Plavix. You may take Tylenol for pain.      Labs:  Lab Results   Component Value Date/Time    Sodium 141 04/27/2023 02:59 AM    Potassium 3.8 04/27/2023 02:59 AM    Chloride 113 (H) 04/27/2023 02:59 AM    CO2 25 04/27/2023 02:59 AM    Anion gap 3 (L) 04/27/2023 02:59 AM    Glucose 124 (H) 04/27/2023 02:59 AM    BUN 10 04/27/2023 02:59 AM    Creatinine 0.44 (L) 04/27/2023 02:59 AM    BUN/Creatinine ratio 23 (H) 04/27/2023 02:59 AM    GFR est AA >60 07/05/2022 01:39 PM    GFR est AA >60 07/05/2022 01:39 PM    GFR est non-AA >60 07/05/2022 01:39 PM    GFR est non-AA >60 07/05/2022 01:39 PM    Calcium 8.5 04/27/2023 02:59 AM     Lab Results   Component Value Date/Time    WBC 12.4 (H) 04/27/2023 02:59 AM    Hemoglobin (POC) 13.9 10/24/2018 11:40 AM    HGB 11.1 (L) 04/27/2023 02:59 AM    HCT 34.0 (L) 04/27/2023 02:59 AM    PLATELET 172 67/93/5418 02:59 AM    MCV 95.0 04/27/2023 02:59 AM     Lab Results   Component Value Date/Time    MCH 31.0 04/27/2023 02:59 AM    MCHC 32.6 04/27/2023 02:59 AM    BASOPHILS 0 04/25/2023 01:59 PM    ABS. LYMPHOCYTES 1.0 04/25/2023 01:59 PM    ABS. MONOCYTES 0.1 04/25/2023 01:59 PM    ABS. EOSINOPHILS 0.0 04/25/2023 01:59 PM    ABS.  BASOPHILS 0.0 04/25/2023 01:59 PM    Phosphorus 2.9 04/26/2023 04:37 AM    Magnesium 2.2 04/27/2023 02:59 AM       Current Discharge Medication List        START taking these medications     methylPREDNISolone (MEDROL DOSEPACK) 4 mg tablet Follow taper instructions on pack  Qty: 1 Dose Pack, Refills: 0             Greater than 30 minutes were spent in patient management, greater than half of which was spent in counseling and coordination of care    Lala Medeiros, [facility]  Neurointerventional Surgery

## 2023-04-27 NOTE — PROGRESS NOTES
0730: Bedside and Verbal shift change report given to Gisselle Gruber David (oncoming nurse) by Houston Yony (offgoing nurse). Report included the following information SBAR, Kardex, OR Summary, Procedure Summary, Intake/Output, MAR, Recent Results, Cardiac Rhythm NSR / federico, Alarm Parameters , Quality Measures, and Dual Neuro Assessment. 0800: Patient c/o dizziness which is semi-relieved by laying on right side and nausea. 1105: Attempted to call report to [facility].    1119: TRANSFER - OUT REPORT:    Verbal report given to Baltazar (name) salvador De La Rosa  being transferred to [facility] (unit) for routine progression of care       Report consisted of patients Situation, Background, Assessment and   Recommendations(SBAR). Information from the following report(s) SBAR, Kardex, OR Summary, Procedure Summary, Intake/Output, MAR, Recent Results, Cardiac Rhythm NSR / federico, Alarm Parameters , Quality Measures, and Dual Neuro Assessment was reviewed with the receiving nurse. Lines:   Peripheral IV 04/25/23 Right Antecubital (Active)   Site Assessment Clean, dry, & intact 04/27/23 0800   Phlebitis Assessment 0 04/27/23 0800   Infiltration Assessment 0 04/27/23 0800   Dressing Status Clean, dry, & intact 04/27/23 0800   Dressing Type Transparent 04/27/23 0800   Hub Color/Line Status Pink;Capped;Flushed 04/27/23 0800   Action Taken Open ports on tubing capped 04/27/23 0800   Alcohol Cap Used Yes 04/27/23 0800       Peripheral IV 04/25/23 Anterior;Right Forearm (Active)   Site Assessment Clean, dry, & intact 04/27/23 0800   Phlebitis Assessment 0 04/27/23 0800   Infiltration Assessment 0 04/27/23 0800   Dressing Status Clean, dry, & intact 04/27/23 0800   Dressing Type Transparent 04/27/23 0800   Hub Color/Line Status Pink; Infusing 04/27/23 0800   Action Taken Open ports on tubing capped 04/27/23 0800   Alcohol Cap Used Yes 04/27/23 0800        Opportunity for questions and clarification was provided.       Patient transported with:   Monitor  Registered Nurse  Tech

## 2023-04-27 NOTE — PROGRESS NOTES
Neurocritical Care Brief Progress Note:  41-year-old female  s/p transarterial embolization, CNS: Silk and PVA particle embolization of distal right SCA feeder by Dr. Robert Narvaez yesterday    Was planned to discharge today but became dizzy with nausea and vomiting this afternoon after walking in the unit. No further vomiting this evening but still with dizziness and nausea. Physical Exam:  Gen: NAD, calm, cooperative  Neuro: A&Ox4. Follows commands. Speech clear. Affect normal. PERRL, 3 mm bilaterally. Blinks to threat. No disconjugate gaze present. EOMI. No nystagmus. Face symmetric. Palate symmetric. Tongue midline. Valentine spontaneously. Strength 5/5 in UE and LE BL. Negative drift. Bulk and tone normal. No involuntary movements. Gait deferred. Skin: Warm, dry, color appropriate for ethnicity. Right groin arteriotomy site soft and non-tender on palpation, dressing is C/D/I, with no active bleeding or drainage noted. PLAN:   - SBP goal   - Q 2 hour neuro checks  - ml/hr  - Repeat CTH stable  -Decadron 4 mg once then 2 mg q 6 hours x 2.   -Reglan 5 mg/benadryl 12.5 mg IV for persistent HA  -Plan for d/c in am if dizziness and nausea is improved.      Noe Ricardo NP  Neurocritical Care Nurse Practitioner  [phone]

## 2023-04-30 ENCOUNTER — HOSPITAL ENCOUNTER (EMERGENCY)
Age: 38
Discharge: HOME | End: 2023-05-01
Attending: EMERGENCY MEDICINE
Payer: MEDICAID

## 2023-04-30 ENCOUNTER — APPOINTMENT (OUTPATIENT)
Dept: CT IMAGING | Age: 38
End: 2023-04-30
Attending: EMERGENCY MEDICINE
Payer: MEDICAID

## 2023-04-30 VITALS
HEIGHT: 60 IN | OXYGEN SATURATION: 98 % | WEIGHT: 89.95 LBS | RESPIRATION RATE: 11 BRPM | TEMPERATURE: 98 F | DIASTOLIC BLOOD PRESSURE: 82 MMHG | HEART RATE: 57 BPM | BODY MASS INDEX: 17.66 KG/M2 | SYSTOLIC BLOOD PRESSURE: 119 MMHG

## 2023-04-30 DIAGNOSIS — G43.809 OTHER MIGRAINE WITHOUT STATUS MIGRAINOSUS, NOT INTRACTABLE: Primary | ICD-10-CM

## 2023-04-30 LAB
ALBUMIN SERPL-MCNC: 3.8 G/DL (ref 3.5–5)
ALBUMIN/GLOB SERPL: 1.3 (ref 1.1–2.2)
ALP SERPL-CCNC: 34 U/L (ref 45–117)
ALT SERPL-CCNC: 30 U/L (ref 12–78)
ANION GAP SERPL CALC-SCNC: 4 MMOL/L (ref 5–15)
AST SERPL-CCNC: 12 U/L (ref 15–37)
BASOPHILS # BLD: 0 K/UL (ref 0–0.1)
BASOPHILS NFR BLD: 0 % (ref 0–1)
BILIRUB SERPL-MCNC: 0.2 MG/DL (ref 0.2–1)
BUN SERPL-MCNC: 18 MG/DL (ref 6–20)
BUN/CREAT SERPL: 28 (ref 12–20)
CALCIUM SERPL-MCNC: 9.2 MG/DL (ref 8.5–10.1)
CHLORIDE SERPL-SCNC: 107 MMOL/L (ref 97–108)
CO2 SERPL-SCNC: 31 MMOL/L (ref 21–32)
COMMENT, HOLDF: NORMAL
CREAT SERPL-MCNC: 0.65 MG/DL (ref 0.55–1.02)
DIFFERENTIAL METHOD BLD: NORMAL
EOSINOPHIL # BLD: 0.2 K/UL (ref 0–0.4)
EOSINOPHIL NFR BLD: 2 % (ref 0–7)
ERYTHROCYTE [DISTWIDTH] IN BLOOD BY AUTOMATED COUNT: 12.8 % (ref 11.5–14.5)
GLOBULIN SER CALC-MCNC: 2.9 G/DL (ref 2–4)
GLUCOSE BLD STRIP.AUTO-MCNC: 98 MG/DL (ref 65–117)
GLUCOSE SERPL-MCNC: 96 MG/DL (ref 65–100)
HCT VFR BLD AUTO: 40 % (ref 35–47)
HGB BLD-MCNC: 13.5 G/DL (ref 11.5–16)
IMM GRANULOCYTES # BLD AUTO: 0 K/UL (ref 0–0.04)
IMM GRANULOCYTES NFR BLD AUTO: 0 % (ref 0–0.5)
INR PPP: 1.1 (ref 0.9–1.1)
LYMPHOCYTES # BLD: 3.2 K/UL (ref 0.8–3.5)
LYMPHOCYTES NFR BLD: 31 % (ref 12–49)
MCH RBC QN AUTO: 31.7 PG (ref 26–34)
MCHC RBC AUTO-ENTMCNC: 33.8 G/DL (ref 30–36.5)
MCV RBC AUTO: 93.9 FL (ref 80–99)
MONOCYTES # BLD: 0.7 K/UL (ref 0–1)
MONOCYTES NFR BLD: 7 % (ref 5–13)
NEUTS SEG # BLD: 6.2 K/UL (ref 1.8–8)
NEUTS SEG NFR BLD: 60 % (ref 32–75)
NRBC # BLD: 0 K/UL (ref 0–0.01)
NRBC BLD-RTO: 0 PER 100 WBC
PLATELET # BLD AUTO: 282 K/UL (ref 150–400)
PMV BLD AUTO: 11.3 FL (ref 8.9–12.9)
POTASSIUM SERPL-SCNC: 3.5 MMOL/L (ref 3.5–5.1)
PROT SERPL-MCNC: 6.7 G/DL (ref 6.4–8.2)
PROTHROMBIN TIME: 11 SEC (ref 9–11.1)
RBC # BLD AUTO: 4.26 M/UL (ref 3.8–5.2)
SAMPLES BEING HELD,HOLD: NORMAL
SERVICE CMNT-IMP: NORMAL
SODIUM SERPL-SCNC: 142 MMOL/L (ref 136–145)
WBC # BLD AUTO: 10.4 K/UL (ref 3.6–11)

## 2023-04-30 PROCEDURE — 70450 CT HEAD/BRAIN W/O DYE: CPT

## 2023-04-30 PROCEDURE — 82962 GLUCOSE BLOOD TEST: CPT

## 2023-04-30 PROCEDURE — 96375 TX/PRO/DX INJ NEW DRUG ADDON: CPT

## 2023-04-30 PROCEDURE — 99284 EMERGENCY DEPT VISIT MOD MDM: CPT

## 2023-04-30 PROCEDURE — 96374 THER/PROPH/DIAG INJ IV PUSH: CPT

## 2023-04-30 PROCEDURE — 80053 COMPREHEN METABOLIC PANEL: CPT

## 2023-04-30 PROCEDURE — 74011250636 HC RX REV CODE- 250/636: Performed by: EMERGENCY MEDICINE

## 2023-04-30 PROCEDURE — 93005 ELECTROCARDIOGRAM TRACING: CPT

## 2023-04-30 PROCEDURE — 85610 PROTHROMBIN TIME: CPT

## 2023-04-30 PROCEDURE — 36415 COLL VENOUS BLD VENIPUNCTURE: CPT

## 2023-04-30 PROCEDURE — 96361 HYDRATE IV INFUSION ADD-ON: CPT

## 2023-04-30 PROCEDURE — 85025 COMPLETE CBC W/AUTO DIFF WBC: CPT

## 2023-04-30 RX ORDER — METOCLOPRAMIDE HYDROCHLORIDE 5 MG/ML
10 INJECTION INTRAMUSCULAR; INTRAVENOUS
Status: COMPLETED | OUTPATIENT
Start: 2023-04-30 | End: 2023-04-30

## 2023-04-30 RX ORDER — DIPHENHYDRAMINE HYDROCHLORIDE 50 MG/ML
25 INJECTION, SOLUTION INTRAMUSCULAR; INTRAVENOUS
Status: COMPLETED | OUTPATIENT
Start: 2023-04-30 | End: 2023-04-30

## 2023-04-30 RX ORDER — DEXAMETHASONE SODIUM PHOSPHATE 100 MG/10ML
10 INJECTION INTRAMUSCULAR; INTRAVENOUS
Status: DISCONTINUED | OUTPATIENT
Start: 2023-04-30 | End: 2023-04-30 | Stop reason: SDUPTHER

## 2023-04-30 RX ORDER — DEXAMETHASONE SODIUM PHOSPHATE 4 MG/ML
10 INJECTION, SOLUTION INTRA-ARTICULAR; INTRALESIONAL; INTRAMUSCULAR; INTRAVENOUS; SOFT TISSUE
Status: COMPLETED | OUTPATIENT
Start: 2023-04-30 | End: 2023-04-30

## 2023-04-30 RX ADMIN — DIPHENHYDRAMINE HYDROCHLORIDE 25 MG: 50 INJECTION, SOLUTION INTRAMUSCULAR; INTRAVENOUS at 22:36

## 2023-04-30 RX ADMIN — METOCLOPRAMIDE 10 MG: 5 INJECTION, SOLUTION INTRAMUSCULAR; INTRAVENOUS at 22:36

## 2023-04-30 RX ADMIN — SODIUM CHLORIDE 1000 ML: 9 INJECTION, SOLUTION INTRAVENOUS at 22:34

## 2023-04-30 RX ADMIN — DEXAMETHASONE SODIUM PHOSPHATE 10 MG: 4 INJECTION, SOLUTION INTRAMUSCULAR; INTRAVENOUS at 22:36

## 2023-05-01 LAB
ATRIAL RATE: 50 BPM
CALCULATED P AXIS, ECG09: 68 DEGREES
CALCULATED R AXIS, ECG10: 85 DEGREES
CALCULATED T AXIS, ECG11: 83 DEGREES
DIAGNOSIS, 93000: NORMAL
P-R INTERVAL, ECG05: 128 MS
Q-T INTERVAL, ECG07: 470 MS
QRS DURATION, ECG06: 74 MS
QTC CALCULATION (BEZET), ECG08: 428 MS
VENTRICULAR RATE, ECG03: 50 BPM

## 2023-05-01 NOTE — ED TRIAGE NOTES
Patient ambulatory to triage for migraine that started when patient woke up. States had stroke Tuesday after surgery for ablation of avm. States started nausea at noon. Was d/c Thursday from [facility]. Intermittent weakness to R side.  Trung Butler MD called to bedside for stroke r/o

## 2023-05-01 NOTE — DISCHARGE INSTRUCTIONS
It was a pleasure taking care of you at [facility] Emergency Department today. We know that when you come to [facility], you are entrusting us with your health, comfort, and safety. Our physicians and nurses honor that trust, and we truly appreciate the opportunity to care for you and your loved ones. We also value your feedback. If you receive a survey about your Emergency Department experience today, please fill it out. We care about our patients' feedback, and we listen to what you have to say. Thank you!

## 2023-05-01 NOTE — ED PROVIDER NOTES
[facility] EMERGENCY DEPT  EMERGENCY DEPARTMENT ENCOUNTER       Pt Name: Dannie Massey  MRN: [id number]  [place] 1985  Date of evaluation: 4/30/2023  Provider: Sixto Reyes MD   PCP: Jeanette Nunez MD  Note Started: 9:10 PM 4/30/23     CHIEF COMPLAINT       Chief Complaint   Patient presents with    Headache        HISTORY OF PRESENT ILLNESS: 1 or more elements      History From: Patient  HPI Limitations : None     Dannie Massey is a 41 y.o. female who presents worsening headache over the last 24 hours. Patient was recently admitted to the [facility] neuro critical care unit for repair of an AVM and aneurysm. Patient suffered a minor stroke with some right upper extremity deficits related to that bleeding. There was some aneurysm repair, patient's headache was controlled in the hospital with Reglan, Decadron, Benadryl. Patient reports feeling better until today and the headache is come back. She is taking these medications orally with no relief. She does not report any new neurologic deficits. No fevers or stiff neck. Nursing Notes were all reviewed and agreed with or any disagreements were addressed in the HPI. REVIEW OF SYSTEMS      Review of Systems     Positives and Pertinent negatives as per HPI.     PAST HISTORY     Past Medical History:  Past Medical History:   Diagnosis Date    Aneurysm ([address]     AVM X5    Anxiety     Arrhythmia     BIGEMENY PVC'S    Arthritis     Calculus of kidney     Cerebellar hemorrhage, acute ([address] 03/2022    secondary to AVM (IR angiography 3/22)    Chronic pain     BACK AND PELVIS    Current smoker     Dyspepsia and other specified disorders of function of stomach     GERD (gastroesophageal reflux disease)     Ill-defined condition     KIDNEY STONES    Irregular heart beat     Kidney disease     hx of kidney stone    Nausea & vomiting     Other ill-defined conditions(799.89)     kidney stone in pass,passed    Postpartum hemorrhage 2017    ALSO HAD HEMORRHAGE DURING MISCARRIAGE 2012    Psychiatric disorder     ADHD    Stroke [facility]) 2022       Past Surgical History:  Past Surgical History:   Procedure Laterality Date    HX APPENDECTOMY  2013    HX  SECTION      HX CHOLECYSTECTOMY  10/30/2012    HX COLONOSCOPY      HX DILATION AND CURETTAGE      X2    HX GI      COLONOSCOPY    HX GYN      miscarriage x2    HX GYN      ENDOMETRIOSIS, LAPROSCOPY X2    HX KNEE ARTHROSCOPY Left     torn catiledge repair left knee    HX WISDOM TEETH EXTRACTION      [place] UNLISTED NEUROLOGICAL/NEUROMUSCULAR DX PX  2022    PIPELINE FLOW DIVERSION    [place] UNLISTED NEUROLOGICAL/NEUROMUSCULAR DX PX  2022    PARTIAL AVM EMBOLISM    [place] UNLISTED PROCEDURE VASCULAR SURGERY  2022    UPPER GI ENDOSCOPY,BIOPSY  2015            Family History:  Family History   Problem Relation Age of Onset    Emphysema Mother     Alcohol abuse Mother     Cancer Mother         Skin    Psychiatric Disorder Mother     No Known Problems Father     No Known Problems Sister     No Known Problems Sister     No Known Problems Sister     No Known Problems Sister     No Known Problems Sister     No Known Problems Sister     No Known Problems Brother     No Known Problems Son     No Known Problems Son     OSTEOARTHRITIS Maternal Grandmother     Cancer Maternal Grandmother         Colon, breast & skin    Anesth Problems Neg Hx        Social History:  Social History     Tobacco Use    Smoking status: Former     Packs/day: 0.50     Years: 15.00     Pack years: 7.50     Types: Cigarettes     Quit date: 3/17/2022     Years since quittin.1    Smokeless tobacco: Never    Tobacco comments:     about 6 cigarettes per day at present   Vaping Use    Vaping Use: Former   Substance Use Topics    Alcohol use: Not Currently    Drug use: Yes     Types: Marijuana     Comment: DAILY       Allergies:   Allergies   Allergen Reactions    Morphine Rash and Hives    Zolpidem Other (comments)     \"Hallucinations and black outs\" CURRENT MEDICATIONS      Previous Medications    ASPIRIN (VAZALORE) 81 MG CAP    Take 81 mg by mouth daily. METHYLPREDNISOLONE (MEDROL DOSEPACK) 4 MG TABLET    Follow taper instructions on pack    METHYLPREDNISOLONE (MEDROL) 4 MG TAB    Take 1 Tablet by mouth four (4) times daily (with meals) for 4 doses. METHYLPREDNISOLONE (MEDROL) 4 MG TAB    Take 1 Tablet by mouth three (3) times daily for 3 doses. METHYLPREDNISOLONE (MEDROL) 4 MG TAB    Take 1 Tablet by mouth two (2) times a day for 2 doses. METHYLPREDNISOLONE (MEDROL) 4 MG TAB    Take 1 Tablet by mouth Daily (before breakfast) for 1 dose. PHYSICAL EXAM      ED Triage Vitals [04/30/23 2102]   ED Encounter Vitals Group      BP (!) 146/99      Pulse (Heart Rate) 89      Resp Rate 22      Temp 97.9 °F (36.6 °C)      Temp src       O2 Sat (%) 100 %      Weight 89 lb 15.2 oz      Height 5'        Physical Exam  Vitals and nursing note reviewed. Constitutional:       General: She is in acute distress. Appearance: She is well-developed. She is not ill-appearing. Comments: Acute distress secondary to pain   HENT:      Head: Normocephalic and atraumatic. Mouth/Throat:      Pharynx: No oropharyngeal exudate. Eyes:      Conjunctiva/sclera: Conjunctivae normal.      Pupils: Pupils are equal, round, and reactive to light. Cardiovascular:      Rate and Rhythm: Normal rate and regular rhythm. Heart sounds: Normal heart sounds. No murmur heard. Pulmonary:      Effort: Pulmonary effort is normal. No tachypnea or accessory muscle usage. Breath sounds: Normal breath sounds. No wheezing or rales. Abdominal:      General: Bowel sounds are normal. There is no distension. Palpations: Abdomen is soft. Tenderness: There is no abdominal tenderness. Musculoskeletal:         General: No deformity. Normal range of motion. Cervical back: Normal range of motion and neck supple. Skin:     General: Skin is warm. Findings: No rash. Neurological:      Mental Status: She is alert and oriented to person, place, and time. GCS: GCS eye subscore is 4. GCS verbal subscore is 5. GCS motor subscore is 6. Cranial Nerves: No dysarthria or facial asymmetry. Sensory: Sensation is intact. No sensory deficit. Motor: Motor function is intact. Coordination: Coordination normal.      Comments: Symmetric smile. No facial droop. Psychiatric:         Behavior: Behavior normal.          DIAGNOSTIC RESULTS   LABS:     Recent Results (from the past 12 hour(s))   CBC WITH AUTOMATED DIFF    Collection Time: 04/30/23 10:28 PM   Result Value Ref Range    WBC 10.4 3.6 - 11.0 K/uL    RBC 4.26 3.80 - 5.20 M/uL    HGB 13.5 11.5 - 16.0 g/dL    HCT 40.0 35.0 - 47.0 %    MCV 93.9 80.0 - 99.0 FL    MCH 31.7 26.0 - 34.0 PG    MCHC 33.8 30.0 - 36.5 g/dL    RDW 12.8 11.5 - 14.5 %    PLATELET 545 266 - 572 K/uL    MPV 11.3 8.9 - 12.9 FL    NRBC 0.0 0  WBC    ABSOLUTE NRBC 0.00 0.00 - 0.01 K/uL    NEUTROPHILS 60 32 - 75 %    LYMPHOCYTES 31 12 - 49 %    MONOCYTES 7 5 - 13 %    EOSINOPHILS 2 0 - 7 %    BASOPHILS 0 0 - 1 %    IMMATURE GRANULOCYTES 0 0.0 - 0.5 %    ABS. NEUTROPHILS 6.2 1.8 - 8.0 K/UL    ABS. LYMPHOCYTES 3.2 0.8 - 3.5 K/UL    ABS. MONOCYTES 0.7 0.0 - 1.0 K/UL    ABS. EOSINOPHILS 0.2 0.0 - 0.4 K/UL    ABS. BASOPHILS 0.0 0.0 - 0.1 K/UL    ABS. IMM.  GRANS. 0.0 0.00 - 0.04 K/UL    DF AUTOMATED     METABOLIC PANEL, COMPREHENSIVE    Collection Time: 04/30/23 10:28 PM   Result Value Ref Range    Sodium 142 136 - 145 mmol/L    Potassium 3.5 3.5 - 5.1 mmol/L    Chloride 107 97 - 108 mmol/L    CO2 31 21 - 32 mmol/L    Anion gap 4 (L) 5 - 15 mmol/L    Glucose 96 65 - 100 mg/dL    BUN 18 6 - 20 MG/DL    Creatinine 0.65 0.55 - 1.02 MG/DL    BUN/Creatinine ratio 28 (H) 12 - 20      eGFR >60 >60 ml/min/1.73m2    Calcium 9.2 8.5 - 10.1 MG/DL    Bilirubin, total 0.2 0.2 - 1.0 MG/DL    ALT (SGPT) 30 12 - 78 U/L    AST (SGOT) 12 (L) 15 - 37 U/L    Alk. phosphatase 34 (L) 45 - 117 U/L    Protein, total 6.7 6.4 - 8.2 g/dL    Albumin 3.8 3.5 - 5.0 g/dL    Globulin 2.9 2.0 - 4.0 g/dL    A-G Ratio 1.3 1.1 - 2.2     PROTHROMBIN TIME + INR    Collection Time: 04/30/23 10:28 PM   Result Value Ref Range    INR 1.1 0.9 - 1.1      Prothrombin time 11.0 9.0 - 11.1 sec   SAMPLES BEING HELD    Collection Time: 04/30/23 10:28 PM   Result Value Ref Range    SAMPLES BEING HELD BLUE,PST     COMMENT        Add-on orders for these samples will be processed based on acceptable specimen integrity and analyte stability, which may vary by analyte. GLUCOSE, POC    Collection Time: 04/30/23 10:35 PM   Result Value Ref Range    Glucose (POC) 98 65 - 117 mg/dL    Performed by Humberto Pettit (EDT)    EKG, 12 LEAD, INITIAL    Collection Time: 04/30/23 11:17 PM   Result Value Ref Range    Ventricular Rate 50 BPM    Atrial Rate 50 BPM    P-R Interval 128 ms    QRS Duration 74 ms    Q-T Interval 470 ms    QTC Calculation (Bezet) 428 ms    Calculated P Axis 68 degrees    Calculated R Axis 85 degrees    Calculated T Axis 83 degrees    Diagnosis       Sinus bradycardia  Nonspecific T wave abnormality  When compared with ECG of 17-JAN-2023 08:20,  aberrant conduction is no longer present  Nonspecific T wave abnormality now evident in Anterior leads          EKG:      RADIOLOGY:  Non-plain film images such as CT, Ultrasound and MRI are read by the radiologist. Plain radiographic images are visualized and preliminarily interpreted by the ED Provider with the below findings:          Interpretation per the Radiologist below, if available at the time of this note:     CT HEAD WO CONT    Result Date: 4/30/2023  EXAM: CT HEAD WO CONT INDICATION: Severe headache, recent aneurysm COMPARISON: CT head 4/26/2023, CT head 10/5/2022. CONTRAST: None. TECHNIQUE: Unenhanced CT of the head was performed using 5 mm images. Brain and bone windows were generated. Coronal and sagittal reformats.  CT dose reduction was achieved through use of a standardized protocol tailored for this examination and automatic exposure control for dose modulation. FINDINGS: The ventricles and sulci are normal in size, shape and configuration. There is no significant white matter disease. There is no intracranial hemorrhage, extra-axial collection, or mass effect. The basilar cisterns are open. No CT evidence of acute infarct. Redemonstrated right PICA metallic stents. Redemonstrated are right cerebellar encephalomalacia. The bone windows demonstrate no abnormalities. The visualized portions of the paranasal sinuses and mastoid air cells are clear. No acute abnormality or interval change.         PROCEDURES   Unless otherwise noted below, none  Procedures     CRITICAL CARE TIME         SCREENINGS             No data recorded         EMERGENCY DEPARTMENT COURSE and DIFFERENTIAL DIAGNOSIS/MDM   Vitals:    Vitals:    04/30/23 2102 04/30/23 2323   BP: (!) 146/99 119/82   Pulse: 89 (!) 57   Resp: 22 11   Temp: 97.9 °F (36.6 °C) 98 °F (36.7 °C)   SpO2: 100% 98%   Weight: 40.8 kg (89 lb 15.2 oz)    Height: 5' (1.524 m)         Patient was given the following medications:  Medications   metoclopramide HCl (REGLAN) injection 10 mg (10 mg IntraVENous Given 4/30/23 2236)   diphenhydrAMINE (BENADRYL) injection 25 mg (25 mg IntraVENous Given 4/30/23 2236)   sodium chloride 0.9 % bolus infusion 1,000 mL (1,000 mL IntraVENous New Bag 4/30/23 2234)   dexamethasone (DECADRON) 4 mg/mL injection 10 mg (10 mg IntraVENous Given 4/30/23 2236)       CONSULTS: (Who and What was discussed)  None    Chronic Conditions:   Past Medical History:   Diagnosis Date    Aneurysm ([address]     AVM X5    Anxiety     Arrhythmia     BIGEMENY PVC'S    Arthritis     Calculus of kidney     Cerebellar hemorrhage, acute ([address] 03/2022    secondary to AVM (IR angiography 3/22)    Chronic pain     BACK AND PELVIS    Current smoker     Dyspepsia and other specified disorders of function of stomach     GERD (gastroesophageal reflux disease)     Ill-defined condition     KIDNEY STONES    Irregular heart beat     Kidney disease     hx of kidney stone    Nausea & vomiting     Other ill-defined conditions(799.89)     kidney stone in pass,passed    Postpartum hemorrhage 2017    ALSO HAD HEMORRHAGE DURING MISCARRIAGE 2012    Psychiatric disorder     ADHD    Stroke [facility]) 03/17/2022   '      Social Determinants affecting Dx or Tx: None    Records Reviewed (source and summary of external notes): Prior medical records and Nursing notes    CC/HPI Summary, DDx, ED Course, and Reassessment: 41-year-old female presenting with severe headache, photosensitivity, nausea and vomiting. Patient with recent AVM repair, neck recently discharged from [facility].  Had a CT scan of the brain to rule out intracranial hemorrhage, which was negative. Patient feeling much better after IV Reglan, IV Benadryl, IV fluids. This time no evidence of hemorrhage, discharge, normal neurologic exam, NIH score of 0     Disposition Considerations (Tests not done, Shared Decision Making, Pt Expectation of Test or Tx.):      FINAL IMPRESSION     1. Other migraine without status migrainosus, not intractable          DISPOSITION/PLAN   Discharged    Discharge Note: The patient is stable for discharge home. The signs, symptoms, diagnosis, and discharge instructions have been discussed, understanding conveyed, and agreed upon. The patient is to follow up as recommended or return to ER should their symptoms worsen. PATIENT REFERRED TO:  Follow-up Information       Follow up With Specialties Details Why Contact Info    Alex Elena MD Internal Medicine Physician In 1 week For a follow-up evaluation.  [address] [place], [place] [address]  [address] [facility]  [phone]      [facility] EMERGENCY DEPT Emergency Medicine In 2 days If symptoms worsen [address] [facility]  [address] [facility]  [phone] DISCHARGE MEDICATIONS:  Current Discharge Medication List            DISCONTINUED MEDICATIONS:  Current Discharge Medication List          I am the Primary Clinician of Record. Anthony Gongora MD (electronically signed)    (Please note that parts of this dictation were completed with voice recognition software. Quite often unanticipated grammatical, syntax, homophones, and other interpretive errors are inadvertently transcribed by the computer software. Please disregards these errors.  Please excuse any errors that have escaped final proofreading.)

## 2023-05-04 ENCOUNTER — OFFICE VISIT (OUTPATIENT)
Dept: NEUROSURGERY | Age: 38
End: 2023-05-04
Payer: MEDICAID

## 2023-05-04 VITALS
TEMPERATURE: 97.3 F | OXYGEN SATURATION: 98 % | HEIGHT: 60 IN | DIASTOLIC BLOOD PRESSURE: 60 MMHG | HEART RATE: 77 BPM | BODY MASS INDEX: 17.4 KG/M2 | SYSTOLIC BLOOD PRESSURE: 102 MMHG | WEIGHT: 88.6 LBS

## 2023-05-04 DIAGNOSIS — I67.1 CEREBRAL ANEURYSM: ICD-10-CM

## 2023-05-04 DIAGNOSIS — Q28.2 AVM (ARTERIOVENOUS MALFORMATION) BRAIN: Primary | ICD-10-CM

## 2023-05-04 PROCEDURE — 99214 OFFICE O/P EST MOD 30 MIN: CPT | Performed by: RADIOLOGY

## 2023-05-25 ENCOUNTER — TELEPHONE (OUTPATIENT)
Age: 38
End: 2023-05-25

## 2023-05-31 NOTE — TELEPHONE ENCOUNTER
Spoke to patient and informed her per provider, she is to take Aspirin daily indefinitely. Patient stated understanding. Patient asked if [facility] releases her to drive a small radius, would provider approve. Asked patient to have [facility] sent written report to this office. Patient stated understanding.

## 2023-06-14 DIAGNOSIS — Q28.2 AVM (ARTERIOVENOUS MALFORMATION) BRAIN: Primary | ICD-10-CM

## 2023-06-14 DIAGNOSIS — I67.1 CEREBRAL ANEURYSM: ICD-10-CM

## 2023-06-23 ENCOUNTER — TELEPHONE (OUTPATIENT)
Age: 38
End: 2023-06-23

## 2023-06-23 DIAGNOSIS — Q28.2 AVM (ARTERIOVENOUS MALFORMATION) BRAIN: Primary | ICD-10-CM

## 2023-06-23 NOTE — TELEPHONE ENCOUNTER
Spoke top patient to inform her of [place] [facility] [place] [id number] [address]. Phone number given to patient. Provider spoke to patient earlier.

## 2023-06-23 NOTE — TELEPHONE ENCOUNTER
Patient called office and expressed that she has been having feelings of stress and sadness. She was not sure who to turn to so she called her office. She stated that she has been feeling overwhelmed with her situation. She has not had suicidal thoughts, and she does not have any plans to hurt herself or anybody else. I advised her to reach out to her PCP soon as possible. I also placed a referral to behavioral health at [facility]. Patient expressed understanding and was in agreement with this plan.

## 2023-06-26 ENCOUNTER — TELEMEDICINE (OUTPATIENT)
Age: 38
End: 2023-06-26

## 2023-06-26 DIAGNOSIS — Z76.0 ENCOUNTER FOR MEDICATION REFILL: Primary | ICD-10-CM

## 2023-06-26 RX ORDER — ACETAMINOPHEN AND CODEINE PHOSPHATE 300; 30 MG/1; MG/1
TABLET ORAL PRN
COMMUNITY
End: 2023-06-26 | Stop reason: ALTCHOICE

## 2023-06-26 RX ORDER — DEXAMETHASONE 2 MG/1
TABLET ORAL
COMMUNITY
Start: 2023-06-13

## 2023-06-26 SDOH — ECONOMIC STABILITY: HOUSING INSECURITY
IN THE LAST 12 MONTHS, WAS THERE A TIME WHEN YOU DID NOT HAVE A STEADY PLACE TO SLEEP OR SLEPT IN A SHELTER (INCLUDING NOW)?: NO

## 2023-06-26 SDOH — ECONOMIC STABILITY: INCOME INSECURITY: HOW HARD IS IT FOR YOU TO PAY FOR THE VERY BASICS LIKE FOOD, HOUSING, MEDICAL CARE, AND HEATING?: VERY HARD

## 2023-06-26 SDOH — ECONOMIC STABILITY: FOOD INSECURITY: WITHIN THE PAST 12 MONTHS, YOU WORRIED THAT YOUR FOOD WOULD RUN OUT BEFORE YOU GOT MONEY TO BUY MORE.: OFTEN TRUE

## 2023-06-26 SDOH — ECONOMIC STABILITY: FOOD INSECURITY: WITHIN THE PAST 12 MONTHS, THE FOOD YOU BOUGHT JUST DIDN'T LAST AND YOU DIDN'T HAVE MONEY TO GET MORE.: OFTEN TRUE

## 2023-06-26 ASSESSMENT — PATIENT HEALTH QUESTIONNAIRE - PHQ9
5. POOR APPETITE OR OVEREATING: 0
SUM OF ALL RESPONSES TO PHQ9 QUESTIONS 1 & 2: 6
10. IF YOU CHECKED OFF ANY PROBLEMS, HOW DIFFICULT HAVE THESE PROBLEMS MADE IT FOR YOU TO DO YOUR WORK, TAKE CARE OF THINGS AT HOME, OR GET ALONG WITH OTHER PEOPLE: 3
SUM OF ALL RESPONSES TO PHQ QUESTIONS 1-9: 19
6. FEELING BAD ABOUT YOURSELF - OR THAT YOU ARE A FAILURE OR HAVE LET YOURSELF OR YOUR FAMILY DOWN: 3
SUM OF ALL RESPONSES TO PHQ QUESTIONS 1-9: 19
4. FEELING TIRED OR HAVING LITTLE ENERGY: 3
1. LITTLE INTEREST OR PLEASURE IN DOING THINGS: 3
8. MOVING OR SPEAKING SO SLOWLY THAT OTHER PEOPLE COULD HAVE NOTICED. OR THE OPPOSITE, BEING SO FIGETY OR RESTLESS THAT YOU HAVE BEEN MOVING AROUND A LOT MORE THAN USUAL: 3
SUM OF ALL RESPONSES TO PHQ QUESTIONS 1-9: 19
2. FEELING DOWN, DEPRESSED OR HOPELESS: 3
SUM OF ALL RESPONSES TO PHQ QUESTIONS 1-9: 19
9. THOUGHTS THAT YOU WOULD BE BETTER OFF DEAD, OR OF HURTING YOURSELF: 0
3. TROUBLE FALLING OR STAYING ASLEEP: 1
7. TROUBLE CONCENTRATING ON THINGS, SUCH AS READING THE NEWSPAPER OR WATCHING TELEVISION: 3

## 2023-11-03 DIAGNOSIS — Q28.2 AVM (ARTERIOVENOUS MALFORMATION) BRAIN: Primary | ICD-10-CM

## 2023-12-29 ENCOUNTER — HOSPITAL ENCOUNTER (EMERGENCY)
Facility: HOSPITAL | Age: 38
Discharge: HOME | End: 2023-12-29
Attending: STUDENT IN AN ORGANIZED HEALTH CARE EDUCATION/TRAINING PROGRAM
Payer: MEDICAID

## 2023-12-29 VITALS
WEIGHT: 98.55 LBS | TEMPERATURE: 98.2 F | HEIGHT: 60 IN | HEART RATE: 108 BPM | RESPIRATION RATE: 16 BRPM | DIASTOLIC BLOOD PRESSURE: 64 MMHG | SYSTOLIC BLOOD PRESSURE: 89 MMHG | OXYGEN SATURATION: 96 % | BODY MASS INDEX: 19.35 KG/M2

## 2023-12-29 DIAGNOSIS — U07.1 COVID-19: Primary | ICD-10-CM

## 2023-12-29 LAB
ALBUMIN SERPL-MCNC: 3.8 G/DL (ref 3.5–5)
ALBUMIN/GLOB SERPL: 1.1 (ref 1.1–2.2)
ALP SERPL-CCNC: 42 U/L (ref 45–117)
ALT SERPL-CCNC: 24 U/L (ref 12–78)
ANION GAP SERPL CALC-SCNC: 2 MMOL/L (ref 5–15)
APPEARANCE UR: ABNORMAL
AST SERPL-CCNC: 22 U/L (ref 15–37)
BACTERIA URNS QL MICRO: ABNORMAL /HPF
BASOPHILS # BLD: 0 K/UL (ref 0–0.1)
BASOPHILS NFR BLD: 1 % (ref 0–1)
BILIRUB SERPL-MCNC: <0.1 MG/DL (ref 0.2–1)
BILIRUB UR QL: NEGATIVE
BUN SERPL-MCNC: 14 MG/DL (ref 6–20)
BUN/CREAT SERPL: 20 (ref 12–20)
CALCIUM SERPL-MCNC: 9 MG/DL (ref 8.5–10.1)
CHLORIDE SERPL-SCNC: 111 MMOL/L (ref 97–108)
CO2 SERPL-SCNC: 26 MMOL/L (ref 21–32)
COLOR UR: ABNORMAL
CREAT SERPL-MCNC: 0.7 MG/DL (ref 0.55–1.02)
DIFFERENTIAL METHOD BLD: ABNORMAL
EOSINOPHIL # BLD: 0.1 K/UL (ref 0–0.4)
EOSINOPHIL NFR BLD: 2 % (ref 0–7)
EPITH CASTS URNS QL MICRO: ABNORMAL /LPF
ERYTHROCYTE [DISTWIDTH] IN BLOOD BY AUTOMATED COUNT: 12.6 % (ref 11.5–14.5)
GLOBULIN SER CALC-MCNC: 3.4 G/DL (ref 2–4)
GLUCOSE SERPL-MCNC: 95 MG/DL (ref 65–100)
GLUCOSE UR STRIP.AUTO-MCNC: 100 MG/DL
HCT VFR BLD AUTO: 39.1 % (ref 35–47)
HGB BLD-MCNC: 12.8 G/DL (ref 11.5–16)
HGB UR QL STRIP: NEGATIVE
HYALINE CASTS URNS QL MICRO: ABNORMAL /LPF (ref 0–2)
IMM GRANULOCYTES # BLD AUTO: 0 K/UL (ref 0–0.04)
IMM GRANULOCYTES NFR BLD AUTO: 0 % (ref 0–0.5)
KETONES UR QL STRIP.AUTO: NEGATIVE MG/DL
LACTATE BLD-SCNC: 0.42 MMOL/L (ref 0.4–2)
LACTATE BLD-SCNC: 2.88 MMOL/L (ref 0.4–2)
LEUKOCYTE ESTERASE UR QL STRIP.AUTO: NEGATIVE
LYMPHOCYTES # BLD: 0.3 K/UL (ref 0.8–3.5)
LYMPHOCYTES NFR BLD: 6 % (ref 12–49)
MCH RBC QN AUTO: 31.3 PG (ref 26–34)
MCHC RBC AUTO-ENTMCNC: 32.7 G/DL (ref 30–36.5)
MCV RBC AUTO: 95.6 FL (ref 80–99)
MONOCYTES # BLD: 0.5 K/UL (ref 0–1)
MONOCYTES NFR BLD: 10 % (ref 5–13)
NEUTS SEG # BLD: 3.8 K/UL (ref 1.8–8)
NEUTS SEG NFR BLD: 81 % (ref 32–75)
NITRITE UR QL STRIP.AUTO: NEGATIVE
NRBC # BLD: 0 K/UL (ref 0–0.01)
NRBC BLD-RTO: 0 PER 100 WBC
PH UR STRIP: 6.5 (ref 5–8)
PLATELET # BLD AUTO: 189 K/UL (ref 150–400)
PMV BLD AUTO: 11 FL (ref 8.9–12.9)
POTASSIUM SERPL-SCNC: 3.6 MMOL/L (ref 3.5–5.1)
PROT SERPL-MCNC: 7.2 G/DL (ref 6.4–8.2)
PROT UR STRIP-MCNC: NEGATIVE MG/DL
RBC # BLD AUTO: 4.09 M/UL (ref 3.8–5.2)
RBC #/AREA URNS HPF: ABNORMAL /HPF (ref 0–5)
RBC MORPH BLD: ABNORMAL
SARS-COV-2 RDRP RESP QL NAA+PROBE: DETECTED
SODIUM SERPL-SCNC: 139 MMOL/L (ref 136–145)
SOURCE: ABNORMAL
SP GR UR REFRACTOMETRY: 1.02
URINE CULTURE IF INDICATED: ABNORMAL
UROBILINOGEN UR QL STRIP.AUTO: 0.2 EU/DL (ref 0.2–1)
WBC # BLD AUTO: 4.7 K/UL (ref 3.6–11)
WBC URNS QL MICRO: ABNORMAL /HPF (ref 0–4)

## 2023-12-29 PROCEDURE — 87635 SARS-COV-2 COVID-19 AMP PRB: CPT

## 2023-12-29 PROCEDURE — 6370000000 HC RX 637 (ALT 250 FOR IP): Performed by: STUDENT IN AN ORGANIZED HEALTH CARE EDUCATION/TRAINING PROGRAM

## 2023-12-29 PROCEDURE — 81001 URINALYSIS AUTO W/SCOPE: CPT

## 2023-12-29 PROCEDURE — 83605 ASSAY OF LACTIC ACID: CPT

## 2023-12-29 PROCEDURE — 99284 EMERGENCY DEPT VISIT MOD MDM: CPT

## 2023-12-29 PROCEDURE — 96375 TX/PRO/DX INJ NEW DRUG ADDON: CPT

## 2023-12-29 PROCEDURE — 2580000003 HC RX 258: Performed by: STUDENT IN AN ORGANIZED HEALTH CARE EDUCATION/TRAINING PROGRAM

## 2023-12-29 PROCEDURE — 85025 COMPLETE CBC W/AUTO DIFF WBC: CPT

## 2023-12-29 PROCEDURE — 96361 HYDRATE IV INFUSION ADD-ON: CPT

## 2023-12-29 PROCEDURE — 80053 COMPREHEN METABOLIC PANEL: CPT

## 2023-12-29 PROCEDURE — 36415 COLL VENOUS BLD VENIPUNCTURE: CPT

## 2023-12-29 PROCEDURE — 6360000002 HC RX W HCPCS: Performed by: STUDENT IN AN ORGANIZED HEALTH CARE EDUCATION/TRAINING PROGRAM

## 2023-12-29 PROCEDURE — 96374 THER/PROPH/DIAG INJ IV PUSH: CPT

## 2023-12-29 RX ORDER — PROCHLORPERAZINE EDISYLATE 5 MG/ML
5 INJECTION INTRAMUSCULAR; INTRAVENOUS ONCE
Status: COMPLETED | OUTPATIENT
Start: 2023-12-29 | End: 2023-12-29

## 2023-12-29 RX ORDER — SODIUM CHLORIDE, SODIUM LACTATE, POTASSIUM CHLORIDE, AND CALCIUM CHLORIDE .6; .31; .03; .02 G/100ML; G/100ML; G/100ML; G/100ML
1000 INJECTION, SOLUTION INTRAVENOUS ONCE
Status: COMPLETED | OUTPATIENT
Start: 2023-12-29 | End: 2023-12-29

## 2023-12-29 RX ORDER — KETOROLAC TROMETHAMINE 30 MG/ML
15 INJECTION, SOLUTION INTRAMUSCULAR; INTRAVENOUS ONCE
Status: COMPLETED | OUTPATIENT
Start: 2023-12-29 | End: 2023-12-29

## 2023-12-29 RX ORDER — ACETAMINOPHEN 500 MG
1000 TABLET ORAL
Status: COMPLETED | OUTPATIENT
Start: 2023-12-29 | End: 2023-12-29

## 2023-12-29 RX ORDER — DIPHENHYDRAMINE HYDROCHLORIDE 50 MG/ML
25 INJECTION INTRAMUSCULAR; INTRAVENOUS
Status: COMPLETED | OUTPATIENT
Start: 2023-12-29 | End: 2023-12-29

## 2023-12-29 RX ORDER — 0.9 % SODIUM CHLORIDE 0.9 %
400 INTRAVENOUS SOLUTION INTRAVENOUS ONCE
Status: COMPLETED | OUTPATIENT
Start: 2023-12-29 | End: 2023-12-29

## 2023-12-29 RX ADMIN — SODIUM CHLORIDE, POTASSIUM CHLORIDE, SODIUM LACTATE AND CALCIUM CHLORIDE 1000 ML: 600; 310; 30; 20 INJECTION, SOLUTION INTRAVENOUS at 05:23

## 2023-12-29 RX ADMIN — DIPHENHYDRAMINE HYDROCHLORIDE 25 MG: 50 INJECTION INTRAMUSCULAR; INTRAVENOUS at 06:52

## 2023-12-29 RX ADMIN — SODIUM CHLORIDE 400 ML: 9 INJECTION, SOLUTION INTRAVENOUS at 06:54

## 2023-12-29 RX ADMIN — ACETAMINOPHEN 1000 MG: 500 TABLET ORAL at 05:22

## 2023-12-29 RX ADMIN — KETOROLAC TROMETHAMINE 15 MG: 30 INJECTION INTRAMUSCULAR; INTRAVENOUS at 05:24

## 2023-12-29 RX ADMIN — PROCHLORPERAZINE EDISYLATE 5 MG: 5 INJECTION INTRAMUSCULAR; INTRAVENOUS at 06:53

## 2023-12-29 ASSESSMENT — PAIN - FUNCTIONAL ASSESSMENT: PAIN_FUNCTIONAL_ASSESSMENT: 0-10

## 2023-12-29 ASSESSMENT — PAIN DESCRIPTION - LOCATION: LOCATION: BACK

## 2023-12-29 ASSESSMENT — PAIN DESCRIPTION - ORIENTATION: ORIENTATION: LOWER

## 2023-12-29 ASSESSMENT — PAIN DESCRIPTION - DESCRIPTORS: DESCRIPTORS: STABBING;BURNING

## 2023-12-29 ASSESSMENT — PAIN SCALES - GENERAL: PAINLEVEL_OUTOF10: 10

## 2023-12-29 NOTE — ED NOTES
Discharge paperwork reviewed and provided to pt. Time was given to ask any questions or concerns which there were none at this time.

## 2023-12-29 NOTE — DISCHARGE INSTRUCTIONS
Thank You! It was a pleasure taking care of you in our Emergency Department today. We know that when you come to our Emergency Department, you are entrusting us with your health, comfort, and safety. Our physicians and nurses honor that trust, and truly appreciate the opportunity to care for you and your loved ones. We also value your feedback. If you receive a survey about your Emergency Department experience today, please fill it out. We care about our patients' feedback, and we listen to what you have to say. Thank you. Karlie Sykes MD      ________________________________________________________________________  I have included a copy of your lab results and/or radiologic studies from today's visit so you can have them easily available at your follow-up visit. We hope you feel better and please do not hesitate to contact the ED if you have any questions at all!     Recent Results (from the past 12 hour(s))   CBC with Auto Differential    Collection Time: 12/29/23  3:27 AM   Result Value Ref Range    WBC 4.7 3.6 - 11.0 K/uL    RBC 4.09 3.80 - 5.20 M/uL    Hemoglobin 12.8 11.5 - 16.0 g/dL    Hematocrit 39.1 35.0 - 47.0 %    MCV 95.6 80.0 - 99.0 FL    MCH 31.3 26.0 - 34.0 PG    MCHC 32.7 30.0 - 36.5 g/dL    RDW 12.6 11.5 - 14.5 %    Platelets 415 812 - 804 K/uL    MPV 11.0 8.9 - 12.9 FL    Nucleated RBCs 0.0 0  WBC    nRBC 0.00 0.00 - 0.01 K/uL    Neutrophils % 81 (H) 32 - 75 %    Lymphocytes % 6 (L) 12 - 49 %    Monocytes % 10 5 - 13 %    Eosinophils % 2 0 - 7 %    Basophils % 1 0 - 1 %    Immature Granulocytes 0 0.0 - 0.5 %    Neutrophils Absolute 3.8 1.8 - 8.0 K/UL    Lymphocytes Absolute 0.3 (L) 0.8 - 3.5 K/UL    Monocytes Absolute 0.5 0.0 - 1.0 K/UL    Eosinophils Absolute 0.1 0.0 - 0.4 K/UL    Basophils Absolute 0.0 0.0 - 0.1 K/UL    Absolute Immature Granulocyte 0.0 0.00 - 0.04 K/UL    Differential Type SMEAR SCANNED      RBC Comment NORMOCYTIC, NORMOCHROMIC     Comprehensive Metabolic received in the Emergency Department were for an urgent problem and are not intended as complete care. It is important that you follow up with a doctor, nurse practitioner, or physician assistant for ongoing care. If your symptoms become worse or you do not improve as expected and you are unable to reach your usual health care provider, you should return to the Emergency Department. We are available 24 hours a day.    Please take your discharge instructions with you when you go to your follow-up appointment.     If a prescription has been provided, please have it filled as soon as possible to prevent a delay in treatment. Read the entire medication instruction sheet provided to you by the pharmacy. If you have any questions or reservations about taking the medication due to side effects or interactions with other medications, please call your primary care physician or contact the ER to speak with the charge nurse.     Please make an appointment with your family doctor or the physician you were referred to for follow-up of this visit as instructed on your discharge paperwork. Return to the ER if you are unable to be seen or if you are unable to be seen in a timely manner.    Should you experience abdominal pain lasting greater than 6 hours, chest pain, difficulty breathing, fever/chills, numbness/tingling, skin changes or other symptoms that concern you, return to the ED sooner. If you feel worse over the next 24 hours, please return to the ED. We are available 24 hours a day. Thank you for trusting us with your care!

## 2023-12-29 NOTE — ED PROVIDER NOTES
[facility] EMERGENCY DEPT  EMERGENCY DEPARTMENT ENCOUNTER       Pt Name: Shadia Armstrong  MRN: [id number]  [address] [facility] [place] 1985  Date of evaluation: 12/29/2023  Provider: Pete Colindres MD   PCP: No primary care provider on file. Note Started: 5:48 AM EST 12/29/23     CHIEF COMPLAINT       Chief Complaint   Patient presents with    Back Pain     Pt arrived to ED from home with lower back pain since 7pm, ambulates with a cane. Pt denies difficulty urinating. Pt reports her close family members tested positive for covid today. HISTORY OF PRESENT ILLNESS: 1 or more elements      History From: patient, History limited by: none     Shadia Armstrong is a 41 y.o. female presenting with back pain. She had started few hours ago and has been getting worse. Denies any dysuria constipation or diarrhea. Has a headache. No fevers. Please See MDM for Additional Details of the HPI/PMH  Nursing Notes were all reviewed and agreed with or any disagreements were addressed in the HPI. REVIEW OF SYSTEMS        Positives and Pertinent negatives as per HPI.     PAST HISTORY     Past Medical History:  Past Medical History:   Diagnosis Date    Aneurysm ([address])     AVM X5    Anxiety     Arrhythmia     BIGEMENY PVC'S    Arthritis     Calculus of kidney     Cerebellar hemorrhage, acute ([address]) 03/2022    secondary to AVM (IR angiography 3/22)    Chronic pain     BACK AND PELVIS    Current smoker     Dyspepsia and other specified disorders of function of stomach     GERD (gastroesophageal reflux disease)     Ill-defined condition     KIDNEY STONES    Irregular heart beat     Kidney disease     hx of kidney stone    Nausea & vomiting     Other ill-defined conditions(799.89)     kidney stone in pass,passed    Postpartum hemorrhage 2017    ALSO HAD HEMORRHAGE DURING MISCARRIAGE 2012    POTS (postural orthostatic tachycardia syndrome)     Psychiatric disorder     ADHD    Stroke ([address]) 03/17/2022       Past Surgical History:  Past

## 2024-01-25 ENCOUNTER — TELEPHONE (OUTPATIENT)
Age: 39
End: 2024-01-25

## 2024-01-25 NOTE — TELEPHONE ENCOUNTER
PSR called and rescheduled the patient as she was mistakenly scheduled as a new patient when she is actually a follow up.    Patient was thankful for the call and excited to be seen earlier.

## 2024-01-26 ENCOUNTER — OFFICE VISIT (OUTPATIENT)
Age: 39
End: 2024-01-26
Payer: MEDICAID

## 2024-01-26 VITALS
RESPIRATION RATE: 16 BRPM | SYSTOLIC BLOOD PRESSURE: 90 MMHG | DIASTOLIC BLOOD PRESSURE: 71 MMHG | WEIGHT: 92.6 LBS | HEIGHT: 60 IN | BODY MASS INDEX: 18.18 KG/M2

## 2024-01-26 DIAGNOSIS — R51.9 CHRONIC DAILY HEADACHE: Primary | ICD-10-CM

## 2024-01-26 DIAGNOSIS — Z86.79 HISTORY OF CEREBRAL HEMORRHAGE: ICD-10-CM

## 2024-01-26 DIAGNOSIS — Z87.74 HISTORY OF ARTERIOVENOUS MALFORMATION (AVM): ICD-10-CM

## 2024-01-26 DIAGNOSIS — G62.82 RADIATION INDUCED NEUROPATHY (HCC): ICD-10-CM

## 2024-01-26 PROCEDURE — 99215 OFFICE O/P EST HI 40 MIN: CPT

## 2024-01-26 RX ORDER — AMITRIPTYLINE HYDROCHLORIDE 10 MG/1
10 TABLET, FILM COATED ORAL NIGHTLY
Qty: 30 TABLET | Refills: 2 | Status: SHIPPED | OUTPATIENT
Start: 2024-01-26

## 2024-01-26 ASSESSMENT — PATIENT HEALTH QUESTIONNAIRE - PHQ9
2. FEELING DOWN, DEPRESSED OR HOPELESS: 0
SUM OF ALL RESPONSES TO PHQ QUESTIONS 1-9: 0
SUM OF ALL RESPONSES TO PHQ9 QUESTIONS 1 & 2: 0
SUM OF ALL RESPONSES TO PHQ QUESTIONS 1-9: 0
1. LITTLE INTEREST OR PLEASURE IN DOING THINGS: 0

## 2024-01-26 ASSESSMENT — ENCOUNTER SYMPTOMS
COLOR CHANGE: 1
PHOTOPHOBIA: 1

## 2024-01-26 NOTE — PROGRESS NOTES
Chief Complaint   Patient presents with    Follow-up    Headache     Patient reports daily headaches      Vitals:    01/26/24 1026   BP: 90/71   Resp: 16

## 2024-01-26 NOTE — PROGRESS NOTES
Chief Complaint   Patient presents with    Follow-up    Headache     Patient reports daily headaches       HPI    Mrs Jasso is a 38 year old female here for follow up/hospital. She was seen inpatient by Dr Hamm on 7/5/22 for headaches. PMHX of right cerebral hemorrhage on March 2022 secondary to a AVM, anxiety, GERD, ADHD. She has had a pipeline stenting. After discharge on 6/30/22 she started experiencing headaches. She has tried steroids, Fioricet, OTC pain meds and nothing is helpful. She is on  Vazalore 81 mg daily. She had a scheduled pipeline interrogation of the AVM on 11/29/22, 1/17/23, 4/25/23. She continues to have daily headaches since her radiation last June-July 2023. She wakes up with it. Middle left area, dull, bending over is worse. Different than her pipeline headaches. Constant pain but usually tolerable. Not taking anything right now. Lots of light sensitivity. Sometimes she reports there are \"odd\" s/s but not regularly. She will have some difficulty walking someday's and blurry vision, but this has been b/c of her surgeries more than her HA's.  No N/V. Sleep has been rough lately; stress is always high but usually she sleeps good. Past month has been a terrible sleeping schedule. Wakes up multiple times. She has used Benadryl and Fioricet in the past but doesn't like how she feels when she takes it.   She is also reporting Neuropathy since her radiation. It starts out as very itchy skin, mostly when she gets cold. Then her toes and fingertips go numb and tingling. Hot water helps. Sometimes will get whitish in color. No pain.            BACKGROUND  Rafaela Jasso is a 37 y.o. right-hand-dominant female with a history significant for right cerebral hemorrhage in March 2022 secondary to an AVM in the posterior fossa.  Patient was noted to have prenatal aneurysms that time and was brought back to the hospital last week for pipeline stenting of them which went well without any complication.

## 2024-02-02 ENCOUNTER — HOSPITAL ENCOUNTER (OUTPATIENT)
Facility: HOSPITAL | Age: 39
End: 2024-02-02
Attending: NEUROLOGICAL SURGERY
Payer: MEDICAID

## 2024-02-02 DIAGNOSIS — Q27.30 AVM (ARTERIOVENOUS MALFORMATION): ICD-10-CM

## 2024-02-02 PROCEDURE — 70553 MRI BRAIN STEM W/O & W/DYE: CPT

## 2024-02-02 PROCEDURE — 6360000004 HC RX CONTRAST MEDICATION: Performed by: NEUROLOGICAL SURGERY

## 2024-02-02 PROCEDURE — A9579 GAD-BASE MR CONTRAST NOS,1ML: HCPCS | Performed by: NEUROLOGICAL SURGERY

## 2024-02-02 RX ADMIN — GADOTERIDOL 8 ML: 279.3 INJECTION, SOLUTION INTRAVENOUS at 13:18

## 2024-03-04 ENCOUNTER — CLINICAL DOCUMENTATION (OUTPATIENT)
Age: 39
End: 2024-03-04

## 2024-03-04 NOTE — PROGRESS NOTES
Note from patient's visit with Dr. Treviño on 2/8/2024 was provided and scanned into media.  MRI performed 2/2/2024 shows no significant interval changes.  Patient will be seen again by Dr. Treviño with follow-up imaging in another 6 months.

## 2024-03-07 ENCOUNTER — OFFICE VISIT (OUTPATIENT)
Age: 39
End: 2024-03-07

## 2024-03-07 VITALS
SYSTOLIC BLOOD PRESSURE: 110 MMHG | HEIGHT: 60 IN | TEMPERATURE: 97.9 F | HEART RATE: 79 BPM | WEIGHT: 95.4 LBS | OXYGEN SATURATION: 100 % | BODY MASS INDEX: 18.73 KG/M2 | DIASTOLIC BLOOD PRESSURE: 68 MMHG

## 2024-03-07 DIAGNOSIS — Q28.2 ARTERIOVENOUS MALFORMATION OF CEREBRAL VESSELS: Primary | ICD-10-CM

## 2024-03-07 DIAGNOSIS — I67.1 CEREBRAL ANEURYSM: ICD-10-CM

## 2024-03-07 NOTE — PROGRESS NOTES
Follow up post Gamma Knife with Dr Treviño for AVM and Cerebral aneurysm.  Patient reports decrease in frequency and intensity of headaches.  Denies  dizziness,blurred or double vision, numbness or tingling.  No acute problems reported.  
    X2    GI      COLONOSCOPY    GYN      miscarriage x2    GYN      ENDOMETRIOSIS, LAPROSCOPY X2    KNEE ARTHROSCOPY Left     torn catiledge repair left knee    NEUROLOGICAL SURGERY  2022    PARTIAL AVM EMBOLISM    NEUROLOGICAL SURGERY  2022    PIPELINE FLOW DIVERSION    UPPER GI ENDOSCOPY,BIOPSY  2015         VASCULAR SURGERY  2022    WISDOM TOOTH EXTRACTION        Family History   Problem Relation Age of Onset    Emphysema Mother     Alcohol Abuse Mother     Anesth Problems Neg Hx     Cancer Maternal Grandmother         Colon, breast & skin    Osteoarthritis Maternal Grandmother     No Known Problems Son     No Known Problems Son     No Known Problems Brother     No Known Problems Sister     No Known Problems Sister     No Known Problems Sister     No Known Problems Sister     No Known Problems Sister     No Known Problems Sister     Cancer Mother         Skin    Psychiatric Disorder Mother     No Known Problems Father      Social History     Tobacco Use    Smoking status: Former     Current packs/day: 0.00     Average packs/day: 0.5 packs/day for 15.0 years (7.5 ttl pk-yrs)     Types: Cigarettes     Start date: 3/17/2007     Quit date: 3/17/2022     Years since quittin.9    Smokeless tobacco: Never   Substance Use Topics    Alcohol use: Not Currently      Current Outpatient Medications   Medication Sig Dispense Refill    amitriptyline (ELAVIL) 10 MG tablet Take 1 tablet by mouth nightly 30 tablet 2    diphenhydrAMINE (BENADRYL) 50 MG tablet Take by mouth as needed      Aspirin (VAZALORE) 81 MG CAPS Take 81 mg by mouth daily       No current facility-administered medications for this visit.        Allergies   Allergen Reactions    Morphine Hives and Rash    Zolpidem Other (See Comments)     \"Hallucinations and black outs\"       Review of Systems:  Pertinent items are noted in the History of Present Illness.    Objective:     Vitals:    24 1036   BP: 110/68   Site: Left Upper Arm

## 2024-03-07 NOTE — PATIENT INSTRUCTIONS
Follow up in 6 months, September 2024, after imaging is completed.  See attached paperwork to schedule imaging.

## 2024-04-18 NOTE — PROGRESS NOTES
has normal signal  characteristics. There is no cerebellar tonsillar herniation. Expected arterial  flow-voids are present. Mild mucosal thickening in the bilateral maxillary  sinuses without air-fluid level. The mastoid air cells and middle ears are  clear. The orbital contents are within normal limits. No significant osseous or  scalp lesions are identified.    MRA Head:  Right cerebellar arteriovenous malformation, with nidus measuring approximately  3.3 x 2.0 cm (series 3 image 99). There is supply is via an enlarged right PICA,  which contains an aneurysm measuring 1.2 x 0.9 x 1.2 cm (series 3 image 100).  There are additional small arterial feeding vessels from the right vertebral  artery and from the AICA. There are multiple adjacent draining veins, one of the  largest courses along the right petrous ridge and drains into the right distal  transverse sinus, and a second largest which courses along the right posterior  cerebellum and drains into the torcular.    There is no evidence of large vessel occlusion or flow-limiting stenosis of the  intracranial internal carotid, anterior cerebral, and middle cerebral arteries.  The anterior communicating artery is patent, with small right A1 segment.    There is no evidence of large vessel occlusion or flow-limiting stenosis of the  intracranial vertebral arteries, basilar artery, or posterior cerebral arteries.  The posterior communicating arteries are patent, right larger than left.    MRA Neck:  The common carotid arteries demonstrate no flow-limiting stenosis. There is no  evidence of flow-limiting stenosis in the cervical right internal carotid  artery. There is no evidence of flow-limiting stenosis in the cervical left  internal carotid artery.    There is a codominant vertebrobasilar arterial system. The vertebral arteries  and imaged portion of the basilar artery demonstrate no flow-limiting stenosis.    Impression  MRI brain:  1. Stable right cerebellar

## 2024-04-19 ENCOUNTER — OFFICE VISIT (OUTPATIENT)
Age: 39
End: 2024-04-19

## 2024-04-19 VITALS
HEART RATE: 66 BPM | SYSTOLIC BLOOD PRESSURE: 108 MMHG | BODY MASS INDEX: 18.02 KG/M2 | DIASTOLIC BLOOD PRESSURE: 60 MMHG | WEIGHT: 91.8 LBS | HEIGHT: 60 IN

## 2024-04-19 DIAGNOSIS — G62.82 RADIATION INDUCED NEUROPATHY (HCC): ICD-10-CM

## 2024-04-19 DIAGNOSIS — R51.9 CHRONIC DAILY HEADACHE: Primary | ICD-10-CM

## 2024-04-19 DIAGNOSIS — Z87.74 HISTORY OF ARTERIOVENOUS MALFORMATION (AVM): ICD-10-CM

## 2024-04-19 RX ORDER — AMITRIPTYLINE HYDROCHLORIDE 10 MG/1
20 TABLET, FILM COATED ORAL NIGHTLY
Qty: 180 TABLET | Refills: 1 | Status: SHIPPED | OUTPATIENT
Start: 2024-04-19

## 2024-04-19 ASSESSMENT — ENCOUNTER SYMPTOMS: PHOTOPHOBIA: 1

## 2024-05-06 ENCOUNTER — TELEPHONE (OUTPATIENT)
Age: 39
End: 2024-05-06

## 2024-05-06 NOTE — TELEPHONE ENCOUNTER
Received a call from patient stating she is pregnant.  Patient thinks about 4 weeks and is requesting guidance for NIS.  Spoke to provider who will call patient to discuss.  Patient stated understanding.

## 2024-06-15 LAB
C. TRACHOMATIS, EXTERNAL RESULT: NEGATIVE
HEP B, EXTERNAL RESULT: NEGATIVE
HIV, EXTERNAL RESULT: NON REACTIVE
N. GONORRHOEAE, EXTERNAL RESULT: NEGATIVE
RPR, EXTERNAL RESULT: NON REACTIVE
RUBELLA TITER, EXTERNAL RESULT: NORMAL

## 2024-09-17 ENCOUNTER — TELEPHONE (OUTPATIENT)
Age: 39
End: 2024-09-17

## 2024-10-26 ENCOUNTER — HOSPITAL ENCOUNTER (OUTPATIENT)
Facility: HOSPITAL | Age: 39
Discharge: HOME | End: 2024-10-26
Attending: OBSTETRICS & GYNECOLOGY | Admitting: OBSTETRICS & GYNECOLOGY
Payer: MEDICAID

## 2024-10-26 VITALS
RESPIRATION RATE: 18 BRPM | BODY MASS INDEX: 22.58 KG/M2 | DIASTOLIC BLOOD PRESSURE: 65 MMHG | SYSTOLIC BLOOD PRESSURE: 109 MMHG | HEART RATE: 72 BPM | TEMPERATURE: 98.2 F | WEIGHT: 115 LBS | OXYGEN SATURATION: 100 % | HEIGHT: 60 IN

## 2024-10-26 DIAGNOSIS — O47.03 PREMATURE UTERINE CONTRACTIONS IN THIRD TRIMESTER, ANTEPARTUM: Primary | ICD-10-CM

## 2024-10-26 LAB
AMPHET UR QL SCN: NEGATIVE
APPEARANCE UR: CLEAR
BACTERIA URNS QL MICRO: ABNORMAL /HPF
BARBITURATES UR QL SCN: NEGATIVE
BENZODIAZ UR QL: NEGATIVE
BILIRUB UR QL: NEGATIVE
CANNABINOIDS UR QL SCN: POSITIVE
CLUE CELLS VAG QL WET PREP: ABNORMAL
COCAINE UR QL SCN: NEGATIVE
COLOR UR: ABNORMAL
EPITH CASTS URNS QL MICRO: ABNORMAL /LPF
FIBRONECTIN FETAL VAG QL: NEGATIVE
GLUCOSE UR STRIP.AUTO-MCNC: 100 MG/DL
HGB UR QL STRIP: NEGATIVE
HYALINE CASTS URNS QL MICRO: ABNORMAL /LPF (ref 0–2)
KETONES UR QL STRIP.AUTO: NEGATIVE MG/DL
LEUKOCYTE ESTERASE UR QL STRIP.AUTO: ABNORMAL
Lab: ABNORMAL
METHADONE UR QL: NEGATIVE
NITRITE UR QL STRIP.AUTO: NEGATIVE
OPIATES UR QL: NEGATIVE
PCP UR QL: NEGATIVE
PH UR STRIP: 7 (ref 5–8)
PROT UR STRIP-MCNC: NEGATIVE MG/DL
RBC #/AREA URNS HPF: ABNORMAL /HPF (ref 0–5)
SP GR UR REFRACTOMETRY: 1.01
T VAGINALIS VAG QL WET PREP: ABNORMAL
URINE CULTURE IF INDICATED: ABNORMAL
UROBILINOGEN UR QL STRIP.AUTO: 0.2 EU/DL (ref 0.2–1)
WBC URNS QL MICRO: ABNORMAL /HPF (ref 0–4)
YEAST: ABNORMAL

## 2024-10-26 PROCEDURE — 96374 THER/PROPH/DIAG INJ IV PUSH: CPT

## 2024-10-26 PROCEDURE — 82731 ASSAY OF FETAL FIBRONECTIN: CPT

## 2024-10-26 PROCEDURE — 6370000000 HC RX 637 (ALT 250 FOR IP): Performed by: OBSTETRICS & GYNECOLOGY

## 2024-10-26 PROCEDURE — 4500000002 HC ER NO CHARGE

## 2024-10-26 PROCEDURE — 2580000003 HC RX 258: Performed by: OBSTETRICS & GYNECOLOGY

## 2024-10-26 PROCEDURE — 6360000002 HC RX W HCPCS: Performed by: OBSTETRICS & GYNECOLOGY

## 2024-10-26 PROCEDURE — 96361 HYDRATE IV INFUSION ADD-ON: CPT

## 2024-10-26 PROCEDURE — 87591 N.GONORRHOEAE DNA AMP PROB: CPT

## 2024-10-26 PROCEDURE — 87491 CHLMYD TRACH DNA AMP PROBE: CPT

## 2024-10-26 PROCEDURE — 87210 SMEAR WET MOUNT SALINE/INK: CPT

## 2024-10-26 PROCEDURE — 81001 URINALYSIS AUTO W/SCOPE: CPT

## 2024-10-26 PROCEDURE — 87081 CULTURE SCREEN ONLY: CPT

## 2024-10-26 PROCEDURE — 80307 DRUG TEST PRSMV CHEM ANLYZR: CPT

## 2024-10-26 RX ORDER — METRONIDAZOLE 500 MG/1
500 TABLET ORAL 2 TIMES DAILY
Qty: 14 TABLET | Refills: 0 | Status: SHIPPED | OUTPATIENT
Start: 2024-10-26 | End: 2024-11-02

## 2024-10-26 RX ORDER — ONDANSETRON 2 MG/ML
4 INJECTION INTRAMUSCULAR; INTRAVENOUS EVERY 6 HOURS PRN
Status: DISCONTINUED | OUTPATIENT
Start: 2024-10-26 | End: 2024-10-26 | Stop reason: HOSPADM

## 2024-10-26 RX ORDER — SODIUM CHLORIDE, SODIUM LACTATE, POTASSIUM CHLORIDE, AND CALCIUM CHLORIDE .6; .31; .03; .02 G/100ML; G/100ML; G/100ML; G/100ML
1000 INJECTION, SOLUTION INTRAVENOUS ONCE
Status: COMPLETED | OUTPATIENT
Start: 2024-10-26 | End: 2024-10-26

## 2024-10-26 RX ORDER — AZITHROMYCIN 250 MG/1
250 TABLET, FILM COATED ORAL DAILY
Status: ON HOLD | COMMUNITY
End: 2024-10-26 | Stop reason: HOSPADM

## 2024-10-26 RX ORDER — PROMETHAZINE HYDROCHLORIDE 25 MG/1
25 TABLET ORAL EVERY 6 HOURS PRN
COMMUNITY

## 2024-10-26 RX ORDER — ACETAMINOPHEN 500 MG
1000 TABLET ORAL EVERY 6 HOURS SCHEDULED
Status: DISCONTINUED | OUTPATIENT
Start: 2024-10-26 | End: 2024-10-26

## 2024-10-26 RX ORDER — ACETAMINOPHEN 500 MG
1000 TABLET ORAL EVERY 6 HOURS SCHEDULED
Status: DISCONTINUED | OUTPATIENT
Start: 2024-10-26 | End: 2024-10-26 | Stop reason: HOSPADM

## 2024-10-26 RX ORDER — CEFUROXIME AXETIL 250 MG/1
250 TABLET ORAL 2 TIMES DAILY
COMMUNITY

## 2024-10-26 RX ORDER — ASPIRIN 81 MG/1
81 TABLET, CHEWABLE ORAL DAILY
COMMUNITY

## 2024-10-26 RX ORDER — ALBUTEROL SULFATE 0.63 MG/3ML
1 SOLUTION RESPIRATORY (INHALATION) EVERY 6 HOURS PRN
COMMUNITY

## 2024-10-26 RX ADMIN — ONDANSETRON 4 MG: 2 INJECTION INTRAMUSCULAR; INTRAVENOUS at 08:59

## 2024-10-26 RX ADMIN — ACETAMINOPHEN 1000 MG: 500 TABLET ORAL at 08:48

## 2024-10-26 RX ADMIN — SODIUM CHLORIDE, POTASSIUM CHLORIDE, SODIUM LACTATE AND CALCIUM CHLORIDE 1000 ML: 600; 310; 30; 20 INJECTION, SOLUTION INTRAVENOUS at 08:57

## 2024-10-26 ASSESSMENT — PAIN DESCRIPTION - LOCATION
LOCATION: HEAD
LOCATION: ABDOMEN;BACK

## 2024-10-26 ASSESSMENT — ENCOUNTER SYMPTOMS
NAUSEA: 1
SHORTNESS OF BREATH: 0
CONSTIPATION: 0
SORE THROAT: 0
DIARRHEA: 0
COUGH: 1
FACIAL SWELLING: 0
ABDOMINAL PAIN: 1
RECTAL PAIN: 1
VOMITING: 0
BACK PAIN: 1

## 2024-10-26 ASSESSMENT — PAIN - FUNCTIONAL ASSESSMENT: PAIN_FUNCTIONAL_ASSESSMENT: 0-10

## 2024-10-26 ASSESSMENT — PAIN DESCRIPTION - DESCRIPTORS: DESCRIPTORS: ACHING

## 2024-10-26 ASSESSMENT — PAIN SCALES - GENERAL
PAINLEVEL_OUTOF10: 5
PAINLEVEL_OUTOF10: 8

## 2024-10-26 ASSESSMENT — PAIN DESCRIPTION - ORIENTATION: ORIENTATION: LEFT

## 2024-10-26 NOTE — ED PROVIDER NOTES
Brief Medical Screening Examination for OB patient at triage    HPI: 39 female 29 weeks pregnant complains of lower abdominal pain.  Patient reports lower abdominal contractions radiate to back.  Pelvis began at 8 PM.  Reports gradually worsening.  Patient . denies any other issues with this pregnancy    Vitals: Patient Vitals for the past 4 hrs:   Temp Pulse Resp BP SpO2   10/26/24 0719 98.2 °F (36.8 °C) 79 20 128/81 92 %         Physical Exam:  General appearance: No apparent distress.  Stable vitals.  Afebrile.  Skin exam: Warm and dry.  No pallor.  Neurologic: Alert and oriented.  Abdominal exam: Soft, nontender.  Gravid uterus.    Differential diagnosis: Labor, premature labor, threatened miscarriage, Andrey-Andres contractions, uterine contractions in pregnancy, premature rupture of membranes.    This patient with a primary obstetrical chief complaint is stable and medically cleared for direct transfer to the OB Labor & Delivery unit for further monitoring and workup.  Medical screening exam is complete.    MD Blayne Cardoza Matthew R, MD  10/26/24 1421

## 2024-10-26 NOTE — PROGRESS NOTES
Rafaela Jasso is a 39 y.o.  at 28w6d patient of Dr Funes at [facility] who presents to L&D triage with c/o abdominal and back pressure/tightness. She reports Positive FM, denies vaginal bleeding and LOF. She also denies Headaches, Scotoma, RUQ pain, and Edema. Urine sample obtained. EFM and toco placed for initial assessment.

## 2024-10-26 NOTE — ED NOTES
Brigid Lopez is a 40year old female who presents today for a follow-up. She continues to complain of heaviness and pulling of bilateral breast and wishes to undergo reduction of her flaps. She brings photos today of what her goal would ideally be. Pt presents ambulatory to triage, 29 weeks preg, w/ complaints of LLQ ABD cramping that is referred to her back starting last night. Pt denies any loss of vaginal fluid. Pt reports this is her 5th pregnancy and 3rd child, pain has become more consistent this morning. ER MD in triage for eval, RN called report to Evelyne in L&D. Due date is estimated 1/12/25

## 2024-10-26 NOTE — PROCEDURES
NST Procedure Note    Patient: Rafaela Jasso Age: 39 y.o. Sex: female    YOB: 1985 Admit Date: 10/26/2024 PCP: No primary care provider on file.   MRN: [id number]  CSN: [id number]  LOS: 0      Estimated Gestational Age: 28w6d     Indication for NST: PTL eval    15 x 15    Fetal Vital Signs:  Mode: External  Fetal Heart Rate: 150 baseline  Fetal Activity: Present  Variability: Moderate 6-25 bmp  Accelerations: Yes  Decelerations: No  FHR Interpretations: Category I    Non-Stress Test: reactive    Uterine Activity:  Mode: External  Frequency (min): irregular   Duration (sec): 60  Intensity:Mild  Uterine Resting Tone: Relaxed    Signed by:Tiffanie Gutierrez MD  10/26/2024 9:21 AM

## 2024-10-26 NOTE — H&P
OB H&P        Patient: Rafaela Jasso Age: 39 y.o. Sex: female    YOB: 1985 Admit Date: 10/26/2024 PCP: No primary care provider on file.   MRN: [id number]  CSN: [id number]     Room: [address] [place] [address] Time Dictated: 8:52 AM        Chief Complaint   Chief Complaint   Patient presents with    Pregnancy Problem     Pt presents ambulatory to triage, 29 weeks preg, w/ complaints of LLQ ABD cramping that is referred to her back starting last night. Pt denies any loss of vaginal fluid. Pt reports this is her 5th pregnancy and 3rd child, pain has become more consistent this morning. ER MD in triage for eval, RN called report to Evelyne in L&D        History of Present Illness   39 y.o.  at 28+6 presents c/o intermittent, painful contractions overnight rated 6/10 that kept her awake.  Associated with N but no vomiting.  States that she feels them in her lower abdomen, back and rectum.  Denies LOF/VB.  Also c/o HA (has not taken anything for it).  Left occipital.  Denies vision changes/RUQ pain/edema.  No prior h/o PTL.  Two prior term deliveries.  Currently being treated for bronchitis.    [facility]    PNC:  - AMA  - h/o prior LTCS (planning scheduled RLTCS)  - Successful  associated with PPH/2 units PRBC, managed in OR.  Uterine atony.  - 2 prior CVA's, known brain AVM (ASA)  - Anxiety/Depression (Zoloft)  - THC use    PNL:  A pos  Ab neg  HBsAg neg  HIV NR  GC/Chlam neg/neg  RI  TPA NR  HCV Ab NR    OB History    Para Term  AB Living   5 2 2   2 2   SAB IAB Ectopic Molar Multiple Live Births             2      # Outcome Date GA Lbr Luis Enrique/2nd Weight Sex Type Anes PTL Lv   5 Current            4 Term 10/07/17 40w3d 24:30 / 02:22 3.445 kg (7 lb 9.5 oz) M Vag-Spont  N ANNETTE      Complications: Other Excessive Bleeding   3 Term 14 39w5d  3.795 kg (8 lb 5.9 oz) M   N ANNETTE   2 AB 12              Birth Comments: spontaneous miscarriage   1 AB 05/16/10              Birth

## 2024-10-26 NOTE — PROGRESS NOTES
Dr. Celestin at bedside, labs reviewed and discussed with patient. Plan to discharge home with prescription. All questions answered    Patient education provided regarding discharge. Opportunity given for patient to ask questions and all questions answered appropriately. Patient ambulated off unit. Patient remains pregnant and plans to deliver at [facility].

## 2024-10-26 NOTE — DISCHARGE INSTRUCTIONS
Weeks 30 to 32 of Your Pregnancy: Care Instructions  Your baby is growing more every day. Its eyes can open and close, and it may have hair on its head. Your baby may sleep 20 to 45 minutes at a time and is more active at certain times.    You should feel your baby move several times every day. Your baby now turns less and kicks more.   This is a good time to tour your hospital or birthing center. You may also want to find childcare if needed.         To ease heartburn   Avoid foods that make your symptoms worse, such as chocolate, spicy foods, and caffeine.  Avoid bending over or lying down after meals.  Do not eat for 2 hours before bedtime.  Take antacids like Tums, but don't take ones that have sodium bicarbonate, magnesium trisilicate, or aspirin.        To care for large, swollen veins (varicose veins)   Try to avoid standing for long periods of time.  Sit with your feet propped up.  Wear support hose.  Get some exercise every day, like walking or swimming.  Counting your baby's kicks  Your doctor may ask you to count your baby's movements, such as kicks, flutters, or rolls.    Find a quiet place, and get comfortable. Write down your start time. Count your baby's movements (except hiccups). When your baby has moved 10 times, you can stop counting. Write down how many minutes it took.   If an hour goes by and you don't feel 10 movements, have something to eat or drink. Count for another hour. If you don't feel at least 10 movements in the 2-hour period, call your doctor.   Follow-up care is a key part of your treatment and safety. Be sure to make and go to all appointments, and call your doctor if you are having problems. It's also a good idea to know your test results and keep a list of the medicines you take.  Where can you learn more?  Go to https://www."[employer]wise.net/patientEd and enter X471 to learn more about \"Weeks 30 to 32 of Your Pregnancy: Care Instructions.\"  Current as of: July 10,

## 2024-10-26 NOTE — PROGRESS NOTES
OB Progress Note  Patient reports no significant cramping, pain, contractions, bleeding, leaking fluid.  The baby has been active.    Physical Exam:  FHT: 140s, adequate variability, reactivity; no significant decelerations    /65   Pulse 72   Temp 98.2 °F (36.8 °C) (Oral)   Resp 18   Ht 1.524 m (5')   Wt 52.2 kg (115 lb)   SpO2 100%   BMI 22.46 kg/m²   Recent Results (from the past 12 hour(s))   Fetal Fibronectin    Collection Time: 10/26/24  8:29 AM   Result Value Ref Range    Fetal Fibronectin Negative NEG     Wet prep, genital    Collection Time: 10/26/24  8:29 AM    Specimen: Miscellaneous sample   Result Value Ref Range    Clue Cells, Wet Prep CLUE CELLS PRESENT (A) NOSEE      Trich, Wet Prep NONE SEEN NOSEE      Yeast, UA NONE SEEN NOSEE     Urinalysis with Reflex to Culture    Collection Time: 10/26/24  8:29 AM    Specimen: Urine   Result Value Ref Range    Color, UA YELLOW/STRAW      Appearance CLEAR CLEAR      Specific Gravity, UA 1.011      pH, Urine 7.0 5.0 - 8.0      Protein, UA Negative NEG mg/dL    Glucose, Ur 100 (A) NEG mg/dL    Ketones, Urine Negative NEG mg/dL    Bilirubin, Urine Negative NEG      Blood, Urine Negative NEG      Urobilinogen, Urine 0.2 0.2 - 1.0 EU/dL    Nitrite, Urine Negative NEG      Leukocyte Esterase, Urine SMALL (A) NEG      WBC, UA 5-10 0 - 4 /hpf    RBC, UA 0-5 0 - 5 /hpf    Epithelial Cells, UA MANY (A) FEW /lpf    BACTERIA, URINE 2+ (A) NEG /hpf    Urine Culture if Indicated CULTURE NOT INDICATED BY UA RESULT CNI      Hyaline Casts, UA 0-2 0 - 2 /lpf       Assessment/Plan:  Reassuring fetal status.   BV--Rx Flagyl--discussed use in detail  Discharge home  Follow-up scheduled next week with primary OB  Precautions reviewed; questions answered    MADDY Celestin MD

## 2024-10-27 LAB
BACTERIA SPEC CULT: NORMAL
SERVICE CMNT-IMP: NORMAL

## 2024-10-28 LAB
C TRACH DNA SPEC QL NAA+PROBE: NEGATIVE
N GONORRHOEA DNA SPEC QL NAA+PROBE: NEGATIVE
SAMPLE TYPE: NORMAL
SERVICE CMNT-IMP: NORMAL
SPECIMEN SOURCE: NORMAL

## 2024-10-29 LAB
BACTERIA SPEC CULT: NORMAL
SERVICE CMNT-IMP: NORMAL

## 2024-12-02 NOTE — ROUTINE PROCESS
0800 Received OB SBAR report at bedside from Elana Milligan RN.  2139 Myers catheter removed. 1000 Patient up to void. 1230 Patient up to check void. Paperwork up front . Patient notified on eadvice and will  today

## 2024-12-19 LAB — GBS, EXTERNAL RESULT: NEGATIVE

## 2025-01-06 ENCOUNTER — HOSPITAL ENCOUNTER (INPATIENT)
Facility: HOSPITAL | Age: 40
LOS: 3 days | Discharge: HOME | DRG: 540 | End: 2025-01-09
Attending: OBSTETRICS & GYNECOLOGY | Admitting: OBSTETRICS & GYNECOLOGY
Payer: MEDICAID

## 2025-01-06 ENCOUNTER — ANESTHESIA EVENT (OUTPATIENT)
Facility: HOSPITAL | Age: 40
DRG: 540 | End: 2025-01-06
Payer: MEDICAID

## 2025-01-06 ENCOUNTER — ANESTHESIA (OUTPATIENT)
Facility: HOSPITAL | Age: 40
DRG: 540 | End: 2025-01-06
Payer: MEDICAID

## 2025-01-06 DIAGNOSIS — O26.893 ABDOMINAL PAIN DURING PREGNANCY IN THIRD TRIMESTER: ICD-10-CM

## 2025-01-06 DIAGNOSIS — R10.9 ABDOMINAL PAIN DURING PREGNANCY IN THIRD TRIMESTER: ICD-10-CM

## 2025-01-06 DIAGNOSIS — Z3A.39 39 WEEKS GESTATION OF PREGNANCY: Primary | ICD-10-CM

## 2025-01-06 PROBLEM — O34.219 PREVIOUS CESAREAN DELIVERY AFFECTING PREGNANCY: Status: ACTIVE | Noted: 2025-01-06

## 2025-01-06 LAB
BASOPHILS # BLD: 0 K/UL (ref 0–0.1)
BASOPHILS # BLD: 0.1 K/UL (ref 0–0.1)
BASOPHILS NFR BLD: 0 % (ref 0–1)
BASOPHILS NFR BLD: 0 % (ref 0–1)
DIFFERENTIAL METHOD BLD: ABNORMAL
DIFFERENTIAL METHOD BLD: ABNORMAL
EOSINOPHIL # BLD: 0 K/UL (ref 0–0.4)
EOSINOPHIL # BLD: 0.2 K/UL (ref 0–0.4)
EOSINOPHIL NFR BLD: 0 % (ref 0–7)
EOSINOPHIL NFR BLD: 1 % (ref 0–7)
ERYTHROCYTE [DISTWIDTH] IN BLOOD BY AUTOMATED COUNT: 12.8 % (ref 11.5–14.5)
ERYTHROCYTE [DISTWIDTH] IN BLOOD BY AUTOMATED COUNT: 12.8 % (ref 11.5–14.5)
ERYTHROCYTE [DISTWIDTH] IN BLOOD BY AUTOMATED COUNT: 12.9 % (ref 11.5–14.5)
HCT VFR BLD AUTO: 25.1 % (ref 35–47)
HCT VFR BLD AUTO: 28.2 % (ref 35–47)
HCT VFR BLD AUTO: 36.2 % (ref 35–47)
HGB BLD-MCNC: 12.4 G/DL (ref 11.5–16)
HGB BLD-MCNC: 8.2 G/DL (ref 11.5–16)
HGB BLD-MCNC: 9.1 G/DL (ref 11.5–16)
HISTORY CHECK: NORMAL
IMM GRANULOCYTES # BLD AUTO: 0.1 K/UL (ref 0–0.04)
IMM GRANULOCYTES # BLD AUTO: 0.1 K/UL (ref 0–0.04)
IMM GRANULOCYTES NFR BLD AUTO: 1 % (ref 0–0.5)
IMM GRANULOCYTES NFR BLD AUTO: 1 % (ref 0–0.5)
LYMPHOCYTES # BLD: 1.4 K/UL (ref 0.8–3.5)
LYMPHOCYTES # BLD: 2 K/UL (ref 0.8–3.5)
LYMPHOCYTES NFR BLD: 13 % (ref 12–49)
LYMPHOCYTES NFR BLD: 7 % (ref 12–49)
MCH RBC QN AUTO: 32.2 PG (ref 26–34)
MCH RBC QN AUTO: 32.3 PG (ref 26–34)
MCH RBC QN AUTO: 32.3 PG (ref 26–34)
MCHC RBC AUTO-ENTMCNC: 32.3 G/DL (ref 30–36.5)
MCHC RBC AUTO-ENTMCNC: 32.7 G/DL (ref 30–36.5)
MCHC RBC AUTO-ENTMCNC: 34.3 G/DL (ref 30–36.5)
MCV RBC AUTO: 100 FL (ref 80–99)
MCV RBC AUTO: 94.3 FL (ref 80–99)
MCV RBC AUTO: 98.4 FL (ref 80–99)
MONOCYTES # BLD: 1 K/UL (ref 0–1)
MONOCYTES # BLD: 1.5 K/UL (ref 0–1)
MONOCYTES NFR BLD: 7 % (ref 5–13)
MONOCYTES NFR BLD: 7 % (ref 5–13)
NEUTS SEG # BLD: 12.2 K/UL (ref 1.8–8)
NEUTS SEG # BLD: 18.3 K/UL (ref 1.8–8)
NEUTS SEG NFR BLD: 78 % (ref 32–75)
NEUTS SEG NFR BLD: 86 % (ref 32–75)
NRBC # BLD: 0 K/UL (ref 0–0.01)
NRBC BLD-RTO: 0 PER 100 WBC
PLATELET # BLD AUTO: 218 K/UL (ref 150–400)
PLATELET # BLD AUTO: 242 K/UL (ref 150–400)
PLATELET # BLD AUTO: 279 K/UL (ref 150–400)
PMV BLD AUTO: 10.5 FL (ref 8.9–12.9)
PMV BLD AUTO: 10.7 FL (ref 8.9–12.9)
PMV BLD AUTO: 10.7 FL (ref 8.9–12.9)
RBC # BLD AUTO: 2.55 M/UL (ref 3.8–5.2)
RBC # BLD AUTO: 2.82 M/UL (ref 3.8–5.2)
RBC # BLD AUTO: 3.84 M/UL (ref 3.8–5.2)
RPR SER QL: NONREACTIVE
WBC # BLD AUTO: 15.5 K/UL (ref 3.6–11)
WBC # BLD AUTO: 21.4 K/UL (ref 3.6–11)
WBC # BLD AUTO: 21.6 K/UL (ref 3.6–11)

## 2025-01-06 PROCEDURE — 86901 BLOOD TYPING SEROLOGIC RH(D): CPT

## 2025-01-06 PROCEDURE — 85025 COMPLETE CBC W/AUTO DIFF WBC: CPT

## 2025-01-06 PROCEDURE — 4A0HXCZ MEASUREMENT OF PRODUCTS OF CONCEPTION, CARDIAC RATE, EXTERNAL APPROACH: ICD-10-PCS | Performed by: ADVANCED PRACTICE MIDWIFE

## 2025-01-06 PROCEDURE — 2580000003 HC RX 258: Performed by: STUDENT IN AN ORGANIZED HEALTH CARE EDUCATION/TRAINING PROGRAM

## 2025-01-06 PROCEDURE — 2500000003 HC RX 250 WO HCPCS

## 2025-01-06 PROCEDURE — 7100000000 HC PACU RECOVERY - FIRST 15 MIN

## 2025-01-06 PROCEDURE — 2580000003 HC RX 258: Performed by: OBSTETRICS & GYNECOLOGY

## 2025-01-06 PROCEDURE — 6360000002 HC RX W HCPCS: Performed by: STUDENT IN AN ORGANIZED HEALTH CARE EDUCATION/TRAINING PROGRAM

## 2025-01-06 PROCEDURE — 85027 COMPLETE CBC AUTOMATED: CPT

## 2025-01-06 PROCEDURE — 51702 INSERT TEMP BLADDER CATH: CPT

## 2025-01-06 PROCEDURE — 7100000001 HC PACU RECOVERY - ADDTL 15 MIN: Performed by: OBSTETRICS & GYNECOLOGY

## 2025-01-06 PROCEDURE — 2500000003 HC RX 250 WO HCPCS: Performed by: STUDENT IN AN ORGANIZED HEALTH CARE EDUCATION/TRAINING PROGRAM

## 2025-01-06 PROCEDURE — 6360000002 HC RX W HCPCS: Performed by: OBSTETRICS & GYNECOLOGY

## 2025-01-06 PROCEDURE — 36415 COLL VENOUS BLD VENIPUNCTURE: CPT

## 2025-01-06 PROCEDURE — 86592 SYPHILIS TEST NON-TREP QUAL: CPT

## 2025-01-06 PROCEDURE — 7100000000 HC PACU RECOVERY - FIRST 15 MIN: Performed by: OBSTETRICS & GYNECOLOGY

## 2025-01-06 PROCEDURE — 86850 RBC ANTIBODY SCREEN: CPT

## 2025-01-06 PROCEDURE — P9045 ALBUMIN (HUMAN), 5%, 250 ML: HCPCS | Performed by: STUDENT IN AN ORGANIZED HEALTH CARE EDUCATION/TRAINING PROGRAM

## 2025-01-06 PROCEDURE — 2709999900 HC NON-CHARGEABLE SUPPLY: Performed by: OBSTETRICS & GYNECOLOGY

## 2025-01-06 PROCEDURE — 4500000002 HC ER NO CHARGE

## 2025-01-06 PROCEDURE — 86900 BLOOD TYPING SEROLOGIC ABO: CPT

## 2025-01-06 PROCEDURE — 7100000001 HC PACU RECOVERY - ADDTL 15 MIN

## 2025-01-06 PROCEDURE — 6370000000 HC RX 637 (ALT 250 FOR IP): Performed by: STUDENT IN AN ORGANIZED HEALTH CARE EDUCATION/TRAINING PROGRAM

## 2025-01-06 PROCEDURE — 3700000001 HC ADD 15 MINUTES (ANESTHESIA): Performed by: OBSTETRICS & GYNECOLOGY

## 2025-01-06 PROCEDURE — 7210000100 HC LABOR FEE PER 1 HR

## 2025-01-06 PROCEDURE — 86923 COMPATIBILITY TEST ELECTRIC: CPT

## 2025-01-06 PROCEDURE — 86644 CMV ANTIBODY: CPT

## 2025-01-06 PROCEDURE — 3700000000 HC ANESTHESIA ATTENDED CARE: Performed by: OBSTETRICS & GYNECOLOGY

## 2025-01-06 PROCEDURE — 0UQ90ZZ REPAIR UTERUS, OPEN APPROACH: ICD-10-PCS | Performed by: OBSTETRICS & GYNECOLOGY

## 2025-01-06 PROCEDURE — 85018 HEMOGLOBIN: CPT

## 2025-01-06 PROCEDURE — 1120000000 HC RM PRIVATE OB

## 2025-01-06 PROCEDURE — 3609079900 HC CESAREAN SECTION: Performed by: OBSTETRICS & GYNECOLOGY

## 2025-01-06 RX ORDER — BUPIVACAINE HYDROCHLORIDE 7.5 MG/ML
INJECTION, SOLUTION INTRASPINAL
Status: COMPLETED | OUTPATIENT
Start: 2025-01-06 | End: 2025-01-06

## 2025-01-06 RX ORDER — DEXAMETHASONE SODIUM PHOSPHATE 4 MG/ML
INJECTION, SOLUTION INTRA-ARTICULAR; INTRALESIONAL; INTRAMUSCULAR; INTRAVENOUS; SOFT TISSUE
Status: DISCONTINUED | OUTPATIENT
Start: 2025-01-06 | End: 2025-01-06 | Stop reason: SDUPTHER

## 2025-01-06 RX ORDER — SODIUM CHLORIDE, SODIUM LACTATE, POTASSIUM CHLORIDE, CALCIUM CHLORIDE 600; 310; 30; 20 MG/100ML; MG/100ML; MG/100ML; MG/100ML
INJECTION, SOLUTION INTRAVENOUS CONTINUOUS
Status: DISPENSED | OUTPATIENT
Start: 2025-01-06 | End: 2025-01-06

## 2025-01-06 RX ORDER — OXYCODONE HYDROCHLORIDE 5 MG/1
10 TABLET ORAL EVERY 4 HOURS PRN
Status: DISCONTINUED | OUTPATIENT
Start: 2025-01-07 | End: 2025-01-09 | Stop reason: HOSPADM

## 2025-01-06 RX ORDER — ONDANSETRON 2 MG/ML
4 INJECTION INTRAMUSCULAR; INTRAVENOUS EVERY 6 HOURS PRN
Status: DISCONTINUED | OUTPATIENT
Start: 2025-01-06 | End: 2025-01-09 | Stop reason: HOSPADM

## 2025-01-06 RX ORDER — ALBUMIN HUMAN 50 G/1000ML
SOLUTION INTRAVENOUS
Status: DISCONTINUED | OUTPATIENT
Start: 2025-01-06 | End: 2025-01-06 | Stop reason: SDUPTHER

## 2025-01-06 RX ORDER — AZITHROMYCIN MONOHYDRATE 500 MG/5ML
INJECTION, POWDER, LYOPHILIZED, FOR SOLUTION INTRAVENOUS
Status: DISCONTINUED
Start: 2025-01-06 | End: 2025-01-06

## 2025-01-06 RX ORDER — METHYLERGONOVINE MALEATE 0.2 MG/ML
200 INJECTION INTRAVENOUS PRN
Status: DISCONTINUED | OUTPATIENT
Start: 2025-01-06 | End: 2025-01-09 | Stop reason: HOSPADM

## 2025-01-06 RX ORDER — ONDANSETRON 2 MG/ML
4 INJECTION INTRAMUSCULAR; INTRAVENOUS EVERY 6 HOURS PRN
Status: DISCONTINUED | OUTPATIENT
Start: 2025-01-06 | End: 2025-01-06 | Stop reason: SDUPTHER

## 2025-01-06 RX ORDER — MISOPROSTOL 200 UG/1
400 TABLET ORAL PRN
Status: DISCONTINUED | OUTPATIENT
Start: 2025-01-06 | End: 2025-01-09 | Stop reason: HOSPADM

## 2025-01-06 RX ORDER — POLYETHYLENE GLYCOL 3350 17 G/17G
17 POWDER, FOR SOLUTION ORAL DAILY PRN
Status: DISCONTINUED | OUTPATIENT
Start: 2025-01-06 | End: 2025-01-09 | Stop reason: HOSPADM

## 2025-01-06 RX ORDER — SODIUM CHLORIDE 0.9 % (FLUSH) 0.9 %
5-40 SYRINGE (ML) INJECTION PRN
Status: DISCONTINUED | OUTPATIENT
Start: 2025-01-06 | End: 2025-01-09 | Stop reason: HOSPADM

## 2025-01-06 RX ORDER — SODIUM CHLORIDE 0.9 % (FLUSH) 0.9 %
5-40 SYRINGE (ML) INJECTION EVERY 12 HOURS SCHEDULED
Status: DISCONTINUED | OUTPATIENT
Start: 2025-01-06 | End: 2025-01-09 | Stop reason: HOSPADM

## 2025-01-06 RX ORDER — CEFAZOLIN SODIUM 1 G/3ML
INJECTION, POWDER, FOR SOLUTION INTRAMUSCULAR; INTRAVENOUS
Status: DISCONTINUED | OUTPATIENT
Start: 2025-01-06 | End: 2025-01-06 | Stop reason: SDUPTHER

## 2025-01-06 RX ORDER — SODIUM CHLORIDE 9 MG/ML
INJECTION, SOLUTION INTRAVENOUS PRN
Status: DISCONTINUED | OUTPATIENT
Start: 2025-01-06 | End: 2025-01-06

## 2025-01-06 RX ORDER — FENTANYL CITRATE 50 UG/ML
INJECTION, SOLUTION INTRAMUSCULAR; INTRAVENOUS
Status: COMPLETED | OUTPATIENT
Start: 2025-01-06 | End: 2025-01-06

## 2025-01-06 RX ORDER — ONDANSETRON 2 MG/ML
INJECTION INTRAMUSCULAR; INTRAVENOUS
Status: DISCONTINUED | OUTPATIENT
Start: 2025-01-06 | End: 2025-01-06 | Stop reason: SDUPTHER

## 2025-01-06 RX ORDER — SODIUM CHLORIDE 0.9 % (FLUSH) 0.9 %
10 SYRINGE (ML) INJECTION PRN
Status: DISCONTINUED | OUTPATIENT
Start: 2025-01-06 | End: 2025-01-06

## 2025-01-06 RX ORDER — SIMETHICONE 80 MG
80 TABLET,CHEWABLE ORAL EVERY 6 HOURS PRN
Status: DISCONTINUED | OUTPATIENT
Start: 2025-01-06 | End: 2025-01-09 | Stop reason: HOSPADM

## 2025-01-06 RX ORDER — DIPHENHYDRAMINE HYDROCHLORIDE 50 MG/ML
25 INJECTION INTRAMUSCULAR; INTRAVENOUS EVERY 6 HOURS PRN
Status: DISCONTINUED | OUTPATIENT
Start: 2025-01-06 | End: 2025-01-06

## 2025-01-06 RX ORDER — DIPHENHYDRAMINE HCL 25 MG
25 CAPSULE ORAL EVERY 6 HOURS PRN
Status: DISCONTINUED | OUTPATIENT
Start: 2025-01-06 | End: 2025-01-06

## 2025-01-06 RX ORDER — EPINEPHRINE 1 MG/ML(1)
AMPUL (ML) INJECTION
Status: DISCONTINUED | OUTPATIENT
Start: 2025-01-06 | End: 2025-01-06 | Stop reason: SDUPTHER

## 2025-01-06 RX ORDER — CARBOPROST TROMETHAMINE 250 UG/ML
250 INJECTION, SOLUTION INTRAMUSCULAR PRN
Status: DISCONTINUED | OUTPATIENT
Start: 2025-01-06 | End: 2025-01-09 | Stop reason: HOSPADM

## 2025-01-06 RX ORDER — NALBUPHINE HYDROCHLORIDE 10 MG/ML
2 INJECTION INTRAMUSCULAR; INTRAVENOUS; SUBCUTANEOUS EVERY 4 HOURS PRN
Status: DISCONTINUED | OUTPATIENT
Start: 2025-01-06 | End: 2025-01-09 | Stop reason: HOSPADM

## 2025-01-06 RX ORDER — MODIFIED LANOLIN
OINTMENT (GRAM) TOPICAL
Status: DISCONTINUED | OUTPATIENT
Start: 2025-01-06 | End: 2025-01-09 | Stop reason: HOSPADM

## 2025-01-06 RX ORDER — EPHEDRINE SULFATE 50 MG/ML
5 INJECTION INTRAVENOUS ONCE
Status: COMPLETED | OUTPATIENT
Start: 2025-01-06 | End: 2025-01-06

## 2025-01-06 RX ORDER — SODIUM CHLORIDE 9 MG/ML
INJECTION, SOLUTION INTRAVENOUS PRN
Status: DISCONTINUED | OUTPATIENT
Start: 2025-01-06 | End: 2025-01-09 | Stop reason: HOSPADM

## 2025-01-06 RX ORDER — NALOXONE HYDROCHLORIDE 0.4 MG/ML
INJECTION, SOLUTION INTRAMUSCULAR; INTRAVENOUS; SUBCUTANEOUS PRN
Status: DISCONTINUED | OUTPATIENT
Start: 2025-01-06 | End: 2025-01-06

## 2025-01-06 RX ORDER — ASPIRIN 81 MG/1
81 TABLET, CHEWABLE ORAL DAILY
Status: DISCONTINUED | OUTPATIENT
Start: 2025-01-06 | End: 2025-01-08 | Stop reason: SDUPTHER

## 2025-01-06 RX ORDER — OXYCODONE HYDROCHLORIDE 5 MG/1
5 TABLET ORAL EVERY 4 HOURS PRN
Status: DISCONTINUED | OUTPATIENT
Start: 2025-01-07 | End: 2025-01-09 | Stop reason: HOSPADM

## 2025-01-06 RX ORDER — DEXMEDETOMIDINE HYDROCHLORIDE 100 UG/ML
INJECTION, SOLUTION INTRAVENOUS
Status: DISCONTINUED | OUTPATIENT
Start: 2025-01-06 | End: 2025-01-06 | Stop reason: SDUPTHER

## 2025-01-06 RX ORDER — EPHEDRINE SULFATE 50 MG/ML
INJECTION INTRAVENOUS
Status: COMPLETED
Start: 2025-01-06 | End: 2025-01-06

## 2025-01-06 RX ORDER — ACETAMINOPHEN 500 MG
1000 TABLET ORAL EVERY 8 HOURS SCHEDULED
Status: DISCONTINUED | OUTPATIENT
Start: 2025-01-06 | End: 2025-01-09 | Stop reason: HOSPADM

## 2025-01-06 RX ORDER — BUTALBITAL, ACETAMINOPHEN AND CAFFEINE 50; 325; 40 MG/1; MG/1; MG/1
1 TABLET ORAL
Status: DISCONTINUED | OUTPATIENT
Start: 2025-01-06 | End: 2025-01-06

## 2025-01-06 RX ORDER — SODIUM CHLORIDE, SODIUM LACTATE, POTASSIUM CHLORIDE, AND CALCIUM CHLORIDE .6; .31; .03; .02 G/100ML; G/100ML; G/100ML; G/100ML
1000 INJECTION, SOLUTION INTRAVENOUS ONCE
Status: DISCONTINUED | OUTPATIENT
Start: 2025-01-06 | End: 2025-01-06

## 2025-01-06 RX ORDER — MORPHINE SULFATE 1 MG/ML
INJECTION, SOLUTION EPIDURAL; INTRATHECAL; INTRAVENOUS
Status: COMPLETED | OUTPATIENT
Start: 2025-01-06 | End: 2025-01-06

## 2025-01-06 RX ORDER — ONDANSETRON 4 MG/1
4 TABLET, ORALLY DISINTEGRATING ORAL EVERY 8 HOURS PRN
Status: DISCONTINUED | OUTPATIENT
Start: 2025-01-06 | End: 2025-01-09 | Stop reason: HOSPADM

## 2025-01-06 RX ORDER — DOCUSATE SODIUM 100 MG/1
100 CAPSULE, LIQUID FILLED ORAL 2 TIMES DAILY PRN
Status: DISCONTINUED | OUTPATIENT
Start: 2025-01-06 | End: 2025-01-09 | Stop reason: HOSPADM

## 2025-01-06 RX ORDER — SODIUM CHLORIDE, SODIUM LACTATE, POTASSIUM CHLORIDE, CALCIUM CHLORIDE 600; 310; 30; 20 MG/100ML; MG/100ML; MG/100ML; MG/100ML
INJECTION, SOLUTION INTRAVENOUS CONTINUOUS
Status: DISCONTINUED | OUTPATIENT
Start: 2025-01-06 | End: 2025-01-06

## 2025-01-06 RX ORDER — GLYCOPYRROLATE 0.2 MG/ML
INJECTION INTRAMUSCULAR; INTRAVENOUS
Status: DISCONTINUED | OUTPATIENT
Start: 2025-01-06 | End: 2025-01-06 | Stop reason: SDUPTHER

## 2025-01-06 RX ORDER — FENTANYL CITRATE 50 UG/ML
INJECTION, SOLUTION INTRAMUSCULAR; INTRAVENOUS
Status: DISCONTINUED | OUTPATIENT
Start: 2025-01-06 | End: 2025-01-06 | Stop reason: SDUPTHER

## 2025-01-06 RX ORDER — ACETAMINOPHEN 325 MG/1
975 TABLET ORAL ONCE
Status: DISCONTINUED | OUTPATIENT
Start: 2025-01-06 | End: 2025-01-06 | Stop reason: SDUPTHER

## 2025-01-06 RX ORDER — SWAB
1 SWAB, NON-MEDICATED MISCELLANEOUS DAILY
Status: DISCONTINUED | OUTPATIENT
Start: 2025-01-06 | End: 2025-01-09 | Stop reason: HOSPADM

## 2025-01-06 RX ORDER — PROPOFOL 10 MG/ML
INJECTION, EMULSION INTRAVENOUS
Status: DISCONTINUED | OUTPATIENT
Start: 2025-01-06 | End: 2025-01-06 | Stop reason: SDUPTHER

## 2025-01-06 RX ORDER — SODIUM CHLORIDE 0.9 % (FLUSH) 0.9 %
10 SYRINGE (ML) INJECTION EVERY 12 HOURS SCHEDULED
Status: DISCONTINUED | OUTPATIENT
Start: 2025-01-06 | End: 2025-01-06

## 2025-01-06 RX ORDER — MISOPROSTOL 200 UG/1
800 TABLET ORAL PRN
Status: DISCONTINUED | OUTPATIENT
Start: 2025-01-06 | End: 2025-01-09 | Stop reason: HOSPADM

## 2025-01-06 RX ADMIN — Medication 100 MCG: at 04:25

## 2025-01-06 RX ADMIN — EPHEDRINE SULFATE 5 MG: 50 INJECTION INTRAVENOUS at 07:07

## 2025-01-06 RX ADMIN — FENTANYL CITRATE 85 MCG: 50 INJECTION, SOLUTION INTRAMUSCULAR; INTRAVENOUS at 04:53

## 2025-01-06 RX ADMIN — ALBUMIN (HUMAN) 12.5 G: 12.5 INJECTION, SOLUTION INTRAVENOUS at 05:21

## 2025-01-06 RX ADMIN — SIMETHICONE 80 MG: 80 TABLET, CHEWABLE ORAL at 23:49

## 2025-01-06 RX ADMIN — ACETAMINOPHEN 1000 MG: 500 TABLET ORAL at 20:15

## 2025-01-06 RX ADMIN — DEXMEDETOMIDINE 10 MCG: 100 INJECTION, SOLUTION, CONCENTRATE INTRAVENOUS at 05:02

## 2025-01-06 RX ADMIN — Medication 909 ML/HR: at 04:53

## 2025-01-06 RX ADMIN — AZITHROMYCIN MONOHYDRATE 500 MG: 500 INJECTION, POWDER, LYOPHILIZED, FOR SOLUTION INTRAVENOUS at 04:39

## 2025-01-06 RX ADMIN — DEXMEDETOMIDINE 10 MCG: 100 INJECTION, SOLUTION, CONCENTRATE INTRAVENOUS at 05:05

## 2025-01-06 RX ADMIN — ACETAMINOPHEN 1000 MG: 500 TABLET ORAL at 11:53

## 2025-01-06 RX ADMIN — HYDROMORPHONE HYDROCHLORIDE 0.5 MG: 1 INJECTION, SOLUTION INTRAMUSCULAR; INTRAVENOUS; SUBCUTANEOUS at 15:50

## 2025-01-06 RX ADMIN — CEFAZOLIN 2 G: 1 INJECTION, POWDER, FOR SOLUTION INTRAMUSCULAR; INTRAVENOUS at 04:39

## 2025-01-06 RX ADMIN — EPHEDRINE SULFATE 5 MG: 50 INJECTION, SOLUTION INTRAVENOUS at 07:07

## 2025-01-06 RX ADMIN — GLYCOPYRROLATE 0.2 MG: 0.2 INJECTION INTRAMUSCULAR; INTRAVENOUS at 04:43

## 2025-01-06 RX ADMIN — PROPOFOL 10 MG: 10 INJECTION, EMULSION INTRAVENOUS at 06:25

## 2025-01-06 RX ADMIN — PROPOFOL 40 MG: 10 INJECTION, EMULSION INTRAVENOUS at 06:18

## 2025-01-06 RX ADMIN — SERTRALINE HYDROCHLORIDE 50 MG: 50 TABLET ORAL at 11:52

## 2025-01-06 RX ADMIN — HYDROMORPHONE HYDROCHLORIDE 0.5 MG: 1 INJECTION, SOLUTION INTRAMUSCULAR; INTRAVENOUS; SUBCUTANEOUS at 23:18

## 2025-01-06 RX ADMIN — POLYETHYLENE GLYCOL 3350 17 G: 17 POWDER, FOR SOLUTION ORAL at 11:52

## 2025-01-06 RX ADMIN — MORPHINE SULFATE 0.2 MG: 1 INJECTION EPIDURAL; INTRATHECAL; INTRAVENOUS at 04:25

## 2025-01-06 RX ADMIN — FENTANYL CITRATE 15 MCG: 50 INJECTION INTRAMUSCULAR; INTRAVENOUS at 04:25

## 2025-01-06 RX ADMIN — PHENYLEPHRINE HYDROCHLORIDE 75 MCG/MIN: 10 INJECTION INTRAVENOUS at 04:35

## 2025-01-06 RX ADMIN — Medication 1 TABLET: at 11:52

## 2025-01-06 RX ADMIN — HYDROMORPHONE HYDROCHLORIDE 0.5 MG: 1 INJECTION, SOLUTION INTRAMUSCULAR; INTRAVENOUS; SUBCUTANEOUS at 11:52

## 2025-01-06 RX ADMIN — SODIUM CHLORIDE, POTASSIUM CHLORIDE, SODIUM LACTATE AND CALCIUM CHLORIDE: 600; 310; 30; 20 INJECTION, SOLUTION INTRAVENOUS at 04:19

## 2025-01-06 RX ADMIN — SODIUM CHLORIDE, POTASSIUM CHLORIDE, SODIUM LACTATE AND CALCIUM CHLORIDE: 600; 310; 30; 20 INJECTION, SOLUTION INTRAVENOUS at 08:41

## 2025-01-06 RX ADMIN — HYDROMORPHONE HYDROCHLORIDE 0.5 MG: 1 INJECTION, SOLUTION INTRAMUSCULAR; INTRAVENOUS; SUBCUTANEOUS at 08:33

## 2025-01-06 RX ADMIN — FENTANYL CITRATE 100 MCG: 50 INJECTION, SOLUTION INTRAMUSCULAR; INTRAVENOUS at 06:16

## 2025-01-06 RX ADMIN — ONDANSETRON 4 MG: 2 INJECTION INTRAMUSCULAR; INTRAVENOUS at 06:12

## 2025-01-06 RX ADMIN — HYDROMORPHONE HYDROCHLORIDE 0.5 MG: 1 INJECTION, SOLUTION INTRAMUSCULAR; INTRAVENOUS; SUBCUTANEOUS at 20:16

## 2025-01-06 RX ADMIN — BUPIVACAINE HYDROCHLORIDE WITH DEXTROSE 10 MG: 7.5 INJECTION SUBARACHNOID at 04:25

## 2025-01-06 RX ADMIN — ONDANSETRON 4 MG: 2 INJECTION INTRAMUSCULAR; INTRAVENOUS at 05:02

## 2025-01-06 RX ADMIN — DEXAMETHASONE SODIUM PHOSPHATE 4 MG: 4 INJECTION INTRA-ARTICULAR; INTRALESIONAL; INTRAMUSCULAR; INTRAVENOUS; SOFT TISSUE at 05:02

## 2025-01-06 ASSESSMENT — PAIN SCALES - GENERAL
PAINLEVEL_OUTOF10: 7
PAINLEVEL_OUTOF10: 6
PAINLEVEL_OUTOF10: 7
PAINLEVEL_OUTOF10: 7
PAINLEVEL_OUTOF10: 8

## 2025-01-06 ASSESSMENT — PAIN DESCRIPTION - ORIENTATION
ORIENTATION: LOWER

## 2025-01-06 ASSESSMENT — PAIN DESCRIPTION - LOCATION
LOCATION: ABDOMEN
LOCATION: INCISION
LOCATION: ABDOMEN

## 2025-01-06 ASSESSMENT — PAIN DESCRIPTION - DESCRIPTORS
DESCRIPTORS: CRAMPING
DESCRIPTORS: BURNING
DESCRIPTORS: ACHING;DISCOMFORT
DESCRIPTORS: SORE
DESCRIPTORS: ACHING;CRAMPING

## 2025-01-06 ASSESSMENT — ENCOUNTER SYMPTOMS
ABDOMINAL PAIN: 1
SHORTNESS OF BREATH: 0
SORE THROAT: 0
VOMITING: 1
COUGH: 0
NAUSEA: 1
DIARRHEA: 0

## 2025-01-06 ASSESSMENT — PAIN - FUNCTIONAL ASSESSMENT: PAIN_FUNCTIONAL_ASSESSMENT: ACTIVITIES ARE NOT PREVENTED

## 2025-01-06 NOTE — OP NOTE
Operative Note    Name: Rafaela Jasso   Medical Record Number: [id number]   YOB: 1985  Today's Date: 2025      Pre-operative Diagnosis: Delivery by  section using transverse incision of lower segment of uterus [O82]    Post-operative Diagnosis: uterine incision extension into left broad ligament with significant hemorhage    Operation: Low Cervical Transverse Procedure(s):   SECTION (E R A S)    Surgeon(s):  Nathan Bautista MD Platamone, Jacqueline J, DO Johnson, Eric, MD    Anesthesia: Spinal    Prophylactic Antibiotics: Azithromycin and Ancef  DVT Prophylaxis: Sequential Compression Devices         Fetal Description: beck     Birth Information:   Information for the patient's :  SHALA Jasso [[id number]]   @[phone]@    Umbilical Cord: Nuchal Cord x  2 and 3 vessels present    Placenta:  manual removal and mildly adherent    Specimens: Placenta           Complications:  extension of hysterotomy into left broad ligament with hemorrhage    Procedure Detail:      After proper patient identification and consent, the patient was taken to the operating room, where epidural anesthesia was administered and found to be adequate. Stanton catheter had been placed using sterile technique.  The patient was prepped and draped in the normal sterile fashion.The abdomen was entered using the Pfannenstiel technique. The peritoneum was entered sharply well superior to the bladder without any apparent injury. The bladder flap was created without difficulty. A low transverse uterine incision was made with the scalpel and extended with blunt finger dissection. Amniotomy was performed and the fluid was small amount clear.  The baby’s head was then delivered atraumatically. The nose and mouth were suctioned. The cord was clamped and cut and the baby was handed off to Nursing staff in attendance. Placenta was then removed from the uterus. The uterus was
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
## 2025-01-06 NOTE — ANESTHESIA POSTPROCEDURE EVALUATION
Department of Anesthesiology  Postprocedure Note    Patient: Rafaela Jasso  MRN: [id number]  YOB: 1985  Date of evaluation: 2025    Procedure Summary       Date: 25 Room / Location: [facility] L&D OR    Anesthesia Start: 419 Anesthesia Stop: 650    Procedure:  SECTION (E R A S) (Abdomen) Diagnosis:       Delivery by  section using transverse incision of lower segment of uterus      (Delivery by  section using transverse incision of lower segment of uterus [O82])    Surgeons: Wai Hernandez MD Responsible Provider: Jose Joyce DO    Anesthesia Type: spinal ASA Status: 2 - Emergent            Anesthesia Type: No value filed.    Odalis Phase I:      Odalis Phase II:      Anesthesia Post Evaluation    Patient location during evaluation: bedside  Patient participation: complete - patient participated  Level of consciousness: awake  Pain score: 0  Airway patency: patent  Nausea & Vomiting: no nausea and no vomiting  Cardiovascular status: blood pressure returned to baseline  Respiratory status: acceptable  Hydration status: euvolemic  Pain management: adequate    No notable events documented.

## 2025-01-06 NOTE — ANESTHESIA PRE PROCEDURE
Department of Anesthesiology  Preprocedure Note       Name:  Rafaela Jasso   Age:  39 y.o.  :  1985                                          MRN:  [id number]         Date:  2025      Surgeon: Surgeon(s):  Wai Hernandez MD    Procedure: Procedure(s):   SECTION (E R A S)    Medications prior to admission:   Prior to Admission medications    Medication Sig Start Date End Date Taking? Authorizing Provider   sertraline (ZOLOFT) 50 MG tablet Take 1 tablet by mouth daily    Nathalia Rodriguez MD   aspirin 81 MG chewable tablet Take 1 tablet by mouth daily    Nathalia Rodriguez MD   albuterol (ACCUNEB) 0.63 MG/3ML nebulizer solution Take 3 mLs by nebulization every 6 hours as needed for Wheezing    Nathalia Rodriguez MD   promethazine (PHENERGAN) 25 MG tablet Take 1 tablet by mouth every 6 hours as needed for Nausea    Nathalia Rodriguez MD   cefUROXime (CEFTIN) 250 MG tablet Take 1 tablet by mouth 2 times daily    Nathalia Rodriguez MD       Current medications:    Current Facility-Administered Medications   Medication Dose Route Frequency Provider Last Rate Last Admin    lactated ringers infusion   IntraVENous Continuous Wai Hernandez MD   New Bag at 25 0419    lactated ringers bolus 1,000 mL  1,000 mL IntraVENous Once Wai Hernandez MD        sodium chloride flush 0.9 % injection 10 mL  10 mL IntraVENous 2 times per day Wai Hernandez MD        sodium chloride flush 0.9 % injection 10 mL  10 mL IntraVENous PRN Wai Hernandez MD        0.9 % sodium chloride infusion   IntraVENous PRN Wai Hernandez MD        ceFAZolin (ANCEF) 2,000 mg in sterile water 20 mL IV syringe  2,000 mg IntraVENous Once Wai Hernandez MD        famotidine (PEPCID) 20 mg in sodium chloride (PF) 0.9 % 10 mL injection  20 mg IntraVENous Once Wai Hernandez MD        acetaminophen (TYLENOL) tablet 975 mg  975 mg Oral Once Wai Hernandez MD        oxytocin (PITOCIN) 30 units in 500 mL infusion  87.3

## 2025-01-06 NOTE — ANESTHESIA PROCEDURE NOTES
Spinal Block    Patient location during procedure: OR  End time: 1/6/2025 4:25 AM  Reason for block: primary anesthetic  Staffing  Performed: anesthesiologist   Anesthesiologist: Jose Joyce DO  Performed by: Jose Joyce DO  Authorized by: Jose Joyce DO    Spinal Block  Patient position: sitting  Prep: DuraPrep  Patient monitoring: cardiac monitor, continuous pulse ox, frequent blood pressure checks and oxygen  Approach: midline  Location: L4/L5  Provider prep: mask and sterile gloves  Local infiltration: lidocaine  Needle  Needle type: pencil-tip   Needle gauge: 25 G  Needle length: 4 in  Assessment  Sensory level: T4  Events: None  Swirl obtained: Yes  CSF: clear  Attempts: 1  Hemodynamics: stable  Preanesthetic Checklist  Completed: patient identified, IV checked, site marked, risks and benefits discussed, surgical/procedural consents, equipment checked, pre-op evaluation, timeout performed, anesthesia consent given, oxygen available, monitors applied/VS acknowledged and fire risk safety assessment completed and verbalized

## 2025-01-06 NOTE — OP NOTE
Operative Note      Patient: Rafaela Jasso  YOB: 1985  MRN: [id number]    Date of Procedure: 2025    Pre-Op Diagnosis Codes:      * Delivery by  section using transverse incision of lower segment of uterus [O82]  Retroperitoneal dissection    Post-Op Diagnosis: Same       Procedure(s):   SECTION (E R A S)    Surgeon(s):  Nathan Bautista MD Platamone, Jacqueline J, DO Johnson, Eric, MD    Assistant:   First Assistant: Stefany Rodriguez RN; Phuong Saenz RN    Anesthesia: Spinal    Estimated Blood Loss (mL): see Dr. Hernandez's operative report    Complications: None    Specimens:   * No specimens in log *    Implants:  * No implants in log *      Drains:   Urinary Catheter 25 Stanton (Active)   Output (mL) 200 mL 25 0925       Findings:  Infection Present At Time Of Surgery (PATOS) (choose all levels that have infection present):  No infection present  Other Findings: Please see operative report of Dr. Hernandez for main findings.  I was consulted intraoperatively for bleeding.  At the time I entered into the OR the patient had a uterine laceration along the uterine artery on the left side.  This had been ligated by Dr. Hernandez and the hysterotomy had been closed.  There was a large broad ligament window approximately 7 cm in size.  Was also consulted given unclear location of the ureter in relation to the repair.  There is minimal bleeding along the ligated uterine artery along the left uterine sidewall.  The left pelvic retroperitoneum was explored and the left ureter was palpated and visualized and was noted to peristalsed normally.  This area was dissected out to ensure that no sutures were nearby, and the ureter was a good distance from any of the operative field.  The broad ligament window was closed at the end of the procedure.    Detailed Description of Procedure:   Please see main operative report at [facility].    Per above you can see my operative
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
## 2025-01-06 NOTE — LACTATION NOTE
This note was copied from a baby's chart.  Initial Lactation Consultation - baby born by  this morning to a  mom at 38 5/7 weeks gestation. Mom states she breast fed her other children. She said this baby has been latching and nursing better on the left breast. I helped mom get baby latched deeply on the right breast. Baby was sucking rhythmically with swallows noted.     Mom will continue to feed baby according to her feeding cues or at least every 3 hours. She will not limit the time the baby is at the breast and will offer both breasts at each feeding.

## 2025-01-06 NOTE — DISCHARGE INSTRUCTIONS
groin.  Swelling in the leg or groin.  A color change on the leg or groin. The skin may be reddish or purplish, depending on your usual skin color.     You have signs of preeclampsia, such as:  Sudden swelling of your face, hands, or feet.  New vision problems (such as dimness, blurring, or seeing spots).  A severe headache.     You have signs of heart failure, such as:  New or increased shortness of breath.  New or worse swelling in your legs, ankles, or feet.  Sudden weight gain, such as more than 2 to 3 pounds in a day or 5 pounds in a week.  Feeling so tired or weak that you cannot do your usual activities.     You had spinal or epidural pain relief and have:  New or worse back pain.  Increased pain, swelling, warmth, or redness at the injection site.  Tingling, weakness, or numbness in your legs or groin.   Watch closely for changes in your health, and be sure to contact your doctor or midwife if:    Your vaginal bleeding isn't decreasing.     You feel sad, anxious, or hopeless for more than a few days.     You are having problems with your breasts or breastfeeding.   Where can you learn more?  Go to https://www.[employer].net/patientEd and enter A461 to learn more about \"After Your Delivery (the Postpartum Period): Care Instructions.\"  Current as of: 2024  Content Version: 14.3  2024 [employer].   Care instructions adapted under license by [employer]. If you have questions about a medical condition or this instruction, always ask your healthcare professional. [employer] disclaims any warranty or liability for your use of this information.    POST DELIVERY DISCHARGE INSTRUCTIONS    Name: Rafaela Jasso  YOB: 1985  Primary Diagnosis: s/p  delivery    General:     Diet/Diet Restrictions:  Eight 8-ounce glasses of fluid daily (water, juices); avoid excessive caffeine intake.  Meals/snacks as desired which are high in fiber and carbohydrates and low in

## 2025-01-07 LAB
ABO + RH BLD: NORMAL
BLD PROD TYP BPU: NORMAL
BLOOD BANK DISPENSE STATUS: NORMAL
BLOOD GROUP ANTIBODIES SERPL: NORMAL
BPU ID: NORMAL
CROSSMATCH RESULT: NORMAL
SPECIMEN EXP DATE BLD: NORMAL
UNIT DIVISION: 0

## 2025-01-07 PROCEDURE — 6370000000 HC RX 637 (ALT 250 FOR IP): Performed by: STUDENT IN AN ORGANIZED HEALTH CARE EDUCATION/TRAINING PROGRAM

## 2025-01-07 PROCEDURE — 6360000002 HC RX W HCPCS: Performed by: STUDENT IN AN ORGANIZED HEALTH CARE EDUCATION/TRAINING PROGRAM

## 2025-01-07 PROCEDURE — 6370000000 HC RX 637 (ALT 250 FOR IP): Performed by: OBSTETRICS & GYNECOLOGY

## 2025-01-07 PROCEDURE — 1120000000 HC RM PRIVATE OB

## 2025-01-07 RX ADMIN — SIMETHICONE 80 MG: 80 TABLET, CHEWABLE ORAL at 22:21

## 2025-01-07 RX ADMIN — OXYCODONE HYDROCHLORIDE 10 MG: 5 TABLET ORAL at 22:21

## 2025-01-07 RX ADMIN — OXYCODONE HYDROCHLORIDE 10 MG: 5 TABLET ORAL at 18:40

## 2025-01-07 RX ADMIN — HYDROMORPHONE HYDROCHLORIDE 0.5 MG: 1 INJECTION, SOLUTION INTRAMUSCULAR; INTRAVENOUS; SUBCUTANEOUS at 02:34

## 2025-01-07 RX ADMIN — ACETAMINOPHEN 1000 MG: 500 TABLET ORAL at 14:54

## 2025-01-07 RX ADMIN — SIMETHICONE 80 MG: 80 TABLET, CHEWABLE ORAL at 16:14

## 2025-01-07 RX ADMIN — OXYCODONE HYDROCHLORIDE 10 MG: 5 TABLET ORAL at 10:07

## 2025-01-07 RX ADMIN — SERTRALINE HYDROCHLORIDE 100 MG: 50 TABLET ORAL at 09:25

## 2025-01-07 RX ADMIN — POLYETHYLENE GLYCOL 3350 17 G: 17 POWDER, FOR SOLUTION ORAL at 09:25

## 2025-01-07 RX ADMIN — ACETAMINOPHEN 1000 MG: 500 TABLET ORAL at 22:21

## 2025-01-07 RX ADMIN — ACETAMINOPHEN 1000 MG: 500 TABLET ORAL at 05:50

## 2025-01-07 RX ADMIN — OXYCODONE HYDROCHLORIDE 10 MG: 5 TABLET ORAL at 05:50

## 2025-01-07 RX ADMIN — OXYCODONE HYDROCHLORIDE 10 MG: 5 TABLET ORAL at 14:55

## 2025-01-07 ASSESSMENT — PAIN - FUNCTIONAL ASSESSMENT
PAIN_FUNCTIONAL_ASSESSMENT: ACTIVITIES ARE NOT PREVENTED

## 2025-01-07 ASSESSMENT — PAIN DESCRIPTION - LOCATION
LOCATION: INCISION
LOCATION: INCISION
LOCATION: ABDOMEN;INCISION
LOCATION: INCISION
LOCATION: INCISION;ABDOMEN
LOCATION: ABDOMEN;INCISION

## 2025-01-07 ASSESSMENT — PAIN DESCRIPTION - ORIENTATION
ORIENTATION: LOWER

## 2025-01-07 ASSESSMENT — PAIN SCALES - GENERAL
PAINLEVEL_OUTOF10: 8
PAINLEVEL_OUTOF10: 8
PAINLEVEL_OUTOF10: 7
PAINLEVEL_OUTOF10: 8
PAINLEVEL_OUTOF10: 8
PAINLEVEL_OUTOF10: 7
PAINLEVEL_OUTOF10: 8

## 2025-01-07 ASSESSMENT — PAIN DESCRIPTION - DESCRIPTORS
DESCRIPTORS: SORE;BURNING
DESCRIPTORS: SORE;ACHING
DESCRIPTORS: BURNING
DESCRIPTORS: SORE
DESCRIPTORS: SORE

## 2025-01-07 NOTE — LACTATION NOTE
This note was copied from a baby's chart.  Per mom, infant had just finished nursing for 20 minutes just prior to my visit. Encouraged mom to offer both breasts at each feeding. Opportunity for questions provided. Mom will call for assistance as needed.

## 2025-01-08 PROCEDURE — 1120000000 HC RM PRIVATE OB

## 2025-01-08 PROCEDURE — 6370000000 HC RX 637 (ALT 250 FOR IP): Performed by: STUDENT IN AN ORGANIZED HEALTH CARE EDUCATION/TRAINING PROGRAM

## 2025-01-08 PROCEDURE — 6370000000 HC RX 637 (ALT 250 FOR IP): Performed by: OBSTETRICS & GYNECOLOGY

## 2025-01-08 RX ORDER — SERTRALINE HYDROCHLORIDE 100 MG/1
100 TABLET, FILM COATED ORAL DAILY
Qty: 90 TABLET | Refills: 3 | Status: SHIPPED | OUTPATIENT
Start: 2025-01-09

## 2025-01-08 RX ORDER — OXYCODONE HYDROCHLORIDE 5 MG/1
5 TABLET ORAL EVERY 6 HOURS PRN
Qty: 18 TABLET | Refills: 0 | Status: SHIPPED | OUTPATIENT
Start: 2025-01-08 | End: 2025-01-15

## 2025-01-08 RX ORDER — ASPIRIN 81 MG/1
81 TABLET ORAL DAILY
Status: DISCONTINUED | OUTPATIENT
Start: 2025-01-08 | End: 2025-01-09 | Stop reason: HOSPADM

## 2025-01-08 RX ORDER — PSEUDOEPHEDRINE HCL 30 MG
100 TABLET ORAL 2 TIMES DAILY PRN
Qty: 60 CAPSULE | Refills: 1 | Status: SHIPPED | OUTPATIENT
Start: 2025-01-08

## 2025-01-08 RX ORDER — FERROUS SULFATE 325(65) MG
325 TABLET ORAL
Qty: 90 TABLET | Refills: 1 | Status: SHIPPED | OUTPATIENT
Start: 2025-01-08

## 2025-01-08 RX ADMIN — SERTRALINE HYDROCHLORIDE 100 MG: 50 TABLET ORAL at 08:44

## 2025-01-08 RX ADMIN — OXYCODONE HYDROCHLORIDE 10 MG: 5 TABLET ORAL at 19:12

## 2025-01-08 RX ADMIN — POLYETHYLENE GLYCOL 3350 17 G: 17 POWDER, FOR SOLUTION ORAL at 08:44

## 2025-01-08 RX ADMIN — ACETAMINOPHEN 1000 MG: 500 TABLET ORAL at 07:22

## 2025-01-08 RX ADMIN — ACETAMINOPHEN 1000 MG: 500 TABLET ORAL at 14:57

## 2025-01-08 RX ADMIN — OXYCODONE HYDROCHLORIDE 10 MG: 5 TABLET ORAL at 23:11

## 2025-01-08 RX ADMIN — ASPIRIN 81 MG: 81 TABLET, COATED ORAL at 15:00

## 2025-01-08 RX ADMIN — OXYCODONE HYDROCHLORIDE 10 MG: 5 TABLET ORAL at 14:56

## 2025-01-08 RX ADMIN — OXYCODONE HYDROCHLORIDE 10 MG: 5 TABLET ORAL at 02:26

## 2025-01-08 RX ADMIN — OXYCODONE HYDROCHLORIDE 10 MG: 5 TABLET ORAL at 11:12

## 2025-01-08 RX ADMIN — OXYCODONE HYDROCHLORIDE 10 MG: 5 TABLET ORAL at 07:20

## 2025-01-08 RX ADMIN — ACETAMINOPHEN 1000 MG: 500 TABLET ORAL at 23:15

## 2025-01-08 RX ADMIN — SIMETHICONE 80 MG: 80 TABLET, CHEWABLE ORAL at 07:23

## 2025-01-08 RX ADMIN — SIMETHICONE 80 MG: 80 TABLET, CHEWABLE ORAL at 13:02

## 2025-01-08 ASSESSMENT — PAIN DESCRIPTION - DESCRIPTORS
DESCRIPTORS: SORE
DESCRIPTORS: BURNING;PRESSURE;SORE
DESCRIPTORS: BURNING;PRESSURE;SORE
DESCRIPTORS: ACHING;BURNING;PRESSURE
DESCRIPTORS: SORE
DESCRIPTORS: ACHING;BURNING;PRESSURE

## 2025-01-08 ASSESSMENT — PAIN SCALES - GENERAL
PAINLEVEL_OUTOF10: 7
PAINLEVEL_OUTOF10: 7
PAINLEVEL_OUTOF10: 5
PAINLEVEL_OUTOF10: 8
PAINLEVEL_OUTOF10: 7
PAINLEVEL_OUTOF10: 7

## 2025-01-08 ASSESSMENT — PAIN - FUNCTIONAL ASSESSMENT
PAIN_FUNCTIONAL_ASSESSMENT: ACTIVITIES ARE NOT PREVENTED

## 2025-01-08 ASSESSMENT — PAIN DESCRIPTION - LOCATION
LOCATION: ABDOMEN;INCISION
LOCATION: INCISION
LOCATION: INCISION

## 2025-01-08 ASSESSMENT — PAIN DESCRIPTION - ORIENTATION
ORIENTATION: LOWER

## 2025-01-08 NOTE — DISCHARGE SUMMARY
hours as needed for Wheezing           STOP taking these medications       promethazine (PHENERGAN) 25 MG tablet Comments:   Reason for Stopping:         cefUROXime (CEFTIN) 250 MG tablet Comments:   Reason for Stopping:               Disposition at Discharge: Home or self care    Condition at Discharge: Stable    Reference my discharge instructions.    No follow-ups on file.     Signed By:  Magalis Hayes MD     January 8, 2025

## 2025-01-09 VITALS
TEMPERATURE: 98.3 F | DIASTOLIC BLOOD PRESSURE: 77 MMHG | OXYGEN SATURATION: 99 % | HEART RATE: 83 BPM | SYSTOLIC BLOOD PRESSURE: 120 MMHG | RESPIRATION RATE: 16 BRPM

## 2025-01-09 LAB — HGB BLD-MCNC: 10.4 G/DL (ref 11.5–16)

## 2025-01-09 PROCEDURE — 6370000000 HC RX 637 (ALT 250 FOR IP): Performed by: OBSTETRICS & GYNECOLOGY

## 2025-01-09 PROCEDURE — 6370000000 HC RX 637 (ALT 250 FOR IP): Performed by: STUDENT IN AN ORGANIZED HEALTH CARE EDUCATION/TRAINING PROGRAM

## 2025-01-09 RX ADMIN — ACETAMINOPHEN 1000 MG: 500 TABLET ORAL at 07:25

## 2025-01-09 RX ADMIN — OXYCODONE HYDROCHLORIDE 10 MG: 5 TABLET ORAL at 07:26

## 2025-01-09 RX ADMIN — ASPIRIN 81 MG: 81 TABLET, COATED ORAL at 10:26

## 2025-01-09 RX ADMIN — OXYCODONE HYDROCHLORIDE 10 MG: 5 TABLET ORAL at 12:58

## 2025-01-09 RX ADMIN — OXYCODONE HYDROCHLORIDE 10 MG: 5 TABLET ORAL at 03:34

## 2025-01-09 RX ADMIN — SERTRALINE HYDROCHLORIDE 100 MG: 50 TABLET ORAL at 10:26

## 2025-01-09 ASSESSMENT — PAIN DESCRIPTION - ORIENTATION
ORIENTATION: LOWER

## 2025-01-09 ASSESSMENT — PAIN - FUNCTIONAL ASSESSMENT
PAIN_FUNCTIONAL_ASSESSMENT: ACTIVITIES ARE NOT PREVENTED
PAIN_FUNCTIONAL_ASSESSMENT: ACTIVITIES ARE NOT PREVENTED

## 2025-01-09 ASSESSMENT — PAIN SCALES - GENERAL
PAINLEVEL_OUTOF10: 4
PAINLEVEL_OUTOF10: 5
PAINLEVEL_OUTOF10: 3

## 2025-01-09 ASSESSMENT — PAIN DESCRIPTION - LOCATION
LOCATION: INCISION
LOCATION: ABDOMEN;INCISION
LOCATION: ABDOMEN;INCISION

## 2025-01-09 ASSESSMENT — PAIN DESCRIPTION - DESCRIPTORS
DESCRIPTORS: SORE
DESCRIPTORS: ACHING;BURNING;PRESSURE
DESCRIPTORS: ACHING;BURNING;PRESSURE

## 2025-01-09 NOTE — LACTATION NOTE
This note was copied from a baby's chart.  Per mom, baby nursing well today,  deep latch obtained, mother is comfortable, baby feeding vigorously with rhythmic suck, swallow, breathe pattern, audible swallowing, and evident milk transfer, both breasts offered, baby is asleep following feeding. Mom states her milk is in. Infant weight loss 6.3%, reflecting a gain.     Breasts may become engorged when milk \"comes in\".  How milk is made / normal phases of milk production, supply and demand discussed.  Taught care of engorged breasts - frequent breastfeeding encouraged and breast massage ac. Then nurse the baby (or pump minimally for comfort). Apply cold compresses ac and/or pc x 15 minutes a few times a day for swelling or discomfort if necessary.  May need to do this care for a couple of days.Discussed prevention and treatment of mastitis.

## 2025-01-09 NOTE — PROGRESS NOTES
0328- Pt arrived to [facility] 2 via squad for r/o labor. Previous c/s.  Pt feeling pressure SVE 5-6/90  
0419 - Bedside and Verbal shift change report given to ALONDRA Henson RN (oncoming nurse) by DUSTIN Sanez RN (offgoing nurse). Report included the following information Nurse Handoff Report, Intake/Output, MAR, and Recent Results.    0730 - Bedside and Verbal shift change report given to EDIN Rodriguez RN (oncoming nurse) by ALONDRA Henson RN (offgoing nurse). Report included the following information Nurse Handoff Report, Intake/Output, MAR, and Recent Results.        
0735~  Bedside and Verbal shift change report given to SANCHO Rodriguez RN (oncoming nurse) by NY Henson RN (offgoing nurse). Report included the following information Nurse Handoff Report, Intake/Output, MAR, and Recent Results    0817~  Dr Baugh in room to evaluate patient.    0915~  Patient bathed, gown changed.    
Called in to help in OR d/t bleeding and extension/window in left broad ligament. Pretty good hemostasis by the time I arrived. GYN ONC arrived a couple minutes after me - closed broad ligament window and placed few sutures to obtain hemostasis of left apex of hysterotomy. See their op notes for further details.    Electronically signed:  Keisha Mercedes DO  1/6/2025 6:32 AM    
Post-Operative  Day 2    Rafaela Jasso     Assessment:   Post-Op day 2 s/p RLTCS done for labor and desired repeat c/b broad ligament window repaired in conjuction with gyn onc, doing well  Acute surgical blood loss anemia: Hgb 12.4 -> 8.2, VSS, pt asymptomatic, home on Fe  H/o stroke secondary to AVM, f/b neurology - advised to notify them of delivery. On ldASA. No NSAIDS.   H/o PPH with G1, bleeding currently stable     Plan:   - Routine post-operative care.  - Plan for discharge Tomorrow.  - Home on Fe    Information for the patient's :  Romero GIRL Rafaela [[id number]]   , Low Transverse  Patient doing well without significant complaint. Tolerating regular diet.  Ambulating.  Voiding without difficulty.    Vitals:  /68   Pulse 70   Temp 98.4 °F (36.9 °C) (Oral)   Resp 16   SpO2 96%   Breastfeeding Unknown   Temp (24hrs), Av.5 °F (36.9 °C), Min:98.2 °F (36.8 °C), Max:98.8 °F (37.1 °C)        Exam:       Patient without distress.                 Fundus firm, nontender per nursing fundal checks.     Incision bandaged. Clean, dry, intact.                Perineum with normal lochia noted per nursing assessment.                Lower extremities are negative for pathological edema.    Labs:   Lab Results   Component Value Date/Time    WBC 21.6 2025 11:50 AM    WBC 21.4 2025 08:11 AM    WBC 15.5 2025 04:13 AM    WBC 4.7 2023 03:27 AM    WBC 10.4 2023 10:28 PM    WBC 12.4 2023 02:59 AM    WBC 11.5 2023 04:37 AM    WBC 8.2 2023 01:59 PM    WBC 6.6 2023 11:19 AM    WBC 9.8 2023 02:59 AM    WBC 6.0 2023 02:06 PM    WBC 6.7 2022 11:40 AM    WBC 13.2 2022 04:36 AM    WBC 7.2 2022 11:30 AM    WBC 6.9 2022 01:39 PM    WBC 11.2 2022 03:23 AM    WBC 6.1 2022 01:35 PM    WBC 9.0 2022 04:17 PM    WBC 14.2 2022 05:44 AM    WBC 13.0 2022 03:13 AM    WBC 13.7 2022 05:17 
Post-Operative  Day 3    Rafaela Jasso     Assessment:   Post-Op day 3 s/p RLTCS done for labor and desired repeat c/b broad ligament window repaired in conjuction with gyn onc, doing well  Acute surgical blood loss anemia: Hgb 12.4 -> 8.2, VSS, pt asymptomatic, home on Fe  H/o stroke secondary to AVM, f/b neurology - advised to notify them of delivery. On ldASA. No NSAIDS.   H/o PPH with G1, bleeding currently stable     Plan:   - Routine post-operative care.  - Plan for discharge today.  - Home on Fe    Information for the patient's :  Romero GIRL Rafaela [[id number]]   , Low Transverse  Patient doing well without significant complaint. Tolerating regular diet.  Ambulating.  Voiding without difficulty.    Vitals:  /76   Pulse 66   Temp 97.7 °F (36.5 °C) (Oral)   Resp 16   SpO2 99%   Breastfeeding Unknown   Temp (24hrs), Av.9 °F (36.6 °C), Min:97.7 °F (36.5 °C), Max:98.1 °F (36.7 °C)        Exam:       Patient without distress.                 Fundus firm, nontender per nursing fundal checks.     Incision bandaged. Clean, dry, intact.                Perineum with normal lochia noted per nursing assessment.                Lower extremities are negative for pathological edema.    Labs:   Lab Results   Component Value Date/Time    WBC 21.6 2025 11:50 AM    WBC 21.4 2025 08:11 AM    WBC 15.5 2025 04:13 AM    WBC 4.7 2023 03:27 AM    WBC 10.4 2023 10:28 PM    WBC 12.4 2023 02:59 AM    WBC 11.5 2023 04:37 AM    WBC 8.2 2023 01:59 PM    WBC 6.6 2023 11:19 AM    WBC 9.8 2023 02:59 AM    WBC 6.0 2023 02:06 PM    WBC 6.7 2022 11:40 AM    WBC 13.2 2022 04:36 AM    WBC 7.2 2022 11:30 AM    WBC 6.9 2022 01:39 PM    WBC 11.2 2022 03:23 AM    WBC 6.1 2022 01:35 PM    WBC 9.0 2022 04:17 PM    WBC 14.2 2022 05:44 AM    WBC 13.0 2022 03:13 AM    WBC 13.7 2022 05:17 AM 
02:41 AM    HCT 41.1 03/17/2022 04:06 PM     01/06/2025 11:50 AM     01/06/2025 08:11 AM     01/06/2025 04:13 AM     12/29/2023 03:27 AM     04/30/2023 10:28 PM     04/27/2023 02:59 AM     04/26/2023 04:37 AM     04/25/2023 01:59 PM     04/18/2023 11:19 AM     01/18/2023 02:59 AM     01/06/2023 02:06 PM     12/08/2022 11:40 AM     11/30/2022 04:36 AM     11/22/2022 11:30 AM     07/05/2022 01:39 PM     06/30/2022 03:23 AM     06/22/2022 01:35 PM     04/20/2022 04:17 PM     03/25/2022 05:44 AM     03/24/2022 03:13 AM     03/23/2022 05:17 AM     03/22/2022 06:13 AM     03/21/2022 05:29 AM     03/20/2022 04:27 AM     03/19/2022 04:18 AM     03/18/2022 02:41 AM     03/17/2022 04:06 PM       Recent Results (from the past 24 hour(s))   CBC    Collection Time: 01/06/25 11:50 AM   Result Value Ref Range    WBC 21.6 (H) 3.6 - 11.0 K/uL    RBC 2.55 (L) 3.80 - 5.20 M/uL    Hemoglobin 8.2 (L) 11.5 - 16.0 g/dL    Hematocrit 25.1 (L) 35.0 - 47.0 %    MCV 98.4 80.0 - 99.0 FL    MCH 32.2 26.0 - 34.0 PG    MCHC 32.7 30.0 - 36.5 g/dL    RDW 12.8 11.5 - 14.5 %    Platelets 218 150 - 400 K/uL    MPV 10.5 8.9 - 12.9 FL    Nucleated RBCs 0.0 0  WBC    nRBC 0.00 0.00 - 0.01 K/uL

## 2025-03-26 ENCOUNTER — PATIENT MESSAGE (OUTPATIENT)
Age: 40
End: 2025-03-26

## 2025-03-26 DIAGNOSIS — I67.1 CEREBRAL ANEURYSM: ICD-10-CM

## 2025-03-26 DIAGNOSIS — Q28.2 ARTERIOVENOUS MALFORMATION OF CEREBRAL VESSELS: Primary | ICD-10-CM

## 2025-04-06 ENCOUNTER — HOSPITAL ENCOUNTER (OUTPATIENT)
Facility: HOSPITAL | Age: 40
Discharge: HOME | End: 2025-04-09
Attending: RADIOLOGY
Payer: MEDICAID

## 2025-04-06 DIAGNOSIS — Q28.2 ARTERIOVENOUS MALFORMATION OF CEREBRAL VESSELS: ICD-10-CM

## 2025-04-06 DIAGNOSIS — I67.1 CEREBRAL ANEURYSM: ICD-10-CM

## 2025-04-06 PROCEDURE — 70496 CT ANGIOGRAPHY HEAD: CPT

## 2025-04-06 PROCEDURE — 70450 CT HEAD/BRAIN W/O DYE: CPT

## 2025-04-06 PROCEDURE — 6360000004 HC RX CONTRAST MEDICATION: Performed by: RADIOLOGY

## 2025-04-06 RX ORDER — IOPAMIDOL 755 MG/ML
100 INJECTION, SOLUTION INTRAVASCULAR
Status: COMPLETED | OUTPATIENT
Start: 2025-04-06 | End: 2025-04-06

## 2025-04-06 RX ADMIN — IOPAMIDOL 100 ML: 755 INJECTION, SOLUTION INTRAVENOUS at 11:42

## 2025-04-14 ENCOUNTER — OFFICE VISIT (OUTPATIENT)
Age: 40
End: 2025-04-14
Payer: MEDICAID

## 2025-04-14 VITALS
HEART RATE: 80 BPM | WEIGHT: 115 LBS | HEIGHT: 60 IN | BODY MASS INDEX: 22.58 KG/M2 | SYSTOLIC BLOOD PRESSURE: 116 MMHG | DIASTOLIC BLOOD PRESSURE: 70 MMHG | OXYGEN SATURATION: 99 % | RESPIRATION RATE: 16 BRPM

## 2025-04-14 DIAGNOSIS — G43.909 EPISODIC MIGRAINE: Primary | ICD-10-CM

## 2025-04-14 DIAGNOSIS — Z87.74 HISTORY OF ARTERIOVENOUS MALFORMATION (AVM): ICD-10-CM

## 2025-04-14 PROCEDURE — 99215 OFFICE O/P EST HI 40 MIN: CPT

## 2025-04-14 RX ORDER — MAGNESIUM OXIDE 400 MG/1
400 TABLET ORAL DAILY
Qty: 90 TABLET | Refills: 1 | Status: SHIPPED | OUTPATIENT
Start: 2025-04-14

## 2025-04-14 ASSESSMENT — VISUAL ACUITY: VA_NORMAL: 1

## 2025-04-14 ASSESSMENT — PATIENT HEALTH QUESTIONNAIRE - PHQ9
SUM OF ALL RESPONSES TO PHQ QUESTIONS 1-9: 0
2. FEELING DOWN, DEPRESSED OR HOPELESS: NOT AT ALL
SUM OF ALL RESPONSES TO PHQ QUESTIONS 1-9: 0
1. LITTLE INTEREST OR PLEASURE IN DOING THINGS: NOT AT ALL
SUM OF ALL RESPONSES TO PHQ QUESTIONS 1-9: 0
SUM OF ALL RESPONSES TO PHQ QUESTIONS 1-9: 0

## 2025-04-14 ASSESSMENT — ENCOUNTER SYMPTOMS: PHOTOPHOBIA: 0

## 2025-04-14 NOTE — PROGRESS NOTES
Chief Complaint   Patient presents with    Follow-up    Migraine      Vitals:    04/14/25 1308   BP: 116/70   Pulse: 80   Resp: 16   SpO2: 99%      
stents of right cerebellar arteriovenous  malformation.    Preliminary report was provided by Dr. Gómez, the on-call radiologist, at 3:12  AM    Final report to follow.        EXAM:  MRI BRAIN W WO CONT, MRA NECK WO CONT, MRA BRAIN WO CONT    INDICATION:    R cerebellar    COMPARISON:  CT head 3/18/2022, CTA head and neck 3/17/2022.    CONTRAST: 5 ml ProHance.    TECHNIQUE:  MRI brain:  Multiplanar multisequence acquisition without and with contrast of the brain.    MRA Head/Neck:  Time-of-flight non-contrast MRA of the head and neck were performed. Multiplanar  and MIP reconstructions were obtained.    FINDINGS:  MRI brain:  Stable size of right cerebellar intraparenchymal hemorrhage measuring 2.4 x 1.7  x 2.6 cm, with stable surrounding mild edema and mass effect resulting in  partial effacement of the fourth ventricle. There is no cerebellar tonsillar  herniation. There is no hydrocephalus with normal size of the ventricles. There  are multiple serpiginous vessels seen adjacent to the hemorrhage within the  right posterior fossa, consistent with arteriovenous malformation; see below.  There is no evidence of an underlying mass.    There is no acute infarct. The remaining brain parenchyma has normal signal  characteristics. There is no cerebellar tonsillar herniation. Expected arterial  flow-voids are present. Mild mucosal thickening in the bilateral maxillary  sinuses without air-fluid level. The mastoid air cells and middle ears are  clear. The orbital contents are within normal limits. No significant osseous or  scalp lesions are identified.    MRA Head:  Right cerebellar arteriovenous malformation, with nidus measuring approximately  3.3 x 2.0 cm (series 3 image 99). There is supply is via an enlarged right PICA,  which contains an aneurysm measuring 1.2 x 0.9 x 1.2 cm (series 3 image 100).  There are additional small arterial feeding vessels from the right vertebral  artery and from the AICA. There are

## 2025-06-12 ENCOUNTER — OFFICE VISIT (OUTPATIENT)
Age: 40
End: 2025-06-12
Payer: MEDICAID

## 2025-06-12 VITALS
HEART RATE: 98 BPM | SYSTOLIC BLOOD PRESSURE: 110 MMHG | WEIGHT: 98.6 LBS | OXYGEN SATURATION: 98 % | DIASTOLIC BLOOD PRESSURE: 78 MMHG | BODY MASS INDEX: 19.36 KG/M2 | HEIGHT: 60 IN | TEMPERATURE: 97.8 F

## 2025-06-12 DIAGNOSIS — Q28.2 ARTERIOVENOUS MALFORMATION OF CEREBRAL VESSELS: Primary | ICD-10-CM

## 2025-06-12 PROCEDURE — 99214 OFFICE O/P EST MOD 30 MIN: CPT | Performed by: RADIOLOGY

## 2025-06-12 NOTE — PATIENT INSTRUCTIONS
Follow up in 1 year, June 2026, after imaging is completed.  See attached paperwork to schedule imaging.

## 2025-06-12 NOTE — PROGRESS NOTES
Annual follow up for AVM and imaging review.  Friend at visit.  Patient reports continued headaches, having a weird feeling, feeling like she is walking sideways at times, and increased forgetfulness.  Reports she has taken Nurtec for headaches without relief.  No acute problems reported.  
the medication.  Since then, patient has continued to improve and has been doing fairly well.    Neurologic exam is normal.    We reviewed the results of her recent CTA.  There has been interval decrease in size of the right cerebellar AVM nidus.  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  The Pipeline construct is patent.  Overall, the appearance is improved compared to the prior contrast-enhanced MRI performed 2/2/2024.    I advised patient to follow-up with Dr. Treviño.  For now, we will plan to obtain a follow-up MRI brain with and without contrast in 1 year and adjust this plan as necessary per recommendations of Dr. Treviño.  Patient understands to seek emergent medical care if she experiences the worst headache of her life or new/recurrent neurological symptoms.    Plan:     -MRI brain with and without contrast in 1 year    Thank you for allowing me to participate in the care of this patient.    I have spent at least 30 minutes reviewing previous notes, test results and face to face (virtual) with the patient discussing the diagnosis and importance of compliance with the treatment plan as well as documenting on the day of the visit.      Signed By: Maxim Collier MD     June 12, 2025

## 2025-07-16 ENCOUNTER — TELEPHONE (OUTPATIENT)
Age: 40
End: 2025-07-16

## 2025-07-18 NOTE — TELEPHONE ENCOUNTER
Call placed to patient.  2 identifiers received.  Patient stated she is having daily headaches.  Nurtec not working.  Advised she would need to be seen in the office as she missed her last appointment.  Patient did not make an appointment.  Wanted ZENAIDA Hoffman to know on Tuesday she is seeing her neuro radiologist and was going to mention the headaches to them as well   Advised I would reach out to her Tuesday afternoon.

## 2025-07-28 ENCOUNTER — TRANSCRIBE ORDERS (OUTPATIENT)
Facility: HOSPITAL | Age: 40
End: 2025-07-28

## 2025-07-28 DIAGNOSIS — Q27.30 ARTERIOVENOUS MALFORMATION: Primary | ICD-10-CM

## 2025-08-09 ENCOUNTER — HOSPITAL ENCOUNTER (OUTPATIENT)
Facility: HOSPITAL | Age: 40
Discharge: HOME | End: 2025-08-12
Attending: NEUROLOGICAL SURGERY
Payer: MEDICAID

## 2025-08-09 DIAGNOSIS — Q27.30 ARTERIOVENOUS MALFORMATION: ICD-10-CM

## 2025-08-09 PROCEDURE — A9579 GAD-BASE MR CONTRAST NOS,1ML: HCPCS | Performed by: NEUROLOGICAL SURGERY

## 2025-08-09 PROCEDURE — 70553 MRI BRAIN STEM W/O & W/DYE: CPT

## 2025-08-09 PROCEDURE — 6360000004 HC RX CONTRAST MEDICATION: Performed by: NEUROLOGICAL SURGERY

## 2025-08-09 RX ORDER — GADOTERIDOL 279.3 MG/ML
9 INJECTION INTRAVENOUS
Status: COMPLETED | OUTPATIENT
Start: 2025-08-09 | End: 2025-08-09

## 2025-08-09 RX ADMIN — GADOTERIDOL 9 ML: 279.3 INJECTION, SOLUTION INTRAVENOUS at 15:16

## (undated) DEVICE — SUTURE VICRYL + SZ 0 L36IN ABSRB VLT L40MM CT 1/2 CIR TAPR VCP358H

## (undated) DEVICE — ATTACHMENT SMK 3/8INX10FT VALLEYLAB

## (undated) DEVICE — Z DISCONTINUED NO SUB IDED SET EXTN W/ 4 W STPCOCK M SPIN LOK 36IN

## (undated) DEVICE — INFECTION CONTROL KIT SYS

## (undated) DEVICE — STERILE POLYISOPRENE POWDER-FREE SURGICAL GLOVES WITH EMOLLIENT COATING: Brand: PROTEXIS

## (undated) DEVICE — SYR 10ML LUER LOK 1/5ML GRAD --

## (undated) DEVICE — TOWEL SURG W17XL27IN STD BLU COT NONFENESTRATED PREWASHED

## (undated) DEVICE — X-RAY SPONGES,16 PLY: Brand: DERMACEA

## (undated) DEVICE — 3000CC GUARDIAN II: Brand: GUARDIAN

## (undated) DEVICE — KENDALL SCD EXPRESS SLEEVES, KNEE LENGTH, MEDIUM: Brand: KENDALL SCD

## (undated) DEVICE — TRAY PREP DRY W/ PREM GLV 2 APPL 6 SPNG 2 UNDPD 1 OVERWRAP

## (undated) DEVICE — STERILE POLYISOPRENE POWDER-FREE SURGICAL GLOVES: Brand: PROTEXIS

## (undated) DEVICE — SOLUTION IRRIG 1000ML STRL H2O USP PLAS POUR BTL

## (undated) DEVICE — PACK,LITHOTOMY,PK I: Brand: MEDLINE

## (undated) DEVICE — MEDI-VAC NON-CONDUCTIVE SUCTION TUBING: Brand: CARDINAL HEALTH

## (undated) DEVICE — CATHETER URIN 16FR 30CC BLLN 2 W F LUBRI-SIL IC

## (undated) DEVICE — COVERALLS PROTCT 2XL WHT SMS ANTISTATIC PREM KNIT CUF FULL

## (undated) DEVICE — BASIC SINGLE BASIN BTC-LF: Brand: MEDLINE INDUSTRIES, INC.

## (undated) DEVICE — DEVON™ KNEE AND BODY STRAP 60" X 3" (1.5 M X 7.6 CM): Brand: DEVON

## (undated) DEVICE — Z DISCONTINUED PACK PROCEDURE SURG C SECT KT SMH

## (undated) DEVICE — Z DISCONTINUED NO SUB SUTURE PLN GUT SZ 2-0 L27IN ABSRB YELLOWISH TAN L70MM XLH 53T

## (undated) DEVICE — LIGHT HANDLE: Brand: DEVON

## (undated) DEVICE — DRESSING SIL W4XL5IN ANTIBACT GELLING FBR CYTOFORM

## (undated) DEVICE — APPLICATOR MEDICATED 26 CC SOLUTION HI LT ORNG CHLORAPREP

## (undated) DEVICE — ELECTRODE PT RET AD L9FT HI MOIST COND ADH HYDRGEL CORDED

## (undated) DEVICE — DRAPE FLD WRM W44XL66IN C6L FOR INTRATEMP SYS THERMABASIN

## (undated) DEVICE — SKIN MARKER,REGULAR TIP WITH RULER AND LABELS: Brand: DEVON

## (undated) DEVICE — Z INACTIVE USE 2527070 DRAPE SURG W40XL44IN UNDERBUTTOCK SMS POLYPR W/ PCH BK DISP

## (undated) DEVICE — NEEDLE SPNL 22GAX3 1 2IN

## (undated) DEVICE — SOLUTION IRRIG 1000ML 0.9% SOD CHL USP POUR PLAS BTL

## (undated) DEVICE — GOWN,SIRUS,NONRNF,SETINSLV,XL,20/CS: Brand: MEDLINE

## (undated) DEVICE — PAD SANIT NPKN 4IN GRD

## (undated) DEVICE — STAPLER SKIN SQ 30 ABSRB STPL DISP INSORB ORDER VIA PHONE OR EMAIL

## (undated) DEVICE — SOLUTION IV 1000ML 0.9% SOD CHL

## (undated) DEVICE — SUTURE MONOCRYL + SZ 2-0 L36IN ABSRB UD CT-1 L36MM 1/2 CIR MCP945H

## (undated) DEVICE — DEVICE ES L3M EDGE COAT HEX LOK BLDE ELECTRD HOLSTER FORC

## (undated) DEVICE — Z DUP USE 2271313 SOLIDIFIER FLUID 3000 CC ABSORB

## (undated) DEVICE — CATH URETH INTMIT ROB 16FR FUN -- CONVERT TO ITEM 179520

## (undated) DEVICE — SUTURE MONOCRYL SZ0 L36IN VIO ABSORB CT-1 NDL HALF CIR MONOCRYL PLUS

## (undated) DEVICE — SYR 50ML LR LCK 1ML GRAD NSAF --